# Patient Record
Sex: MALE | Race: WHITE | Employment: OTHER | URBAN - METROPOLITAN AREA
[De-identification: names, ages, dates, MRNs, and addresses within clinical notes are randomized per-mention and may not be internally consistent; named-entity substitution may affect disease eponyms.]

---

## 2017-03-09 RX ORDER — TADALAFIL 5 MG
TABLET ORAL
Refills: 6 | COMMUNITY
Start: 2017-02-21 | End: 2017-09-18

## 2017-03-09 RX ORDER — PANTOPRAZOLE SODIUM 40 MG/1
40 TABLET, DELAYED RELEASE ORAL
Qty: 90 TABLET | Refills: 3 | Status: SHIPPED | OUTPATIENT
Start: 2017-03-09 | End: 2017-03-27

## 2017-03-27 ENCOUNTER — TELEPHONE (OUTPATIENT)
Dept: INTERNAL MEDICINE CLINIC | Facility: CLINIC | Age: 82
End: 2017-03-27

## 2017-03-27 RX ORDER — PANTOPRAZOLE SODIUM 40 MG/1
40 TABLET, DELAYED RELEASE ORAL
Qty: 90 TABLET | Refills: 3 | Status: SHIPPED | OUTPATIENT
Start: 2017-03-27 | End: 2018-05-10

## 2017-03-27 NOTE — TELEPHONE ENCOUNTER
[facility] requesting NEW RX for:  Pantoprazole Sod DR Tabs 40MG, Qty 90 days w/refills  Tasked to [employer]

## 2017-03-28 ENCOUNTER — HOSPITAL ENCOUNTER (OUTPATIENT)
Dept: GENERAL RADIOLOGY | Facility: HOSPITAL | Age: 82
Discharge: HOME | End: 2017-03-28
Attending: ORTHOPAEDIC SURGERY
Payer: MEDICARE

## 2017-03-28 DIAGNOSIS — M16.11 PRIMARY OSTEOARTHRITIS OF RIGHT HIP: ICD-10-CM

## 2017-03-28 PROCEDURE — 77002 NEEDLE LOCALIZATION BY XRAY: CPT

## 2017-03-28 PROCEDURE — 20610 DRAIN/INJ JOINT/BURSA W/O US: CPT

## 2017-03-28 RX ORDER — TRIAMCINOLONE ACETONIDE 40 MG/ML
INJECTION, SUSPENSION INTRA-ARTICULAR; INTRAMUSCULAR
Status: COMPLETED
Start: 2017-03-28 | End: 2017-03-28

## 2017-03-28 RX ORDER — LIDOCAINE HYDROCHLORIDE 20 MG/ML
5 INJECTION, SOLUTION EPIDURAL; INFILTRATION; INTRACAUDAL; PERINEURAL ONCE
Status: COMPLETED | OUTPATIENT
Start: 2017-03-28 | End: 2017-03-28

## 2017-03-28 RX ORDER — LIDOCAINE HYDROCHLORIDE 20 MG/ML
INJECTION, SOLUTION EPIDURAL; INFILTRATION; INTRACAUDAL; PERINEURAL
Status: COMPLETED
Start: 2017-03-28 | End: 2017-03-28

## 2017-03-28 RX ORDER — TRIAMCINOLONE ACETONIDE 40 MG/ML
40 INJECTION, SUSPENSION INTRA-ARTICULAR; INTRAMUSCULAR ONCE
Status: COMPLETED | OUTPATIENT
Start: 2017-03-28 | End: 2017-03-28

## 2017-03-28 RX ADMIN — TRIAMCINOLONE ACETONIDE 40 MG: 40 INJECTION, SUSPENSION INTRA-ARTICULAR; INTRAMUSCULAR at 09:45:00

## 2017-03-28 RX ADMIN — LIDOCAINE HYDROCHLORIDE 5 ML: 20 INJECTION, SOLUTION EPIDURAL; INFILTRATION; INTRACAUDAL; PERINEURAL at 09:45:00

## 2017-03-28 NOTE — OPERATIVE REPORT
[facility]    PATIENT'S NAME: Marley Smart   ATTENDING PHYSICIAN: George Lee MD   OPERATING PHYSICIAN: George Lee MD   PATIENT ACCOUNT#:   [de-identified]    LOCATION:  [facility]  MEDICAL RECORD #:   [id number]       DATE OF BIRTH:  [birth year] patient supine on the fluoroscopy table and with a foam wedge placed to flex his right hip and knee, posterior sterile draping to the right hip was applied.   The anterior and lateral right hip were then prepared with a triple application of Povidone-iodine

## 2017-03-31 ENCOUNTER — APPOINTMENT (OUTPATIENT)
Dept: LAB | Age: 82
End: 2017-03-31
Attending: INTERNAL MEDICINE
Payer: MEDICARE

## 2017-03-31 PROCEDURE — 84443 ASSAY THYROID STIM HORMONE: CPT | Performed by: INTERNAL MEDICINE

## 2017-03-31 PROCEDURE — 80061 LIPID PANEL: CPT | Performed by: INTERNAL MEDICINE

## 2017-03-31 PROCEDURE — 80053 COMPREHEN METABOLIC PANEL: CPT | Performed by: INTERNAL MEDICINE

## 2017-03-31 PROCEDURE — 85060 BLOOD SMEAR INTERPRETATION: CPT | Performed by: INTERNAL MEDICINE

## 2017-03-31 PROCEDURE — 81001 URINALYSIS AUTO W/SCOPE: CPT | Performed by: INTERNAL MEDICINE

## 2017-03-31 PROCEDURE — 85025 COMPLETE CBC W/AUTO DIFF WBC: CPT | Performed by: INTERNAL MEDICINE

## 2017-04-05 ENCOUNTER — OFFICE VISIT (OUTPATIENT)
Dept: INTERNAL MEDICINE CLINIC | Facility: CLINIC | Age: 82
End: 2017-04-05

## 2017-04-05 VITALS
OXYGEN SATURATION: 97 % | SYSTOLIC BLOOD PRESSURE: 120 MMHG | HEART RATE: 81 BPM | BODY MASS INDEX: 24.5 KG/M2 | WEIGHT: 140 LBS | DIASTOLIC BLOOD PRESSURE: 70 MMHG | HEIGHT: 63.25 IN | TEMPERATURE: 98 F

## 2017-04-05 DIAGNOSIS — E78.00 HYPERCHOLESTEREMIA: ICD-10-CM

## 2017-04-05 DIAGNOSIS — I25.9 IHD (ISCHEMIC HEART DISEASE): ICD-10-CM

## 2017-04-05 DIAGNOSIS — M79.606 PAIN IN LEG, UNSPECIFIED: Primary | ICD-10-CM

## 2017-04-05 DIAGNOSIS — D72.829 LEUKOCYTOSIS, UNSPECIFIED TYPE: ICD-10-CM

## 2017-04-05 PROCEDURE — G0463 HOSPITAL OUTPT CLINIC VISIT: HCPCS | Performed by: INTERNAL MEDICINE

## 2017-04-05 PROCEDURE — 99214 OFFICE O/P EST MOD 30 MIN: CPT | Performed by: INTERNAL MEDICINE

## 2017-04-05 RX ORDER — ATORVASTATIN CALCIUM 10 MG/1
10 TABLET, FILM COATED ORAL DAILY
Qty: 90 TABLET | Refills: 3 | Status: SHIPPED | OUTPATIENT
Start: 2017-04-05 | End: 2018-03-13

## 2017-04-05 RX ORDER — NITROGLYCERIN 0.3 MG/1
0.3 TABLET SUBLINGUAL EVERY 5 MIN PRN
Qty: 25 TABLET | Refills: 0 | Status: SHIPPED | OUTPATIENT
Start: 2017-04-05 | End: 2018-09-07

## 2017-04-05 NOTE — PROGRESS NOTES
Clinton Monroe is a 80year old male. HPI:   He is doing well and has no major complaints. He did have steroid injection in right hip Dr Roula Griffin with good relief. He has not CV sx in spite of significant disease.        SR: no chest pain or sob, no gu or g distress  SKIN: no rashes,no suspicious lesions  HEENT: atraumatic, normocephalic,ears and throat are clear  NECK: supple,no adenopathy,no bruits  LUNGS: clear to auscultation  CARDIO: RRR without murmur  GI: good BS's,no masses, HSM or tenderness  EXTREMI

## 2017-05-04 ENCOUNTER — LAB ENCOUNTER (OUTPATIENT)
Dept: LAB | Age: 82
End: 2017-05-04
Attending: INTERNAL MEDICINE
Payer: MEDICARE

## 2017-05-04 DIAGNOSIS — I25.9 IHD (ISCHEMIC HEART DISEASE): ICD-10-CM

## 2017-05-04 DIAGNOSIS — M79.606 PAIN IN LEG, UNSPECIFIED: ICD-10-CM

## 2017-05-04 DIAGNOSIS — E78.00 HYPERCHOLESTEREMIA: ICD-10-CM

## 2017-05-04 DIAGNOSIS — D72.829 LEUKOCYTOSIS, UNSPECIFIED TYPE: ICD-10-CM

## 2017-05-04 PROCEDURE — 36415 COLL VENOUS BLD VENIPUNCTURE: CPT

## 2017-05-04 PROCEDURE — 85025 COMPLETE CBC W/AUTO DIFF WBC: CPT

## 2017-05-07 ENCOUNTER — TELEPHONE (OUTPATIENT)
Dept: INTERNAL MEDICINE CLINIC | Facility: CLINIC | Age: 82
End: 2017-05-07

## 2017-05-07 DIAGNOSIS — D56.3 THALASSEMIA TRAIT: Primary | ICD-10-CM

## 2017-05-07 NOTE — TELEPHONE ENCOUNTER
Blood count slightly lower 12.2 was 12.9 - most likely due to thalassemia-  Just repeat CBC in 2 mo with ferritin    All other labs good from 3/31

## 2017-05-08 NOTE — TELEPHONE ENCOUNTER
Patient contacted and relayed 's message regarding lab results. CBC and Ferritin to be done in 2 months ordered.

## 2017-07-07 ENCOUNTER — LAB ENCOUNTER (OUTPATIENT)
Dept: LAB | Age: 82
End: 2017-07-07
Attending: INTERNAL MEDICINE
Payer: MEDICARE

## 2017-07-07 DIAGNOSIS — D56.3 THALASSEMIA TRAIT: ICD-10-CM

## 2017-07-07 LAB
BASOPHILS # BLD: 0.1 K/UL (ref 0–0.2)
BASOPHILS NFR BLD: 1 %
EOSINOPHIL # BLD: 0.2 K/UL (ref 0–0.7)
EOSINOPHIL NFR BLD: 2 %
ERYTHROCYTE [DISTWIDTH] IN BLOOD BY AUTOMATED COUNT: 17.7 % (ref 11–15)
FERRITIN SERPL IA-MCNC: 101 NG/ML (ref 24–336)
HCT VFR BLD AUTO: 41.5 % (ref 41–52)
HGB BLD-MCNC: 13 G/DL (ref 13.5–17.5)
LYMPHOCYTES # BLD: 1.5 K/UL (ref 1–4)
LYMPHOCYTES NFR BLD: 16 %
MCH RBC QN AUTO: 20.7 PG (ref 27–32)
MCHC RBC AUTO-ENTMCNC: 31.4 G/DL (ref 32–37)
MCV RBC AUTO: 65.9 FL (ref 80–100)
MONOCYTES # BLD: 0.9 K/UL (ref 0–1)
MONOCYTES NFR BLD: 10 %
NEUTROPHILS # BLD AUTO: 6.5 K/UL (ref 1.8–7.7)
NEUTROPHILS NFR BLD: 71 %
PLATELET # BLD AUTO: 190 K/UL (ref 140–400)
PMV BLD AUTO: 9.2 FL (ref 7.4–10.3)
RBC # BLD AUTO: 6.3 M/UL (ref 4.5–5.9)
WBC # BLD AUTO: 9.1 K/UL (ref 4–11)

## 2017-07-07 PROCEDURE — 82728 ASSAY OF FERRITIN: CPT

## 2017-07-07 PROCEDURE — 85025 COMPLETE CBC W/AUTO DIFF WBC: CPT

## 2017-07-07 PROCEDURE — 36415 COLL VENOUS BLD VENIPUNCTURE: CPT

## 2017-07-16 ENCOUNTER — TELEPHONE (OUTPATIENT)
Dept: INTERNAL MEDICINE CLINIC | Facility: CLINIC | Age: 82
End: 2017-07-16

## 2017-08-30 RX ORDER — METOPROLOL SUCCINATE 25 MG/1
TABLET, EXTENDED RELEASE ORAL
Qty: 90 TABLET | Refills: 3 | Status: SHIPPED | OUTPATIENT
Start: 2017-08-30 | End: 2018-08-25

## 2017-09-05 ENCOUNTER — HOSPITAL ENCOUNTER (OUTPATIENT)
Dept: GENERAL RADIOLOGY | Facility: HOSPITAL | Age: 82
Discharge: HOME | End: 2017-09-05
Attending: ORTHOPAEDIC SURGERY
Payer: MEDICARE

## 2017-09-05 DIAGNOSIS — M16.11 PRIMARY OSTEOARTHRITIS OF RIGHT HIP: ICD-10-CM

## 2017-09-05 PROCEDURE — 77002 NEEDLE LOCALIZATION BY XRAY: CPT | Performed by: ORTHOPAEDIC SURGERY

## 2017-09-05 PROCEDURE — 20610 DRAIN/INJ JOINT/BURSA W/O US: CPT | Performed by: ORTHOPAEDIC SURGERY

## 2017-09-05 RX ORDER — LIDOCAINE HYDROCHLORIDE 20 MG/ML
INJECTION, SOLUTION EPIDURAL; INFILTRATION; INTRACAUDAL; PERINEURAL
Status: COMPLETED
Start: 2017-09-05 | End: 2017-09-05

## 2017-09-05 RX ORDER — TRIAMCINOLONE ACETONIDE 40 MG/ML
40 INJECTION, SUSPENSION INTRA-ARTICULAR; INTRAMUSCULAR ONCE
Status: COMPLETED | OUTPATIENT
Start: 2017-09-05 | End: 2017-09-05

## 2017-09-05 RX ORDER — TRIAMCINOLONE ACETONIDE 40 MG/ML
INJECTION, SUSPENSION INTRA-ARTICULAR; INTRAMUSCULAR
Status: COMPLETED
Start: 2017-09-05 | End: 2017-09-05

## 2017-09-05 RX ORDER — LIDOCAINE HYDROCHLORIDE 20 MG/ML
5 INJECTION, SOLUTION EPIDURAL; INFILTRATION; INTRACAUDAL; PERINEURAL ONCE
Status: COMPLETED | OUTPATIENT
Start: 2017-09-05 | End: 2017-09-05

## 2017-09-05 RX ADMIN — TRIAMCINOLONE ACETONIDE 40 MG: 40 INJECTION, SUSPENSION INTRA-ARTICULAR; INTRAMUSCULAR at 10:00:00

## 2017-09-05 RX ADMIN — LIDOCAINE HYDROCHLORIDE 5 ML: 20 INJECTION, SOLUTION EPIDURAL; INFILTRATION; INTRACAUDAL; PERINEURAL at 10:15:00

## 2017-09-05 NOTE — OPERATIVE REPORT
[facility]    PATIENT'S NAME: John Saha   ATTENDING PHYSICIAN: George Lee MD   OPERATING PHYSICIAN: George Lee MD   PATIENT ACCOUNT#:   [de-identified]    LOCATION:  [facility]  MEDICAL RECORD #:   [id number]       DATE OF BIRTH:  [birth year] application of povidone-iodine solution. Local anterolateral draping was placed. The right hip was visualized with AP fluoroscopy.   A 22-gauge spinal needle was aligned to the femoral neck and head followed by skin puncture and infiltration of a small Dictated By Tanya Lee MD  d: 09/05/2017 11:46:22  t: 09/05/2017 11:58:29  Job [id number]  EOK/

## 2017-09-18 ENCOUNTER — TELEPHONE (OUTPATIENT)
Dept: INTERNAL MEDICINE CLINIC | Facility: CLINIC | Age: 82
End: 2017-09-18

## 2017-09-18 RX ORDER — TADALAFIL 10 MG/1
10 TABLET ORAL
Qty: 30 TABLET | Refills: 6 | COMMUNITY
Start: 2017-09-18 | End: 2018-09-21

## 2017-09-18 NOTE — TELEPHONE ENCOUNTER
Pt. Needs a Rx for Cialis 10 mg he will use a [facility] to have it filled there.    Ph. # [phone]    Routed to Rx

## 2017-09-18 NOTE — TELEPHONE ENCOUNTER
Called patient and relayed DR. SAUER message - RX written , copy to scanning  Patient will  RX [address]

## 2018-01-09 ENCOUNTER — HOSPITAL ENCOUNTER (OUTPATIENT)
Dept: GENERAL RADIOLOGY | Facility: HOSPITAL | Age: 83
Discharge: HOME | End: 2018-01-09
Attending: ORTHOPAEDIC SURGERY
Payer: MEDICARE

## 2018-01-09 DIAGNOSIS — M16.11 OSTEOARTHRITIS OF RIGHT HIP: ICD-10-CM

## 2018-01-09 PROCEDURE — 20610 DRAIN/INJ JOINT/BURSA W/O US: CPT | Performed by: ORTHOPAEDIC SURGERY

## 2018-01-09 PROCEDURE — 77002 NEEDLE LOCALIZATION BY XRAY: CPT | Performed by: ORTHOPAEDIC SURGERY

## 2018-01-09 RX ORDER — TRIAMCINOLONE ACETONIDE 40 MG/ML
INJECTION, SUSPENSION INTRA-ARTICULAR; INTRAMUSCULAR
Status: COMPLETED
Start: 2018-01-09 | End: 2018-01-09

## 2018-01-09 RX ORDER — TRIAMCINOLONE ACETONIDE 40 MG/ML
40 INJECTION, SUSPENSION INTRA-ARTICULAR; INTRAMUSCULAR ONCE
Status: COMPLETED | OUTPATIENT
Start: 2018-01-09 | End: 2018-01-09

## 2018-01-09 RX ORDER — LIDOCAINE HYDROCHLORIDE 20 MG/ML
5 INJECTION, SOLUTION EPIDURAL; INFILTRATION; INTRACAUDAL; PERINEURAL ONCE
Status: COMPLETED | OUTPATIENT
Start: 2018-01-09 | End: 2018-01-09

## 2018-01-09 RX ORDER — LIDOCAINE HYDROCHLORIDE 20 MG/ML
INJECTION, SOLUTION EPIDURAL; INFILTRATION; INTRACAUDAL; PERINEURAL
Status: COMPLETED
Start: 2018-01-09 | End: 2018-01-09

## 2018-01-09 RX ADMIN — LIDOCAINE HYDROCHLORIDE 5 ML: 20 INJECTION, SOLUTION EPIDURAL; INFILTRATION; INTRACAUDAL; PERINEURAL at 09:30:00

## 2018-01-09 RX ADMIN — TRIAMCINOLONE ACETONIDE 40 MG: 40 INJECTION, SUSPENSION INTRA-ARTICULAR; INTRAMUSCULAR at 09:30:00

## 2018-01-09 NOTE — OPERATIVE REPORT
[facility]    PATIENT'S NAME: Francie Uriarte   ATTENDING PHYSICIAN: George Lee MD   OPERATING PHYSICIAN: George Lee MD   PATIENT ACCOUNT#:   [id number]    LOCATION:  [facility]  MEDICAL RECORD #:   [id number]       DATE OF BIRTH:  [birth year] bone resistance over the midportion of the femoral neck at the junction of the middle and inferior one-third. A total of 3.5 mL of the 1% lidocaine was injected. Attempt at aspirating the right hip provided no recovery of joint fluid.     The right hip wa

## 2018-01-16 ENCOUNTER — TELEPHONE (OUTPATIENT)
Dept: INTERNAL MEDICINE CLINIC | Facility: CLINIC | Age: 83
End: 2018-01-16

## 2018-01-16 DIAGNOSIS — D64.9 ANEMIA, UNSPECIFIED TYPE: ICD-10-CM

## 2018-01-16 DIAGNOSIS — E78.00 HYPERCHOLESTEREMIA: Primary | ICD-10-CM

## 2018-01-16 NOTE — TELEPHONE ENCOUNTER
Pt is scheduled for 6 Month on 2/5 pt would like an order for labs put in system  Please call pt when complete [phone]   Tasked to nursing

## 2018-02-01 ENCOUNTER — LAB ENCOUNTER (OUTPATIENT)
Dept: LAB | Age: 83
End: 2018-02-01
Attending: INTERNAL MEDICINE
Payer: MEDICARE

## 2018-02-01 DIAGNOSIS — D64.9 ANEMIA, UNSPECIFIED TYPE: ICD-10-CM

## 2018-02-01 DIAGNOSIS — E78.00 HYPERCHOLESTEREMIA: ICD-10-CM

## 2018-02-01 LAB
ALBUMIN SERPL BCP-MCNC: 3.9 G/DL (ref 3.5–4.8)
ALBUMIN/GLOB SERPL: 1.5 {RATIO} (ref 1–2)
ALP SERPL-CCNC: 62 U/L (ref 32–100)
ALT SERPL-CCNC: 17 U/L (ref 17–63)
ANION GAP SERPL CALC-SCNC: 8 MMOL/L (ref 0–18)
AST SERPL-CCNC: 22 U/L (ref 15–41)
BASOPHILS # BLD: 0.1 K/UL (ref 0–0.2)
BASOPHILS NFR BLD: 1 %
BILIRUB SERPL-MCNC: 1.9 MG/DL (ref 0.3–1.2)
BUN SERPL-MCNC: 18 MG/DL (ref 8–20)
BUN/CREAT SERPL: 17.8 (ref 10–20)
CALCIUM SERPL-MCNC: 9.3 MG/DL (ref 8.5–10.5)
CHLORIDE SERPL-SCNC: 108 MMOL/L (ref 95–110)
CHOLEST SERPL-MCNC: 128 MG/DL (ref 110–200)
CO2 SERPL-SCNC: 27 MMOL/L (ref 22–32)
CREAT SERPL-MCNC: 1.01 MG/DL (ref 0.5–1.5)
EOSINOPHIL # BLD: 0.2 K/UL (ref 0–0.7)
EOSINOPHIL NFR BLD: 2 %
ERYTHROCYTE [DISTWIDTH] IN BLOOD BY AUTOMATED COUNT: 17.6 % (ref 11–15)
FERRITIN SERPL IA-MCNC: 111 NG/ML (ref 24–336)
GLOBULIN PLAS-MCNC: 2.6 G/DL (ref 2.5–3.7)
GLUCOSE SERPL-MCNC: 94 MG/DL (ref 70–99)
HCT VFR BLD AUTO: 42.5 % (ref 41–52)
HDLC SERPL-MCNC: 46 MG/DL
HGB BLD-MCNC: 13.2 G/DL (ref 13.5–17.5)
LDLC SERPL CALC-MCNC: 70 MG/DL (ref 0–99)
LYMPHOCYTES # BLD: 1.3 K/UL (ref 1–4)
LYMPHOCYTES NFR BLD: 12 %
MCH RBC QN AUTO: 20.7 PG (ref 27–32)
MCHC RBC AUTO-ENTMCNC: 31.1 G/DL (ref 32–37)
MCV RBC AUTO: 66.4 FL (ref 80–100)
MONOCYTES # BLD: 0.8 K/UL (ref 0–1)
MONOCYTES NFR BLD: 8 %
NEUTROPHILS # BLD AUTO: 8 K/UL (ref 1.8–7.7)
NEUTROPHILS NFR BLD: 77 %
NONHDLC SERPL-MCNC: 82 MG/DL
OSMOLALITY UR CALC.SUM OF ELEC: 298 MOSM/KG (ref 275–295)
PLATELET # BLD AUTO: 184 K/UL (ref 140–400)
PMV BLD AUTO: 8.4 FL (ref 7.4–10.3)
POTASSIUM SERPL-SCNC: 4.4 MMOL/L (ref 3.3–5.1)
PROT SERPL-MCNC: 6.5 G/DL (ref 5.9–8.4)
RBC # BLD AUTO: 6.4 M/UL (ref 4.5–5.9)
SODIUM SERPL-SCNC: 143 MMOL/L (ref 136–144)
TRIGL SERPL-MCNC: 59 MG/DL (ref 1–149)
TSH SERPL-ACNC: 5.72 UIU/ML (ref 0.45–5.33)
VIT B12 SERPL-MCNC: 639 PG/ML (ref 181–914)
WBC # BLD AUTO: 10.3 K/UL (ref 4–11)

## 2018-02-01 PROCEDURE — 82728 ASSAY OF FERRITIN: CPT

## 2018-02-01 PROCEDURE — 80053 COMPREHEN METABOLIC PANEL: CPT

## 2018-02-01 PROCEDURE — 80061 LIPID PANEL: CPT

## 2018-02-01 PROCEDURE — 84443 ASSAY THYROID STIM HORMONE: CPT

## 2018-02-01 PROCEDURE — 82607 VITAMIN B-12: CPT

## 2018-02-01 PROCEDURE — 36415 COLL VENOUS BLD VENIPUNCTURE: CPT

## 2018-02-01 PROCEDURE — 85025 COMPLETE CBC W/AUTO DIFF WBC: CPT

## 2018-02-05 ENCOUNTER — OFFICE VISIT (OUTPATIENT)
Dept: INTERNAL MEDICINE CLINIC | Facility: CLINIC | Age: 83
End: 2018-02-05

## 2018-02-05 VITALS
SYSTOLIC BLOOD PRESSURE: 144 MMHG | OXYGEN SATURATION: 98 % | TEMPERATURE: 98 F | DIASTOLIC BLOOD PRESSURE: 82 MMHG | WEIGHT: 136 LBS | BODY MASS INDEX: 23.8 KG/M2 | HEIGHT: 63.25 IN | HEART RATE: 87 BPM

## 2018-02-05 DIAGNOSIS — R09.89 CAROTID BRUIT, UNSPECIFIED LATERALITY: ICD-10-CM

## 2018-02-05 DIAGNOSIS — I25.9 IHD (ISCHEMIC HEART DISEASE): Primary | ICD-10-CM

## 2018-02-05 DIAGNOSIS — E78.00 HYPERCHOLESTEREMIA: ICD-10-CM

## 2018-02-05 DIAGNOSIS — M16.11 PRIMARY OSTEOARTHRITIS OF RIGHT HIP: ICD-10-CM

## 2018-02-05 DIAGNOSIS — D56.3 THALASSEMIA MINOR: ICD-10-CM

## 2018-02-05 PROCEDURE — G0463 HOSPITAL OUTPT CLINIC VISIT: HCPCS | Performed by: INTERNAL MEDICINE

## 2018-02-05 PROCEDURE — 99214 OFFICE O/P EST MOD 30 MIN: CPT | Performed by: INTERNAL MEDICINE

## 2018-02-05 NOTE — PROGRESS NOTES
Kristy Junior is a 80year old male. HPI:   He is feeling fine and very active and no cardiac sx.        SR: no chest pain or sob, no gu or gi sx.        1. IHD (ischemic heart disease)  He has no sx of chest pain and is doing well on current meds     2 resection of prostate)    • Thalassemia trait       Social History:  Smoking status: Never Smoker                                                              Alcohol use: Yes           3.5 oz/week     Glasses of wine: 7 per week       REVIEW OF SYSTEMS:

## 2018-02-09 ENCOUNTER — HOSPITAL ENCOUNTER (OUTPATIENT)
Dept: ULTRASOUND IMAGING | Facility: HOSPITAL | Age: 83
Discharge: HOME | End: 2018-02-09
Attending: INTERNAL MEDICINE
Payer: MEDICARE

## 2018-02-09 DIAGNOSIS — R09.89 CAROTID BRUIT, UNSPECIFIED LATERALITY: ICD-10-CM

## 2018-02-09 DIAGNOSIS — I25.9 IHD (ISCHEMIC HEART DISEASE): ICD-10-CM

## 2018-02-09 PROCEDURE — 76770 US EXAM ABDO BACK WALL COMP: CPT | Performed by: INTERNAL MEDICINE

## 2018-02-09 PROCEDURE — 93880 EXTRACRANIAL BILAT STUDY: CPT | Performed by: INTERNAL MEDICINE

## 2018-02-13 ENCOUNTER — TELEPHONE (OUTPATIENT)
Dept: INTERNAL MEDICINE CLINIC | Facility: CLINIC | Age: 83
End: 2018-02-13

## 2018-03-13 RX ORDER — ATORVASTATIN CALCIUM 10 MG/1
TABLET, FILM COATED ORAL
Qty: 90 TABLET | Refills: 3 | Status: SHIPPED | OUTPATIENT
Start: 2018-03-13 | End: 2019-03-10

## 2018-05-08 ENCOUNTER — HOSPITAL ENCOUNTER (OUTPATIENT)
Dept: GENERAL RADIOLOGY | Facility: HOSPITAL | Age: 83
Discharge: HOME | End: 2018-05-08
Attending: ORTHOPAEDIC SURGERY
Payer: MEDICARE

## 2018-05-08 DIAGNOSIS — M16.11 OSTEOARTHRITIS OF RIGHT HIP: ICD-10-CM

## 2018-05-08 PROCEDURE — 20610 DRAIN/INJ JOINT/BURSA W/O US: CPT | Performed by: ORTHOPAEDIC SURGERY

## 2018-05-08 PROCEDURE — 77002 NEEDLE LOCALIZATION BY XRAY: CPT | Performed by: ORTHOPAEDIC SURGERY

## 2018-05-08 RX ORDER — LIDOCAINE HYDROCHLORIDE 20 MG/ML
5 INJECTION, SOLUTION EPIDURAL; INFILTRATION; INTRACAUDAL; PERINEURAL ONCE
Status: COMPLETED | OUTPATIENT
Start: 2018-05-08 | End: 2018-05-08

## 2018-05-08 RX ORDER — TRIAMCINOLONE ACETONIDE 40 MG/ML
40 INJECTION, SUSPENSION INTRA-ARTICULAR; INTRAMUSCULAR ONCE
Status: DISCONTINUED | OUTPATIENT
Start: 2018-05-08 | End: 2018-05-10

## 2018-05-08 RX ORDER — LIDOCAINE HYDROCHLORIDE 10 MG/ML
5 INJECTION, SOLUTION EPIDURAL; INFILTRATION; INTRACAUDAL; PERINEURAL ONCE
Status: DISCONTINUED | OUTPATIENT
Start: 2018-05-08 | End: 2018-05-10

## 2018-05-08 RX ADMIN — LIDOCAINE HYDROCHLORIDE 5 ML: 20 INJECTION, SOLUTION EPIDURAL; INFILTRATION; INTRACAUDAL; PERINEURAL at 09:30:00

## 2018-05-08 NOTE — OPERATIVE REPORT
[facility]    PATIENT'S NAME: Katie Mejia   ATTENDING PHYSICIAN: George Lee MD   OPERATING PHYSICIAN: George Lee MD   PATIENT ACCOUNT#:   [id number]    LOCATION:  [facility]  MEDICAL RECORD #:   [id number]       DATE OF BIRTH:  [birth year] desires to proceed with right hip steroid injection, which was agreed to. Repeat exam of the right hip shows markedly restricted passive range of motion with severe end range pain with minimal degrees of internal and external rotation as well as flexion. company of technical staff. Prior to doing so, he was directed to reschedule a repeat injection for 4 months which would be in the second week of September 2018. Dictated By Tanya Lee MD  d: 05/08/2018 10:19:59  t: 05/08/2018 10:28:47  Job [id number]

## 2018-05-10 RX ORDER — PANTOPRAZOLE SODIUM 40 MG/1
40 TABLET, DELAYED RELEASE ORAL DAILY
Qty: 90 TABLET | Refills: 3 | Status: SHIPPED | OUTPATIENT
Start: 2018-05-10 | End: 2019-05-05

## 2018-05-26 ENCOUNTER — HOSPITAL ENCOUNTER (EMERGENCY)
Facility: HOSPITAL | Age: 83
Discharge: HOME | End: 2018-05-27
Attending: EMERGENCY MEDICINE
Payer: MEDICARE

## 2018-05-26 VITALS
HEIGHT: 64 IN | TEMPERATURE: 98 F | HEART RATE: 78 BPM | OXYGEN SATURATION: 96 % | DIASTOLIC BLOOD PRESSURE: 77 MMHG | RESPIRATION RATE: 18 BRPM | BODY MASS INDEX: 23.9 KG/M2 | SYSTOLIC BLOOD PRESSURE: 142 MMHG | WEIGHT: 140 LBS

## 2018-05-26 DIAGNOSIS — S61.411A LACERATION OF RIGHT HAND WITHOUT FOREIGN BODY, INITIAL ENCOUNTER: Primary | ICD-10-CM

## 2018-05-26 PROCEDURE — 12001 RPR S/N/AX/GEN/TRNK 2.5CM/<: CPT

## 2018-05-26 PROCEDURE — 99283 EMERGENCY DEPT VISIT LOW MDM: CPT

## 2018-05-27 NOTE — ED INITIAL ASSESSMENT (HPI)
Caught top of right hand on car door edge- skin tear to top of right hand, came to ED for uncontrolled bleeding that has now stopped.

## 2018-05-27 NOTE — ED PROVIDER NOTES
Patient Seen in: [facility] AND [facility] Emergency Department    History   Patient presents with:  Laceration Abrasion (integumentary)      HPI    Patient presents to the ED after sustaining a skin tear to the dorsal right hand after catching on his car door e 2314]  BP: 142/77  Pulse: 78  Resp: 18  Temp: 98.1 °F (36.7 °C)  Temp src: Temporal  SpO2: 96 %  O2 Device: None (Room air)    Physical Exam   Constitutional: He appears well-developed and well-nourished. No distress.    HENT:   Head: Normocephalic and atra Verbal consent was obtained from the patient. Sterile technique. The 2 cm laceration located to the dorsal right hand was  scrubbed, draped and explored. There were no deep structures involved. No tendon injury was identified.   The wound was repaired wi

## 2018-07-13 RX ORDER — TADALAFIL 5 MG
TABLET ORAL
Qty: 30 TABLET | Refills: 0 | OUTPATIENT
Start: 2018-07-13

## 2018-07-13 NOTE — TELEPHONE ENCOUNTER
Cialis refill request denied. Per 9/18/17 encounter, patient on 10mg and gets from [facility]

## 2018-08-21 ENCOUNTER — HOSPITAL ENCOUNTER (OUTPATIENT)
Dept: GENERAL RADIOLOGY | Facility: HOSPITAL | Age: 83
Discharge: HOME | End: 2018-08-21
Attending: ORTHOPAEDIC SURGERY
Payer: MEDICARE

## 2018-08-21 DIAGNOSIS — M16.11 OSTEOARTHRITIS OF RIGHT HIP: ICD-10-CM

## 2018-08-21 PROCEDURE — 77002 NEEDLE LOCALIZATION BY XRAY: CPT | Performed by: ORTHOPAEDIC SURGERY

## 2018-08-21 PROCEDURE — 20610 DRAIN/INJ JOINT/BURSA W/O US: CPT | Performed by: ORTHOPAEDIC SURGERY

## 2018-08-21 RX ORDER — LIDOCAINE HYDROCHLORIDE 10 MG/ML
5 INJECTION, SOLUTION EPIDURAL; INFILTRATION; INTRACAUDAL; PERINEURAL ONCE
Status: COMPLETED | OUTPATIENT
Start: 2018-08-21 | End: 2018-08-21

## 2018-08-21 RX ORDER — METHYLPREDNISOLONE ACETATE 80 MG/ML
80 INJECTION, SUSPENSION INTRA-ARTICULAR; INTRALESIONAL; INTRAMUSCULAR; SOFT TISSUE ONCE
Status: COMPLETED | OUTPATIENT
Start: 2018-08-21 | End: 2018-08-21

## 2018-08-21 RX ORDER — METHYLPREDNISOLONE ACETATE 80 MG/ML
INJECTION, SUSPENSION INTRA-ARTICULAR; INTRALESIONAL; INTRAMUSCULAR; SOFT TISSUE
Status: COMPLETED
Start: 2018-08-21 | End: 2018-08-21

## 2018-08-21 RX ORDER — LIDOCAINE HYDROCHLORIDE 20 MG/ML
INJECTION, SOLUTION EPIDURAL; INFILTRATION; INTRACAUDAL; PERINEURAL
Status: COMPLETED
Start: 2018-08-21 | End: 2018-08-21

## 2018-08-21 RX ORDER — TRIAMCINOLONE ACETONIDE 40 MG/ML
INJECTION, SUSPENSION INTRA-ARTICULAR; INTRAMUSCULAR
Status: DISCONTINUED
Start: 2018-08-21 | End: 2018-08-21 | Stop reason: WASHOUT

## 2018-08-21 RX ORDER — LIDOCAINE HYDROCHLORIDE 10 MG/ML
INJECTION, SOLUTION EPIDURAL; INFILTRATION; INTRACAUDAL; PERINEURAL
Status: COMPLETED
Start: 2018-08-21 | End: 2018-08-21

## 2018-08-21 RX ORDER — LIDOCAINE HYDROCHLORIDE 20 MG/ML
5 INJECTION, SOLUTION EPIDURAL; INFILTRATION; INTRACAUDAL; PERINEURAL ONCE
Status: COMPLETED | OUTPATIENT
Start: 2018-08-21 | End: 2018-08-21

## 2018-08-21 RX ADMIN — LIDOCAINE HYDROCHLORIDE 5 ML: 20 INJECTION, SOLUTION EPIDURAL; INFILTRATION; INTRACAUDAL; PERINEURAL at 10:00:00

## 2018-08-21 RX ADMIN — LIDOCAINE HYDROCHLORIDE 5 ML: 10 INJECTION, SOLUTION EPIDURAL; INFILTRATION; INTRACAUDAL; PERINEURAL at 10:00:00

## 2018-08-21 RX ADMIN — METHYLPREDNISOLONE ACETATE 80 MG: 80 INJECTION, SUSPENSION INTRA-ARTICULAR; INTRALESIONAL; INTRAMUSCULAR; SOFT TISSUE at 10:00:00

## 2018-08-21 NOTE — OPERATIVE REPORT
[facility]    PATIENT'S NAME: Kitty Stephie   ATTENDING PHYSICIAN: George Lee MD   OPERATING PHYSICIAN: George Lee MD   PATIENT ACCOUNT#:   [de-identified]    LOCATION:  [facility]  MEDICAL RECORD #:   [id number]       DATE OF BIRTH:  [birth year] to the right hip. The anterior and lateral right hip were prepared with a triple application of povidone-iodine solution. Local isolation draping to the anterolateral right hip was placed.     With fluoroscopy guidance, a 22-gauge spinal needle was aligne time then continuation of the steroid injection might be recommended. He continues to be a candidate for total hip replacement arthroplasty in the event that his cardiac status would be further addressed and allow for major elective surgery.   The current

## 2018-08-25 RX ORDER — METOPROLOL SUCCINATE 25 MG/1
TABLET, EXTENDED RELEASE ORAL
Qty: 90 TABLET | Refills: 3 | Status: SHIPPED | OUTPATIENT
Start: 2018-08-25 | End: 2019-08-22

## 2018-08-27 ENCOUNTER — TELEPHONE (OUTPATIENT)
Dept: INTERNAL MEDICINE CLINIC | Facility: CLINIC | Age: 83
End: 2018-08-27

## 2018-08-27 DIAGNOSIS — D56.3 THALASSEMIA TRAIT: Primary | ICD-10-CM

## 2018-08-27 DIAGNOSIS — E78.00 HYPERCHOLESTEREMIA: ICD-10-CM

## 2018-08-27 NOTE — TELEPHONE ENCOUNTER
Pt made appointment with Dr Krystle Alcantara for September 7th. Pt states he does blood work before every 6 month appointment. Can we please put the orders, if any, in epic.

## 2018-09-06 ENCOUNTER — LAB ENCOUNTER (OUTPATIENT)
Dept: LAB | Age: 83
End: 2018-09-06
Attending: INTERNAL MEDICINE
Payer: MEDICARE

## 2018-09-06 DIAGNOSIS — D56.3 THALASSEMIA TRAIT: ICD-10-CM

## 2018-09-06 DIAGNOSIS — E78.00 HYPERCHOLESTEREMIA: ICD-10-CM

## 2018-09-06 LAB
ALBUMIN SERPL BCP-MCNC: 4 G/DL (ref 3.5–4.8)
ALBUMIN/GLOB SERPL: 1.5 {RATIO} (ref 1–2)
ALP SERPL-CCNC: 75 U/L (ref 32–100)
ALT SERPL-CCNC: 17 U/L (ref 17–63)
ANION GAP SERPL CALC-SCNC: 8 MMOL/L (ref 0–18)
AST SERPL-CCNC: 22 U/L (ref 15–41)
BASOPHILS # BLD: 0.1 K/UL (ref 0–0.2)
BASOPHILS NFR BLD: 1 %
BILIRUB SERPL-MCNC: 1.6 MG/DL (ref 0.3–1.2)
BUN SERPL-MCNC: 16 MG/DL (ref 8–20)
BUN/CREAT SERPL: 15.7 (ref 10–20)
CALCIUM SERPL-MCNC: 9.1 MG/DL (ref 8.5–10.5)
CHLORIDE SERPL-SCNC: 108 MMOL/L (ref 95–110)
CHOLEST SERPL-MCNC: 132 MG/DL (ref 110–200)
CO2 SERPL-SCNC: 27 MMOL/L (ref 22–32)
CREAT SERPL-MCNC: 1.02 MG/DL (ref 0.5–1.5)
EOSINOPHIL # BLD: 0.2 K/UL (ref 0–0.7)
EOSINOPHIL NFR BLD: 2 %
ERYTHROCYTE [DISTWIDTH] IN BLOOD BY AUTOMATED COUNT: 17.8 % (ref 11–15)
GLOBULIN PLAS-MCNC: 2.6 G/DL (ref 2.5–3.7)
GLUCOSE SERPL-MCNC: 96 MG/DL (ref 70–99)
HCT VFR BLD AUTO: 42.1 % (ref 41–52)
HDLC SERPL-MCNC: 46 MG/DL
HGB BLD-MCNC: 13.1 G/DL (ref 13.5–17.5)
LDLC SERPL CALC-MCNC: 72 MG/DL (ref 0–99)
LYMPHOCYTES # BLD: 1.3 K/UL (ref 1–4)
LYMPHOCYTES NFR BLD: 10 %
MCH RBC QN AUTO: 21 PG (ref 27–32)
MCHC RBC AUTO-ENTMCNC: 31.2 G/DL (ref 32–37)
MCV RBC AUTO: 67.2 FL (ref 80–100)
MONOCYTES # BLD: 1.2 K/UL (ref 0–1)
MONOCYTES NFR BLD: 9 %
NEUTROPHILS # BLD AUTO: 10.1 K/UL (ref 1.8–7.7)
NEUTROPHILS NFR BLD: 78 %
NONHDLC SERPL-MCNC: 86 MG/DL
OSMOLALITY UR CALC.SUM OF ELEC: 297 MOSM/KG (ref 275–295)
PATIENT FASTING: YES
PLATELET # BLD AUTO: 216 K/UL (ref 140–400)
PMV BLD AUTO: 8.9 FL (ref 7.4–10.3)
POTASSIUM SERPL-SCNC: 4.4 MMOL/L (ref 3.3–5.1)
PROT SERPL-MCNC: 6.6 G/DL (ref 5.9–8.4)
RBC # BLD AUTO: 6.26 M/UL (ref 4.5–5.9)
SODIUM SERPL-SCNC: 143 MMOL/L (ref 136–144)
TRIGL SERPL-MCNC: 71 MG/DL (ref 1–149)
VIT B12 SERPL-MCNC: 746 PG/ML (ref 181–914)
WBC # BLD AUTO: 12.8 K/UL (ref 4–11)

## 2018-09-06 PROCEDURE — 82607 VITAMIN B-12: CPT

## 2018-09-06 PROCEDURE — 85025 COMPLETE CBC W/AUTO DIFF WBC: CPT

## 2018-09-06 PROCEDURE — 36415 COLL VENOUS BLD VENIPUNCTURE: CPT

## 2018-09-06 PROCEDURE — 80061 LIPID PANEL: CPT

## 2018-09-06 PROCEDURE — 80053 COMPREHEN METABOLIC PANEL: CPT

## 2018-09-07 ENCOUNTER — OFFICE VISIT (OUTPATIENT)
Dept: INTERNAL MEDICINE CLINIC | Facility: CLINIC | Age: 83
End: 2018-09-07
Payer: MEDICARE

## 2018-09-07 VITALS
BODY MASS INDEX: 23.97 KG/M2 | OXYGEN SATURATION: 97 % | DIASTOLIC BLOOD PRESSURE: 74 MMHG | WEIGHT: 137 LBS | HEIGHT: 63.25 IN | SYSTOLIC BLOOD PRESSURE: 118 MMHG | HEART RATE: 106 BPM | TEMPERATURE: 98 F

## 2018-09-07 DIAGNOSIS — I25.9 IHD (ISCHEMIC HEART DISEASE): ICD-10-CM

## 2018-09-07 DIAGNOSIS — E78.00 HYPERCHOLESTEREMIA: ICD-10-CM

## 2018-09-07 DIAGNOSIS — D56.3 THALASSEMIA MINOR: ICD-10-CM

## 2018-09-07 DIAGNOSIS — M16.11 PRIMARY OSTEOARTHRITIS OF RIGHT HIP: Primary | ICD-10-CM

## 2018-09-07 PROCEDURE — 99214 OFFICE O/P EST MOD 30 MIN: CPT | Performed by: INTERNAL MEDICINE

## 2018-09-07 PROCEDURE — G0463 HOSPITAL OUTPT CLINIC VISIT: HCPCS | Performed by: INTERNAL MEDICINE

## 2018-09-07 RX ORDER — NITROGLYCERIN 0.3 MG/1
0.3 TABLET SUBLINGUAL EVERY 5 MIN PRN
Qty: 25 TABLET | Refills: 0 | Status: SHIPPED | OUTPATIENT
Start: 2018-09-07 | End: 2018-09-21

## 2018-09-07 NOTE — PROGRESS NOTES
Jaziel Horn is a 80year old male. HPI:   He is generally doing well and has no CV sx and no chest pain -  He has triple vessel disease and has elected against CABG.      He has continued pain in the right hip and has had 5 steroid injections in the h week       REVIEW OF SYSTEMS:   GENERAL HEALTH: feels well otherwise  SKIN: denies any unusual skin lesions or rashes  RESPIRATORY: denies shortness of breath with exertion  CARDIOVASCULAR: denies chest pain on exertion  GI: denies abdominal pain and denie

## 2018-09-12 ENCOUNTER — TELEPHONE (OUTPATIENT)
Dept: INTERNAL MEDICINE CLINIC | Facility: CLINIC | Age: 83
End: 2018-09-12

## 2018-09-12 NOTE — TELEPHONE ENCOUNTER
[facility] faxed a clarification for: Nitroglycerin fax.  # [phone]    Routed to Rx  Placed in [address] [place] [address] folder

## 2018-09-20 NOTE — TELEPHONE ENCOUNTER
Pt requesting refill for Cialis 10MG, Qty 60  Previous prescription   Pt uses [address] ([facility])  MJW#[phone]  Tasked to [employer]

## 2018-09-21 RX ORDER — TADALAFIL 10 MG/1
10 TABLET ORAL
Qty: 30 TABLET | Refills: 6 | Status: ON HOLD
Start: 2018-09-21 | End: 2019-08-28

## 2019-03-10 RX ORDER — ATORVASTATIN CALCIUM 10 MG/1
TABLET, FILM COATED ORAL
Qty: 90 TABLET | Refills: 3 | Status: SHIPPED | OUTPATIENT
Start: 2019-03-10 | End: 2020-03-05

## 2019-05-06 RX ORDER — PANTOPRAZOLE SODIUM 40 MG/1
TABLET, DELAYED RELEASE ORAL
Qty: 90 TABLET | Refills: 3 | Status: SHIPPED | OUTPATIENT
Start: 2019-05-06 | End: 2020-04-30

## 2019-08-22 RX ORDER — METOPROLOL SUCCINATE 25 MG/1
25 TABLET, EXTENDED RELEASE ORAL DAILY
Qty: 90 TABLET | Refills: 0 | Status: SHIPPED | OUTPATIENT
Start: 2019-08-22 | End: 2019-11-25

## 2019-08-27 ENCOUNTER — APPOINTMENT (OUTPATIENT)
Dept: GENERAL RADIOLOGY | Facility: HOSPITAL | Age: 84
DRG: 661 | End: 2019-08-27
Attending: EMERGENCY MEDICINE
Payer: MEDICARE

## 2019-08-27 ENCOUNTER — APPOINTMENT (OUTPATIENT)
Dept: CT IMAGING | Facility: HOSPITAL | Age: 84
DRG: 661 | End: 2019-08-27
Attending: EMERGENCY MEDICINE
Payer: MEDICARE

## 2019-08-27 ENCOUNTER — TELEPHONE (OUTPATIENT)
Dept: INTERNAL MEDICINE CLINIC | Facility: CLINIC | Age: 84
End: 2019-08-27

## 2019-08-27 ENCOUNTER — HOSPITAL ENCOUNTER (INPATIENT)
Facility: HOSPITAL | Age: 84
LOS: 2 days | Discharge: HOME | DRG: 661 | End: 2019-08-29
Attending: EMERGENCY MEDICINE | Admitting: HOSPITALIST
Payer: MEDICARE

## 2019-08-27 DIAGNOSIS — N30.01 ACUTE CYSTITIS WITH HEMATURIA: ICD-10-CM

## 2019-08-27 DIAGNOSIS — N20.1 URETEROLITHIASIS: Primary | ICD-10-CM

## 2019-08-27 LAB
ANION GAP SERPL CALC-SCNC: 10 MMOL/L (ref 0–18)
BILIRUB UR QL: NEGATIVE
BUN BLD-MCNC: 16 MG/DL (ref 7–18)
BUN/CREAT SERPL: 14.5 (ref 10–20)
CALCIUM BLD-MCNC: 9.3 MG/DL (ref 8.5–10.1)
CHLORIDE SERPL-SCNC: 105 MMOL/L (ref 98–112)
CO2 SERPL-SCNC: 25 MMOL/L (ref 21–32)
COLOR UR: YELLOW
CREAT BLD-MCNC: 1.1 MG/DL (ref 0.7–1.3)
GLUCOSE BLD-MCNC: 110 MG/DL (ref 70–99)
GLUCOSE UR-MCNC: NEGATIVE MG/DL
HAV IGM SER QL: 2.1 MG/DL (ref 1.6–2.6)
HYALINE CASTS #/AREA URNS AUTO: 1 /LPF
KETONES UR-MCNC: NEGATIVE MG/DL
LACTATE SERPL-SCNC: 1.3 MMOL/L (ref 0.4–2)
NITRITE UR QL STRIP.AUTO: NEGATIVE
OSMOLALITY SERPL CALC.SUM OF ELEC: 292 MOSM/KG (ref 275–295)
PH UR: 5 [PH] (ref 5–8)
POTASSIUM SERPL-SCNC: 3.5 MMOL/L (ref 3.5–5.1)
PROT UR-MCNC: NEGATIVE MG/DL
RBC #/AREA URNS AUTO: 9 /HPF
SODIUM SERPL-SCNC: 140 MMOL/L (ref 136–145)
SP GR UR STRIP: 1.01 (ref 1–1.03)
UROBILINOGEN UR STRIP-ACNC: <2
VIT C UR-MCNC: NEGATIVE MG/DL
WBC #/AREA URNS AUTO: 72 /HPF

## 2019-08-27 PROCEDURE — 74019 RADEX ABDOMEN 2 VIEWS: CPT | Performed by: EMERGENCY MEDICINE

## 2019-08-27 PROCEDURE — 74176 CT ABD & PELVIS W/O CONTRAST: CPT | Performed by: EMERGENCY MEDICINE

## 2019-08-27 PROCEDURE — 99223 1ST HOSP IP/OBS HIGH 75: CPT | Performed by: HOSPITALIST

## 2019-08-27 RX ORDER — ATORVASTATIN CALCIUM 10 MG/1
10 TABLET, FILM COATED ORAL NIGHTLY
Status: DISCONTINUED | OUTPATIENT
Start: 2019-08-27 | End: 2019-08-29

## 2019-08-27 RX ORDER — SODIUM CHLORIDE 9 MG/ML
INJECTION, SOLUTION INTRAVENOUS CONTINUOUS
Status: ACTIVE | OUTPATIENT
Start: 2019-08-27 | End: 2019-08-27

## 2019-08-27 RX ORDER — POTASSIUM CHLORIDE 20 MEQ/1
40 TABLET, EXTENDED RELEASE ORAL EVERY 4 HOURS
Status: DISCONTINUED | OUTPATIENT
Start: 2019-08-27 | End: 2019-08-27

## 2019-08-27 RX ORDER — DOXEPIN HYDROCHLORIDE 50 MG/1
1 CAPSULE ORAL DAILY
Status: DISCONTINUED | OUTPATIENT
Start: 2019-08-28 | End: 2019-08-29

## 2019-08-27 RX ORDER — METOPROLOL SUCCINATE 25 MG/1
25 TABLET, EXTENDED RELEASE ORAL
Status: DISCONTINUED | OUTPATIENT
Start: 2019-08-28 | End: 2019-08-29

## 2019-08-27 RX ORDER — SODIUM CHLORIDE 9 MG/ML
INJECTION, SOLUTION INTRAVENOUS CONTINUOUS
Status: DISCONTINUED | OUTPATIENT
Start: 2019-08-27 | End: 2019-08-29

## 2019-08-27 RX ORDER — ONDANSETRON 2 MG/ML
4 INJECTION INTRAMUSCULAR; INTRAVENOUS EVERY 4 HOURS PRN
Status: DISCONTINUED | OUTPATIENT
Start: 2019-08-27 | End: 2019-08-29

## 2019-08-27 RX ORDER — PANTOPRAZOLE SODIUM 40 MG/1
40 TABLET, DELAYED RELEASE ORAL
Status: DISCONTINUED | OUTPATIENT
Start: 2019-08-28 | End: 2019-08-29

## 2019-08-27 RX ORDER — SODIUM CHLORIDE 0.9 % (FLUSH) 0.9 %
3 SYRINGE (ML) INJECTION AS NEEDED
Status: DISCONTINUED | OUTPATIENT
Start: 2019-08-27 | End: 2019-08-29

## 2019-08-27 RX ORDER — ONDANSETRON 2 MG/ML
4 INJECTION INTRAMUSCULAR; INTRAVENOUS EVERY 6 HOURS PRN
Status: DISCONTINUED | OUTPATIENT
Start: 2019-08-27 | End: 2019-08-29

## 2019-08-27 NOTE — ED INITIAL ASSESSMENT (HPI)
Pain radiates from left flank to lower left abdomen started one week ago. Nausea and emesis per pt. NO blood in urine. No painful urination.

## 2019-08-27 NOTE — TELEPHONE ENCOUNTER
Yes, if he is not making urine and he is in this much pain, he may be dehydrated or obstructed, regardless he should be in the emergency room getting evaluated and CAT scan, nursing try to call him and stressed that I recommend he goes in

## 2019-08-27 NOTE — TELEPHONE ENCOUNTER
To Dr. Rony Gonzalez, on call - fyi - see below, Onset was Sunday, Monday not too bad, last night was very rough with nausea. Pain located left mid-back - pain level 9-10/10. Pt drinking water and \"only pass about a 1 teaspoonful\" at each void.   Advised ER,

## 2019-08-27 NOTE — TELEPHONE ENCOUNTER
Patient has been in pain for the last 2 nights with what he thinks is a kidney stone. He has pain in his lower back & it goes down his left side into his leg. Very nauseated.     Wants a medication called in to a pharmacy - too difficult to come to office

## 2019-08-27 NOTE — TELEPHONE ENCOUNTER
Spoke with patient and relayed Dr. Rondon Fairly message. He verbalized understanding, but states the soonest he would go to ER is tomorrow morning. States he does not feel like going out, but has just been going from bed to the bathroom.  Encouraged patient not to w

## 2019-08-27 NOTE — TELEPHONE ENCOUNTER
Patient called back. He is going to ER. He has a ride. He wondered if he needed to ask for anyone specifically. I advised him to go through the ER (no specific doctor he needs to ask for) and the staff will be able to view office documentation.  He verbaliz

## 2019-08-28 ENCOUNTER — APPOINTMENT (OUTPATIENT)
Dept: GENERAL RADIOLOGY | Facility: HOSPITAL | Age: 84
DRG: 661 | End: 2019-08-28
Attending: UROLOGY
Payer: MEDICARE

## 2019-08-28 ENCOUNTER — ANESTHESIA (OUTPATIENT)
Dept: SURGERY | Facility: HOSPITAL | Age: 84
DRG: 661 | End: 2019-08-28
Payer: MEDICARE

## 2019-08-28 ENCOUNTER — ANESTHESIA EVENT (OUTPATIENT)
Dept: SURGERY | Facility: HOSPITAL | Age: 84
DRG: 661 | End: 2019-08-28
Payer: MEDICARE

## 2019-08-28 ENCOUNTER — APPOINTMENT (OUTPATIENT)
Dept: CV DIAGNOSTICS | Facility: HOSPITAL | Age: 84
DRG: 661 | End: 2019-08-28
Attending: HOSPITALIST
Payer: MEDICARE

## 2019-08-28 LAB
ALBUMIN SERPL-MCNC: 3 G/DL (ref 3.4–5)
ALBUMIN/GLOB SERPL: 0.9 {RATIO} (ref 1–2)
ALP LIVER SERPL-CCNC: 82 U/L (ref 45–117)
ALT SERPL-CCNC: 13 U/L (ref 16–61)
ANION GAP SERPL CALC-SCNC: 7 MMOL/L (ref 0–18)
AST SERPL-CCNC: 18 U/L (ref 15–37)
BASOPHILS # BLD AUTO: 0.06 X10(3) UL (ref 0–0.2)
BASOPHILS # BLD AUTO: 0.08 X10(3) UL (ref 0–0.2)
BASOPHILS NFR BLD AUTO: 0.4 %
BASOPHILS NFR BLD AUTO: 0.6 %
BILIRUB SERPL-MCNC: 1.7 MG/DL (ref 0.1–2)
BUN BLD-MCNC: 13 MG/DL (ref 7–18)
BUN/CREAT SERPL: 11 (ref 10–20)
CALCIUM BLD-MCNC: 8.4 MG/DL (ref 8.5–10.1)
CHLORIDE SERPL-SCNC: 112 MMOL/L (ref 98–112)
CHOLEST SMN-MCNC: 91 MG/DL (ref ?–200)
CO2 SERPL-SCNC: 25 MMOL/L (ref 21–32)
CREAT BLD-MCNC: 1.18 MG/DL (ref 0.7–1.3)
DEPRECATED RDW RBC AUTO: 40.2 FL (ref 35.1–46.3)
DEPRECATED RDW RBC AUTO: 40.3 FL (ref 35.1–46.3)
EOSINOPHIL # BLD AUTO: 0.08 X10(3) UL (ref 0–0.7)
EOSINOPHIL # BLD AUTO: 0.09 X10(3) UL (ref 0–0.7)
EOSINOPHIL NFR BLD AUTO: 0.5 %
EOSINOPHIL NFR BLD AUTO: 0.7 %
ERYTHROCYTE [DISTWIDTH] IN BLOOD BY AUTOMATED COUNT: 17.6 % (ref 11–15)
ERYTHROCYTE [DISTWIDTH] IN BLOOD BY AUTOMATED COUNT: 18.6 % (ref 11–15)
GLOBULIN PLAS-MCNC: 3.4 G/DL (ref 2.8–4.4)
GLUCOSE BLD-MCNC: 98 MG/DL (ref 70–99)
HCT VFR BLD AUTO: 34.7 % (ref 39–53)
HCT VFR BLD AUTO: 39.1 % (ref 39–53)
HDLC SERPL-MCNC: 40 MG/DL (ref 40–59)
HGB BLD-MCNC: 10.8 G/DL (ref 13–17.5)
HGB BLD-MCNC: 12.2 G/DL (ref 13–17.5)
IMM GRANULOCYTES # BLD AUTO: 0.08 X10(3) UL (ref 0–1)
IMM GRANULOCYTES # BLD AUTO: 0.09 X10(3) UL (ref 0–1)
IMM GRANULOCYTES NFR BLD: 0.6 %
IMM GRANULOCYTES NFR BLD: 0.6 %
LDLC SERPL CALC-MCNC: 38 MG/DL (ref ?–100)
LYMPHOCYTES # BLD AUTO: 0.8 X10(3) UL (ref 1–4)
LYMPHOCYTES # BLD AUTO: 1.29 X10(3) UL (ref 1–4)
LYMPHOCYTES NFR BLD AUTO: 6.4 %
LYMPHOCYTES NFR BLD AUTO: 7.9 %
M PROTEIN MFR SERPL ELPH: 6.4 G/DL (ref 6.4–8.2)
MCH RBC QN AUTO: 21 PG (ref 26–34)
MCH RBC QN AUTO: 21.1 PG (ref 26–34)
MCHC RBC AUTO-ENTMCNC: 31.1 G/DL (ref 31–37)
MCHC RBC AUTO-ENTMCNC: 31.2 G/DL (ref 31–37)
MCV RBC AUTO: 67.4 FL (ref 80–100)
MCV RBC AUTO: 67.6 FL (ref 80–100)
MONOCYTES # BLD AUTO: 0.92 X10(3) UL (ref 0.1–1)
MONOCYTES # BLD AUTO: 1.3 X10(3) UL (ref 0.1–1)
MONOCYTES NFR BLD AUTO: 7.4 %
MONOCYTES NFR BLD AUTO: 8 %
NEUTROPHILS # BLD AUTO: 10.49 X10 (3) UL (ref 1.5–7.7)
NEUTROPHILS # BLD AUTO: 10.49 X10(3) UL (ref 1.5–7.7)
NEUTROPHILS # BLD AUTO: 13.48 X10 (3) UL (ref 1.5–7.7)
NEUTROPHILS # BLD AUTO: 13.48 X10(3) UL (ref 1.5–7.7)
NEUTROPHILS NFR BLD AUTO: 82.6 %
NEUTROPHILS NFR BLD AUTO: 84.3 %
NONHDLC SERPL-MCNC: 51 MG/DL (ref ?–130)
OSMOLALITY SERPL CALC.SUM OF ELEC: 298 MOSM/KG (ref 275–295)
PLATELET # BLD AUTO: 271 10(3)UL (ref 150–450)
PLATELET # BLD AUTO: 318 10(3)UL (ref 150–450)
PLATELET MORPHOLOGY: NORMAL
PLATELET MORPHOLOGY: NORMAL
POTASSIUM SERPL-SCNC: 3.9 MMOL/L (ref 3.5–5.1)
PSA SERPL-MCNC: 8.71 NG/ML (ref ?–4)
RBC # BLD AUTO: 5.13 X10(6)UL (ref 3.8–5.8)
RBC # BLD AUTO: 5.8 X10(6)UL (ref 3.8–5.8)
SODIUM SERPL-SCNC: 144 MMOL/L (ref 136–145)
T4 FREE SERPL-MCNC: 1.1 NG/DL (ref 0.8–1.7)
TRIGL SERPL-MCNC: 66 MG/DL (ref 30–149)
TSI SER-ACNC: 4.05 MIU/ML (ref 0.36–3.74)
VLDLC SERPL CALC-MCNC: 13 MG/DL (ref 0–30)
WBC # BLD AUTO: 12.5 X10(3) UL (ref 4–11)
WBC # BLD AUTO: 16.3 X10(3) UL (ref 4–11)

## 2019-08-28 PROCEDURE — 52332 CYSTOSCOPY AND TREATMENT: CPT | Performed by: UROLOGY

## 2019-08-28 PROCEDURE — 99223 1ST HOSP IP/OBS HIGH 75: CPT | Performed by: UROLOGY

## 2019-08-28 PROCEDURE — 93306 TTE W/DOPPLER COMPLETE: CPT | Performed by: HOSPITALIST

## 2019-08-28 PROCEDURE — 99233 SBSQ HOSP IP/OBS HIGH 50: CPT | Performed by: HOSPITALIST

## 2019-08-28 PROCEDURE — 0T778DZ DILATION OF LEFT URETER WITH INTRALUMINAL DEVICE, VIA NATURAL OR ARTIFICIAL OPENING ENDOSCOPIC: ICD-10-PCS | Performed by: UROLOGY

## 2019-08-28 DEVICE — STENT URET 6F 26CM WO GW INL: Type: IMPLANTABLE DEVICE | Status: FUNCTIONAL

## 2019-08-28 RX ORDER — HALOPERIDOL 5 MG/ML
0.25 INJECTION INTRAMUSCULAR ONCE AS NEEDED
Status: DISCONTINUED | OUTPATIENT
Start: 2019-08-28 | End: 2019-08-28 | Stop reason: HOSPADM

## 2019-08-28 RX ORDER — PROCHLORPERAZINE EDISYLATE 5 MG/ML
5 INJECTION INTRAMUSCULAR; INTRAVENOUS ONCE AS NEEDED
Status: DISCONTINUED | OUTPATIENT
Start: 2019-08-28 | End: 2019-08-28 | Stop reason: HOSPADM

## 2019-08-28 RX ORDER — NALOXONE HYDROCHLORIDE 0.4 MG/ML
80 INJECTION, SOLUTION INTRAMUSCULAR; INTRAVENOUS; SUBCUTANEOUS AS NEEDED
Status: DISCONTINUED | OUTPATIENT
Start: 2019-08-28 | End: 2019-08-28 | Stop reason: HOSPADM

## 2019-08-28 RX ORDER — HYDROMORPHONE HYDROCHLORIDE 1 MG/ML
0.6 INJECTION, SOLUTION INTRAMUSCULAR; INTRAVENOUS; SUBCUTANEOUS EVERY 5 MIN PRN
Status: DISCONTINUED | OUTPATIENT
Start: 2019-08-28 | End: 2019-08-28 | Stop reason: HOSPADM

## 2019-08-28 RX ORDER — SODIUM CHLORIDE, SODIUM LACTATE, POTASSIUM CHLORIDE, CALCIUM CHLORIDE 600; 310; 30; 20 MG/100ML; MG/100ML; MG/100ML; MG/100ML
INJECTION, SOLUTION INTRAVENOUS CONTINUOUS
Status: DISCONTINUED | OUTPATIENT
Start: 2019-08-28 | End: 2019-08-28 | Stop reason: HOSPADM

## 2019-08-28 RX ORDER — DEXAMETHASONE SODIUM PHOSPHATE 4 MG/ML
VIAL (ML) INJECTION AS NEEDED
Status: DISCONTINUED | OUTPATIENT
Start: 2019-08-28 | End: 2019-08-28 | Stop reason: SURG

## 2019-08-28 RX ORDER — SODIUM CHLORIDE, SODIUM LACTATE, POTASSIUM CHLORIDE, CALCIUM CHLORIDE 600; 310; 30; 20 MG/100ML; MG/100ML; MG/100ML; MG/100ML
INJECTION, SOLUTION INTRAVENOUS CONTINUOUS PRN
Status: DISCONTINUED | OUTPATIENT
Start: 2019-08-28 | End: 2019-08-28 | Stop reason: SURG

## 2019-08-28 RX ORDER — ACETAMINOPHEN 325 MG/1
650 TABLET ORAL DAILY
Status: DISCONTINUED | OUTPATIENT
Start: 2019-08-28 | End: 2019-08-28

## 2019-08-28 RX ORDER — ALFUZOSIN HYDROCHLORIDE 10 MG/1
10 TABLET, EXTENDED RELEASE ORAL DAILY
Status: DISCONTINUED | OUTPATIENT
Start: 2019-08-28 | End: 2019-08-29

## 2019-08-28 RX ORDER — MORPHINE SULFATE 10 MG/ML
6 INJECTION, SOLUTION INTRAMUSCULAR; INTRAVENOUS EVERY 10 MIN PRN
Status: DISCONTINUED | OUTPATIENT
Start: 2019-08-28 | End: 2019-08-28 | Stop reason: HOSPADM

## 2019-08-28 RX ORDER — MIDAZOLAM HYDROCHLORIDE 1 MG/ML
INJECTION INTRAMUSCULAR; INTRAVENOUS AS NEEDED
Status: DISCONTINUED | OUTPATIENT
Start: 2019-08-28 | End: 2019-08-28 | Stop reason: SURG

## 2019-08-28 RX ORDER — MORPHINE SULFATE 4 MG/ML
4 INJECTION, SOLUTION INTRAMUSCULAR; INTRAVENOUS EVERY 10 MIN PRN
Status: DISCONTINUED | OUTPATIENT
Start: 2019-08-28 | End: 2019-08-28 | Stop reason: HOSPADM

## 2019-08-28 RX ORDER — HYDROMORPHONE HYDROCHLORIDE 1 MG/ML
0.2 INJECTION, SOLUTION INTRAMUSCULAR; INTRAVENOUS; SUBCUTANEOUS EVERY 5 MIN PRN
Status: DISCONTINUED | OUTPATIENT
Start: 2019-08-28 | End: 2019-08-28 | Stop reason: HOSPADM

## 2019-08-28 RX ORDER — MORPHINE SULFATE 2 MG/ML
2 INJECTION, SOLUTION INTRAMUSCULAR; INTRAVENOUS EVERY 10 MIN PRN
Status: DISCONTINUED | OUTPATIENT
Start: 2019-08-28 | End: 2019-08-28 | Stop reason: HOSPADM

## 2019-08-28 RX ORDER — HYDROCODONE BITARTRATE AND ACETAMINOPHEN 5; 325 MG/1; MG/1
2 TABLET ORAL AS NEEDED
Status: DISCONTINUED | OUTPATIENT
Start: 2019-08-28 | End: 2019-08-28 | Stop reason: HOSPADM

## 2019-08-28 RX ORDER — MULTIVIT-MIN/FA/LYCOPEN/LUTEIN .4-300-25
TABLET ORAL DAILY
Status: DISCONTINUED | OUTPATIENT
Start: 2019-08-28 | End: 2019-08-28

## 2019-08-28 RX ORDER — ACETAMINOPHEN 325 MG/1
650 TABLET ORAL EVERY 6 HOURS PRN
Status: DISCONTINUED | OUTPATIENT
Start: 2019-08-28 | End: 2019-08-29

## 2019-08-28 RX ORDER — HYDROMORPHONE HYDROCHLORIDE 1 MG/ML
0.4 INJECTION, SOLUTION INTRAMUSCULAR; INTRAVENOUS; SUBCUTANEOUS EVERY 5 MIN PRN
Status: DISCONTINUED | OUTPATIENT
Start: 2019-08-28 | End: 2019-08-28 | Stop reason: HOSPADM

## 2019-08-28 RX ORDER — ACETAMINOPHEN 650 MG/1
650 SUPPOSITORY RECTAL EVERY 6 HOURS PRN
Status: DISCONTINUED | OUTPATIENT
Start: 2019-08-28 | End: 2019-08-28

## 2019-08-28 RX ORDER — HYDROCODONE BITARTRATE AND ACETAMINOPHEN 5; 325 MG/1; MG/1
1 TABLET ORAL AS NEEDED
Status: DISCONTINUED | OUTPATIENT
Start: 2019-08-28 | End: 2019-08-28 | Stop reason: HOSPADM

## 2019-08-28 RX ORDER — ONDANSETRON 2 MG/ML
4 INJECTION INTRAMUSCULAR; INTRAVENOUS ONCE AS NEEDED
Status: DISCONTINUED | OUTPATIENT
Start: 2019-08-28 | End: 2019-08-28 | Stop reason: HOSPADM

## 2019-08-28 RX ADMIN — SODIUM CHLORIDE, SODIUM LACTATE, POTASSIUM CHLORIDE, CALCIUM CHLORIDE: 600; 310; 30; 20 INJECTION, SOLUTION INTRAVENOUS at 19:16:00

## 2019-08-28 RX ADMIN — ONDANSETRON 4 MG: 2 INJECTION INTRAMUSCULAR; INTRAVENOUS at 19:51:00

## 2019-08-28 RX ADMIN — DEXAMETHASONE SODIUM PHOSPHATE 4 MG: 4 MG/ML VIAL (ML) INJECTION at 19:49:00

## 2019-08-28 RX ADMIN — MIDAZOLAM HYDROCHLORIDE 2 MG: 1 INJECTION INTRAMUSCULAR; INTRAVENOUS at 19:41:00

## 2019-08-28 NOTE — PROGRESS NOTES
[facility] Pharmacy Note:  Renal Adjustment for meropenem (MERREM)    Radha Iyer is a 80year old male who has been prescribed meropenem (MERREM) 500 mg every 8 hrs. CrCl is estimated creatinine clearance is 38.1 mL/min (based on SCr of 1.1 mg/dL).  so the

## 2019-08-28 NOTE — CONSULTS
[facility]    Report of Consultation    Maximino Miranda Patient Status:  Inpatient    1930 MRN [id number]   Location [facility] 5SW/SE Attending Evangelina Martinez MD   Hosp Day # 1 PCP Fritz Curling.  MD Silvino     Date of Admission:   but states he can walk up to 1/2 mile. Patient never was able to have hip surgery because there was concerns about the cardiac risk of surgery. His echo shows his EF is stable at 40 to 45% with inferior wall akinesis similar to that described in 2015.   E (ADULT) tab 1 tablet 1 tablet Oral Daily   Pantoprazole Sodium (PROTONIX) EC tab 40 mg 40 mg Oral Before breakfast       Medications Prior to Admission:  Multiple Vitamins-Minerals (CENTRUM SILVER ADULT 50+ OR) Take 1 tablet by mouth daily.    Acetaminophen rales, rhonchi or dullness. Normal excursions and effort. Abdomen:  Soft, non-tender. No organosplenomegally, mass or rebound, BS-present. Extremities:  Without clubbing, cyanosis or edema. Peripheral pulses are 2+.   Neurologic:  Alert and oriented, no 10. Low-grade distal bronchial impaction in the lingula. 11. Punctate 5 mm metallic foreign body along the subcutaneous margin of the anterior right 6th rib. 12. Severe asymmetric right hip osteoarthritis.    Dictated by (CST): Nickolas Norris MD on 8/27/20

## 2019-08-28 NOTE — PROGRESS NOTES
ADMISSION NOTE    80year old male with h/o 3 vessel CAD for which he refused surgery 5 years ago, previous urolithiasis presents with 5 to 6 days of intermittent left flank pain radiating to L groin with frequent urination.   Found to have obstructing jacob

## 2019-08-28 NOTE — CM/SW NOTE
SW received MDO for Advanced Directives. SW met w/ pt to discuss eventual discharge needs. Pt lives at home alone in [place]. Pt reports to be independent w/ all ADL's and drives. Pt owns a cane, but never had to use it.  Pt has a daughter in [place]

## 2019-08-28 NOTE — ED PROVIDER NOTES
Patient Seen in: [facility] AND [facility] Emergency Department    History   Patient presents with:  Abdomen/Flank Pain (GI/)      HPI    Patient presents to the ED complaining of pain to his left flank area that started a week ago but then resolved after nora file      Years of education: Not on file      Highest education level: Not on file    Tobacco Use      Smoking status: Never Smoker      Smokeless tobacco: Never Used    Substance and Sexual Activity      Alcohol use:  Yes        Alcohol/week: 5.8 standard - Abnormal; Notable for the following components:    Clarity Urine Hazy (*)     Blood Urine Moderate (*)     Leukocyte Esterase Urine Large (*)     WBC Urine 72 (*)     RBC URINE 9 (*)     Bacteria Urine Few (*)     All other components within normal limit pelvis. 4. Probable bilateral renal cysts. 5. Colonic diverticulosis. Duodenal diverticulum. 6. Nonspecific submucosal fat deposition involving the sigmoid colon.   This finding can be seen in the setting of chronic inflammation, obesity, or steroid use am (ROCEPHIN) 1 g in sodium chloride 0.9% 100 mL MBP/ADD-vantage (0 g Intravenous Stopped 8/27/19 2012)         MDM      08/27/19  2246 08/27/19  2330 08/27/19  2336 08/27/19  2340   BP:  112/60 118/73 128/67   BP Location:  Left arm Left arm Left arm   Pulse

## 2019-08-28 NOTE — CONSULTS
[facility]    Report of Consultation    Linda Beckwith Patient Status:  Inpatient    1930 MRN [id number]   Location [facility] 5SW/SE Attending Lori Cohen MD   Hosp Day # 1 PCP Dmitri Carballo.  MD Silvino     Date of A TURP 2000.     HISTORY:  Past Medical History:   Diagnosis Date   • BPH (benign prostatic hyperplasia)    • Calculus of kidney 2005   • Essential hypertension    • History of asbestos exposure    • Hyperlipidemia    • Lipid screening 04-   • Lower GI patterns, increased activity, polydipsia and polyphagia  Allergic/Immuno:  Negative for environmental allergies and food allergies  Cardiovascular:  Negative for cool extremity and irregular heartbeat/palpitations  Constitutional:  Negative for decreased a SCROTUM--normal.  URETHRAL MEATUS--unremarkable. [place] PENIS--unremarkable. Perineum unremarkable. Normal anus, normal rectal tone, no rectal masses. Seminal vesicles are nonpalpable. STOOL IMPACTION--none.   PROSTATE--prostate is 2+ enlarged, 35 g pr base.  Thoracolumbar levoscoliosis with multilevel spondylosis. Demineralization. Small fat-containing inguinal hernia. 8/27/2019 [facility] X-RAY  Vascular calcifications; no obvious urinary tract stones seen.       Bladder Scan Volume (mL): 61 m and he understands and wants to proceed. I schedule case with the operating room. I write preop orders. We agreed to observe the stone in the right kidney for now since it is asymptomatic.   We discussed possibility of patient having a prostate neoplasm

## 2019-08-28 NOTE — H&P
[facility]    PATIENT'S NAME: Edel Wilkins   ATTENDING PHYSICIAN: Zachary Tapia MD   PATIENT ACCOUNT#:   [de-identified]    LOCATION:  [address] [place] [address] Est #:   [id number]       YOB: 1930  ADMISSION DATE: hydroureteronephrosis related to an obstructing calculi at the distal left ureter, which measured about 6 mm in size, asymmetric left perinephric periureteric stranding most likely secondary to the obstructive uropathy and urinary tract infection.   There w drinks about 1 glass of wine a couple times a week currently. Used to drink more heavily when he was younger. He is a  for the last 15 years and lives alone.   Says he has no trouble with activities of daily living, house cleaning, cooking, etc.  H calcium 9.3, anion gap of 10, magnesium of 2.1, lactic acid level 1.3. White count 16.3, with a hemoglobin of 12.2, and a platelet count of 951,064. Differential was not done. CT scan of the abdomen and pelvis was previously mentioned.       EKG was Cardiology. In the meantime, we will continue beta-blocker, place on remote telemetry, and monitor. 3.   Hypercholesterolemia. Continue statin. 4.   Gastroesophageal reflux disease. Continue Protonix. 5.   Thalassemia minor.   Hemoglobin is stable

## 2019-08-28 NOTE — ED NOTES
Orders for admission, patient is aware of plan and ready to go upstairs.  Any questions, please call ED RN Magnus Plata at extension [phone]

## 2019-08-28 NOTE — PROGRESS NOTES
[facility]  Progress Note     Abelardo Aranda  : 1930    Status: Inpatient  Day #: 1    Attending: Chantal Billings MD  PCP: Joyce Diez.  MD Silvino      Assessment and Plan     Obstructive uropathy with possible UTI  -urology consulted  -plan TP  --  6.4   TSH 4.050*  --    T4F 1.1  --        Ct Abdomen+pelvis Kidneystone 2d Rndr(no Iv,no Oral)(cpt=74176)    Result Date: 8/27/2019  CONCLUSION:  1.  Moderate asymmetric left hydroureteronephrosis related to sequential obstructing calculi in the Daily Beta Blocker   • multivitamin  1 tablet Oral Daily   • Pantoprazole Sodium  40 mg Oral Before breakfast      PRN Meds: ondansetron HCl, Normal Saline Flush, ondansetron HCl    Spent >35 minutes, >50% of the time counseling coordinating care.   Discuss

## 2019-08-28 NOTE — PLAN OF CARE
Problem: Patient Centered Care  Goal: Patient preferences are identified and integrated in the patient's plan of care  Description  Interventions:  - What would you like us to know as we care for you?  \" I have two daughters, I live alone\"  - Provide ti respiratory effort  - Oxygen supplementation based on oxygen saturation or ABGs  - Provide Smoking Cessation handout, if applicable  - Encourage broncho-pulmonary hygiene including cough, deep breathe, Incentive Spirometry  - Assess the need for suctioning patient/family  Outcome: Progressing     Problem: PAIN - ADULT  Goal: Verbalizes/displays adequate comfort level or patient's stated pain goal  Description  INTERVENTIONS:  - Encourage pt to monitor pain and request assistance  - Assess pain using appropri

## 2019-08-29 ENCOUNTER — TELEPHONE (OUTPATIENT)
Dept: SURGERY | Facility: CLINIC | Age: 84
End: 2019-08-29

## 2019-08-29 VITALS
BODY MASS INDEX: 23.46 KG/M2 | HEART RATE: 83 BPM | OXYGEN SATURATION: 97 % | DIASTOLIC BLOOD PRESSURE: 47 MMHG | TEMPERATURE: 97 F | RESPIRATION RATE: 18 BRPM | SYSTOLIC BLOOD PRESSURE: 100 MMHG | HEIGHT: 63 IN | WEIGHT: 132.38 LBS

## 2019-08-29 DIAGNOSIS — N20.1 LEFT URETERAL STONE: ICD-10-CM

## 2019-08-29 DIAGNOSIS — N20.0 RIGHT KIDNEY STONE: Primary | ICD-10-CM

## 2019-08-29 PROCEDURE — 99233 SBSQ HOSP IP/OBS HIGH 50: CPT | Performed by: UROLOGY

## 2019-08-29 PROCEDURE — 99239 HOSP IP/OBS DSCHRG MGMT >30: CPT | Performed by: HOSPITALIST

## 2019-08-29 RX ORDER — ALFUZOSIN HYDROCHLORIDE 10 MG/1
10 TABLET, EXTENDED RELEASE ORAL DAILY
Qty: 30 TABLET | Refills: 0 | Status: SHIPPED | OUTPATIENT
Start: 2019-08-30 | End: 2020-11-30 | Stop reason: ALTCHOICE

## 2019-08-29 RX ORDER — POLYETHYLENE GLYCOL 3350 17 G/17G
17 POWDER, FOR SOLUTION ORAL ONCE
Status: COMPLETED | OUTPATIENT
Start: 2019-08-29 | End: 2019-08-29

## 2019-08-29 RX ORDER — CEFADROXIL 500 MG/1
500 CAPSULE ORAL 2 TIMES DAILY
Qty: 12 CAPSULE | Refills: 0 | Status: SHIPPED | OUTPATIENT
Start: 2019-08-29 | End: 2019-09-04

## 2019-08-29 RX ORDER — ONDANSETRON 2 MG/ML
4 INJECTION INTRAMUSCULAR; INTRAVENOUS EVERY 4 HOURS PRN
Status: DISCONTINUED | OUTPATIENT
Start: 2019-08-29 | End: 2019-08-29

## 2019-08-29 RX ORDER — GABAPENTIN 100 MG/1
100 CAPSULE ORAL NIGHTLY
Qty: 30 CAPSULE | Refills: 0 | Status: SHIPPED | OUTPATIENT
Start: 2019-08-29 | End: 2019-09-20

## 2019-08-29 RX ORDER — ATROPA BELLADONNA AND OPIUM 16.2; 6 MG/1; MG/1
1 SUPPOSITORY RECTAL EVERY 6 HOURS PRN
Status: DISCONTINUED | OUTPATIENT
Start: 2019-08-29 | End: 2019-08-29

## 2019-08-29 RX ORDER — PHENAZOPYRIDINE HYDROCHLORIDE 200 MG/1
200 TABLET, FILM COATED ORAL 3 TIMES DAILY PRN
Qty: 20 TABLET | Refills: 0 | Status: ON HOLD | OUTPATIENT
Start: 2019-08-29 | End: 2019-09-24

## 2019-08-29 NOTE — PROGRESS NOTES
[facility]  Progress Note     Casper Haynes  : 1930    Status: Inpatient  Day #: 2    Attending: Mihaela Schneider MD  PCP: Gunnar Dubois MD      Assessment and Plan     Obstructive uropathy with possible UTI - urine culture with mixed 140 144   K 3.5 3.9    112   CO2 25.0 25.0   * 98   MG 2.1  --    BILT  --  1.7   AST  --  18   ALT  --  13*   ALKPHO  --  82   TP  --  6.4   TSH 4.050*  --    T4F 1.1  --        Ct Abdomen+pelvis Kidneystone 2d Rndr(no Iv,no Oral)(cpt=74176) ventricular beat -Nonspecific ST depression + Nonspecific T-abnormality -Nondiagnostic.  ABNORMAL When compared with ECG of 04/20/2009 12:30:07 No significant changes have occurred Electronically signed on 08/28/2019 at 15:45 by KYLE Cardenas     Medications

## 2019-08-29 NOTE — PROGRESS NOTES
Casper Haynes is a 80year old male. HPI:   Patient presents with:  Abdomen/Flank Pain (GI/)      History provided by patient and review of records.     77-year-old with coronary artery disease, history of asbestos exposure, thalassemia trait.     P hypertension    • History of asbestos exposure    • Hyperlipidemia    • Lipid screening 04-   • Lower GI bleed rectal ulcer   • Osteoarthritis, shoulder    • S/P TURP (transurethral resection of prostate)    • Thalassemia trait       Past Surgical H UNKNOWN      ROS:   General review of systems -- please see HPI section above. Denies chest pain, shortness of breath, bone pain; chronic constipation. PHYSICAL EXAM:   Temperature 97.4. Pulse 83. Respiratory rate 18 nonlabored.   Blood pressure 100/ ureterovesical junction, causing moderate left hydroureteronephrosis  Contralateral RIGHT kidney has 8 mm nonobstructing calculus in the right renal pelvis  Probable bilateral renal cysts. No lymphadenopathy. Enlarged prostate which indents bladder base. Patient denies any symptoms suggestive of prostate neoplasm.     6. Family history of prostate cancer––brother  from     7.   BPH--35 g prostate, prostate 2+ enlarged; history of TURP ; patient denies voiding complaints.     I discussed a that patient has a prostate nodule and a family history of prostate cancer (brother  from an) and there is a possibility the patient may have prostate cancer; we agreed to address this matter in a week or 2 after his current kidney stone problems are a to address the nodule you have in your prostate on rectal exam and also your   family history of prostate cancer     Dr. Nathan Martinez M.D.                   Priya Suggs MD

## 2019-08-29 NOTE — TELEPHONE ENCOUNTER
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 8/29/2019 I saw patient; he is being discharged    Please review the following instructions with patient--a set of these was given to patient at the time of discharge 8/29/2019 =    Patient to go home on alfuzosin, cefadroxil, phenazopyridine  1. You may restart aspirin daily since her urine is not pink or red  2. With the left ureteral stent in place, if you develop moderate to severe visible blood in the urine, please do not take your daily aspirin that morning, but rather call my office nurses and notify us of your condition  3. If your urine is pink or red, drink a lot of oral fluids to dilute out the blood  4. For stent discomfort, please take gabapentin 100 mg tablet 1 hour before bedtime--I will electronically send order to your pharmacy    5. Please call very soon by  surgery scheduler [facility] [phone] to schedule, 1- 3 weeks from now  Cystoscopy,  Left ureteroscopy with laser lithotripsy of stone, possible basket extraction of stone fragments, possible replacement of left ureteral stent, possible dilation of left ureter, possible left  retrograde pyelogram x-ray of the ureter and kidney--General anesthesia, same-day surgery, [facility]  6. Please stop your daily aspirin 4 days before the procedure  7. Nothing to eat or drink after midnight, night before the procedure    8.   KUB plain x-ray about 5 days before the procedure; I am electronically entering that order into the system. Please take milk of magnesia 2 ounces 6 PM evening before the x-ray to induce bowel movement and to improve clarity and visibility of any kidney stones. Please call one day after the KUB plain x-ray for the results and we will get back to you  9.   In the future, we will want to address the nodule you have in your prostate on rectal exam and also your   family history of prostate cancer    Many thanks, Dr. Rona Ponce

## 2019-08-29 NOTE — DISCHARGE PLANNING
Discharge instructions and prescriptions given to patient. Patient was instructed to follow up with doctor for appointment. Patient verbalized understanding of discharge instructions. Saline locks removed.  Patient transported by wheelchair and discharge ho

## 2019-08-29 NOTE — ANESTHESIA POSTPROCEDURE EVALUATION
Patient: Jaziel Horn    Procedure Summary     Date:  08/28/19 Room / Location:  [address] [place] [address] MAIN OR 14 / [address] [place] [address] OR    Anesthesia Start:  1939 Anesthesia Stop:      Procedure:  CYSTOSCOPY STENT INSERTION (Left ) Diagnosis:       Ureterolithiasis      (Ureter

## 2019-08-29 NOTE — DISCHARGE SUMMARY
[facility]  Discharge Summary     Dorota Ramires  : 1930    Status: Inpatient  Day #: 2    Attending: Shannan Beltrán MD  PCP: Mario Raymond MD     Date of Admission: 2019  Date of Discharge: 2019     Hospital Discharge Diag which measured about 6 mm in size, asymmetric left perinephric periureteric stranding most likely secondary to the obstructive uropathy and urinary tract infection. There was an 8 mm nonobstructing staghorn-appearing calculus in the right renal pelvis.   H unlabored  Gastrointestinal:  Soft, +BS  Musculoskeletal:  No joint swelling  Extremities:  No edema, no cyanosis, no clubbing  Neurologic:  nonfocal  Psychiatric:  Normal affect, calm and appropriate         Discharge Medications      CONTINUE taking thes

## 2019-08-29 NOTE — PLAN OF CARE
Problem: Patient Centered Care  Goal: Patient preferences are identified and integrated in the patient's plan of care  Description  Interventions:  - What would you like us to know as we care for you?  \" I have two daughters, I live alone\"  - Provide ti supplementation based on oxygen saturation or ABGs  - Provide Smoking Cessation handout, if applicable  - Encourage broncho-pulmonary hygiene including cough, deep breathe, Incentive Spirometry  - Assess the need for suctioning and perform as needed  - Ass - ADULT  Goal: Return mobility to safest level of function  Description  INTERVENTIONS:  - Assess patient stability and activity tolerance for standing, transferring and ambulating w/ or w/o assistive devices  - Assist with transfers and ambulation using s assistance with activity based on assessment  - Modify environment to reduce risk of injury  - Provide assistive devices as appropriate  - Consider OT/PT consult to assist with strengthening/mobility  - Encourage toileting schedule  Outcome: Progressing  N

## 2019-08-29 NOTE — ANESTHESIA PREPROCEDURE EVALUATION
Anesthesia PreOp Note    HPI:     Kristy Junior is a 80year old male who presents for preoperative consultation requested by: Hudson Rueda MD    Date of Surgery: 8/27/2019 - 8/28/2019    Procedure(s):  CYSTOSCOPY STENT INSERTION  Indication: Basilio Garcia Acetaminophen (TYLENOL ARTHRITIS EXT RELIEF OR) Take 650 mg by mouth daily. Disp:  Rfl:  8/27/2019 at Unknown time   Metoprolol Succinate ER 25 MG Oral Tablet 24 Hr Take 1 tablet (25 mg total) by mouth daily.  Disp: 90 tablet Rfl: 0    PANTOPRAZOLE SODIUM 4 Penicillins             UNKNOWN    Family History   Problem Relation Age of Onset   • Cancer Father 79        gastric   • Stroke Mother 62     Social History    Socioeconomic History      Marital status:        Spouse name: Not on file      Number of Exercise: Not Asked        Bike Helmet: Not Asked        Seat Belt: Not Asked        Self-Exams: Not Asked    Social History Narrative      Not on file      Available pre-op labs reviewed.   Lab Results   Component Value Date    WBC 12.5 (H) 08/28/201 Informed Consent Plan and Risks Discussed With:  Patient  Discussed plan with:  Attending and surgeon  Provider Attestation (if preop done by other):  GA/ LMA,OPONV,dental damagges etc      I have informed Ed Jainism and/or legal guardian or family m

## 2019-08-29 NOTE — ANESTHESIA PROCEDURE NOTES
Airway  Date/Time: 8/28/2019 7:46 PM  Urgency: elective      General Information and Staff    Patient location during procedure: OR  Anesthesiologist: Lizz Garcia MD  Performed: anesthesiologist     Indications and Patient Condition  Indications for airwa

## 2019-08-29 NOTE — PLAN OF CARE
Problem: Patient Centered Care  Goal: Patient preferences are identified and integrated in the patient's plan of care  Description  Interventions:  - What would you like us to know as we care for you?  \" I have two daughters, I live alone\"  - Provide ti ABGs  - Provide Smoking Cessation handout, if applicable  - Encourage broncho-pulmonary hygiene including cough, deep breathe, Incentive Spirometry  - Assess the need for suctioning and perform as needed  - Assess and instruct to report SOB or any respirat Verbalizes/displays adequate comfort level or patient's stated pain goal  Description  INTERVENTIONS:  - Encourage pt to monitor pain and request assistance  - Assess pain using appropriate pain scale  - Administer analgesics based on type and severity of using pain scale from 1 to 10. Vital signs and labs will remain within normal range. Plan of care is discussed with patient. Patient educated on all medications administered per doctor orders.  Patient has discharge orders for home on today if okay and olga

## 2019-08-29 NOTE — PROGRESS NOTES
[facility]    Progress Note    Linda Beckwith Patient Status:  Inpatient    1930 MRN [id number]   Location [facility] 5SW/SE Attending Wallis Lombard, MD   [facility] Day # 2 PCP Dmitri Carballo.  MD Silvino        Subjective:     Respiratory 8/27/2019  CONCLUSION:  1. Moderate asymmetric left hydroureteronephrosis related to sequential obstructing calculi in the distal left ureter, which as a conglomerate measure 6 mm.   These calculi are located approximately 1 cm cephalad to the ureterovesicu APRN  8/29/2019

## 2019-08-29 NOTE — BRIEF OP NOTE
[facility] POST ANESTHESIA CARE UNIT  Brief Op Note       Patients Name: Jimbo Joaquin  Attending Physician: Tessa Chong MD  Operating Physician: Helder Shell MD  CSN: [id number]     Location:  OR  MRN: [id number]    YOB: 1930  A

## 2019-08-29 NOTE — OPERATIVE REPORT
[facility]    PATIENT'S NAME: Edel Wilkins   ATTENDING PHYSICIAN: Marielena Peck MD   OPERATING PHYSICIAN: Humble Louise MD   PATIENT ACCOUNT#:   [id number]    LOCATION:  [address] [place] [address] #:   [id number]       DATE OF BIRTH:  1 large veins and vessels present in the bladder. The ureteral orifices are unremarkable except that I attempted left retrograde pyelogram and the left ureteral orifice is small and there appeared to be some J hooking.   There also appeared to be a stricture because it appeared fluoroscopically it was getting stuck on the stone, but I then advanced the open-ended ureteral catheter over it and ultimately was able to get the guidewire up into the left kidney and then I positioned a 26 cm 6-German [address] maria elena

## 2019-08-30 ENCOUNTER — PATIENT OUTREACH (OUTPATIENT)
Dept: CASE MANAGEMENT | Age: 84
End: 2019-08-30

## 2019-08-30 DIAGNOSIS — Z02.9 ENCOUNTERS FOR ADMINISTRATIVE PURPOSE: ICD-10-CM

## 2019-09-04 ENCOUNTER — TELEPHONE (OUTPATIENT)
Dept: SURGERY | Facility: CLINIC | Age: 84
End: 2019-09-04

## 2019-09-04 DIAGNOSIS — N20.1 URETERAL CALCULI: Primary | ICD-10-CM

## 2019-09-04 NOTE — TELEPHONE ENCOUNTER
Pt saw Dr. Mary Curiel in the ER and had sx on  08/28/19. Pt needs to set up an appointment to get his stent removed.

## 2019-09-05 ENCOUNTER — TELEPHONE (OUTPATIENT)
Dept: SURGERY | Facility: CLINIC | Age: 84
End: 2019-09-05

## 2019-09-05 DIAGNOSIS — N20.1 URETERAL CALCULI: Primary | ICD-10-CM

## 2019-09-05 NOTE — TELEPHONE ENCOUNTER
Called patient scheduled Cystoscopy, left ureteroscopy, laser lithotripsy of stone, possible basket extraction of stone fragments, possible replacement of left ureteral stent, possible dilation of left ureter, possible left retrograde pyelogram, Tuesday 09

## 2019-09-05 NOTE — TELEPHONE ENCOUNTER
Dear Parveen Skelton, per Cristina James' request this is to inform that patient is scheduled forCystoscopy, left ureteroscopy, laser lithotripsy of stone, possible basket extraction of stone fragments, possible replacement of left ureteral stent, possible dilation

## 2019-09-05 NOTE — TELEPHONE ENCOUNTER
Denzel Torresp you for the message. Because of penicillin allergy and because of 10% possibility of patient having allergic reaction to ceftriaxone, please change IV antibiotic on-call to Levaquin 500 mg IV on-call to the OR.   Many thanks, Dr. Brennan Beauchamp

## 2019-09-06 ENCOUNTER — OFFICE VISIT (OUTPATIENT)
Dept: INTERNAL MEDICINE CLINIC | Facility: CLINIC | Age: 84
End: 2019-09-06
Payer: MEDICARE

## 2019-09-06 VITALS
RESPIRATION RATE: 12 BRPM | DIASTOLIC BLOOD PRESSURE: 62 MMHG | WEIGHT: 137 LBS | BODY MASS INDEX: 24 KG/M2 | SYSTOLIC BLOOD PRESSURE: 110 MMHG | HEART RATE: 110 BPM | TEMPERATURE: 98 F | OXYGEN SATURATION: 94 %

## 2019-09-06 DIAGNOSIS — I25.9 IHD (ISCHEMIC HEART DISEASE): ICD-10-CM

## 2019-09-06 DIAGNOSIS — N20.0 KIDNEY STONE: Primary | ICD-10-CM

## 2019-09-06 DIAGNOSIS — N23 RENAL COLIC ON LEFT SIDE: ICD-10-CM

## 2019-09-06 PROCEDURE — G0463 HOSPITAL OUTPT CLINIC VISIT: HCPCS | Performed by: INTERNAL MEDICINE

## 2019-09-06 PROCEDURE — 99214 OFFICE O/P EST MOD 30 MIN: CPT | Performed by: INTERNAL MEDICINE

## 2019-09-06 RX ORDER — NYSTATIN 100000 U/G
1 CREAM TOPICAL 2 TIMES DAILY
Qty: 30 G | Refills: 1 | Status: SHIPPED | OUTPATIENT
Start: 2019-09-06 | End: 2019-09-20

## 2019-09-06 NOTE — PROGRESS NOTES
Elmer Trinidad is a 80year old male. HPI:   He went to ED on 8/27 with left sided renal colic and hydro - multiple stones distal left ureter. He has stent placed and stone extraction planned for about 2 weeks.   He has some balanitis which developed abo History of asbestos exposure    • Hyperlipidemia    • Lipid screening 04-   • Lower GI bleed rectal ulcer   • Osteoarthritis, shoulder    • S/P TURP (transurethral resection of prostate)    • Thalassemia trait       Social History:  Social History

## 2019-09-11 NOTE — PROGRESS NOTES
Several attempts made to reach the patient with no return call. Patient completed HFU on 9/6/2019. Closing encounter.

## 2019-09-17 ENCOUNTER — HOSPITAL ENCOUNTER (OUTPATIENT)
Dept: GENERAL RADIOLOGY | Facility: HOSPITAL | Age: 84
Discharge: HOME | End: 2019-09-17
Attending: UROLOGY
Payer: MEDICARE

## 2019-09-17 DIAGNOSIS — N20.0 RIGHT KIDNEY STONE: ICD-10-CM

## 2019-09-17 DIAGNOSIS — N20.1 LEFT URETERAL STONE: ICD-10-CM

## 2019-09-17 PROCEDURE — 74021 RADEX ABDOMEN 3+ VIEWS: CPT | Performed by: UROLOGY

## 2019-09-24 ENCOUNTER — ANESTHESIA (OUTPATIENT)
Dept: SURGERY | Facility: HOSPITAL | Age: 84
End: 2019-09-24
Payer: MEDICARE

## 2019-09-24 ENCOUNTER — ANESTHESIA EVENT (OUTPATIENT)
Dept: SURGERY | Facility: HOSPITAL | Age: 84
End: 2019-09-24
Payer: MEDICARE

## 2019-09-24 ENCOUNTER — APPOINTMENT (OUTPATIENT)
Dept: GENERAL RADIOLOGY | Facility: HOSPITAL | Age: 84
End: 2019-09-24
Attending: UROLOGY
Payer: MEDICARE

## 2019-09-24 ENCOUNTER — HOSPITAL ENCOUNTER (OUTPATIENT)
Facility: HOSPITAL | Age: 84
Setting detail: HOSPITAL OUTPATIENT SURGERY
Discharge: HOME | End: 2019-09-24
Attending: UROLOGY | Admitting: UROLOGY
Payer: MEDICARE

## 2019-09-24 ENCOUNTER — TELEPHONE (OUTPATIENT)
Dept: SURGERY | Facility: CLINIC | Age: 84
End: 2019-09-24

## 2019-09-24 VITALS
TEMPERATURE: 97 F | RESPIRATION RATE: 15 BRPM | HEART RATE: 66 BPM | OXYGEN SATURATION: 93 % | DIASTOLIC BLOOD PRESSURE: 73 MMHG | SYSTOLIC BLOOD PRESSURE: 135 MMHG | WEIGHT: 134.81 LBS | HEIGHT: 64 IN | BODY MASS INDEX: 23.02 KG/M2

## 2019-09-24 DIAGNOSIS — N20.1 URETERAL CALCULI: ICD-10-CM

## 2019-09-24 PROCEDURE — 0TC78ZZ EXTIRPATION OF MATTER FROM LEFT URETER, VIA NATURAL OR ARTIFICIAL OPENING ENDOSCOPIC: ICD-10-PCS | Performed by: UROLOGY

## 2019-09-24 PROCEDURE — 0TP98DZ REMOVAL OF INTRALUMINAL DEVICE FROM URETER, VIA NATURAL OR ARTIFICIAL OPENING ENDOSCOPIC: ICD-10-PCS | Performed by: UROLOGY

## 2019-09-24 PROCEDURE — 0T7D8ZZ DILATION OF URETHRA, VIA NATURAL OR ARTIFICIAL OPENING ENDOSCOPIC: ICD-10-PCS | Performed by: UROLOGY

## 2019-09-24 PROCEDURE — 52353 CYSTOURETERO W/LITHOTRIPSY: CPT | Performed by: UROLOGY

## 2019-09-24 RX ORDER — ACETAMINOPHEN 500 MG
1000 TABLET ORAL ONCE
Status: COMPLETED | OUTPATIENT
Start: 2019-09-24 | End: 2019-09-24

## 2019-09-24 RX ORDER — HYDROMORPHONE HYDROCHLORIDE 1 MG/ML
0.6 INJECTION, SOLUTION INTRAMUSCULAR; INTRAVENOUS; SUBCUTANEOUS EVERY 5 MIN PRN
Status: DISCONTINUED | OUTPATIENT
Start: 2019-09-24 | End: 2019-09-24

## 2019-09-24 RX ORDER — ONDANSETRON 2 MG/ML
INJECTION INTRAMUSCULAR; INTRAVENOUS AS NEEDED
Status: DISCONTINUED | OUTPATIENT
Start: 2019-09-24 | End: 2019-09-24 | Stop reason: SURG

## 2019-09-24 RX ORDER — ONDANSETRON 2 MG/ML
4 INJECTION INTRAMUSCULAR; INTRAVENOUS ONCE AS NEEDED
Status: DISCONTINUED | OUTPATIENT
Start: 2019-09-24 | End: 2019-09-24

## 2019-09-24 RX ORDER — HYDROCODONE BITARTRATE AND ACETAMINOPHEN 5; 325 MG/1; MG/1
1 TABLET ORAL AS NEEDED
Status: DISCONTINUED | OUTPATIENT
Start: 2019-09-24 | End: 2019-09-24

## 2019-09-24 RX ORDER — HYDROCODONE BITARTRATE AND ACETAMINOPHEN 5; 325 MG/1; MG/1
2 TABLET ORAL AS NEEDED
Status: DISCONTINUED | OUTPATIENT
Start: 2019-09-24 | End: 2019-09-24

## 2019-09-24 RX ORDER — SODIUM CHLORIDE, SODIUM LACTATE, POTASSIUM CHLORIDE, CALCIUM CHLORIDE 600; 310; 30; 20 MG/100ML; MG/100ML; MG/100ML; MG/100ML
INJECTION, SOLUTION INTRAVENOUS CONTINUOUS
Status: DISCONTINUED | OUTPATIENT
Start: 2019-09-24 | End: 2019-09-24

## 2019-09-24 RX ORDER — LEVOFLOXACIN 5 MG/ML
500 INJECTION, SOLUTION INTRAVENOUS ONCE
Status: COMPLETED | OUTPATIENT
Start: 2019-09-24 | End: 2019-09-24

## 2019-09-24 RX ORDER — LIDOCAINE HYDROCHLORIDE 10 MG/ML
INJECTION, SOLUTION EPIDURAL; INFILTRATION; INTRACAUDAL; PERINEURAL AS NEEDED
Status: DISCONTINUED | OUTPATIENT
Start: 2019-09-24 | End: 2019-09-24 | Stop reason: SURG

## 2019-09-24 RX ORDER — HYDROMORPHONE HYDROCHLORIDE 1 MG/ML
0.4 INJECTION, SOLUTION INTRAMUSCULAR; INTRAVENOUS; SUBCUTANEOUS EVERY 5 MIN PRN
Status: DISCONTINUED | OUTPATIENT
Start: 2019-09-24 | End: 2019-09-24

## 2019-09-24 RX ORDER — MORPHINE SULFATE 4 MG/ML
4 INJECTION, SOLUTION INTRAMUSCULAR; INTRAVENOUS EVERY 10 MIN PRN
Status: DISCONTINUED | OUTPATIENT
Start: 2019-09-24 | End: 2019-09-24

## 2019-09-24 RX ORDER — FAMOTIDINE 20 MG/1
20 TABLET ORAL ONCE
Status: DISCONTINUED | OUTPATIENT
Start: 2019-09-24 | End: 2019-09-24 | Stop reason: HOSPADM

## 2019-09-24 RX ORDER — MORPHINE SULFATE 2 MG/ML
2 INJECTION, SOLUTION INTRAMUSCULAR; INTRAVENOUS EVERY 10 MIN PRN
Status: DISCONTINUED | OUTPATIENT
Start: 2019-09-24 | End: 2019-09-24

## 2019-09-24 RX ORDER — LIDOCAINE HYDROCHLORIDE 20 MG/ML
JELLY TOPICAL AS NEEDED
Status: DISCONTINUED | OUTPATIENT
Start: 2019-09-24 | End: 2019-09-24 | Stop reason: HOSPADM

## 2019-09-24 RX ORDER — PHENAZOPYRIDINE HYDROCHLORIDE 200 MG/1
200 TABLET, FILM COATED ORAL 3 TIMES DAILY PRN
Qty: 15 TABLET | Refills: 1 | Status: SHIPPED | OUTPATIENT
Start: 2019-09-24 | End: 2020-11-30 | Stop reason: ALTCHOICE

## 2019-09-24 RX ORDER — HALOPERIDOL 5 MG/ML
0.25 INJECTION INTRAMUSCULAR ONCE AS NEEDED
Status: DISCONTINUED | OUTPATIENT
Start: 2019-09-24 | End: 2019-09-24

## 2019-09-24 RX ORDER — DEXAMETHASONE SODIUM PHOSPHATE 4 MG/ML
VIAL (ML) INJECTION AS NEEDED
Status: DISCONTINUED | OUTPATIENT
Start: 2019-09-24 | End: 2019-09-24 | Stop reason: SURG

## 2019-09-24 RX ORDER — METOCLOPRAMIDE 10 MG/1
10 TABLET ORAL ONCE
Status: DISCONTINUED | OUTPATIENT
Start: 2019-09-24 | End: 2019-09-24 | Stop reason: HOSPADM

## 2019-09-24 RX ORDER — MORPHINE SULFATE 10 MG/ML
6 INJECTION, SOLUTION INTRAMUSCULAR; INTRAVENOUS EVERY 10 MIN PRN
Status: DISCONTINUED | OUTPATIENT
Start: 2019-09-24 | End: 2019-09-24

## 2019-09-24 RX ORDER — HYDROMORPHONE HYDROCHLORIDE 1 MG/ML
0.2 INJECTION, SOLUTION INTRAMUSCULAR; INTRAVENOUS; SUBCUTANEOUS EVERY 5 MIN PRN
Status: DISCONTINUED | OUTPATIENT
Start: 2019-09-24 | End: 2019-09-24

## 2019-09-24 RX ORDER — PROCHLORPERAZINE EDISYLATE 5 MG/ML
5 INJECTION INTRAMUSCULAR; INTRAVENOUS ONCE AS NEEDED
Status: DISCONTINUED | OUTPATIENT
Start: 2019-09-24 | End: 2019-09-24

## 2019-09-24 RX ORDER — NALOXONE HYDROCHLORIDE 0.4 MG/ML
80 INJECTION, SOLUTION INTRAMUSCULAR; INTRAVENOUS; SUBCUTANEOUS AS NEEDED
Status: DISCONTINUED | OUTPATIENT
Start: 2019-09-24 | End: 2019-09-24

## 2019-09-24 RX ORDER — METOPROLOL TARTRATE 5 MG/5ML
2.5 INJECTION INTRAVENOUS ONCE
Status: DISCONTINUED | OUTPATIENT
Start: 2019-09-24 | End: 2019-09-24

## 2019-09-24 RX ORDER — PHENAZOPYRIDINE HYDROCHLORIDE 200 MG/1
200 TABLET, FILM COATED ORAL ONCE AS NEEDED
Status: DISCONTINUED | OUTPATIENT
Start: 2019-09-24 | End: 2019-09-24

## 2019-09-24 RX ADMIN — ONDANSETRON 4 MG: 2 INJECTION INTRAMUSCULAR; INTRAVENOUS at 07:53:00

## 2019-09-24 RX ADMIN — LEVOFLOXACIN 500 MG: 5 INJECTION, SOLUTION INTRAVENOUS at 07:40:00

## 2019-09-24 RX ADMIN — LIDOCAINE HYDROCHLORIDE 20 MG: 10 INJECTION, SOLUTION EPIDURAL; INFILTRATION; INTRACAUDAL; PERINEURAL at 07:42:00

## 2019-09-24 RX ADMIN — SODIUM CHLORIDE, SODIUM LACTATE, POTASSIUM CHLORIDE, CALCIUM CHLORIDE: 600; 310; 30; 20 INJECTION, SOLUTION INTRAVENOUS at 09:07:00

## 2019-09-24 RX ADMIN — DEXAMETHASONE SODIUM PHOSPHATE 4 MG: 4 MG/ML VIAL (ML) INJECTION at 07:53:00

## 2019-09-24 NOTE — ANESTHESIA PROCEDURE NOTES
Airway  Date/Time: 9/24/2019 7:44 AM  Urgency: elective    Airway not difficult    General Information and Staff    Patient location during procedure: OR  Anesthesiologist: Sy Lopez MD  Resident/CRNA: Monet Strickland CRNA  Performed: ANU     In

## 2019-09-24 NOTE — INTERVAL H&P NOTE
Pre-op Diagnosis: Ureteral calculi [N20.1]    The above referenced H&P was reviewed by Kaye Bo MD on 9/24/2019, the patient was examined and no significant changes have occurred in the patient's condition since the H&P was performed.   I discussed

## 2019-09-24 NOTE — H&P
PREOP HISTORY AND PHYSICAL     Aylin Hayde is a 80year old male.     HPI:   Patient presents with:  Abdomen/Flank Pain (GI/)        History provided by patient and review of records.     80year-old with coronary artery disease, history of asbestos     • Calculus of kidney 2005   • Essential hypertension     • History of asbestos exposure     • Hyperlipidemia     • Lipid screening 04-   • Lower GI bleed rectal ulcer   • Osteoarthritis, shoulder     • S/P TURP (transurethral resection of prosta breakfast     Allergies:     Codeine                 UNKNOWN  Penicillins             UNKNOWN        ROS:   General review of systems -- please see HPI section above.    Denies chest pain, shortness of breath, bone pain; chronic constipation.     PHYSICAL E    IIMAGING     9/17/2019 KUB plain x-ray---left ureteral stent in place.   \"Ovoid conglomerate stone measuring 7.5 x 4.1 mm in size along the course of the distal stent in the region of the left ureterovesical junction or perhaps just above this at the to above;           Now resolved with left ureteral stent in place     4.  Contralateral right kidney has a nonobstructing 8 mm calculus--asymptomatic at this time     5.  Prostate nodule on the left lobe suspicious for prostate neoplasm                Hyun detailed message as to my staff to schedule the case.        I also reviewed with patient and he understands that there is a stone present in the contralateral right kidney and right now that stone is not causing any problems; I also explained that patient cancer      Dr. Ramiro Castellanos M.D.

## 2019-09-24 NOTE — ANESTHESIA POSTPROCEDURE EVALUATION
Patient: Radha Davila    Procedure Summary     Date:  09/24/19 Room / Location:  [address] [place] [address] MAIN OR 14 / [address] [place] [address] MAIN OR    Anesthesia Start:  1679 Anesthesia Stop:  8572    Procedure:  CYSTOSCOPY URETEROSCOPY (Left ) Diagnosis:       Ureteral calculi      (Ureter

## 2019-09-24 NOTE — TELEPHONE ENCOUNTER
Urology nurses,    Please help patient get appointment to see me during the next 3--6 weeks to discuss possible treatment of  1. Nonobstructing stone in the right kidney  2. Prostate nodule with elevated PSA 8.71.     Note that 9/24/2019 cystoscopy with l

## 2019-09-24 NOTE — BRIEF OP NOTE
[facility] POST ANESTHESIA CARE UNIT  Brief Op Note       Patients Name: Elmer Trinidad  Attending Physician: Melany Johnson MD  Operating Physician: Nicol Jones MD  CSN: [id number]     Location:  OR  MRN: [id number]    Date of Birth: [birth year]

## 2019-09-24 NOTE — ANESTHESIA PREPROCEDURE EVALUATION
Anesthesia PreOp Note    HPI:     Radha Davila is a 80year old male who presents for preoperative consultation requested by: Jennifer Valles MD    Date of Surgery: 9/24/2019    Procedure(s):  CYSTOSCOPY URETEROSCOPY  LASER HOLMIUM LITHOTRIPSY  CYSTO Surgical History:   Procedure Laterality Date   • BACK SURGERY      C-SPINE SURGERY    • COLONOSCOPY     • CYSTOSCOPY STENT INSERTION Left 8/28/2019    Performed by Yoseph Aguirre MD at [address] [place] [address] MAIN OR   • HERNIA SURGERY  2006   • KNEE REPLACEMENT SURGERY current Epic-ordered outpatient medications on file.       Penicillins             HIVES  Codeine                 NAUSEA AND VOMITING    Family History   Problem Relation Age of Onset   • Cancer Father 79        gastric   • Stroke Mother 62     Social Histo Asked        Stress Concern: Not Asked        Weight Concern: Not Asked        Special Diet: Not Asked        Back Care: Not Asked        Exercise: Not Asked        Bike Helmet: Not Asked        Seat Belt: Not Asked        Self-Exams: Not Asked    Social H or family member of the nature of the anesthetic plan, benefits, risks including possible dental damage if relevant, major complications, and any alternative forms of anesthetic management.    All of the patient's questions were answered to the best of my a

## 2019-09-24 NOTE — TELEPHONE ENCOUNTER
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 Patient did not need replacement of left ureteral stent--it was removed. the following are discharge urology instructions given to the patient today ---    I am asking patient to make appointment to see me to discuss if and how to treat the stone in the other kidney, namely the right kidney and whether to investigate a palpable nodule of the prostate found on rectal exam and also   elevated PSA 8.71.      Many thanks,  [facility]    Urology

## 2019-09-24 NOTE — OPERATIVE REPORT
[facility]    PATIENT'S NAME: Debbie Nair   ATTENDING PHYSICIAN: Humble Grewal MD   OPERATING PHYSICIAN: Humble Grewal MD   PATIENT ACCOUNT#:   [id number]    LOCATION:  [address]  MEDICAL RECORD #:   [id number]       D size and is alongside the stent in the distal ureter, and ureteroscopy confirms a single large yellow stone that is hard.   It takes a lot of energy to break it up into stone fragments, and it does not pulverize into sand, but rather into larger chunks, and I passed a 0.035 Glidewire through the lumen of the stricture into the bladder and then endoscopically dilated with a 19-Ukrainian sheath with 30-degree angle lens, then with a 22-Ukrainian sheath, then with a 24-Ukrainian [employer] sound.   I then reintroduced the Darreld MD Norman Willis MD

## 2019-09-24 NOTE — TELEPHONE ENCOUNTER
Patient contacted. Offered patient appointment on Thursday 11/7 @ 10:00 am (6 weeks). Patient agreed, appt made. Patient verbalized understanding to all discharge instructions listed below. All questions answered.

## 2019-09-26 LAB — CALCULI MASS: 93 MG

## 2019-10-03 ENCOUNTER — LAB ENCOUNTER (OUTPATIENT)
Dept: LAB | Age: 84
End: 2019-10-03
Attending: INTERNAL MEDICINE
Payer: MEDICARE

## 2019-10-03 DIAGNOSIS — I25.9 IHD (ISCHEMIC HEART DISEASE): ICD-10-CM

## 2019-10-03 DIAGNOSIS — N23 RENAL COLIC ON LEFT SIDE: ICD-10-CM

## 2019-10-03 PROCEDURE — 85025 COMPLETE CBC W/AUTO DIFF WBC: CPT

## 2019-10-03 PROCEDURE — 36415 COLL VENOUS BLD VENIPUNCTURE: CPT

## 2019-10-03 PROCEDURE — 80053 COMPREHEN METABOLIC PANEL: CPT

## 2019-10-11 ENCOUNTER — TELEPHONE (OUTPATIENT)
Dept: INTERNAL MEDICINE CLINIC | Facility: CLINIC | Age: 84
End: 2019-10-11

## 2019-10-15 ENCOUNTER — OFFICE VISIT (OUTPATIENT)
Dept: OTOLARYNGOLOGY | Facility: CLINIC | Age: 84
End: 2019-10-15
Payer: MEDICARE

## 2019-10-15 VITALS
HEIGHT: 64 IN | WEIGHT: 134.69 LBS | TEMPERATURE: 98 F | BODY MASS INDEX: 22.99 KG/M2 | DIASTOLIC BLOOD PRESSURE: 82 MMHG | SYSTOLIC BLOOD PRESSURE: 133 MMHG

## 2019-10-15 DIAGNOSIS — R13.10 DYSPHAGIA, UNSPECIFIED TYPE: Primary | ICD-10-CM

## 2019-10-15 PROCEDURE — 99203 OFFICE O/P NEW LOW 30 MIN: CPT | Performed by: OTOLARYNGOLOGY

## 2019-10-15 PROCEDURE — 31575 DIAGNOSTIC LARYNGOSCOPY: CPT | Performed by: OTOLARYNGOLOGY

## 2019-10-15 PROCEDURE — G0463 HOSPITAL OUTPT CLINIC VISIT: HCPCS | Performed by: OTOLARYNGOLOGY

## 2019-10-15 NOTE — PROGRESS NOTES
Andrea Zuluaga is a 80year old male. Patient presents with:  Throat Problem: per pt he feels something stuck at his throat for a while    HPI:   He is been feeling something sticking in his throat for a long time.   This is been going on at least several Alcohol/week: 5.8 standard drinks      Types: 7 Glasses of wine per week      Frequency: 2-3 times a week    Drug use: No       REVIEW OF SYSTEMS:   GENERAL HEALTH: feels well otherwise  GENERAL : denies fever, chills, sweats, weight loss, weight gain  SKI performed. The flexible fiberoptic laryngoscope was placed into the nose or mouth and advanced  into the interior of the larynx. A thorough examination of the interior of the larynx was performed. Findings were as follows.        Hypopharynx/Larynx:  Epig

## 2019-10-31 ENCOUNTER — HOSPITAL ENCOUNTER (OUTPATIENT)
Dept: GENERAL RADIOLOGY | Facility: HOSPITAL | Age: 84
Discharge: HOME | End: 2019-10-31
Attending: OTOLARYNGOLOGY
Payer: MEDICARE

## 2019-10-31 DIAGNOSIS — R13.10 DYSPHAGIA, UNSPECIFIED TYPE: ICD-10-CM

## 2019-10-31 PROCEDURE — 92611 MOTION FLUOROSCOPY/SWALLOW: CPT

## 2019-10-31 PROCEDURE — 74230 X-RAY XM SWLNG FUNCJ C+: CPT | Performed by: OTOLARYNGOLOGY

## 2019-10-31 NOTE — PROGRESS NOTES
ADULT VIDEOFLUOROSCOPIC SWALLOWING STUDY:   Referring Physician: Pablo Russell      Radiologist: Dr. Corina Bello   Diagnosis: Dysphagia  Date of Service: 10/31/2019     PATIENT SUMMARY   Chief Complaint: Pt reports difficulty swallowing stating, \"I feel like little pharyngeal response to penetrated material. SLP cued pt to throat clear+swallow in attempt to clear residue from upper airway;however, deemed ineffective.  Nectar thickened liquids via tsp and nectar thickened liquids via cup with chin tuck posture reduced compensatory strategies:   Sit upright  Small sips  Small bites  Eat slowly  Swallow twice with each bite  Multiple dry swallows  No straw  Use chin tuck for liquids and solids    Medication Administration:  Take whole in pureed    Further Follow-up:  Yes

## 2019-10-31 NOTE — PATIENT INSTRUCTIONS
SWALLOWING INSTRUCTIONS    DIET: CHOPPED SOLIDS   LIQUIDS: NECTAR THICKENED LIQUIDS       ____ SIT UPRIGHT    ____ SMALL BITES AND SIPS    ____ EAT SLOWLY    ____ ALTERNATED LIQUIDS AND SOLIDS    ____ Green Krishna WITH EACH BITE    ____ NO STRAW    ____

## 2019-11-01 NOTE — PROGRESS NOTES
Please let the patient know that there is no obvious structural abnormality in the study for his swallowing. The therapist does note that there is some difficulty with the way the muscles in his throat are working.   According to her note she did give him

## 2019-11-04 ENCOUNTER — TELEPHONE (OUTPATIENT)
Dept: OTOLARYNGOLOGY | Facility: CLINIC | Age: 84
End: 2019-11-04

## 2019-11-04 NOTE — TELEPHONE ENCOUNTER
Informed pt of note below and verbalized understanding,offered one of our gastroenterologist but stated he will give it time with therapist advised  and go from there.

## 2019-11-04 NOTE — TELEPHONE ENCOUNTER
Author: Stephen Card MD Service: ENT Author Type: Physician   Filed: 11/1/2019  6:57 AM Date of Service: 10/31/2019 10:00 AM Status: Signed   : Stephen Card MD (Physician)        Show:Clear all  [x]Manual[]Template[]Copied    Added by:  [x]Charo

## 2019-11-07 ENCOUNTER — OFFICE VISIT (OUTPATIENT)
Dept: SURGERY | Facility: CLINIC | Age: 84
End: 2019-11-07
Payer: MEDICARE

## 2019-11-07 VITALS
HEART RATE: 69 BPM | TEMPERATURE: 98 F | WEIGHT: 138 LBS | BODY MASS INDEX: 24 KG/M2 | DIASTOLIC BLOOD PRESSURE: 74 MMHG | SYSTOLIC BLOOD PRESSURE: 132 MMHG

## 2019-11-07 DIAGNOSIS — N40.2 PROSTATE NODULE: ICD-10-CM

## 2019-11-07 DIAGNOSIS — R39.14 BENIGN PROSTATIC HYPERPLASIA WITH INCOMPLETE BLADDER EMPTYING: ICD-10-CM

## 2019-11-07 DIAGNOSIS — Z80.42 FAMILY HISTORY OF PROSTATE CANCER: ICD-10-CM

## 2019-11-07 DIAGNOSIS — R35.1 NOCTURIA: ICD-10-CM

## 2019-11-07 DIAGNOSIS — N20.0 RIGHT KIDNEY STONE: Primary | ICD-10-CM

## 2019-11-07 DIAGNOSIS — N13.5 URETERAL STRICTURE, LEFT: ICD-10-CM

## 2019-11-07 DIAGNOSIS — N40.1 BENIGN PROSTATIC HYPERPLASIA WITH INCOMPLETE BLADDER EMPTYING: ICD-10-CM

## 2019-11-07 DIAGNOSIS — R97.20 ELEVATED PSA: ICD-10-CM

## 2019-11-07 PROCEDURE — 99214 OFFICE O/P EST MOD 30 MIN: CPT | Performed by: UROLOGY

## 2019-11-07 PROCEDURE — G0463 HOSPITAL OUTPT CLINIC VISIT: HCPCS | Performed by: UROLOGY

## 2019-11-07 NOTE — PATIENT INSTRUCTIONS
Lenin Chavez M.D.      1.  If you ever develop progressive, continuing weakening of your stream, please call my office nurses on an urgent basis--that could mean recurrence and return of stricture/scar tissue in your urethr diet, up to 1,200 mg a day; rule of thumb--you may have 2 servings or possibly up to 3 servings of dairy daily, however, try to avoid cheese which is naturally high in salt.   Be careful not to take high doses of calcium; you may be better off taking vitami

## 2019-11-07 NOTE — PROGRESS NOTES
HPI:    Patient ID: Royer Carranza is a 80year old male. HPI   Kidney Stone  Recurrent.  Status post 9/24/2019 cystoscopy left ureteroscopy with laser lithotripsy of large left ureteral calculus, basket extraction of stone fragments, removal of left ur not drink caffeinated beverages in the evening. He drinks 2 oz of alcohol in the evening. The patient snores. History of Ureteral Stricture  Problem started following TURP in 2000.  Status post 8/29/19 and 9/24/19 cystoscopy with endoscopic dilation of s calculi size 6 mm on CT, cannot be seen on plain x-ray; left hyronephrosis secondary to above; left renal colic--flank pain and left lower quandrant pain secondary to above; nodule on the left lobe suspicious for prostate neoplasm, denies symptoms; family for color change. Neurological: Negative for speech difficulty. Psychiatric/Behavioral: The patient is not nervous/anxious.           Current Outpatient Medications   Medication Sig Dispense Refill   • Multiple Vitamins-Minerals (CENTRUM SILVER ADULT 50 • SHOULDER ARTHROSCOPY  2006   • SPINE SURGERY PROCEDURE UNLISTED     • TOTAL KNEE REPLACEMENT Bilateral       Family History   Problem Relation Age of Onset   • Cancer Father 79        gastric   • Stroke Mother 62      Social History: Social History Genitourinary Comments: I did not examine the prostate today. On 8/28/19 MARIA DE JESUS, prostate 2+ enlarged, 35 grams, there is a suspicious left prostate nodule 1 cm in size--suspicious for prostate neoplasm. Musculoskeletal: Normal range of motion.      Neurolog (H) 4.0 - 11.0 x10(3) uL Final            HGB   Date Value Ref Range Status   08/28/2019 10.8 (L) 13.0 - 17.5 g/dL Final            HCT   Date Value Ref Range Status   08/28/2019 34.7 (L) 39.0 - 53.0 % Final            PLT   Date Value Ref Range Status   0 right ureteroscopy, laser lithotripsy vs observation and I answered patient's questions on treatment; patient understands all of this and decides to observe this problem.  He does not want surgery as he is asymptomatic, especially in light of his heart prob prostatic hyperplasia with incomplete bladder emptying  I did not examine the prostate today. On 8/28/19 MARIA DE JESUS, prostate 2+ enlarged, 35 grams, there is a suspicious left prostate nodule 1 cm in size--suspicious for prostate neoplasm. History of TURP 2000.  Arminda Harirs stones   -- Urinalysis urine test.    4.  To lower your chances of developing kidney stone or having  existing stones increase in size, please avoid salty foods and also sodium and salt in your diet --avoid fast foods, frozen foods, pizza which are very hi documentation has been prepared under the direction and in the presence of Annie Rowell MD.   Electronically Signed: Ely Oconnor, 11/7/2019, 10:34 AM.    I, Annie Rowell MD,  personally performed the services described in this documentatio

## 2019-11-25 ENCOUNTER — TELEPHONE (OUTPATIENT)
Dept: INTERNAL MEDICINE CLINIC | Facility: CLINIC | Age: 84
End: 2019-11-25

## 2019-11-25 DIAGNOSIS — N23 RENAL COLIC ON LEFT SIDE: Primary | ICD-10-CM

## 2019-11-25 DIAGNOSIS — I25.9 IHD (ISCHEMIC HEART DISEASE): ICD-10-CM

## 2019-11-26 RX ORDER — METOPROLOL SUCCINATE 25 MG/1
TABLET, EXTENDED RELEASE ORAL
Qty: 90 TABLET | Refills: 3 | Status: SHIPPED | OUTPATIENT
Start: 2019-11-26 | End: 2020-01-06

## 2019-11-26 NOTE — TELEPHONE ENCOUNTER
Spoke to patient  Scheduled appt w/Dr Rebecca Farias on Dec 6  Please advise if patient should have labs done prior     [phone]

## 2019-12-02 NOTE — TELEPHONE ENCOUNTER
Notified pt that labs have been ordered and can complete them before visit on 12/6; pt verbalized understanding

## 2019-12-04 ENCOUNTER — LAB ENCOUNTER (OUTPATIENT)
Dept: LAB | Age: 84
End: 2019-12-04
Attending: INTERNAL MEDICINE
Payer: MEDICARE

## 2019-12-04 DIAGNOSIS — I25.9 IHD (ISCHEMIC HEART DISEASE): ICD-10-CM

## 2019-12-04 DIAGNOSIS — N23 RENAL COLIC ON LEFT SIDE: ICD-10-CM

## 2019-12-04 PROCEDURE — 85025 COMPLETE CBC W/AUTO DIFF WBC: CPT

## 2019-12-04 PROCEDURE — 36415 COLL VENOUS BLD VENIPUNCTURE: CPT

## 2019-12-04 PROCEDURE — 80061 LIPID PANEL: CPT

## 2019-12-04 PROCEDURE — 80053 COMPREHEN METABOLIC PANEL: CPT

## 2019-12-06 ENCOUNTER — OFFICE VISIT (OUTPATIENT)
Dept: INTERNAL MEDICINE CLINIC | Facility: CLINIC | Age: 84
End: 2019-12-06
Payer: MEDICARE

## 2019-12-06 VITALS
BODY MASS INDEX: 23.56 KG/M2 | OXYGEN SATURATION: 97 % | WEIGHT: 138 LBS | DIASTOLIC BLOOD PRESSURE: 80 MMHG | SYSTOLIC BLOOD PRESSURE: 142 MMHG | TEMPERATURE: 98 F | HEIGHT: 64 IN | HEART RATE: 82 BPM

## 2019-12-06 DIAGNOSIS — N13.30 HYDRONEPHROSIS, LEFT: ICD-10-CM

## 2019-12-06 DIAGNOSIS — D56.3 THALASSEMIA MINOR: Primary | ICD-10-CM

## 2019-12-06 DIAGNOSIS — E78.00 HYPERCHOLESTEREMIA: ICD-10-CM

## 2019-12-06 DIAGNOSIS — M16.11 PRIMARY OSTEOARTHRITIS OF RIGHT HIP: ICD-10-CM

## 2019-12-06 DIAGNOSIS — I25.9 IHD (ISCHEMIC HEART DISEASE): ICD-10-CM

## 2019-12-06 PROCEDURE — 99214 OFFICE O/P EST MOD 30 MIN: CPT | Performed by: INTERNAL MEDICINE

## 2019-12-06 PROCEDURE — G0463 HOSPITAL OUTPT CLINIC VISIT: HCPCS | Performed by: INTERNAL MEDICINE

## 2019-12-06 NOTE — PROGRESS NOTES
Guanakito Teran is a 80year old male. HPI:   He has been doing well. No ED or UC visits. He had extraction of ureteral stone last Sept and no recurrence. OA right hip - doing well and fully ambulatory and no assists.     IHD - no chest pain or sob vomiting)    • S/P TURP (transurethral resection of prostate)    • Thalassemia trait    • Visual impairment       Social History:  Social History    Tobacco Use      Smoking status: Former Smoker      Smokeless tobacco: Never Used    Alcohol use:  Yes

## 2019-12-23 NOTE — TELEPHONE ENCOUNTER
Current refill request refused due to refill is either a duplicate request or has active refills at the pharmacy. Check previous templates.     Requested Prescriptions     Signed Prescriptions Disp Refills   • METOPROLOL SUCCINATE ER 25 MG Oral Tablet 24 H

## 2019-12-23 NOTE — TELEPHONE ENCOUNTER
[facility] faxed a new Rx request for Metoprolol  90 day supply fax.  # [phone]   Routed to Rx

## 2019-12-27 ENCOUNTER — TELEPHONE (OUTPATIENT)
Dept: INTERNAL MEDICINE CLINIC | Facility: CLINIC | Age: 84
End: 2019-12-27

## 2020-01-06 RX ORDER — METOPROLOL SUCCINATE 25 MG/1
25 TABLET, EXTENDED RELEASE ORAL
Qty: 90 TABLET | Refills: 3 | Status: SHIPPED | OUTPATIENT
Start: 2020-01-06 | End: 2020-01-16

## 2020-01-06 RX ORDER — METOPROLOL SUCCINATE 25 MG/1
25 TABLET, EXTENDED RELEASE ORAL
Qty: 30 TABLET | Refills: 3 | Status: SHIPPED | OUTPATIENT
Start: 2020-01-06 | End: 2020-01-06

## 2020-01-06 NOTE — TELEPHONE ENCOUNTER
Pt. Is out of meds he would like a short fill sent to [facility] in [place] pt was told by [facility] that he has no refills please advise ph.  # [phone]  Routed low to Rx

## 2020-01-06 NOTE — TELEPHONE ENCOUNTER
Both Rxs sent  Reviewed and Rx done  Requested Prescriptions     Signed Prescriptions Disp Refills   • Metoprolol Succinate ER 25 MG Oral Tablet 24 Hr 90 tablet 3     Sig: Take 1 tablet (25 mg total) by mouth once daily.      Authorizing Provider: Veronica Angel

## 2020-01-15 NOTE — TELEPHONE ENCOUNTER
Called patient and relayed message that RX was sent 1/6 to [facility] and he should call to ask them if they sent it- if any  Problem he should call us back - verbalized understanding

## 2020-01-15 NOTE — TELEPHONE ENCOUNTER
Pt called for status on refill for Metoprolol that was to be sent to [facility]  Pt got 30 day supply to hold him from [place]  Please call pt with status  Tasked to [employer]

## 2020-01-16 RX ORDER — METOPROLOL SUCCINATE 25 MG/1
25 TABLET, EXTENDED RELEASE ORAL
Qty: 90 TABLET | Refills: 3 | Status: SHIPPED | OUTPATIENT
Start: 2020-01-16 | End: 2020-01-30

## 2020-01-24 NOTE — TELEPHONE ENCOUNTER
Note faxed back to [facility]  Confirmation was received

## 2020-01-24 NOTE — TELEPHONE ENCOUNTER
Pt called, he rec'd an email from [address], they cannot ship his Metoprolol as they have not rec'd RX.  Our records indicate we have sent the RX 2 times already  [employer] called [facility],they say they are waiting on authorization from physi

## 2020-01-24 NOTE — TELEPHONE ENCOUNTER
Note with prescription order to be faxed to [facility] to clarify pt identifier;  [facility] has received Rxs for him in the past without issue

## 2020-01-29 NOTE — TELEPHONE ENCOUNTER
[facility] sent form which requires addt'l information  Form placed in purple folder  Tasked to RX

## 2020-01-30 RX ORDER — METOPROLOL SUCCINATE 25 MG/1
25 TABLET, EXTENDED RELEASE ORAL
Qty: 90 TABLET | Refills: 3 | Status: SHIPPED
Start: 2020-01-30 | End: 2020-11-18

## 2020-01-30 NOTE — TELEPHONE ENCOUNTER
Printed Rx and faxed with a note to contact pt with any further inquires as this Rx has been sent 3 times

## 2020-01-31 NOTE — TELEPHONE ENCOUNTER
Spoke to pt -- [facility] faxed us again about metoprolol; Discussed as pt has demographic issues with name and  that he has had for some time;    He will call [facility] and work on it;   I offered a written Rx to be mailed to him but he will let us know if he need

## 2020-02-03 ENCOUNTER — TELEPHONE (OUTPATIENT)
Dept: INTERNAL MEDICINE CLINIC | Facility: CLINIC | Age: 85
End: 2020-02-03

## 2020-02-03 NOTE — TELEPHONE ENCOUNTER
Spoke to Ritesh Darling who advised that she spoke to patient on Friday and patient is aware of the situation. Per note below, pt was advised to call the pharmacy to clear up demographic issues.

## 2020-02-03 NOTE — TELEPHONE ENCOUNTER
Please call [facility]/pharmacist/[facility] regarding refill for:  Metoprolol  Pt is out of medication  Please call:  [phone]  reference#: [id number]  Tasked to [employer]

## 2020-02-03 NOTE — TELEPHONE ENCOUNTER
Voice mail message received from [employer] for renewal of   Metoprolol  [employer]#[id number]    Pt is low or almost out    Please send prescription for refill

## 2020-03-05 RX ORDER — ATORVASTATIN CALCIUM 10 MG/1
TABLET, FILM COATED ORAL
Qty: 90 TABLET | Refills: 3 | Status: SHIPPED | OUTPATIENT
Start: 2020-03-05 | End: 2021-02-28

## 2020-04-30 RX ORDER — PANTOPRAZOLE SODIUM 40 MG/1
TABLET, DELAYED RELEASE ORAL
Qty: 90 TABLET | Refills: 3 | Status: SHIPPED | OUTPATIENT
Start: 2020-04-30 | End: 2021-04-25

## 2020-06-08 ENCOUNTER — OFFICE VISIT (OUTPATIENT)
Dept: INTERNAL MEDICINE CLINIC | Facility: CLINIC | Age: 85
End: 2020-06-08
Payer: MEDICARE

## 2020-06-08 ENCOUNTER — LAB ENCOUNTER (OUTPATIENT)
Dept: LAB | Age: 85
End: 2020-06-08
Attending: INTERNAL MEDICINE
Payer: MEDICARE

## 2020-06-08 VITALS
HEIGHT: 64 IN | HEART RATE: 90 BPM | DIASTOLIC BLOOD PRESSURE: 70 MMHG | BODY MASS INDEX: 23.05 KG/M2 | OXYGEN SATURATION: 98 % | WEIGHT: 135 LBS | TEMPERATURE: 98 F | RESPIRATION RATE: 16 BRPM | SYSTOLIC BLOOD PRESSURE: 136 MMHG

## 2020-06-08 DIAGNOSIS — I25.9 IHD (ISCHEMIC HEART DISEASE): ICD-10-CM

## 2020-06-08 DIAGNOSIS — E78.00 HYPERCHOLESTEREMIA: ICD-10-CM

## 2020-06-08 DIAGNOSIS — K21.9 GASTROESOPHAGEAL REFLUX DISEASE WITHOUT ESOPHAGITIS: ICD-10-CM

## 2020-06-08 DIAGNOSIS — R42 DIZZINESS: Primary | ICD-10-CM

## 2020-06-08 PROCEDURE — 80061 LIPID PANEL: CPT

## 2020-06-08 PROCEDURE — 80053 COMPREHEN METABOLIC PANEL: CPT

## 2020-06-08 PROCEDURE — 84443 ASSAY THYROID STIM HORMONE: CPT

## 2020-06-08 PROCEDURE — 36415 COLL VENOUS BLD VENIPUNCTURE: CPT

## 2020-06-08 PROCEDURE — 85060 BLOOD SMEAR INTERPRETATION: CPT

## 2020-06-08 PROCEDURE — 85025 COMPLETE CBC W/AUTO DIFF WBC: CPT

## 2020-06-08 PROCEDURE — G0463 HOSPITAL OUTPT CLINIC VISIT: HCPCS | Performed by: INTERNAL MEDICINE

## 2020-06-08 PROCEDURE — 99214 OFFICE O/P EST MOD 30 MIN: CPT | Performed by: INTERNAL MEDICINE

## 2020-06-08 RX ORDER — ASPIRIN 81 MG/1
81 TABLET ORAL DAILY
COMMUNITY

## 2020-06-08 NOTE — PROGRESS NOTES
Patsey Landau is a 80year old male. HPI:   He has felt dizzi off and on over the last 2 weeks, not pre-syncopal and no vertigo. No NV, appetite good, energy adequate but \"no ambition\". He walked up stairs to OV today and no sx.  He feels \"as if he di • Hyperlipidemia    • Lipid screening 04-   • Lower GI bleed rectal ulcer   • Osteoarthritis    • Osteoarthritis, shoulder    • PONV (postoperative nausea and vomiting)    • S/P TURP (transurethral resection of prostate)    • Thalassemia trait

## 2020-07-20 ENCOUNTER — OFFICE VISIT (OUTPATIENT)
Dept: INTERNAL MEDICINE CLINIC | Facility: CLINIC | Age: 85
End: 2020-07-20
Payer: MEDICARE

## 2020-07-20 VITALS
DIASTOLIC BLOOD PRESSURE: 76 MMHG | SYSTOLIC BLOOD PRESSURE: 122 MMHG | WEIGHT: 138 LBS | TEMPERATURE: 97 F | HEIGHT: 64 IN | HEART RATE: 90 BPM | OXYGEN SATURATION: 99 % | BODY MASS INDEX: 23.56 KG/M2

## 2020-07-20 DIAGNOSIS — E78.00 HYPERCHOLESTEREMIA: ICD-10-CM

## 2020-07-20 DIAGNOSIS — I25.9 IHD (ISCHEMIC HEART DISEASE): ICD-10-CM

## 2020-07-20 DIAGNOSIS — K21.9 GASTROESOPHAGEAL REFLUX DISEASE WITHOUT ESOPHAGITIS: ICD-10-CM

## 2020-07-20 DIAGNOSIS — R42 DIZZINESS: Primary | ICD-10-CM

## 2020-07-20 PROCEDURE — G0463 HOSPITAL OUTPT CLINIC VISIT: HCPCS | Performed by: INTERNAL MEDICINE

## 2020-07-20 PROCEDURE — 99214 OFFICE O/P EST MOD 30 MIN: CPT | Performed by: INTERNAL MEDICINE

## 2020-07-20 NOTE — PROGRESS NOTES
Xochitl Lee is a 80year old male. HPI:   6 weeks ago he was here and noted dizziness and I decreased his metoprolol from 25 to 12.5 but he BP andrea and after 1 week he resumed metoprolol 25 mg daily.      He feels the dizziness is about the same - desc • History of asbestos exposure    • Hyperlipidemia    • Lipid screening 04-   • Lower GI bleed rectal ulcer   • Osteoarthritis    • Osteoarthritis, shoulder    • PONV (postoperative nausea and vomiting)    • S/P TURP (transurethral resection of pr

## 2020-09-03 ENCOUNTER — TELEPHONE (OUTPATIENT)
Dept: INTERNAL MEDICINE CLINIC | Facility: CLINIC | Age: 85
End: 2020-09-03

## 2020-09-03 NOTE — TELEPHONE ENCOUNTER
Dr. Santiago Jerome message relayed to pt who verbalized understanding. Pt states this is recurring issue s/p left shoulder surgery - denies sx of CVA. Pt states he can wait till Tuesday to see Dr. Waqar Bardales - appt made for 10:00 AM on Tuesday.     To Dr. Waqar clemente

## 2020-09-03 NOTE — TELEPHONE ENCOUNTER
To Dr. Maty Lopez - see below - OK to schedule with you today? .   Onset: 3 days ago - last night could not move arm. Pt has had cortisone shots Left shoulder for about 10 years. Pt thinks you did the last one. Respiratory infection triage:    Fever:  [x]  No fever  []  Fever>100.4    Cough:  [] Tight cough  [] Cough with exertion  [] Dry cough  [] Sputum production, Color:     Breathing:  [] Mild shortness of breath interfering with activity  [] Wheezing  [] Pain with deep breathing  [] Using inhaler    Other symptoms:  [] Sore throat  [] Difficulty swallowing  [] Nasal drainage/congestion  [] Sinus congestion/pressure  [] Ear pain  [] Body aches  [] Poor appetite  [] Loss of sense of smell   [] Loss of sense of taste  []Conjunctivitis? [] Any recent travel? [] Any sick contacts? [] Are you a healthcare worker? ADDITIONAL NOTES:            Notified patient that we will route this message to the doctor and see what their recommendations would be. In the meantime, if anything worsens, they were advised to call back or seek emergent evaluation.

## 2020-09-08 ENCOUNTER — OFFICE VISIT (OUTPATIENT)
Dept: INTERNAL MEDICINE CLINIC | Facility: CLINIC | Age: 85
End: 2020-09-08
Payer: MEDICARE

## 2020-09-08 VITALS
WEIGHT: 139.19 LBS | BODY MASS INDEX: 23.76 KG/M2 | OXYGEN SATURATION: 98 % | SYSTOLIC BLOOD PRESSURE: 140 MMHG | DIASTOLIC BLOOD PRESSURE: 84 MMHG | RESPIRATION RATE: 16 BRPM | HEIGHT: 64 IN | HEART RATE: 90 BPM | TEMPERATURE: 98 F

## 2020-09-08 DIAGNOSIS — I25.9 IHD (ISCHEMIC HEART DISEASE): ICD-10-CM

## 2020-09-08 DIAGNOSIS — M25.512 ACUTE PAIN OF LEFT SHOULDER: Primary | ICD-10-CM

## 2020-09-08 PROCEDURE — G0463 HOSPITAL OUTPT CLINIC VISIT: HCPCS | Performed by: INTERNAL MEDICINE

## 2020-09-08 PROCEDURE — 99214 OFFICE O/P EST MOD 30 MIN: CPT | Performed by: INTERNAL MEDICINE

## 2020-09-08 NOTE — PROGRESS NOTES
Isra Roche is a 80year old male. HPI:   He had throbbing pain in the left shoulder for 4 days and then he awakened on Sat and had just his usual degree of pain. He had surgery left rotator cuff about 10 yrs ago.      No further vertigo - he has asymm Osteoarthritis, shoulder    • PONV (postoperative nausea and vomiting)    • S/P TURP (transurethral resection of prostate)    • Thalassemia trait    • Visual impairment       Social History:  Social History    Tobacco Use      Smoking status: Former Smoker

## 2020-11-18 RX ORDER — METOPROLOL SUCCINATE 25 MG/1
TABLET, EXTENDED RELEASE ORAL
Qty: 90 TABLET | Refills: 3 | Status: SHIPPED | OUTPATIENT
Start: 2020-11-18 | End: 2021-11-16

## 2020-11-30 ENCOUNTER — OFFICE VISIT (OUTPATIENT)
Dept: INTERNAL MEDICINE CLINIC | Facility: CLINIC | Age: 85
End: 2020-11-30
Payer: MEDICARE

## 2020-11-30 ENCOUNTER — TELEPHONE (OUTPATIENT)
Dept: INTERNAL MEDICINE CLINIC | Facility: CLINIC | Age: 85
End: 2020-11-30

## 2020-11-30 VITALS
DIASTOLIC BLOOD PRESSURE: 84 MMHG | OXYGEN SATURATION: 98 % | HEIGHT: 64 IN | SYSTOLIC BLOOD PRESSURE: 132 MMHG | BODY MASS INDEX: 22.88 KG/M2 | HEART RATE: 110 BPM | WEIGHT: 134 LBS

## 2020-11-30 DIAGNOSIS — R42 DIZZINESS: ICD-10-CM

## 2020-11-30 DIAGNOSIS — M54.12 CERVICAL RADICULOPATHY: Primary | ICD-10-CM

## 2020-11-30 PROCEDURE — G0463 HOSPITAL OUTPT CLINIC VISIT: HCPCS | Performed by: INTERNAL MEDICINE

## 2020-11-30 PROCEDURE — 99213 OFFICE O/P EST LOW 20 MIN: CPT | Performed by: INTERNAL MEDICINE

## 2020-11-30 RX ORDER — METHYLPREDNISOLONE 4 MG/1
TABLET ORAL
Qty: 1 KIT | Refills: 0 | Status: SHIPPED | OUTPATIENT
Start: 2020-11-30

## 2020-11-30 RX ORDER — TRAMADOL HYDROCHLORIDE 50 MG/1
50 TABLET ORAL EVERY 6 HOURS PRN
Qty: 20 TABLET | Refills: 1 | Status: SHIPPED | OUTPATIENT
Start: 2020-11-30

## 2020-11-30 RX ORDER — GUAIFENESIN DEXTROMETHORPHAN HYDROBROMIDE ORAL SOLUTION 10; 100 MG/5ML; MG/5ML
5 SOLUTION ORAL EVERY 12 HOURS
Qty: 120 ML | Refills: 0 | Status: SHIPPED | OUTPATIENT
Start: 2020-11-30 | End: 2020-11-30

## 2020-11-30 NOTE — TELEPHONE ENCOUNTER
Per Dr. Akua Gee-- Robitussin DM rx to be canceled. Called and spoke with pharmacy who reports it looks like it has already been canceled. Updated med module.

## 2020-11-30 NOTE — TELEPHONE ENCOUNTER
Patient reports neck pain that flared up on Friday. He reported feeling some relief over the weekend, but today he looked up and pain got worse again, 10/10. Took 2 tabs of Advil 200 mg 30 minutes ago. Feels as though he may have a pinched nerve. He has a h/o neck pain. Has had neck surgery in the past for a pinched nerve. He doesn't want surgery this time around, and solely looking for pain management. Denies fall/injury/heavy lifting. Advised ER as he reported a high level of pain. Pt declined and requested to see MD instead. Daughter also on the line and questioned if he could get a steroid shot. appt made for 4pm today.     To Dr. Eduardo Leo

## 2020-11-30 NOTE — PROGRESS NOTES
Li Ingram is a 80year old male. HPI:   Last Thursday he was hyperextending his neck watching his son-in-law install a light fixture on the ceiling. The patient was not doing any physical work nor did he injure himself nor fall.   However, following by mouth daily.         Past Medical History:   Diagnosis Date   • Back problem    • BPH (benign prostatic hyperplasia)    • Calculus of kidney 2005   • Cataract    • Coronary atherosclerosis    • Essential hypertension    • High blood pressure    • High ch cervical collar, heating pad if not improving by the end of the week then I want him to call me. 2. Dizziness  Mild and not requiring any treatment    The patient indicates understanding of these issues and agrees to the plan.   The patient is asked to r

## 2020-12-19 ENCOUNTER — HOSPITAL ENCOUNTER (EMERGENCY)
Facility: HOSPITAL | Age: 85
Discharge: HOME | End: 2020-12-19
Attending: EMERGENCY MEDICINE
Payer: MEDICARE

## 2020-12-19 ENCOUNTER — APPOINTMENT (OUTPATIENT)
Dept: CT IMAGING | Facility: HOSPITAL | Age: 85
End: 2020-12-19
Attending: EMERGENCY MEDICINE
Payer: MEDICARE

## 2020-12-19 VITALS
SYSTOLIC BLOOD PRESSURE: 150 MMHG | WEIGHT: 134 LBS | DIASTOLIC BLOOD PRESSURE: 73 MMHG | HEART RATE: 89 BPM | RESPIRATION RATE: 16 BRPM | BODY MASS INDEX: 22.88 KG/M2 | HEIGHT: 64 IN | OXYGEN SATURATION: 93 % | TEMPERATURE: 98 F

## 2020-12-19 DIAGNOSIS — M54.12 CERVICAL RADICULOPATHY: Primary | ICD-10-CM

## 2020-12-19 PROCEDURE — 80048 BASIC METABOLIC PNL TOTAL CA: CPT | Performed by: EMERGENCY MEDICINE

## 2020-12-19 PROCEDURE — 93010 ELECTROCARDIOGRAM REPORT: CPT | Performed by: EMERGENCY MEDICINE

## 2020-12-19 PROCEDURE — 85060 BLOOD SMEAR INTERPRETATION: CPT | Performed by: EMERGENCY MEDICINE

## 2020-12-19 PROCEDURE — 85025 COMPLETE CBC W/AUTO DIFF WBC: CPT | Performed by: EMERGENCY MEDICINE

## 2020-12-19 PROCEDURE — 93005 ELECTROCARDIOGRAM TRACING: CPT

## 2020-12-19 PROCEDURE — 99284 EMERGENCY DEPT VISIT MOD MDM: CPT

## 2020-12-19 PROCEDURE — 36415 COLL VENOUS BLD VENIPUNCTURE: CPT

## 2020-12-19 PROCEDURE — 70491 CT SOFT TISSUE NECK W/DYE: CPT | Performed by: EMERGENCY MEDICINE

## 2020-12-19 RX ORDER — ACETAMINOPHEN 500 MG
1000 TABLET ORAL ONCE
Status: COMPLETED | OUTPATIENT
Start: 2020-12-19 | End: 2020-12-19

## 2020-12-19 RX ORDER — PREDNISONE 20 MG/1
40 TABLET ORAL DAILY
Qty: 10 TABLET | Refills: 0 | Status: SHIPPED | OUTPATIENT
Start: 2020-12-19 | End: 2020-12-24

## 2020-12-19 NOTE — ED NOTES
Patient having difficulty swallowing tylenol. Patient denies difficulty swallowing medication this morning.

## 2020-12-19 NOTE — ED INITIAL ASSESSMENT (HPI)
Patient presents to ER with c/o sharp, neck pain that had been ongoing for several weeks. He saw Dr. Tiana Orta and was given steroids with improvement of symptoms.   The symptoms started back up again this past Wednesday on left side of neck and patient reports

## 2020-12-19 NOTE — ED NOTES
Patient presents ax4, ambulatory in room with family. Patient holding left cheek in pain on arrival to room. Patient has been having neck pain, sharp, for weeks.

## 2020-12-20 NOTE — ED PROVIDER NOTES
Patient Seen in: [facility] AND [facility] Emergency Department    History   Patient presents with:  Neck Pain    Stated Complaint: Pinched nerve in back of neck. HPI    Chief complaint: Neck Pain    History of present illness:   The patient complains of neck p Tab,  Take 1 tablet (50 mg total) by mouth every 6 (six) hours as needed for Pain. METOPROLOL SUCCINATE ER 25 MG Oral Tablet 24 Hr,  TAKE 1 TABLET ONCE DAILY   aspirin 81 MG Oral Tab EC,  Take 81 mg by mouth daily.    PANTOPRAZOLE SODIUM 40 MG Oral Tab EC with steady gait, 5 out of 5 strength bilateral upper extremities. Sensation intact .   2+ DTRs of the bicep bilateral.  Extremities:Nontender full range of motion s, no edema,          ED Course     Labs Reviewed   BASIC METABOLIC PANEL (8) - Abnormal; No for 5 days. , Normal, Disp-10 tablet, R-0          Present on Admission:  **None**

## 2020-12-21 ENCOUNTER — TELEPHONE (OUTPATIENT)
Dept: INTERNAL MEDICINE CLINIC | Facility: CLINIC | Age: 85
End: 2020-12-21

## 2020-12-21 RX ORDER — PREDNISONE 10 MG/1
10 TABLET ORAL 2 TIMES DAILY PRN
Qty: 30 TABLET | Refills: 0 | Status: SHIPPED | OUTPATIENT
Start: 2020-12-21 | End: 2021-04-12

## 2020-12-21 NOTE — TELEPHONE ENCOUNTER
I spoke with patient. He was prescribed prednisone for his neck in the emergency room on December 19. He has had this before but he thinks it was a different dose. He wonders if this is the same medication that he was given back on November 30 when he saw Dr. Linda Gan. Explained that he was given a medrol dose pack at that time. He is on third day of medication. It is helping and he feels back to normal. He asks for Dr. Linda Gan to review his ER visit. He says he had imaging done. Explained that I would relay his message to the doctor and he verbalized understanding. To Dr. Linda Gan.

## 2020-12-21 NOTE — TELEPHONE ENCOUNTER
Patient was in the ER this past weekend  Has questions about the medication the Dr Maru Watkins prescribed    Would like to talk to Dr Taylor Later about it    Call back [phone]

## 2020-12-21 NOTE — TELEPHONE ENCOUNTER
Dr. Graham Oh, I spoke with patient and relayed your message. He verbalized understanding and he will call back to schedule a follow up appointment. Please clarify prednisone 10 mg tablets sig. Two tablets daily prn or 1 tablet BID prn? Thank you.

## 2020-12-21 NOTE — TELEPHONE ENCOUNTER
CT shows much arthritis in the neck. Would continue prednisone for now and I should check him in about 2 weeks.   If he is running out of prednisone give 10 mg #31 or 2 daily as needed for pain

## 2020-12-24 ENCOUNTER — TELEPHONE (OUTPATIENT)
Dept: NEUROLOGY | Facility: CLINIC | Age: 85
End: 2020-12-24

## 2021-02-28 RX ORDER — ATORVASTATIN CALCIUM 10 MG/1
TABLET, FILM COATED ORAL
Qty: 90 TABLET | Refills: 1 | Status: SHIPPED | OUTPATIENT
Start: 2021-02-28 | End: 2021-08-27

## 2021-03-12 DIAGNOSIS — Z23 NEED FOR VACCINATION: ICD-10-CM

## 2021-03-31 ENCOUNTER — TELEPHONE (OUTPATIENT)
Dept: INTERNAL MEDICINE CLINIC | Facility: CLINIC | Age: 86
End: 2021-03-31

## 2021-03-31 NOTE — TELEPHONE ENCOUNTER
Patient was called to schedule a medicare annual with Dr Madeleine Maya. No answer. A message was left for patient to call back and schedule. Awaiting patient's call back at this time.

## 2021-04-08 ENCOUNTER — TELEPHONE (OUTPATIENT)
Dept: INTERNAL MEDICINE CLINIC | Facility: CLINIC | Age: 86
End: 2021-04-08

## 2021-04-08 NOTE — TELEPHONE ENCOUNTER
To Pham/Silvino - see below. Informed pt Dr. Rafa Rome out till Monday. Pt states 20 yr left shoulder pain - has had cortisone injections several times in the past but not for the past couple years.   Pt does have prednisone 10mg tablets - has 10 tablets l

## 2021-04-08 NOTE — TELEPHONE ENCOUNTER
Please call pt  He was hoping to get a cortisone shot with Dr Telma Gibson today  Dr Telma Gibson out of the office until Monday  Would pt be able to schedule with one of the other physicians for this?   Please call pt to advise  Tasked to nursing

## 2021-04-08 NOTE — TELEPHONE ENCOUNTER
Telephone call to patient and situation discussed. I have recommended to the patient that he see Dr. Gladys Salguero on Monday for evaluation. In the interim I would recommend that he use Tylenol for the pain.   Patient does have prednisone available but he was giv

## 2021-04-09 ENCOUNTER — LAB ENCOUNTER (OUTPATIENT)
Dept: LAB | Age: 86
End: 2021-04-09
Attending: INTERNAL MEDICINE
Payer: MEDICARE

## 2021-04-09 DIAGNOSIS — E78.00 HYPERCHOLESTEREMIA: ICD-10-CM

## 2021-04-09 DIAGNOSIS — I25.9 IHD (ISCHEMIC HEART DISEASE): ICD-10-CM

## 2021-04-09 PROCEDURE — 85025 COMPLETE CBC W/AUTO DIFF WBC: CPT

## 2021-04-09 PROCEDURE — 80061 LIPID PANEL: CPT

## 2021-04-09 PROCEDURE — 36415 COLL VENOUS BLD VENIPUNCTURE: CPT

## 2021-04-09 PROCEDURE — 80053 COMPREHEN METABOLIC PANEL: CPT

## 2021-04-09 PROCEDURE — 84443 ASSAY THYROID STIM HORMONE: CPT

## 2021-04-12 ENCOUNTER — OFFICE VISIT (OUTPATIENT)
Dept: INTERNAL MEDICINE CLINIC | Facility: CLINIC | Age: 86
End: 2021-04-12
Payer: MEDICARE

## 2021-04-12 VITALS
HEIGHT: 64 IN | OXYGEN SATURATION: 97 % | BODY MASS INDEX: 23.15 KG/M2 | DIASTOLIC BLOOD PRESSURE: 70 MMHG | HEART RATE: 86 BPM | WEIGHT: 135.63 LBS | TEMPERATURE: 98 F | SYSTOLIC BLOOD PRESSURE: 132 MMHG

## 2021-04-12 DIAGNOSIS — M25.511 CHRONIC RIGHT SHOULDER PAIN: ICD-10-CM

## 2021-04-12 DIAGNOSIS — E78.00 HYPERCHOLESTEREMIA: ICD-10-CM

## 2021-04-12 DIAGNOSIS — I25.9 IHD (ISCHEMIC HEART DISEASE): ICD-10-CM

## 2021-04-12 DIAGNOSIS — D56.3 THALASSEMIA MINOR: ICD-10-CM

## 2021-04-12 DIAGNOSIS — R52 PAIN: Primary | ICD-10-CM

## 2021-04-12 DIAGNOSIS — M19.011 OSTEOARTHRITIS OF RIGHT SHOULDER, UNSPECIFIED OSTEOARTHRITIS TYPE: ICD-10-CM

## 2021-04-12 DIAGNOSIS — M54.12 CERVICAL RADICULOPATHY: ICD-10-CM

## 2021-04-12 DIAGNOSIS — G89.29 CHRONIC RIGHT SHOULDER PAIN: ICD-10-CM

## 2021-04-12 PROCEDURE — 20550 NJX 1 TENDON SHEATH/LIGAMENT: CPT | Performed by: INTERNAL MEDICINE

## 2021-04-12 PROCEDURE — 99214 OFFICE O/P EST MOD 30 MIN: CPT | Performed by: INTERNAL MEDICINE

## 2021-04-12 RX ORDER — TRIAMCINOLONE ACETONIDE 40 MG/ML
40 INJECTION, SUSPENSION INTRA-ARTICULAR; INTRAMUSCULAR ONCE
Status: COMPLETED | OUTPATIENT
Start: 2021-04-12 | End: 2021-04-12

## 2021-04-12 RX ORDER — PREDNISONE 10 MG/1
10 TABLET ORAL 2 TIMES DAILY PRN
Qty: 30 TABLET | Refills: 0 | Status: SHIPPED | OUTPATIENT
Start: 2021-04-12

## 2021-04-12 RX ADMIN — TRIAMCINOLONE ACETONIDE 40 MG: 40 INJECTION, SUSPENSION INTRA-ARTICULAR; INTRAMUSCULAR at 12:30:00

## 2021-04-12 NOTE — PROGRESS NOTES
Traci Hill is a 80year old male. HPI:   He has had severe pain in the right shoulder since Easter Sun, got very intense last Thurs, much better today. No fall or injury. He is otherwise doing well.      occas pain in the neck for which he takes o cholesterol    • History of asbestos exposure    • Hyperlipidemia    • Lipid screening 04-   • Lower GI bleed rectal ulcer   • Osteoarthritis    • Osteoarthritis, shoulder    • PONV (postoperative nausea and vomiting)    • S/P TURP (transurethral re injection 40 mg    3. Chronic right shoulder pain  See above     4. Cervical radiculopathy  Doing well     5. IHD (ischemic heart disease)  No chest pain or sob     6. Hypercholesteremia  Last labs at goal  - LDL 59    7.  Thalassemia minor  Hb steady at 11

## 2021-04-13 ENCOUNTER — OFFICE VISIT (OUTPATIENT)
Dept: OTOLARYNGOLOGY | Facility: CLINIC | Age: 86
End: 2021-04-13
Payer: MEDICARE

## 2021-04-13 VITALS
WEIGHT: 135.63 LBS | BODY MASS INDEX: 23.15 KG/M2 | TEMPERATURE: 99 F | DIASTOLIC BLOOD PRESSURE: 68 MMHG | SYSTOLIC BLOOD PRESSURE: 118 MMHG | HEIGHT: 64 IN

## 2021-04-13 DIAGNOSIS — H61.22 IMPACTED CERUMEN OF LEFT EAR: Primary | ICD-10-CM

## 2021-04-13 PROCEDURE — 69210 REMOVE IMPACTED EAR WAX UNI: CPT | Performed by: OTOLARYNGOLOGY

## 2021-04-13 NOTE — PROGRESS NOTES
Harpal Castellano is a 80year old male. Patient presents with:  Ear Wax: both ears    HPI:   Is been experiencing a blockage in his ears left greater than right.   He wears hearing aids and has not been able to wear the hearing aid because of the wax and hermelinda Comment: rarely    Drug use: No       REVIEW OF SYSTEMS:   GENERAL HEALTH: feels well otherwise  GENERAL : denies fever, chills, sweats, weight loss, weight gain  SKIN: denies any unusual skin lesions or rashes  RESPIRATORY: denies shortness of breath wit issues and agrees to the plan. Umberto Mcneal MD  4/13/2021  11:09 AM

## 2021-04-25 RX ORDER — PANTOPRAZOLE SODIUM 40 MG/1
TABLET, DELAYED RELEASE ORAL
Qty: 90 TABLET | Refills: 3 | Status: SHIPPED | OUTPATIENT
Start: 2021-04-25

## 2021-08-27 RX ORDER — ATORVASTATIN CALCIUM 10 MG/1
TABLET, FILM COATED ORAL
Qty: 90 TABLET | Refills: 1 | Status: SHIPPED | OUTPATIENT
Start: 2021-08-27

## 2021-11-16 RX ORDER — METOPROLOL SUCCINATE 25 MG/1
TABLET, EXTENDED RELEASE ORAL
Qty: 90 TABLET | Refills: 3 | Status: SHIPPED | OUTPATIENT
Start: 2021-11-16

## 2021-12-01 ENCOUNTER — TELEPHONE (OUTPATIENT)
Dept: INTERNAL MEDICINE CLINIC | Facility: CLINIC | Age: 86
End: 2021-12-01

## 2021-12-01 NOTE — TELEPHONE ENCOUNTER
Pt called to request written Rx for Cialis,   10 mg - 30 tablets    Please call pt as he would like to pick it up     [phone]

## 2021-12-06 RX ORDER — TADALAFIL 10 MG/1
10 TABLET ORAL
Qty: 30 TABLET | Refills: 11 | Status: CANCELLED | OUTPATIENT
Start: 2021-12-06

## 2021-12-06 NOTE — TELEPHONE ENCOUNTER
To Dr. Gladys Salguero---    Medication not refilled since 2018. Pended. Please send to nursing after if approved as pt wants paper RX.

## 2021-12-22 ENCOUNTER — HOSPITAL ENCOUNTER (EMERGENCY)
Facility: HOSPITAL | Age: 86
Discharge: HOME | End: 2021-12-22
Attending: EMERGENCY MEDICINE
Payer: MEDICARE

## 2021-12-22 ENCOUNTER — TELEPHONE (OUTPATIENT)
Dept: INTERNAL MEDICINE CLINIC | Facility: CLINIC | Age: 86
End: 2021-12-22

## 2021-12-22 VITALS
RESPIRATION RATE: 17 BRPM | OXYGEN SATURATION: 99 % | DIASTOLIC BLOOD PRESSURE: 81 MMHG | TEMPERATURE: 97 F | BODY MASS INDEX: 22 KG/M2 | WEIGHT: 128 LBS | HEART RATE: 91 BPM | SYSTOLIC BLOOD PRESSURE: 138 MMHG

## 2021-12-22 DIAGNOSIS — D72.829 LEUKOCYTOSIS, UNSPECIFIED TYPE: ICD-10-CM

## 2021-12-22 DIAGNOSIS — N30.01 ACUTE CYSTITIS WITH HEMATURIA: Primary | ICD-10-CM

## 2021-12-22 DIAGNOSIS — N30.00 ACUTE CYSTITIS WITHOUT HEMATURIA: Primary | ICD-10-CM

## 2021-12-22 PROCEDURE — 87186 SC STD MICRODIL/AGAR DIL: CPT | Performed by: EMERGENCY MEDICINE

## 2021-12-22 PROCEDURE — 87086 URINE CULTURE/COLONY COUNT: CPT | Performed by: EMERGENCY MEDICINE

## 2021-12-22 PROCEDURE — 99284 EMERGENCY DEPT VISIT MOD MDM: CPT

## 2021-12-22 PROCEDURE — 87077 CULTURE AEROBIC IDENTIFY: CPT | Performed by: EMERGENCY MEDICINE

## 2021-12-22 PROCEDURE — 85025 COMPLETE CBC W/AUTO DIFF WBC: CPT | Performed by: EMERGENCY MEDICINE

## 2021-12-22 PROCEDURE — 81001 URINALYSIS AUTO W/SCOPE: CPT

## 2021-12-22 PROCEDURE — 87086 URINE CULTURE/COLONY COUNT: CPT

## 2021-12-22 PROCEDURE — 85060 BLOOD SMEAR INTERPRETATION: CPT | Performed by: EMERGENCY MEDICINE

## 2021-12-22 PROCEDURE — 80048 BASIC METABOLIC PNL TOTAL CA: CPT | Performed by: EMERGENCY MEDICINE

## 2021-12-22 PROCEDURE — 96365 THER/PROPH/DIAG IV INF INIT: CPT

## 2021-12-22 PROCEDURE — 81001 URINALYSIS AUTO W/SCOPE: CPT | Performed by: EMERGENCY MEDICINE

## 2021-12-22 RX ORDER — CEPHALEXIN 500 MG/1
500 CAPSULE ORAL 2 TIMES DAILY
Qty: 14 CAPSULE | Refills: 0 | Status: SHIPPED | OUTPATIENT
Start: 2021-12-22 | End: 2021-12-29

## 2021-12-22 NOTE — TELEPHONE ENCOUNTER
UTI symptoms:    [x]Frequency  [x]Urgency  [x]Pain/burning  []Blood in urine  []Low back pain  []Flank pain  []Fevers/chills  []Odor  []Confusion    NOTES:    Please advise - called patient with above SX - to DR.C SCHWARTZ and culture pending

## 2021-12-22 NOTE — TELEPHONE ENCOUNTER
Pt. Called stating on Monday he started urinating like every 1/2 hour. It. Has been getting worse. Now he feels the urge to go frequently and just dribbles out. Sometimes he can't hold it to get to the bathroom, it just comes out.   Looking for direction

## 2021-12-22 NOTE — TELEPHONE ENCOUNTER
Patients daughter Mendez Oconnor called and I relayed 's message regarding the Cipro to her. Mendez Johansenkelli is concerned that patient has not been able to urinate since yesterday and he is feeling lots of pressure but is unable to go.  I discussed with  who a

## 2021-12-22 NOTE — ED INITIAL ASSESSMENT (HPI)
Patient complains of dysuria and retention, reporting he is having a difficult time emptying bladder.

## 2021-12-23 NOTE — TELEPHONE ENCOUNTER
Pt daughter and patient called together  Pt was seen in the ER yesterday for UTI  PT rec'd RX for Cephalexin 500MG, pt took one last night and one this morning   Pt is allergic to penicillin, can he continue with this or take what Dr Dina Deutsch was going to sen

## 2021-12-23 NOTE — ED PROVIDER NOTES
Patient Seen in: [facility] AND [facility] Emergency Department    History   Patient presents with:  Urinary Symptoms      HPI    70-year-old male presents the ER with complaint of burning with urination. Patient denies any suprapubic pain.   Patient without any f pertinent positives for the review of systems are mentioned in the HPI  All other organ systems are reviewed and are negative. Constitutional and vital signs reviewed.       Social History and Family History elements reviewed from today, pertinent positi Psychiatric:         Behavior: Behavior normal.         Thought Content:  Thought content normal.         Judgment: Judgment normal.         ED Course        Labs Reviewed   URINALYSIS WITH CULTURE REFLEX - Abnormal; Notable for the following components: (36.2 °C)   TempSrc: Temporal   SpO2: 100%   Weight: 58.1 kg     *I personally reviewed and interpreted all ED vitals.   I also personally reviewed all labs and imaging if ordered    Pulse Ox: 100%, Room air, Normal     Monitor Interpretation:   normal sinu medications    cephalexin 500 MG Oral Cap  Take 1 capsule (500 mg total) by mouth 2 (two) times daily for 7 days.   Qty: 14 capsule Refills: 0

## 2021-12-23 NOTE — TELEPHONE ENCOUNTER
To Dr. Shweta Zamarripa----    Are you able to advise in MD absence?      FYI per PCP below, he was going to prescribe:  \"Give Cipro 500 mg bid for 7 days \"

## 2022-01-04 ENCOUNTER — LAB ENCOUNTER (OUTPATIENT)
Dept: LAB | Age: 87
End: 2022-01-04
Attending: INTERNAL MEDICINE
Payer: MEDICARE

## 2022-01-04 ENCOUNTER — HOSPITAL ENCOUNTER (OUTPATIENT)
Dept: ULTRASOUND IMAGING | Facility: HOSPITAL | Age: 87
Discharge: HOME | End: 2022-01-04
Attending: INTERNAL MEDICINE
Payer: MEDICARE

## 2022-01-04 ENCOUNTER — OFFICE VISIT (OUTPATIENT)
Dept: INTERNAL MEDICINE CLINIC | Facility: CLINIC | Age: 87
End: 2022-01-04
Payer: MEDICARE

## 2022-01-04 VITALS
HEART RATE: 77 BPM | TEMPERATURE: 98 F | WEIGHT: 127 LBS | BODY MASS INDEX: 22 KG/M2 | DIASTOLIC BLOOD PRESSURE: 84 MMHG | OXYGEN SATURATION: 99 % | SYSTOLIC BLOOD PRESSURE: 150 MMHG

## 2022-01-04 DIAGNOSIS — N30.90 CYSTITIS: Primary | ICD-10-CM

## 2022-01-04 DIAGNOSIS — N30.90 CYSTITIS: ICD-10-CM

## 2022-01-04 DIAGNOSIS — I25.9 IHD (ISCHEMIC HEART DISEASE): ICD-10-CM

## 2022-01-04 LAB
ALBUMIN SERPL-MCNC: 3.8 G/DL (ref 3.4–5)
ALBUMIN/GLOB SERPL: 1.3 {RATIO} (ref 1–2)
ALP LIVER SERPL-CCNC: 93 U/L
ALT SERPL-CCNC: 21 U/L
ANION GAP SERPL CALC-SCNC: 9 MMOL/L (ref 0–18)
AST SERPL-CCNC: 22 U/L (ref 15–37)
BASOPHILS # BLD AUTO: 0.08 X10(3) UL (ref 0–0.2)
BASOPHILS NFR BLD AUTO: 0.6 %
BILIRUB SERPL-MCNC: 1.9 MG/DL (ref 0.1–2)
BILIRUB UR QL: NEGATIVE
BUN BLD-MCNC: 21 MG/DL (ref 7–18)
BUN/CREAT SERPL: 26.3 (ref 10–20)
CALCIUM BLD-MCNC: 9.2 MG/DL (ref 8.5–10.1)
CHLORIDE SERPL-SCNC: 106 MMOL/L (ref 98–112)
CO2 SERPL-SCNC: 27 MMOL/L (ref 21–32)
COLOR UR: YELLOW
CREAT BLD-MCNC: 0.8 MG/DL
DEPRECATED RDW RBC AUTO: 42.5 FL (ref 35.1–46.3)
EOSINOPHIL # BLD AUTO: 0.16 X10(3) UL (ref 0–0.7)
EOSINOPHIL NFR BLD AUTO: 1.3 %
ERYTHROCYTE [DISTWIDTH] IN BLOOD BY AUTOMATED COUNT: 19 % (ref 11–15)
FASTING STATUS PATIENT QL REPORTED: YES
GLOBULIN PLAS-MCNC: 2.9 G/DL (ref 2.8–4.4)
GLUCOSE BLD-MCNC: 93 MG/DL (ref 70–99)
GLUCOSE UR-MCNC: NEGATIVE MG/DL
HCT VFR BLD AUTO: 40.8 %
HGB BLD-MCNC: 12.2 G/DL
IMM GRANULOCYTES # BLD AUTO: 0.08 X10(3) UL (ref 0–1)
IMM GRANULOCYTES NFR BLD: 0.6 %
KETONES UR-MCNC: NEGATIVE MG/DL
LYMPHOCYTES # BLD AUTO: 1.35 X10(3) UL (ref 1–4)
LYMPHOCYTES NFR BLD AUTO: 10.9 %
MCH RBC QN AUTO: 20.5 PG (ref 26–34)
MCHC RBC AUTO-ENTMCNC: 29.9 G/DL (ref 31–37)
MCV RBC AUTO: 68.7 FL
MONOCYTES # BLD AUTO: 0.76 X10(3) UL (ref 0.1–1)
MONOCYTES NFR BLD AUTO: 6.2 %
NEUTROPHILS # BLD AUTO: 9.92 X10 (3) UL (ref 1.5–7.7)
NEUTROPHILS # BLD AUTO: 9.92 X10(3) UL (ref 1.5–7.7)
NEUTROPHILS NFR BLD AUTO: 80.4 %
NITRITE UR QL STRIP.AUTO: NEGATIVE
OSMOLALITY SERPL CALC.SUM OF ELEC: 297 MOSM/KG (ref 275–295)
PH UR: 5 [PH] (ref 5–8)
PLATELET # BLD AUTO: 327 10(3)UL (ref 150–450)
POTASSIUM SERPL-SCNC: 4 MMOL/L (ref 3.5–5.1)
PROT SERPL-MCNC: 6.7 G/DL (ref 6.4–8.2)
PROT UR-MCNC: 100 MG/DL
RBC # BLD AUTO: 5.94 X10(6)UL
RBC #/AREA URNS AUTO: >10 /HPF
SODIUM SERPL-SCNC: 142 MMOL/L (ref 136–145)
SP GR UR STRIP: 1.02 (ref 1–1.03)
UROBILINOGEN UR STRIP-ACNC: <2
WBC # BLD AUTO: 12.4 X10(3) UL (ref 4–11)
WBC #/AREA URNS AUTO: >50 /HPF
WBC CLUMPS UR QL AUTO: PRESENT /HPF

## 2022-01-04 PROCEDURE — 80053 COMPREHEN METABOLIC PANEL: CPT

## 2022-01-04 PROCEDURE — 85025 COMPLETE CBC W/AUTO DIFF WBC: CPT

## 2022-01-04 PROCEDURE — 36415 COLL VENOUS BLD VENIPUNCTURE: CPT

## 2022-01-04 PROCEDURE — 87086 URINE CULTURE/COLONY COUNT: CPT

## 2022-01-04 PROCEDURE — 81001 URINALYSIS AUTO W/SCOPE: CPT

## 2022-01-04 PROCEDURE — 87088 URINE BACTERIA CULTURE: CPT

## 2022-01-04 PROCEDURE — 87186 SC STD MICRODIL/AGAR DIL: CPT

## 2022-01-04 PROCEDURE — 99214 OFFICE O/P EST MOD 30 MIN: CPT | Performed by: INTERNAL MEDICINE

## 2022-01-04 PROCEDURE — 76770 US EXAM ABDO BACK WALL COMP: CPT | Performed by: INTERNAL MEDICINE

## 2022-01-04 RX ORDER — NITROFURANTOIN 25; 75 MG/1; MG/1
100 CAPSULE ORAL 2 TIMES DAILY
Qty: 28 CAPSULE | Refills: 0 | Status: SHIPPED | OUTPATIENT
Start: 2022-01-04

## 2022-01-04 RX ORDER — NITROFURANTOIN 25; 75 MG/1; MG/1
CAPSULE ORAL
COMMUNITY
Start: 2021-12-26 | End: 2022-01-04

## 2022-01-04 NOTE — PROGRESS NOTES
Jassi Lucio is a 80year old male.   HPI:   He went to ED on 12/22 with acute cystitis, urinary frequency and dysuria, given cephalexin then switched to nitrofurantoin for E coli ESBL -  He has retrogressed since starting nitrofurantoin - he is voiding hyperplasia)    • Calculus of kidney 2005   • Cataract    • Coronary atherosclerosis    • Essential hypertension    • High blood pressure    • High cholesterol    • History of asbestos exposure    • Hyperlipidemia    • Lipid screening 04-   • Lower and this is been reported back to me as not showing any distention of the bladder nor any hydronephrosis. The prostate is markedly enlarged. I am referring him to his urologist,     2.  IHD (ischemic heart disease)  Clinically stable    The

## 2022-01-05 ENCOUNTER — OFFICE VISIT (OUTPATIENT)
Dept: SURGERY | Facility: CLINIC | Age: 87
End: 2022-01-05
Payer: MEDICARE

## 2022-01-05 VITALS
WEIGHT: 127 LBS | DIASTOLIC BLOOD PRESSURE: 75 MMHG | HEART RATE: 82 BPM | SYSTOLIC BLOOD PRESSURE: 135 MMHG | HEIGHT: 64 IN | RESPIRATION RATE: 16 BRPM | BODY MASS INDEX: 21.68 KG/M2

## 2022-01-05 DIAGNOSIS — R39.14 BENIGN PROSTATIC HYPERPLASIA WITH INCOMPLETE BLADDER EMPTYING: ICD-10-CM

## 2022-01-05 DIAGNOSIS — N30.00 ACUTE CYSTITIS WITHOUT HEMATURIA: Primary | ICD-10-CM

## 2022-01-05 DIAGNOSIS — N40.1 BENIGN PROSTATIC HYPERPLASIA WITH INCOMPLETE BLADDER EMPTYING: ICD-10-CM

## 2022-01-05 DIAGNOSIS — N40.2 PROSTATE NODULE: ICD-10-CM

## 2022-01-05 DIAGNOSIS — R97.20 ELEVATED PSA: ICD-10-CM

## 2022-01-05 DIAGNOSIS — N20.0 RIGHT KIDNEY STONE: ICD-10-CM

## 2022-01-05 DIAGNOSIS — R35.1 NOCTURIA: ICD-10-CM

## 2022-01-05 DIAGNOSIS — Z80.42 FAMILY HISTORY OF PROSTATE CANCER: ICD-10-CM

## 2022-01-05 PROCEDURE — 99215 OFFICE O/P EST HI 40 MIN: CPT | Performed by: UROLOGY

## 2022-01-05 NOTE — PATIENT INSTRUCTIONS
Dinora Smart M.D.    1.  You have  a recurrent E. coli urinary tract infection and were restarted on nitrofurantoin macrocrystals (generic Macrobid) yesterday.   We do not have final results of urine culture and

## 2022-01-05 NOTE — PROGRESS NOTES
HPI:    Patient ID: Rita Suarez is a 80year old male. HPI    Acute Cystitis   On 12/22 went to ER with acute cystitis, urinary frequency and dysuria.  On 12/22/2021 Urine culture = >100,000 CFU/ML Escherichia coli  ESBL Pos (Resistant: Cefazolin, Ce or herbal prostate medications. He denies any associated pelvic pain or discomfort. He feels the condition is stable.      Voiding Dysfunction  History of TURP in 2000. Patient currently has a UTI.  Patient has current AUA score of 27, severe voiding dysfun nothing made it better or worse.  Pain continued.  Came to [address] [place] [address] ER 8/20/2019 and evaluated at 1 AM by Dr. Gigi Rosenthal; afebrile; urinalysis showed WBC 72 and RBC 9; CT showed 6 mm stone distal left ureter and an additional nonobstructing 8 mm stone in the contr stent; postoperative diagnosis--Large distal left ureteral calculus, recurrent stricture of membranobulbar urethra; recurrent stricture in the membranobulbar urethra, I cannot pass a 19[place] sheath without a guidewire; prostatic urethra is open, status p Take 1 tablet by mouth daily. • predniSONE 10 MG Oral Tab Take 1 tablet (10 mg total) by mouth 2 (two) times daily as needed. (Patient not taking: No sig reported) 30 tablet 0   • methylPREDNISolone (MEDROL) 4 MG Oral Tablet Therapy Pack As directed.  ( emptying your bladder completely after you finish urinating?: Almost Always  Over the past month, how often have you had to urinate again less than 2 hours after you finished urinating?: Almost Always  Over the past month, how often have you found that you ESBL Pos (Resistant: Cefazolin, Cefepime, Ceftazidime, Ceftriaxone, Gentamicin, Levofloxacin; Intermediate: Tobramycin;  Sensitive to nitrofurantoin and Bactrim); UA Blood = large; Protein = >=500; Urobilinogen = 4; WBC = >50; RBC = >10; Creatinine = 0.98; current medications, allergies, laboratories and imaging studies with patient, discussing treatment options, answering questions about treatment, and coordinating care, then documenting the encounter.         ASSESSMENT/PLAN:   Acute cystitis without hematu basket extraction of stone fragments, removal of left ureteral stent. 9/17/2019 XR abdomen showed a 7.0 x 5.6 mm in size stone in profile with the midpole of the right kidney.  Most recent 01/04/2022 US Kidney = 11 mm nonobstructing calculus in the right re stricture  Status post 8/29/19 and 9/24/19 cystoscopy with endoscopic dilation of stricture of membranobulbar urethra.  The patient denies current weak stream. He will contact my office should he develop a weak stream.      I explained to patient the risks, [id number]  By signing my name below, Bev Zackaryneno,  attest that this documentation has been prepared under the direction and in the presence of Mandy Coley MD.   Electronically Signed: Asif Waters, 1/5/2022, 4:24 PM.    I, Clarence Valiente

## 2022-01-06 NOTE — TELEPHONE ENCOUNTER
Refill request is for a maintenance medication and has met the criteria specified in the Ambulatory Medication Refill Standing Order for eligibility, visits, laboratory, alerts and was sent to the requested pharmacy.     Requested Prescriptions     Signed P Medium Risk

## 2022-01-10 ENCOUNTER — TELEPHONE (OUTPATIENT)
Dept: SURGERY | Facility: CLINIC | Age: 87
End: 2022-01-10

## 2022-01-10 DIAGNOSIS — Z22.39 ESBL E. COLI CARRIER: ICD-10-CM

## 2022-01-10 DIAGNOSIS — N41.1 CHRONIC PROSTATITIS: Primary | ICD-10-CM

## 2022-01-10 RX ORDER — SULFAMETHOXAZOLE AND TRIMETHOPRIM 800; 160 MG/1; MG/1
TABLET ORAL
Qty: 42 TABLET | Refills: 0 | Status: SHIPPED | OUTPATIENT
Start: 2022-01-10

## 2022-01-10 NOTE — TELEPHONE ENCOUNTER
Routed to [facility];    Please review most recent urine culture results and advise. Thank you.

## 2022-01-11 NOTE — TELEPHONE ENCOUNTER
Spoke with pt and notified him of result note. Pt verbalized understanding. States he is going to [place] late January for 2 weeks.  Let him know to complete the 3 weeks of antibiotics while traveling and to submit urine specimen once he gets back from [facility]

## 2022-01-11 NOTE — TELEPHONE ENCOUNTER
Message to urology nurses    And    important urology update   ---    Urology nurses,    Please call patient.   1/4/22 urine culture E. coli ESBL which is a \"hardened\" E. coli bacteria that has become immune to a lot of antibiotics; he also has the same b

## 2022-01-12 NOTE — TELEPHONE ENCOUNTER
Patient calling back. States he would like to see if he can get urine test done over at [facility].  Please advise

## 2022-01-13 NOTE — TELEPHONE ENCOUNTER
Called pt, states he talked to someone about this already. Pt wanted to clarify when is should repeat urine test. Let him know [facility] recommends he repeats urine culture 3 weeks after finishing antibiotic.

## 2022-02-01 ENCOUNTER — TELEPHONE (OUTPATIENT)
Dept: SURGERY | Facility: CLINIC | Age: 87
End: 2022-02-01

## 2022-02-01 DIAGNOSIS — R31.0 GROSS HEMATURIA: ICD-10-CM

## 2022-02-01 DIAGNOSIS — N20.0 KIDNEY STONES: Primary | ICD-10-CM

## 2022-02-01 NOTE — TELEPHONE ENCOUNTER
Pt calling to see if MD received results of urine culture done was faxed over from [facility] asking for another script please advise

## 2022-02-02 NOTE — TELEPHONE ENCOUNTER
Let pt know there is no results in our basket. Asked pt if he can resend results to [phone]. Pt states he is on is way to the ER in [place] because of gross hematuria. Outcome pending.

## 2022-02-03 ENCOUNTER — TELEPHONE (OUTPATIENT)
Dept: SURGERY | Facility: CLINIC | Age: 87
End: 2022-02-03

## 2022-02-03 NOTE — TELEPHONE ENCOUNTER
Pt called in, states he is currently in [place]. Went to ER yesterday for gross hematuria. Pt states he was prescribed cephalexin 500 mg BID x 7 days. States bleeding began yesterday and is still dark red blood today. Denies visible blood clots. Denies burning, denies fevers or flu-like sx. States ER also did CT scan abdomen, states he will fax over results. Pt wondering how much longer bleeding is to be expected, and if he should come back to [place], says he can come back next week if necessary. Routed to [facility];  Please advise.

## 2022-02-03 NOTE — TELEPHONE ENCOUNTER
Spoke with pt and notified him of [facility] response. Pt does not think he will be able to find urologist in [place]. Will hold aspirin 81 mg and states he will call back tomorrow with update and with his decision.

## 2022-02-03 NOTE — TELEPHONE ENCOUNTER
Please call patient back--    I cannot answer question as to when his gross hematuria (a visible blood in his urine). ;  Answer the question, I need results of urine culture that should have been obtained in ER in [place] and I also need the CT scan report. I need both of those reports. Once I have them, I might be able to answer his question. If his bleeding gets worse, he should return to that  emergency room. If patient decides to come back to [place], he should let us know when he will be back in [place]. If he stays in [place], then please ask him to come back for his 3/3/2022 appointment with us. Also patient should get a plain KUB x-ray 1--7 days before visit; needs to take milk of magnesia or generic equivalent 2 ounces =60 mL =4 tablespoons at 6 PM, evening before the x-ray; the KUB x-ray does not require any reservation but it is a \"walk-in\"; I will enter the order right now. Thank you,    Dr. Shireen Estrada    P.S. review of records show that --  He likely has stones in his kidneys; history of TURP in 2000; history of membrano-bulbar urethral stricture. He had a E. coli ESBL UTI January 2022. On 1//22 kidney ultrasound showed an 11 mm stone in the right renal pelvis but it was not causing any hydronephrosis. For his January 2022 1/5/2022 E. coli ESBL, patient was treated with Bactrim DS generic for at least 2 weeks if not 3 weeks. Patient/CT was August 27, 2019. He has a follow-up appointment March 3, 2022.

## 2022-02-03 NOTE — TELEPHONE ENCOUNTER
Please call patient back but before doing so, please see my previous message from 10 minutes ago---    Notify patient that his [place] urinalysis indicates that he probably does not have a bacterial infection. The 2/2/2022 [place] CT scan shows right kidney to have a very large 1.3 cm (slightly larger than 1/2 inch ) which is too large to pass. He also has a 2.7 x 1.8 cm object (slightly larger than 1 inch) in his bladder and that is either a bladder tumor or a clot; more likely to be a clot; it is too large to pass at this time. Needs to see a urologist very soon, either in [place] or else he needs to come back here soon as possible. If he stops urinating, he should go to the emergency room; if intensity of blood in the urine worsens, he should go to the closest emergency room. He should try to avoid taking NSAIDs such as Advil, Motrin, Aleve, ibuprofen because the increase bleeding tendencies and sodas aspirin. Okay to take Tylenol. Let me know if patient makes a decision to return here because then we have to start looking for a office spot for him next week. If I have no openings, talk to SIDNEY MEIER and look at my surgery schedule and see if if there is any open spot Tuesday or Thursday of this coming week.     Thank you,    Dr. Carlito Norris

## 2022-02-04 NOTE — TELEPHONE ENCOUNTER
Received CT scan results from [address]. Results placed in back folder. PT has upcoming appt.      Future Appointments   Date Time Provider Crispin Fara   2/8/2022 11:40 AM Evelyn Christian MD [facility] [address]   3/3/2022 10:40 AM Evelyn Christian MD [facility]

## 2022-02-04 NOTE — TELEPHONE ENCOUNTER
S/W Claudine Rivera about booking pt on Tues 2/8 at 11:40 am and she thought this would be OK. I called pt back and offered him that appt and he accepted and I reminded him to make sure he brought all of the records from [place] including the CT and the disc if possible. Pt verbalized understanding and accepted the appt.

## 2022-02-04 NOTE — TELEPHONE ENCOUNTER
S/W pt and he states that he will come back in town tomorrow and will take an appt next week. I explained that I do not have one to offer but we will have to c/b to see if we can see him on tues or thurs next week.

## 2022-02-08 ENCOUNTER — OFFICE VISIT (OUTPATIENT)
Dept: SURGERY | Facility: CLINIC | Age: 87
End: 2022-02-08
Payer: MEDICARE

## 2022-02-08 DIAGNOSIS — R97.20 ELEVATED PSA: ICD-10-CM

## 2022-02-08 DIAGNOSIS — N20.0 RIGHT KIDNEY STONE: ICD-10-CM

## 2022-02-08 DIAGNOSIS — N41.1 CHRONIC PROSTATITIS: ICD-10-CM

## 2022-02-08 DIAGNOSIS — N40.1 BENIGN PROSTATIC HYPERPLASIA WITH INCOMPLETE BLADDER EMPTYING: ICD-10-CM

## 2022-02-08 DIAGNOSIS — R35.1 NOCTURIA: ICD-10-CM

## 2022-02-08 DIAGNOSIS — Z22.39 ESBL E. COLI CARRIER: ICD-10-CM

## 2022-02-08 DIAGNOSIS — N40.2 PROSTATE NODULE: ICD-10-CM

## 2022-02-08 DIAGNOSIS — Z80.42 FAMILY HISTORY OF PROSTATE CANCER: ICD-10-CM

## 2022-02-08 DIAGNOSIS — R39.14 BENIGN PROSTATIC HYPERPLASIA WITH INCOMPLETE BLADDER EMPTYING: ICD-10-CM

## 2022-02-08 DIAGNOSIS — D64.9 ANEMIA, UNSPECIFIED TYPE: ICD-10-CM

## 2022-02-08 DIAGNOSIS — N39.0 RECURRENT UTI: ICD-10-CM

## 2022-02-08 DIAGNOSIS — R31.0 GROSS HEMATURIA: Primary | ICD-10-CM

## 2022-02-08 LAB
BILIRUB UR QL: NEGATIVE
COLOR UR: YELLOW
KETONES UR-MCNC: NEGATIVE MG/DL
NITRITE UR QL STRIP.AUTO: NEGATIVE
PH UR: 5 [PH] (ref 5–8)
PROT UR-MCNC: 100 MG/DL
RBC #/AREA URNS AUTO: >10 /HPF
SP GR UR STRIP: 1.02 (ref 1–1.03)
UROBILINOGEN UR STRIP-ACNC: <2

## 2022-02-08 PROCEDURE — 99215 OFFICE O/P EST HI 40 MIN: CPT | Performed by: UROLOGY

## 2022-02-08 NOTE — PATIENT INSTRUCTIONS
Cherelle Penn M.D.    1.  Urine specimen today for urine culture and complete urinalysis--we will notify you of the results    2. Please submit another urine culture and complete urinalysis on 2/16/2022 Camila Estevez is to make sure that any infections do not return once you are off of antibiotics for 1 week. Please call our office on Friday, 2/18/2022 for the results. 3.   If you ever experience burning pain with urination, please standing order for urine culture and complete urinalysis and submit specimen--call us 2 days later for the results; if it proves providence of an infection, we would prescribe appropriate antibiotic. 4.  Please get KUB plain x-ray--does not require a reservation; it is important to take milk of magnesia or generic equivalent 2 ounces =60 mL =4 tablespoons at 6 PM, evening before x-ray to induce bowel movement and to improve clarity and visibility of any kidney stones. 5.   Restart your aspirin since you have not had any visible blood in your urine for 1 week. 6.  Please get blood draw for CBC--to make sure that you have not become anemic after the visible blood in your urine in [place]. 7.    Cystoscopy, possible dilation of stricture of urethra, possible bladder biopsy, dilation of right ureter, right nephro-ureteroscopy with laser lithotripsy of stone, possible basket extraction of stone fragments, insertion of right ureteral stent---       General anesthesia, [facility], same-day surgery. 8.  If this operation only takes place a few weeks from now, use your standing order for urine culture and complete urinalysis and submit a urine specimen 7 days before the procedure--call office 2 days later for the results to see if any antibiotic has to be started a few days before surgery or not    9. Please stop aspirin 7 days before the procedure    10.   Nothing to eat or drink after midnight, night before the procedure    11.   Important to take your metoprolol blood pressure pill with a sip of water at home on the morning of the procedure

## 2022-02-08 NOTE — PROGRESS NOTES
Patient seen in office, scheduled Cystoscopy, possible dilation of stricture of urethra, possible bladder biopsy, dilation of right ureter, right nephro-ureteroscopy with laser lithotripsy of stone, possible basket extraction of stone fragments, insertion of right ureteral stent, Thursday 03/10/2022, [facility]/outpatient, went over pre-op instructions. Reiterated ' recommendations: 1.patient will submit another urine culture and complete urinalysis on 2/16/2022 Hetal Wooten is to make sure that any infections do not return once you are off of antibiotics for 1 week. Please call our office on Friday, 2/18/2022 for the results. Patient will get KUB plain x-ray--does not require a reservation; it is important to take milk of magnesia or generic equivalent 2 ounces =60 mL =4 tablespoons at 6 pm.    Patient will get blood draw for CBC, verbalized understanding.

## 2022-02-14 ENCOUNTER — TELEPHONE (OUTPATIENT)
Dept: SURGERY | Facility: CLINIC | Age: 87
End: 2022-02-14

## 2022-02-15 ENCOUNTER — TELEPHONE (OUTPATIENT)
Dept: SURGERY | Facility: CLINIC | Age: 87
End: 2022-02-15

## 2022-02-15 ENCOUNTER — LAB ENCOUNTER (OUTPATIENT)
Dept: LAB | Facility: HOSPITAL | Age: 87
End: 2022-02-15
Attending: UROLOGY
Payer: MEDICARE

## 2022-02-15 DIAGNOSIS — N39.0 RECURRENT UTI: ICD-10-CM

## 2022-02-15 DIAGNOSIS — D64.9 ANEMIA, UNSPECIFIED TYPE: ICD-10-CM

## 2022-02-15 LAB
BASOPHILS # BLD AUTO: 0.07 X10(3) UL (ref 0–0.2)
BASOPHILS NFR BLD AUTO: 0.6 %
BILIRUB UR QL: NEGATIVE
COLOR UR: YELLOW
DEPRECATED RDW RBC AUTO: 44.6 FL (ref 35.1–46.3)
EOSINOPHIL # BLD AUTO: 0.16 X10(3) UL (ref 0–0.7)
EOSINOPHIL NFR BLD AUTO: 1.3 %
ERYTHROCYTE [DISTWIDTH] IN BLOOD BY AUTOMATED COUNT: 19.6 % (ref 11–15)
GLUCOSE UR-MCNC: NEGATIVE MG/DL
HCT VFR BLD AUTO: 35.4 %
HGB BLD-MCNC: 10.7 G/DL
IMM GRANULOCYTES # BLD AUTO: 0.08 X10(3) UL (ref 0–1)
IMM GRANULOCYTES NFR BLD: 0.7 %
KETONES UR-MCNC: NEGATIVE MG/DL
LYMPHOCYTES # BLD AUTO: 1.47 X10(3) UL (ref 1–4)
LYMPHOCYTES NFR BLD AUTO: 12.3 %
MCH RBC QN AUTO: 20.7 PG (ref 26–34)
MCHC RBC AUTO-ENTMCNC: 30.2 G/DL (ref 31–37)
MCV RBC AUTO: 68.6 FL
MONOCYTES # BLD AUTO: 1.07 X10(3) UL (ref 0.1–1)
MONOCYTES NFR BLD AUTO: 8.9 %
NEUTROPHILS # BLD AUTO: 9.13 X10 (3) UL (ref 1.5–7.7)
NEUTROPHILS # BLD AUTO: 9.13 X10(3) UL (ref 1.5–7.7)
NITRITE UR QL STRIP.AUTO: NEGATIVE
PH UR: 6 [PH] (ref 5–8)
PLATELET # BLD AUTO: 251 10(3)UL (ref 150–450)
PROT UR-MCNC: 100 MG/DL
RBC # BLD AUTO: 5.16 X10(6)UL
RBC #/AREA URNS AUTO: >10 /HPF
SP GR UR STRIP: 1.01 (ref 1–1.03)
UROBILINOGEN UR STRIP-ACNC: <2
WBC # BLD AUTO: 12 X10(3) UL (ref 4–11)
WBC CLUMPS UR QL AUTO: PRESENT /HPF

## 2022-02-15 PROCEDURE — 87186 SC STD MICRODIL/AGAR DIL: CPT

## 2022-02-15 PROCEDURE — 87086 URINE CULTURE/COLONY COUNT: CPT

## 2022-02-15 PROCEDURE — 36415 COLL VENOUS BLD VENIPUNCTURE: CPT

## 2022-02-15 PROCEDURE — 87088 URINE BACTERIA CULTURE: CPT

## 2022-02-15 PROCEDURE — 85025 COMPLETE CBC W/AUTO DIFF WBC: CPT

## 2022-02-15 PROCEDURE — 81001 URINALYSIS AUTO W/SCOPE: CPT

## 2022-02-15 RX ORDER — SULFAMETHOXAZOLE AND TRIMETHOPRIM 800; 160 MG/1; MG/1
1 TABLET ORAL EVERY 12 HOURS
Qty: 20 TABLET | Refills: 0 | Status: SHIPPED | OUTPATIENT
Start: 2022-02-15 | End: 2022-02-25

## 2022-02-15 NOTE — TELEPHONE ENCOUNTER
Pt  Stated that  He took a  UA test today  And is having  Urinary issues again. Going every 2 hours, Burning.  Please call

## 2022-02-16 NOTE — TELEPHONE ENCOUNTER
I saw that this encounter was un-bolded and that no one had addressed pt's issues. I printed the the UA results and PVK's LOV and I went to PVK to show him and inform him of pt's issues and I told him that the urine c&s is still pending. PVK then reviewed the result and his LOV note and I informed him of pt's allergies of PCN and he gave verbal orders to start pt on Bactrim DS 1 tab by mouth every 12 hrs for 10 days, # 20, no refills. I sent the med to pt's Michelle and I called pt to inform him of PVK's orders and instructions and I told pt to start the med ASAP and to drink plenty of fluids while taking it and to call the office if his symptoms do not improve after a couple days on the med. Pt verbalized understanding and compliance.

## 2022-02-17 RX ORDER — SULFAMETHOXAZOLE AND TRIMETHOPRIM 800; 160 MG/1; MG/1
1 TABLET ORAL 2 TIMES DAILY
Qty: 20 TABLET | Refills: 0 | Status: SHIPPED | OUTPATIENT
Start: 2022-02-17 | End: 2022-02-27

## 2022-02-17 NOTE — PROGRESS NOTES
I like to note that my nurse Gila Araiza just spoke to patient by phone and I am also sending him the following message--  Tamiko Kang,    2/15/2022 CBC blood test shows hemoglobin 10.7; you do not need a blood transfusion. White blood cell count is mildly elevated consistent with urinary tract infection. Urine culture shows a E. coli ESBL bacteria. The Bactrim DS generic that you are taking is the correct antibiotic. You have an order for 10 days; because this is a \"strong\" bacteria, I want you to take the antibiotic for total of 20 days so I sent another extra 10 days of the Bactrim DS generic antibiotic. IF your symptoms are not improving, go to the emergency room for IV antibiotics and to be put on an outpatient IV antibiotic program.  Please call my office nurses on a daily basis and let us know how you are doing.   Dr. Frances Garcia

## 2022-02-17 NOTE — TELEPHONE ENCOUNTER
Patient requesting call back with urine test results, states he still has the same symptoms and medication is not working.  Please advise

## 2022-02-17 NOTE — TELEPHONE ENCOUNTER
Called pt and notified him that I spoke with PVK and PVK states bactrim is the correct antibiotic for the infection. However if pt symptoms are not better and if he feels antibiotic is not working, then pt will need to go to ER for IV antibiotics. Pt states he feel burning is getting a bit better. Advised pt to continue to monitor symptoms, monitor temperature and if worsening to go to ER. Otherwise pt should call back tomorrow for condition update. Verbalized understanding.

## 2022-02-17 NOTE — TELEPHONE ENCOUNTER
Please call patient back either today or tomorrow;     I agree that he needs to continue Bactrim DS generic (presently has a 10-day order) for an E. coli ESBL UTI; I agree that if he does not feel better, he needs to go to the emergency room for IV antibiotics and for infectious disease consult for outpatient IV antibiotics. Let patient know that if he slowly improves, he needs to be on the antibiotic for at least 20 days and I will electronically send order for an extra 10 days of Bactrim DS generic.     Thank you,    Dr. Shelia Nichols

## 2022-02-18 NOTE — TELEPHONE ENCOUNTER
S/W pt and informed him of the info and instructions stated in PVK\"s msg as stated below and he verbalized understanding and compliance.

## 2022-02-22 ENCOUNTER — HOSPITAL ENCOUNTER (OUTPATIENT)
Dept: GENERAL RADIOLOGY | Facility: HOSPITAL | Age: 87
Discharge: HOME | End: 2022-02-22
Attending: UROLOGY
Payer: MEDICARE

## 2022-02-22 ENCOUNTER — TELEPHONE (OUTPATIENT)
Dept: INTERNAL MEDICINE CLINIC | Facility: CLINIC | Age: 87
End: 2022-02-22

## 2022-02-22 DIAGNOSIS — R31.0 GROSS HEMATURIA: ICD-10-CM

## 2022-02-22 DIAGNOSIS — N20.0 KIDNEY STONES: ICD-10-CM

## 2022-02-22 PROCEDURE — 74021 RADEX ABDOMEN 3+ VIEWS: CPT | Performed by: UROLOGY

## 2022-02-23 RX ORDER — ATORVASTATIN CALCIUM 10 MG/1
TABLET, FILM COATED ORAL
Qty: 90 TABLET | Refills: 0 | Status: SHIPPED | OUTPATIENT
Start: 2022-02-23 | End: 2022-05-24

## 2022-02-25 ENCOUNTER — TELEPHONE (OUTPATIENT)
Dept: SURGERY | Facility: CLINIC | Age: 87
End: 2022-02-25

## 2022-03-03 ENCOUNTER — TELEPHONE (OUTPATIENT)
Dept: SURGERY | Facility: CLINIC | Age: 87
End: 2022-03-03

## 2022-03-03 ENCOUNTER — OFFICE VISIT (OUTPATIENT)
Dept: SURGERY | Facility: CLINIC | Age: 87
End: 2022-03-03
Payer: MEDICARE

## 2022-03-03 DIAGNOSIS — D64.9 ANEMIA, UNSPECIFIED TYPE: ICD-10-CM

## 2022-03-03 DIAGNOSIS — N39.0 UTI DUE TO EXTENDED-SPECTRUM BETA LACTAMASE (ESBL) PRODUCING ESCHERICHIA COLI: ICD-10-CM

## 2022-03-03 DIAGNOSIS — R31.0 GROSS HEMATURIA: ICD-10-CM

## 2022-03-03 DIAGNOSIS — R39.14 BENIGN PROSTATIC HYPERPLASIA WITH INCOMPLETE BLADDER EMPTYING: ICD-10-CM

## 2022-03-03 DIAGNOSIS — N40.2 PROSTATE NODULE: ICD-10-CM

## 2022-03-03 DIAGNOSIS — R97.20 ELEVATED PSA: ICD-10-CM

## 2022-03-03 DIAGNOSIS — R35.1 NOCTURIA: ICD-10-CM

## 2022-03-03 DIAGNOSIS — N41.1 CHRONIC PROSTATITIS: ICD-10-CM

## 2022-03-03 DIAGNOSIS — N20.0 RIGHT KIDNEY STONE: Primary | ICD-10-CM

## 2022-03-03 DIAGNOSIS — N40.1 BENIGN PROSTATIC HYPERPLASIA WITH INCOMPLETE BLADDER EMPTYING: ICD-10-CM

## 2022-03-03 DIAGNOSIS — Z22.39 ESBL E. COLI CARRIER: ICD-10-CM

## 2022-03-03 DIAGNOSIS — Z79.82 LONG TERM (CURRENT) USE OF ASPIRIN: ICD-10-CM

## 2022-03-03 DIAGNOSIS — B96.29 UTI DUE TO EXTENDED-SPECTRUM BETA LACTAMASE (ESBL) PRODUCING ESCHERICHIA COLI: ICD-10-CM

## 2022-03-03 DIAGNOSIS — Z16.12 UTI DUE TO EXTENDED-SPECTRUM BETA LACTAMASE (ESBL) PRODUCING ESCHERICHIA COLI: ICD-10-CM

## 2022-03-03 PROCEDURE — 99215 OFFICE O/P EST HI 40 MIN: CPT | Performed by: UROLOGY

## 2022-03-03 RX ORDER — SULFAMETHOXAZOLE AND TRIMETHOPRIM 800; 160 MG/1; MG/1
1 TABLET ORAL 2 TIMES DAILY
Qty: 20 TABLET | Refills: 0 | Status: SHIPPED | OUTPATIENT
Start: 2022-03-03 | End: 2022-03-13

## 2022-03-03 NOTE — TELEPHONE ENCOUNTER
Dr. Sadie Flores,    Patient has been on Bactrim DS twice daily for symptomatic E. coli ESBL UTI and while on Bactrim DS now for about 14 days, he now has no symptoms; he will continue the Bactrim DS for another 7 days until ureteroscopy with laser lithotripsy of a large 1.6 cm calculus that he has; that will be performed [facility] same-day surgery. I will keep you posted.     Many thanks,    Oswald Monroy,  urology

## 2022-03-08 ENCOUNTER — LAB ENCOUNTER (OUTPATIENT)
Dept: LAB | Facility: HOSPITAL | Age: 87
End: 2022-03-08
Attending: UROLOGY
Payer: MEDICARE

## 2022-03-08 DIAGNOSIS — Z01.818 PRE-OP TESTING: ICD-10-CM

## 2022-03-08 LAB — SARS-COV-2 RNA RESP QL NAA+PROBE: NOT DETECTED

## 2022-03-10 ENCOUNTER — TELEPHONE (OUTPATIENT)
Dept: SURGERY | Facility: CLINIC | Age: 87
End: 2022-03-10

## 2022-03-10 ENCOUNTER — ANESTHESIA (OUTPATIENT)
Dept: SURGERY | Facility: HOSPITAL | Age: 87
End: 2022-03-10
Payer: MEDICARE

## 2022-03-10 ENCOUNTER — HOSPITAL ENCOUNTER (OUTPATIENT)
Facility: HOSPITAL | Age: 87
Setting detail: HOSPITAL OUTPATIENT SURGERY
Discharge: HOME | End: 2022-03-10
Attending: UROLOGY | Admitting: UROLOGY
Payer: MEDICARE

## 2022-03-10 ENCOUNTER — ANESTHESIA EVENT (OUTPATIENT)
Dept: SURGERY | Facility: HOSPITAL | Age: 87
End: 2022-03-10
Payer: MEDICARE

## 2022-03-10 ENCOUNTER — APPOINTMENT (OUTPATIENT)
Dept: GENERAL RADIOLOGY | Facility: HOSPITAL | Age: 87
End: 2022-03-10
Attending: UROLOGY
Payer: MEDICARE

## 2022-03-10 VITALS
OXYGEN SATURATION: 95 % | SYSTOLIC BLOOD PRESSURE: 118 MMHG | HEIGHT: 64 IN | HEART RATE: 70 BPM | WEIGHT: 121 LBS | BODY MASS INDEX: 20.66 KG/M2 | RESPIRATION RATE: 16 BRPM | DIASTOLIC BLOOD PRESSURE: 67 MMHG | TEMPERATURE: 98 F

## 2022-03-10 DIAGNOSIS — N20.0 RIGHT KIDNEY STONE: ICD-10-CM

## 2022-03-10 DIAGNOSIS — R31.0 GROSS HEMATURIA: ICD-10-CM

## 2022-03-10 DIAGNOSIS — Z01.818 PRE-OP TESTING: Primary | ICD-10-CM

## 2022-03-10 PROCEDURE — 0T768DZ DILATION OF RIGHT URETER WITH INTRALUMINAL DEVICE, VIA NATURAL OR ARTIFICIAL OPENING ENDOSCOPIC: ICD-10-PCS | Performed by: UROLOGY

## 2022-03-10 PROCEDURE — 52356 CYSTO/URETERO W/LITHOTRIPSY: CPT | Performed by: UROLOGY

## 2022-03-10 PROCEDURE — 0TF38ZZ FRAGMENTATION IN RIGHT KIDNEY PELVIS, VIA NATURAL OR ARTIFICIAL OPENING ENDOSCOPIC: ICD-10-PCS | Performed by: UROLOGY

## 2022-03-10 DEVICE — URETERAL STENT
Type: IMPLANTABLE DEVICE | Site: URETER | Status: FUNCTIONAL
Brand: ASCERTA™

## 2022-03-10 RX ORDER — SODIUM CHLORIDE 9 MG/ML
INJECTION, SOLUTION INTRAVENOUS CONTINUOUS PRN
Status: DISCONTINUED | OUTPATIENT
Start: 2022-03-10 | End: 2022-03-10 | Stop reason: SURG

## 2022-03-10 RX ORDER — ONDANSETRON 2 MG/ML
4 INJECTION INTRAMUSCULAR; INTRAVENOUS ONCE AS NEEDED
Status: DISCONTINUED | OUTPATIENT
Start: 2022-03-10 | End: 2022-03-10

## 2022-03-10 RX ORDER — GENTAMICIN SULFATE 40 MG/ML
INJECTION, SOLUTION INTRAMUSCULAR; INTRAVENOUS AS NEEDED
Status: DISCONTINUED | OUTPATIENT
Start: 2022-03-10 | End: 2022-03-10 | Stop reason: HOSPADM

## 2022-03-10 RX ORDER — PHENAZOPYRIDINE HYDROCHLORIDE 200 MG/1
200 TABLET, FILM COATED ORAL ONCE AS NEEDED
Status: DISCONTINUED | OUTPATIENT
Start: 2022-03-10 | End: 2022-03-10

## 2022-03-10 RX ORDER — HYDROMORPHONE HYDROCHLORIDE 1 MG/ML
0.4 INJECTION, SOLUTION INTRAMUSCULAR; INTRAVENOUS; SUBCUTANEOUS EVERY 5 MIN PRN
Status: DISCONTINUED | OUTPATIENT
Start: 2022-03-10 | End: 2022-03-10

## 2022-03-10 RX ORDER — PROCHLORPERAZINE EDISYLATE 5 MG/ML
5 INJECTION INTRAMUSCULAR; INTRAVENOUS ONCE AS NEEDED
Status: DISCONTINUED | OUTPATIENT
Start: 2022-03-10 | End: 2022-03-10

## 2022-03-10 RX ORDER — GLYCOPYRROLATE 0.2 MG/ML
INJECTION, SOLUTION INTRAMUSCULAR; INTRAVENOUS AS NEEDED
Status: DISCONTINUED | OUTPATIENT
Start: 2022-03-10 | End: 2022-03-10 | Stop reason: SURG

## 2022-03-10 RX ORDER — NALOXONE HYDROCHLORIDE 0.4 MG/ML
80 INJECTION, SOLUTION INTRAMUSCULAR; INTRAVENOUS; SUBCUTANEOUS AS NEEDED
Status: DISCONTINUED | OUTPATIENT
Start: 2022-03-10 | End: 2022-03-10

## 2022-03-10 RX ORDER — HYDROMORPHONE HYDROCHLORIDE 1 MG/ML
0.2 INJECTION, SOLUTION INTRAMUSCULAR; INTRAVENOUS; SUBCUTANEOUS EVERY 5 MIN PRN
Status: DISCONTINUED | OUTPATIENT
Start: 2022-03-10 | End: 2022-03-10

## 2022-03-10 RX ORDER — METOPROLOL TARTRATE 5 MG/5ML
2.5 INJECTION INTRAVENOUS ONCE
Status: DISCONTINUED | OUTPATIENT
Start: 2022-03-10 | End: 2022-03-10

## 2022-03-10 RX ORDER — DEXAMETHASONE SODIUM PHOSPHATE 4 MG/ML
VIAL (ML) INJECTION AS NEEDED
Status: DISCONTINUED | OUTPATIENT
Start: 2022-03-10 | End: 2022-03-10 | Stop reason: SURG

## 2022-03-10 RX ORDER — HYDROCODONE BITARTRATE AND ACETAMINOPHEN 5; 325 MG/1; MG/1
1 TABLET ORAL AS NEEDED
Status: DISCONTINUED | OUTPATIENT
Start: 2022-03-10 | End: 2022-03-10

## 2022-03-10 RX ORDER — HYDROCODONE BITARTRATE AND ACETAMINOPHEN 5; 325 MG/1; MG/1
2 TABLET ORAL AS NEEDED
Status: DISCONTINUED | OUTPATIENT
Start: 2022-03-10 | End: 2022-03-10

## 2022-03-10 RX ORDER — MORPHINE SULFATE 4 MG/ML
4 INJECTION, SOLUTION INTRAMUSCULAR; INTRAVENOUS EVERY 10 MIN PRN
Status: DISCONTINUED | OUTPATIENT
Start: 2022-03-10 | End: 2022-03-10

## 2022-03-10 RX ORDER — NEOSTIGMINE METHYLSULFATE 1 MG/ML
INJECTION INTRAVENOUS AS NEEDED
Status: DISCONTINUED | OUTPATIENT
Start: 2022-03-10 | End: 2022-03-10 | Stop reason: SURG

## 2022-03-10 RX ORDER — MORPHINE SULFATE 4 MG/ML
2 INJECTION, SOLUTION INTRAMUSCULAR; INTRAVENOUS EVERY 10 MIN PRN
Status: DISCONTINUED | OUTPATIENT
Start: 2022-03-10 | End: 2022-03-10

## 2022-03-10 RX ORDER — LIDOCAINE HYDROCHLORIDE 20 MG/ML
JELLY TOPICAL AS NEEDED
Status: DISCONTINUED | OUTPATIENT
Start: 2022-03-10 | End: 2022-03-10 | Stop reason: HOSPADM

## 2022-03-10 RX ORDER — SODIUM CHLORIDE, SODIUM LACTATE, POTASSIUM CHLORIDE, CALCIUM CHLORIDE 600; 310; 30; 20 MG/100ML; MG/100ML; MG/100ML; MG/100ML
INJECTION, SOLUTION INTRAVENOUS CONTINUOUS
Status: DISCONTINUED | OUTPATIENT
Start: 2022-03-10 | End: 2022-03-10

## 2022-03-10 RX ORDER — ONDANSETRON 2 MG/ML
INJECTION INTRAMUSCULAR; INTRAVENOUS AS NEEDED
Status: DISCONTINUED | OUTPATIENT
Start: 2022-03-10 | End: 2022-03-10 | Stop reason: SURG

## 2022-03-10 RX ORDER — LIDOCAINE HYDROCHLORIDE 10 MG/ML
INJECTION, SOLUTION EPIDURAL; INFILTRATION; INTRACAUDAL; PERINEURAL AS NEEDED
Status: DISCONTINUED | OUTPATIENT
Start: 2022-03-10 | End: 2022-03-10 | Stop reason: SURG

## 2022-03-10 RX ORDER — HYDROMORPHONE HYDROCHLORIDE 1 MG/ML
0.6 INJECTION, SOLUTION INTRAMUSCULAR; INTRAVENOUS; SUBCUTANEOUS EVERY 5 MIN PRN
Status: DISCONTINUED | OUTPATIENT
Start: 2022-03-10 | End: 2022-03-10

## 2022-03-10 RX ORDER — PHENYLEPHRINE HCL 10 MG/ML
VIAL (ML) INJECTION AS NEEDED
Status: DISCONTINUED | OUTPATIENT
Start: 2022-03-10 | End: 2022-03-10 | Stop reason: SURG

## 2022-03-10 RX ORDER — MORPHINE SULFATE 10 MG/ML
6 INJECTION, SOLUTION INTRAMUSCULAR; INTRAVENOUS EVERY 10 MIN PRN
Status: DISCONTINUED | OUTPATIENT
Start: 2022-03-10 | End: 2022-03-10

## 2022-03-10 RX ORDER — ROCURONIUM BROMIDE 10 MG/ML
INJECTION, SOLUTION INTRAVENOUS AS NEEDED
Status: DISCONTINUED | OUTPATIENT
Start: 2022-03-10 | End: 2022-03-10 | Stop reason: SURG

## 2022-03-10 RX ORDER — ACETAMINOPHEN 500 MG
1000 TABLET ORAL ONCE
Status: COMPLETED | OUTPATIENT
Start: 2022-03-10 | End: 2022-03-10

## 2022-03-10 RX ADMIN — DEXAMETHASONE SODIUM PHOSPHATE 4 MG: 4 MG/ML VIAL (ML) INJECTION at 09:54:00

## 2022-03-10 RX ADMIN — SODIUM CHLORIDE: 9 INJECTION, SOLUTION INTRAVENOUS at 09:50:00

## 2022-03-10 RX ADMIN — NEOSTIGMINE METHYLSULFATE 3 MG: 1 INJECTION INTRAVENOUS at 11:53:00

## 2022-03-10 RX ADMIN — ROCURONIUM BROMIDE 30 MG: 10 INJECTION, SOLUTION INTRAVENOUS at 09:54:00

## 2022-03-10 RX ADMIN — SODIUM CHLORIDE: 9 INJECTION, SOLUTION INTRAVENOUS at 12:08:00

## 2022-03-10 RX ADMIN — LIDOCAINE HYDROCHLORIDE 25 MG: 10 INJECTION, SOLUTION EPIDURAL; INFILTRATION; INTRACAUDAL; PERINEURAL at 09:54:00

## 2022-03-10 RX ADMIN — ONDANSETRON 4 MG: 2 INJECTION INTRAMUSCULAR; INTRAVENOUS at 09:54:00

## 2022-03-10 RX ADMIN — GLYCOPYRROLATE 0.6 MG: 0.2 INJECTION, SOLUTION INTRAMUSCULAR; INTRAVENOUS at 11:53:00

## 2022-03-10 RX ADMIN — GLYCOPYRROLATE 0.2 MG: 0.2 INJECTION, SOLUTION INTRAMUSCULAR; INTRAVENOUS at 09:54:00

## 2022-03-10 RX ADMIN — PHENYLEPHRINE HCL 100 MCG: 10 MG/ML VIAL (ML) INJECTION at 10:00:00

## 2022-03-10 NOTE — BRIEF OP NOTE
[facility] POST ANESTHESIA CARE UNIT  Brief Op Note       Patients Name: Joann Keller  Attending Physician: Johan Almonte MD  Operating Physician: Di Alves MD  CSN: [id number]     Location:  OR  MRN: [id number]    YOB: 1930  Admission Date: 3/10/2022  Operation Date: 3/10/2022    Brief Operative Report    Pre-Operative Diagnosis: Gross hematuria [R31.0]                                           Right kidney stone [N20.0];  history of recurrent urethral stricture    Post-Operative Diagnosis: Large right kidney stone 1.5 cm; gross hematuria; BPH; recurrent urethral stricture    Procedure Performed: Cystoscopy, endoscopic dilation of stricture of urethra, dilation of right ureter, right nephro-ureteroscopy with holmium laser lithotripsy of large stone, insertion of right ureteral stent     Primary Surgeon:  Di Alves MD    Assistants: None    Anesthesia: General  CRNA: Parish Miranda CRNA  Anesthesiologist [facility]: Kim Cifuentes MD    Findings: As above    EBL: Less than 5 mL    Complications: None    Specimens:  * No specimens in log *    Condition: Stable      Di Alves MD  3/10/2022  12:29 PM

## 2022-03-10 NOTE — ANESTHESIA PROCEDURE NOTES
Airway  Date/Time: 3/10/2022 9:55 AM  Urgency: Elective    Airway not difficult    General Information and Staff    Patient location during procedure: OR  Anesthesiologist: Arlen Holder MD  Resident/CRNA: Estefany Andrade CRNA  Performed: CRNA     Indications and Patient Condition  Indications for airway management: anesthesia  Sedation level: deep  Preoxygenated: yes  Patient position: sniffing  Mask difficulty assessment: 1 - vent by mask    Final Airway Details  Final airway type: endotracheal airway      Successful airway: ETT  Cuffed: yes   Successful intubation technique: direct laryngoscopy  Endotracheal tube insertion site: oral  Blade: Michel  Blade size: #3  ETT size (mm): 7.5    Placement verified by: chest auscultation and capnometry   Cuff volume (mL): 6  Measured from: lips  ETT to lips (cm): 22  Number of attempts at approach: 1  Number of other approaches attempted: 0

## 2022-03-10 NOTE — INTERVAL H&P NOTE
Pre-op Diagnosis: Gross hematuria [R31.0]  Right kidney stone [N20.0]    The above referenced H&P was reviewed by Frances Warren MD on 3/10/2022, the patient was examined and no significant changes have occurred in the patient's condition since the H&P was performed. I discussed with the patient and upon obtaining patient's permission also discussed with his significant friend Thierry Roche, who is present in the room, the potential benefits, risks and side effects of this procedure; the likelihood of the patient achieving goals; and potential problems that might occur during recuperation. I discussed reasonable alternatives to the procedure, including risks, benefits and side effects related to the alternatives and risks related to not receiving this procedure. I answered patient's questions and friend Paulette's questions on surgery and on treatment and patient understands and wants to proceed and Thierry Roche agrees. I asked patient he specifically asks me to call his friend Thierry Roche after the procedure and tell her about any significant urological developments that occurred during the operation and I agreed to do so. Patient wants to proceed with surgery as scheduled. We will proceed with procedure as planned.

## 2022-03-10 NOTE — TELEPHONE ENCOUNTER
Urology instructions given to the patient today after successful right nephro-ureteroscopy, laser lithotripsy =      Urology discharge instructions of the patient  1. This evening resume the Bactrim DS generic antibiotic (sulfamethoxazole-trimethoprim) and continue it until you finish it  2. Resume daily aspirin and 1 to 2 days as long as color of urine is no worse than light pink; if it were worse than light pink, restart the aspirin but take it every other day instead  3. If urine is pink or red, drink lots of various different liquids to increase urine output, thereby diluting out in color blood in your urine  4. For pain, please take Tylenol  5. For burning pain with urination, if Tylenol is enough, buy over-the-counter \"Azo\" 95 mg capsule, 1 to 2 capsules every 8 hours as needed; this urine makes the urine of bright fluorescent orange-red; it is optional and I will be happy to take it if you are uncomfortable. 6.  Please perform the inversion exercises detailed on the handout that I am giving you. 50% of the time do the exercise lying on your stomach and the other 50% of the time performed lying on your left side with the right side up towards the ceiling; ideally, when you do these exercises, keep the entire body shifted downward by 10 or 15 degrees. 7.)  In 6 to 7 days, please get plain KUB x-ray; I will enter the order. Please take milk of magnesia   two ounces =60 mL =4 tablespoons 6 PM, evening before x-ray to induce bowel movement and to improve clarity and visibility of any remaining stone fragments. Purpose of this x-rays to make sure that you are mobilizing the stone particles out of your kidney  8. Keep in touch with [facility] my surgery scheduler [phone] to set up cystoscopy with right stent removal, but only after we confirm on the follow-up x-ray described above that it is appropriate to remove your stent.

## 2022-03-10 NOTE — OPERATIVE REPORT
[facility]    PATIENT'S NAME: Megha Chavez   ATTENDING PHYSICIAN: Deandre Sanders MD   OPERATING PHYSICIAN: Deandre Sanders MD   PATIENT ACCOUNT#:   [id number]    LOCATION:  [facility] [address] PACU [address] [place] [address]  MEDICAL RECORD #:   [id number]       YOB: 1930  ADMISSION DATE:       03/10/2022      OPERATION DATE:  03/10/2022    OPERATIVE REPORT    PREOPERATIVE DIAGNOSIS:    1.   Large 1.5 cm calculus, right kidney. 2.   Gross hematuria. 3.   Recent Escherichia coli extended spectrum beta-lactamase urinary tract infection - still on Bactrim DS for it. 4.   History of urethral stricture. 5.   Benign prostatic hypertrophy. POSTOPERATIVE DIAGNOSIS:    1.   Large 1.5 cm calculus, right kidney. 2.   Gross hematuria. 3.   Recent Escherichia coli extended spectrum beta-lactamase urinary tract infection - still on Bactrim DS for it. 4.   History of urethral stricture. 5.   Benign prostatic hypertrophy. 6.   Recurrent stricture of urethra. PROCEDURE:  Cystoscopy with endoscopic dilation of stricture of urethra, dilation of right ureter, right nephroureteroscopy, holmium laser lithotripsy of very large right kidney stone 1.5 cm, right stent insertion. ASSISTANT:  None. COMPLICATIONS:  None. ESTIMATED BLOOD LOSS:  1 mL. ANESTHESIA:  General anesthesia. FINDINGS:  This is a 16-Bengali stricture in the distal most urethra and then another 1 in the mid to proximal bulbar urethra, and both of these are dilated cystoscopically and then up to a 22-Bengali with a 30-degree angle lens and then with a [employer] sound up to a size 24-Bengali. There is some small amount of obstructing BPH tissue at the apex, but the prostatic fossa is open and this patient has had a TURP in the past.  The bladder is 2+ trabeculated with no evidence of any cancers or stones in the bladder and no CIS of the bladder.   Hence, previous gross hematuria was felt to be due to either the patient's known very large stone in the right kidney and/or his Escherichia coli ESBL infection which he has had in the last several weeks and he has been on Bactrim DS since then. Right nephroureteroscopy shows no stones in the ureter, but there is a very large 1.5 cm gold and light brown-colored stone and it is relatively hard, but it is totally broken up into 1 mm fragments. A 24 cm 6-Maltese stent was placed. RECOMMENDATION AND TREATMENT PLAN:  I am going to instruct the patient how to do inversion exercises - we will give him our teaching handout on that; because the stone was so large at 1.5 cm preop, I will likely him get a KUB plain x-ray in about a week to make sure that he is passing the stone particle dust and debris out of the kidney, and if that looks good, we will plan to perform cystoscopy with stent removal.     OPERATIVE TECHNIQUE:  The patient was taken to the operating room, placed in a supine position, induced under general anesthesia, placed in the dorsal lithotomy position, prepped and draped in usual sterile fashion. I endoscopically dilated strictures in the urethra that I encountered with a combination of using a 17-Maltese, then a 19-Maltese, then a 22-Maltese sheath with 30-degree angle lens and dilating under direct visualization followed by using Cresenciano Jose sounds and the above-mentioned urethral strictures were dilated. I entered the bladder, inspected it with 30-degree and 70-degree angle lenses and confirmed that there were no bladder tumors or CIS of the bladder, and other findings as above. Next, I dilated the right ureter using 2 separate 0.035 [employer] \"zip\" Amauri Champion and than also used a 10-Maltese tapered double-lumen ureteral catheter as well as a 6/12-Maltese Bally ureteral dilator and then an 11/13-Maltese 24 cm App[employer] ureteral access sheath, and then I used a disposable [employer] ureteroscope and performed nephroureteroscopy and encountered the stone.   I used a 200 micron holmium laser fiber. Laser settings to totally pulverize the stone were as follows:  0.3 joules, 70 hertz, 21 lee; 0.8 joules, 8 hertz, 6.4 lee; then 0.5 joules, 80 hertz, 4 lee. I delivered a total of 28.78 kilojoules to totally fragment the stone up into 1 mm particles. At the end of the case, inspected all the calices and the kidney. There were no other significant stones or stone fragments. I then positioned a 24 cm 6-Malaysian stent so a good coil formed in the right kidney fluoroscopically and endoscopically witnessed how a good coil formed in the bladder. I irrigated the bladder. Because of BPH oozing, I inserted a 20-Malaysian 3-way Beaver catheter into the bladder, inflated the 30 mL balloon to 30 mL and placed it to gravity drainage with continuous bladder irrigation with the irrigant returning just light pink/salmon in color. He was returned to the recovery room in stable condition having tolerated the procedure well. Patient asked me preoperatively, before he received any anesthesia, to call his \"significant other\" friend Maryana Carltonneno, and my intentions are to do so right now. Also, we will plan to remove his Beaver catheter in approximately 1 hour, as soon as the color of the urine is good. (Also job [id number])    Dictated By Maura Dc MD  d: 03/10/2022 12:01:54  t: 03/10/2022 12:27:20  Job [id number]  PVK/    cc: Saad Stewart.  MD Maura Sharpe MD

## 2022-03-11 NOTE — TELEPHONE ENCOUNTER
Jose Dumont,    Today 3/10/2022 cystoscopy with right nephro-ureteroscopy and laser lithotripsy of a very large 1.5 cm stone             General anesthesia, EM, same-day surgery. The very large stone pulverized into 1 mm fragments. Because of the large volume of stone material, I have asked him to get a KUB in 6 to 7 days to make sure that he is moving the stone \"dust\" out of the kidney. If follow-up KUB is favorable, we will remove his stent under local anesthesia. With respect to his E. coli ESBL UTI, he has been on Bactrim DS continuously for 2 to 3 weeks; he will take it for another several days and stop it. I will asked patient to submit a urine culture several days before the cystoscopy stent removal.    I will keep you posted.     Many thanks,    Rigoberto Oconnor, urology

## 2022-03-11 NOTE — TELEPHONE ENCOUNTER
Lin Hernandez,    Please make sure the patient submits urine culture 7--10 days before cystoscopy with stent removal; please note that he needs to get a KUB plain x-ray first.  Thank you, Dr. Art Birmingham      I sent patient the following message by means of \"my chart\" =    Blanquita Veronica,    Your operation went well today. Please do the inversion exercises as described on the handout that was given to you. Please try to do 20 minutes sets, 3 sets per day; 50% of time lying on your stomach, the other 50% of time lying on your left side with right kidney/right side up towards the ceiling and try to do these maneuvers, if possible, with the entire body slanting 10 or 15 degrees downward. Purpose of these is to get the stone \"dust\" out from the bottom of the kidney, to the center of the kidney also known as the renal pelvis, thereby going down the ureter, into your bladder and out of your body. Remember to get the KUB plain x-ray in about 6 to 7 days. If the x-ray looks good, we will plan to perform cystoscopy with stent removal; my surgery scheduler Lin Hernandez will be calling you about that. Please submit a urine culture specimen to the laboratory 7-10 days before cystoscopy with stent removal to make sure that you do not have a relapse or return of the E. coli ESBL bacteria. Once you submit the urine culture, please call our office 2 to 3 days later for the results--state that it is urgent. If you did have a bacteria, we would want you to start appropriate antibiotic a few days before cystoscopy with stent removal.  I am entering the order for the urine culture.     I wish you the best of health,    Dr. Art Sera

## 2022-03-16 ENCOUNTER — LAB ENCOUNTER (OUTPATIENT)
Dept: LAB | Facility: HOSPITAL | Age: 87
End: 2022-03-16
Attending: UROLOGY
Payer: MEDICARE

## 2022-03-16 ENCOUNTER — TELEPHONE (OUTPATIENT)
Dept: SURGERY | Facility: CLINIC | Age: 87
End: 2022-03-16

## 2022-03-16 ENCOUNTER — HOSPITAL ENCOUNTER (OUTPATIENT)
Dept: GENERAL RADIOLOGY | Facility: HOSPITAL | Age: 87
Discharge: HOME | End: 2022-03-16
Attending: UROLOGY
Payer: MEDICARE

## 2022-03-16 DIAGNOSIS — N20.0 RIGHT KIDNEY STONE: ICD-10-CM

## 2022-03-16 DIAGNOSIS — N39.0 RECURRENT UTI: Primary | ICD-10-CM

## 2022-03-16 DIAGNOSIS — N39.0 RECURRENT UTI: ICD-10-CM

## 2022-03-16 LAB
BILIRUB UR QL: NEGATIVE
COLOR UR: YELLOW
GLUCOSE UR-MCNC: NEGATIVE MG/DL
KETONES UR-MCNC: NEGATIVE MG/DL
NITRITE UR QL STRIP.AUTO: POSITIVE
PH UR: 6 [PH] (ref 5–8)
PROT UR-MCNC: >=500 MG/DL
RBC #/AREA URNS AUTO: >10 /HPF
UROBILINOGEN UR STRIP-ACNC: 4
VIT C UR-MCNC: NEGATIVE MG/DL
WBC #/AREA URNS AUTO: >50 /HPF

## 2022-03-16 PROCEDURE — 74021 RADEX ABDOMEN 3+ VIEWS: CPT | Performed by: UROLOGY

## 2022-03-16 PROCEDURE — 87086 URINE CULTURE/COLONY COUNT: CPT

## 2022-03-16 PROCEDURE — 81001 URINALYSIS AUTO W/SCOPE: CPT

## 2022-03-16 NOTE — PROGRESS NOTES
I sent patient the following message by means of \"my chart\" =  Peterson Stewart,  3/16/2022 KUB plain x-ray shows no remaining stones or stone fragments in the right kidney. We can proceed with plans to perform cystoscopy with removal of right ureteral stent under local anesthesia either in the office or at the [facility] on the first floor of the same building that my office is in  The procedure takes about 5 to 7 minutes; if you wish, I could prescribe a Valium-diazepam 5 mg tablet that you could take 30 minutes before the procedure to relax you but then you need a . Since we would not be putting you to sleep, you may eat or drink before this procedure. Because you had a recent urinary tract infection with a very resistant E. coli ESBL bacteria, please submit a urine culture 7 to 10 days before the procedure, then call our office 3 days after submitting the urine culture for the results; this would give us time to treat you with an antibiotic before the procedure. I will ask my surgery scheduler Tomás Wheatley to call you to set up the procedure.   I wish you the best of health, Dr. Carlito Norris

## 2022-03-16 NOTE — TELEPHONE ENCOUNTER
Lin Hernandez,    Please call patient and set him up for cystoscopy with removal of right ureteral stent under local anesthesia--office or surgery center next available, anytime. If something were to happen where it would take 3 weeks or longer to do this, please let me know and I would then make sure that patient would be taking potassium citrate to lessen the chances of his stent calcifying before it is removed. Please find out from patient if he would like to take Valium-diazepam 5 mg tablet sedative to relax him but if he does that, he needs a ; since we are not putting in the sleep, you may eat breakfast or lunch that day. Patient must submit urine culture 7-10 days before the procedure and to call her office 3 days later for the results so that we have time to start him on an oral antibiotic if he has a UTI before the procedure. I am entering the order. I will leave the filled out surgery scheduling sheet on your desk. Please see the following message that I sent patient this evening by means of \"my chart result note\" =    Blanquita Martin,  3/16/2022 KUB plain x-ray shows no remaining stones or stone fragments in the right kidney. We can proceed with plans to perform cystoscopy with removal of right ureteral stent under local anesthesia either in the office or at the [facility] center on the first floor of the same building that my office is in  The procedure takes about 5 to 7 minutes; if you wish, I could prescribe a Valium-diazepam 5 mg tablet that you could take 30 minutes before the procedure to relax you but then you need a . Since we would not be putting you to sleep, you may eat or drink before this procedure.   Because you had a recent urinary tract infection with a very resistant E. coli ESBL bacteria, please submit a urine culture 7 to 10 days before the procedure, then call our office 3 days after submitting the urine culture for the results; this would give us time to treat you with an antibiotic before the procedure. I will ask my surgery scheduler Danii Sung to call you to set up the procedure.   I wish you the best of [facility],  [address]

## 2022-03-18 NOTE — TELEPHONE ENCOUNTER
Spoke with patient scheduled cystoscopy, right ureteral stent removal, Thursday 03/24/2022, [place] [facility], informed patient, pre-op will call with exact arrival time.      per your request, patient does not want diazepam/ valium, prefers straight local.  Thanks Jorge Luis

## 2022-03-24 ENCOUNTER — TELEPHONE (OUTPATIENT)
Dept: SURGERY | Facility: CLINIC | Age: 87
End: 2022-03-24

## 2022-03-24 NOTE — TELEPHONE ENCOUNTER
3/24/2022 cystoscopy with removal of right ureteral stent--                                                                            local anesthesia at the surgery center    On 3/10/2022 patient underwent right nephro-ureteroscopy with holmium laser lithotripsy of a large stone, right stent insertion. Today at the surgery center I discussed with patient further treatment options. He states he will use food management measures to help prevent kidney stones. He will return to see us as needed as needed. Please see my last office visit note of 3/3/2022.      [address]

## 2022-04-20 RX ORDER — PANTOPRAZOLE SODIUM 40 MG/1
TABLET, DELAYED RELEASE ORAL
Qty: 90 TABLET | Refills: 3 | Status: SHIPPED | OUTPATIENT
Start: 2022-04-20

## 2022-05-24 RX ORDER — ATORVASTATIN CALCIUM 10 MG/1
TABLET, FILM COATED ORAL
Qty: 90 TABLET | Refills: 0 | Status: SHIPPED | OUTPATIENT
Start: 2022-05-24

## 2022-08-22 ENCOUNTER — TELEPHONE (OUTPATIENT)
Dept: INTERNAL MEDICINE CLINIC | Facility: CLINIC | Age: 87
End: 2022-08-22

## 2022-08-22 DIAGNOSIS — Z00.00 ANNUAL PHYSICAL EXAM: Primary | ICD-10-CM

## 2022-08-22 DIAGNOSIS — E78.00 HYPERCHOLESTEREMIA: ICD-10-CM

## 2022-08-22 RX ORDER — ATORVASTATIN CALCIUM 10 MG/1
TABLET, FILM COATED ORAL
Qty: 90 TABLET | Refills: 3 | Status: SHIPPED | OUTPATIENT
Start: 2022-08-22 | End: 2023-08-17

## 2022-08-22 NOTE — TELEPHONE ENCOUNTER
Called patient   Scheduled annual ov w/Dr Dubois on Oct 5   Pt will go for labs the Monday prior to his appt

## 2022-08-22 NOTE — TELEPHONE ENCOUNTER
There aren't any active lab orders.  Annual labs pended per protocol    To  for lab    To Rites[facility] Darling for EE

## 2022-10-03 ENCOUNTER — LAB ENCOUNTER (OUTPATIENT)
Dept: LAB | Age: 87
End: 2022-10-03
Attending: INTERNAL MEDICINE
Payer: MEDICARE

## 2022-10-03 DIAGNOSIS — E78.00 HYPERCHOLESTEREMIA: ICD-10-CM

## 2022-10-03 DIAGNOSIS — Z00.00 ANNUAL PHYSICAL EXAM: ICD-10-CM

## 2022-10-03 LAB
ALBUMIN SERPL-MCNC: 3.7 G/DL (ref 3.4–5)
ALBUMIN/GLOB SERPL: 1.2 {RATIO} (ref 1–2)
ALP LIVER SERPL-CCNC: 75 U/L
ALT SERPL-CCNC: 19 U/L
ANION GAP SERPL CALC-SCNC: 8 MMOL/L (ref 0–18)
AST SERPL-CCNC: 21 U/L (ref 15–37)
BASOPHILS # BLD AUTO: 0.1 X10(3) UL (ref 0–0.2)
BASOPHILS NFR BLD AUTO: 1.2 %
BILIRUB SERPL-MCNC: 1.3 MG/DL (ref 0.1–2)
BILIRUB UR QL: NEGATIVE
BUN BLD-MCNC: 21 MG/DL (ref 7–18)
BUN/CREAT SERPL: 20.6 (ref 10–20)
CALCIUM BLD-MCNC: 9.2 MG/DL (ref 8.5–10.1)
CHLORIDE SERPL-SCNC: 109 MMOL/L (ref 98–112)
CHOLEST SERPL-MCNC: 127 MG/DL (ref ?–200)
CLARITY UR: CLEAR
CO2 SERPL-SCNC: 26 MMOL/L (ref 21–32)
COLOR UR: YELLOW
CREAT BLD-MCNC: 1.02 MG/DL
DEPRECATED RDW RBC AUTO: 43 FL (ref 35.1–46.3)
EOSINOPHIL # BLD AUTO: 0.36 X10(3) UL (ref 0–0.7)
EOSINOPHIL NFR BLD AUTO: 4.4 %
ERYTHROCYTE [DISTWIDTH] IN BLOOD BY AUTOMATED COUNT: 18.6 % (ref 11–15)
FASTING PATIENT LIPID ANSWER: YES
FASTING STATUS PATIENT QL REPORTED: YES
GFR SERPLBLD BASED ON 1.73 SQ M-ARVRAT: 69 ML/MIN/1.73M2 (ref 60–?)
GLOBULIN PLAS-MCNC: 3 G/DL (ref 2.8–4.4)
GLUCOSE BLD-MCNC: 93 MG/DL (ref 70–99)
GLUCOSE UR-MCNC: NEGATIVE MG/DL
HCT VFR BLD AUTO: 39 %
HDLC SERPL-MCNC: 46 MG/DL (ref 40–59)
HGB BLD-MCNC: 12.1 G/DL
HGB UR QL STRIP.AUTO: NEGATIVE
IMM GRANULOCYTES # BLD AUTO: 0.05 X10(3) UL (ref 0–1)
IMM GRANULOCYTES NFR BLD: 0.6 %
KETONES UR-MCNC: NEGATIVE MG/DL
LDLC SERPL CALC-MCNC: 69 MG/DL (ref ?–100)
LYMPHOCYTES # BLD AUTO: 1.14 X10(3) UL (ref 1–4)
LYMPHOCYTES NFR BLD AUTO: 13.8 %
MCH RBC QN AUTO: 21.5 PG (ref 26–34)
MCHC RBC AUTO-ENTMCNC: 31 G/DL (ref 31–37)
MCV RBC AUTO: 69.3 FL
MONOCYTES # BLD AUTO: 0.82 X10(3) UL (ref 0.1–1)
MONOCYTES NFR BLD AUTO: 9.9 %
NEUTROPHILS # BLD AUTO: 5.78 X10 (3) UL (ref 1.5–7.7)
NEUTROPHILS # BLD AUTO: 5.78 X10(3) UL (ref 1.5–7.7)
NEUTROPHILS NFR BLD AUTO: 70.1 %
NITRITE UR QL STRIP.AUTO: NEGATIVE
NONHDLC SERPL-MCNC: 81 MG/DL (ref ?–130)
OSMOLALITY SERPL CALC.SUM OF ELEC: 299 MOSM/KG (ref 275–295)
PH UR: 5 [PH] (ref 5–8)
PLATELET # BLD AUTO: 247 10(3)UL (ref 150–450)
POTASSIUM SERPL-SCNC: 4 MMOL/L (ref 3.5–5.1)
PROT SERPL-MCNC: 6.7 G/DL (ref 6.4–8.2)
PROT UR-MCNC: NEGATIVE MG/DL
RBC # BLD AUTO: 5.63 X10(6)UL
SODIUM SERPL-SCNC: 143 MMOL/L (ref 136–145)
SP GR UR STRIP: 1.02 (ref 1–1.03)
T4 FREE SERPL-MCNC: 0.9 NG/DL (ref 0.8–1.7)
TRIGL SERPL-MCNC: 52 MG/DL (ref 30–149)
TSI SER-ACNC: 6.87 MIU/ML (ref 0.36–3.74)
UROBILINOGEN UR STRIP-ACNC: 2
VIT C UR-MCNC: NEGATIVE MG/DL
VLDLC SERPL CALC-MCNC: 8 MG/DL (ref 0–30)
WBC # BLD AUTO: 8.3 X10(3) UL (ref 4–11)

## 2022-10-03 PROCEDURE — 84439 ASSAY OF FREE THYROXINE: CPT

## 2022-10-03 PROCEDURE — 81001 URINALYSIS AUTO W/SCOPE: CPT

## 2022-10-03 PROCEDURE — 80053 COMPREHEN METABOLIC PANEL: CPT

## 2022-10-03 PROCEDURE — 85025 COMPLETE CBC W/AUTO DIFF WBC: CPT

## 2022-10-03 PROCEDURE — 84443 ASSAY THYROID STIM HORMONE: CPT

## 2022-10-03 PROCEDURE — 80061 LIPID PANEL: CPT

## 2022-10-03 PROCEDURE — 87086 URINE CULTURE/COLONY COUNT: CPT

## 2022-10-03 PROCEDURE — 85060 BLOOD SMEAR INTERPRETATION: CPT

## 2022-10-03 PROCEDURE — 36415 COLL VENOUS BLD VENIPUNCTURE: CPT

## 2022-10-05 ENCOUNTER — OFFICE VISIT (OUTPATIENT)
Dept: INTERNAL MEDICINE CLINIC | Facility: CLINIC | Age: 87
End: 2022-10-05
Payer: MEDICARE

## 2022-10-05 VITALS
HEIGHT: 64 IN | WEIGHT: 124 LBS | TEMPERATURE: 98 F | DIASTOLIC BLOOD PRESSURE: 60 MMHG | HEART RATE: 77 BPM | SYSTOLIC BLOOD PRESSURE: 140 MMHG | OXYGEN SATURATION: 99 % | BODY MASS INDEX: 21.17 KG/M2

## 2022-10-05 DIAGNOSIS — I25.9 IHD (ISCHEMIC HEART DISEASE): ICD-10-CM

## 2022-10-05 DIAGNOSIS — Z00.00 ENCOUNTER FOR MEDICARE ANNUAL WELLNESS EXAM: Primary | ICD-10-CM

## 2022-10-05 DIAGNOSIS — K21.9 GASTROESOPHAGEAL REFLUX DISEASE WITHOUT ESOPHAGITIS: ICD-10-CM

## 2022-10-05 DIAGNOSIS — M16.11 PRIMARY OSTEOARTHRITIS OF RIGHT HIP: ICD-10-CM

## 2022-10-05 DIAGNOSIS — E78.00 HYPERCHOLESTEREMIA: ICD-10-CM

## 2022-10-05 DIAGNOSIS — E46 PROTEIN-CALORIE MALNUTRITION, UNSPECIFIED SEVERITY (HCC): ICD-10-CM

## 2022-10-05 PROCEDURE — 1125F AMNT PAIN NOTED PAIN PRSNT: CPT | Performed by: INTERNAL MEDICINE

## 2022-10-05 PROCEDURE — G0439 PPPS, SUBSEQ VISIT: HCPCS | Performed by: INTERNAL MEDICINE

## 2022-11-10 RX ORDER — METOPROLOL SUCCINATE 25 MG/1
TABLET, EXTENDED RELEASE ORAL
Qty: 90 TABLET | Refills: 3 | Status: SHIPPED | OUTPATIENT
Start: 2022-11-10

## 2022-11-18 ENCOUNTER — TELEPHONE (OUTPATIENT)
Dept: INTERNAL MEDICINE CLINIC | Facility: CLINIC | Age: 87
End: 2022-11-18

## 2022-11-18 RX ORDER — TADALAFIL 20 MG/1
20 TABLET ORAL
Refills: 0 | Status: CANCELLED | OUTPATIENT
Start: 2022-11-18

## 2022-11-18 NOTE — TELEPHONE ENCOUNTER
Pt requesting NEW RX for:  Cialis 20MG  Pt discussed this with Dr Sera Carney at his last appt  [facility]  Tasked to [employer]

## 2022-11-28 ENCOUNTER — TELEPHONE (OUTPATIENT)
Dept: INTERNAL MEDICINE CLINIC | Facility: CLINIC | Age: 87
End: 2022-11-28

## 2022-11-28 LAB — AMB EXT COVID-19 RESULT: DETECTED

## 2022-11-28 RX ORDER — NIRMATRELVIR AND RITONAVIR 300-100 MG
KIT ORAL
Qty: 30 TABLET | Refills: 0 | Status: SHIPPED | OUTPATIENT
Start: 2022-11-28 | End: 2022-12-03

## 2022-11-28 NOTE — TELEPHONE ENCOUNTER
Daughter Susan Ritter called  Pt sick with flu like symptoms for two days and he tested positive for Covid today    Hoping to get anti viral medication for patient   Pharmacy is [facility] in [place]    Call back to Susan Ritter to advise [phone]

## 2022-11-28 NOTE — TELEPHONE ENCOUNTER
To nursing, please tell patient or family-I sent Rx to [facility] for Paxlovid. Hold atorvastatin for 10 days from first dose of Paxlovid. Thanks. GFR 69.

## 2022-11-28 NOTE — TELEPHONE ENCOUNTER
Spoke to pt's daughter Felicita Juan (ok per hipaa) and relayed MD message and instructions. She verbalized understanding and agrees with plan.

## 2022-11-28 NOTE — TELEPHONE ENCOUNTER
Respiratory infection triage:    Fever:  [x]  No fever  []  Fever>100.4    Cough:  [] Tight cough  [] Cough with exertion  [x] Dry cough  [] Sputum production, Color:     Breathing:  [] Mild shortness of breath interfering with activity  [] Wheezing  [] Pain with deep breathing  [] Using inhaler    Other symptoms:  [x] Sore throat:scratchy[] Difficulty swallowing  [x] Nasal drainage/congestion  [] Sinus congestion/pressure  [] Ear pain  [] Body aches  [] Poor appetite  [] Loss of sense of smell   [] Loss of sense of taste  []Conjunctivitis? [] Any recent travel? [] Any sick contacts? [] Are you a healthcare worker? ADDITIONAL NOTES:  Please advise - called daughter per hipaa who states SX started 11/26/ and tested positive today. He uses [facility] in [place]. RN went over quarantine guidelines per CDC and SX to monitor like high fever, SOB chest pain or oxygen below 9 0% which would warrant ER visit - to DR. HUGHES        Notified patient that we will route this message to the doctor and see what their recommendations would be. In the meantime, if anything worsens, they were advised to call back or seek emergent evaluation.

## 2022-12-23 RX ORDER — TADALAFIL 10 MG/1
10 TABLET ORAL
Qty: 8 TABLET | Refills: 3 | OUTPATIENT
Start: 2022-12-23

## 2023-03-06 ENCOUNTER — LAB ENCOUNTER (OUTPATIENT)
Dept: LAB | Age: 88
End: 2023-03-06
Attending: INTERNAL MEDICINE
Payer: MEDICARE

## 2023-03-06 ENCOUNTER — OFFICE VISIT (OUTPATIENT)
Dept: INTERNAL MEDICINE CLINIC | Facility: CLINIC | Age: 88
End: 2023-03-06

## 2023-03-06 ENCOUNTER — HOSPITAL ENCOUNTER (OUTPATIENT)
Dept: GENERAL RADIOLOGY | Facility: HOSPITAL | Age: 88
Discharge: HOME | End: 2023-03-06
Attending: INTERNAL MEDICINE
Payer: MEDICARE

## 2023-03-06 VITALS
BODY MASS INDEX: 21.51 KG/M2 | OXYGEN SATURATION: 95 % | HEIGHT: 64 IN | SYSTOLIC BLOOD PRESSURE: 140 MMHG | DIASTOLIC BLOOD PRESSURE: 86 MMHG | HEART RATE: 73 BPM | WEIGHT: 126 LBS

## 2023-03-06 DIAGNOSIS — I25.9 IHD (ISCHEMIC HEART DISEASE): Primary | ICD-10-CM

## 2023-03-06 DIAGNOSIS — E78.00 HYPERCHOLESTEREMIA: ICD-10-CM

## 2023-03-06 DIAGNOSIS — R52 PAIN: ICD-10-CM

## 2023-03-06 DIAGNOSIS — E46 PROTEIN-CALORIE MALNUTRITION, UNSPECIFIED SEVERITY (HCC): ICD-10-CM

## 2023-03-06 DIAGNOSIS — I10 ESSENTIAL HYPERTENSION: ICD-10-CM

## 2023-03-06 LAB
ALBUMIN SERPL-MCNC: 3.8 G/DL (ref 3.4–5)
ALBUMIN/GLOB SERPL: 1.2 {RATIO} (ref 1–2)
ALP LIVER SERPL-CCNC: 96 U/L
ALT SERPL-CCNC: 19 U/L
ANION GAP SERPL CALC-SCNC: 4 MMOL/L (ref 0–18)
AST SERPL-CCNC: 23 U/L (ref 15–37)
BASOPHILS # BLD AUTO: 0.09 X10(3) UL (ref 0–0.2)
BASOPHILS NFR BLD AUTO: 0.8 %
BILIRUB SERPL-MCNC: 1.5 MG/DL (ref 0.1–2)
BUN BLD-MCNC: 19 MG/DL (ref 7–18)
BUN/CREAT SERPL: 19.6 (ref 10–20)
CALCIUM BLD-MCNC: 9.5 MG/DL (ref 8.5–10.1)
CHLORIDE SERPL-SCNC: 111 MMOL/L (ref 98–112)
CHOLEST SERPL-MCNC: 120 MG/DL (ref ?–200)
CO2 SERPL-SCNC: 28 MMOL/L (ref 21–32)
CREAT BLD-MCNC: 0.97 MG/DL
DEPRECATED RDW RBC AUTO: 43.3 FL (ref 35.1–46.3)
EOSINOPHIL # BLD AUTO: 0.31 X10(3) UL (ref 0–0.7)
EOSINOPHIL NFR BLD AUTO: 2.9 %
ERYTHROCYTE [DISTWIDTH] IN BLOOD BY AUTOMATED COUNT: 18.7 % (ref 11–15)
FASTING PATIENT LIPID ANSWER: YES
FASTING STATUS PATIENT QL REPORTED: YES
GFR SERPLBLD BASED ON 1.73 SQ M-ARVRAT: 73 ML/MIN/1.73M2 (ref 60–?)
GLOBULIN PLAS-MCNC: 3.3 G/DL (ref 2.8–4.4)
GLUCOSE BLD-MCNC: 96 MG/DL (ref 70–99)
HCT VFR BLD AUTO: 39.9 %
HDLC SERPL-MCNC: 51 MG/DL (ref 40–59)
HGB BLD-MCNC: 12.2 G/DL
IMM GRANULOCYTES # BLD AUTO: 0.05 X10(3) UL (ref 0–1)
IMM GRANULOCYTES NFR BLD: 0.5 %
LDLC SERPL CALC-MCNC: 54 MG/DL (ref ?–100)
LYMPHOCYTES # BLD AUTO: 1.21 X10(3) UL (ref 1–4)
LYMPHOCYTES NFR BLD AUTO: 11.2 %
MCH RBC QN AUTO: 21.4 PG (ref 26–34)
MCHC RBC AUTO-ENTMCNC: 30.6 G/DL (ref 31–37)
MCV RBC AUTO: 70.1 FL
MONOCYTES # BLD AUTO: 1.02 X10(3) UL (ref 0.1–1)
MONOCYTES NFR BLD AUTO: 9.4 %
NEUTROPHILS # BLD AUTO: 8.16 X10 (3) UL (ref 1.5–7.7)
NEUTROPHILS # BLD AUTO: 8.16 X10(3) UL (ref 1.5–7.7)
NEUTROPHILS NFR BLD AUTO: 75.2 %
NONHDLC SERPL-MCNC: 69 MG/DL (ref ?–130)
OSMOLALITY SERPL CALC.SUM OF ELEC: 298 MOSM/KG (ref 275–295)
PLATELET # BLD AUTO: 239 10(3)UL (ref 150–450)
POTASSIUM SERPL-SCNC: 4.7 MMOL/L (ref 3.5–5.1)
PROT SERPL-MCNC: 7.1 G/DL (ref 6.4–8.2)
RBC # BLD AUTO: 5.69 X10(6)UL
SODIUM SERPL-SCNC: 143 MMOL/L (ref 136–145)
TRIGL SERPL-MCNC: 73 MG/DL (ref 30–149)
TSI SER-ACNC: 7.02 MIU/ML (ref 0.36–3.74)
VLDLC SERPL CALC-MCNC: 11 MG/DL (ref 0–30)
WBC # BLD AUTO: 10.8 X10(3) UL (ref 4–11)

## 2023-03-06 PROCEDURE — 80053 COMPREHEN METABOLIC PANEL: CPT

## 2023-03-06 PROCEDURE — 99214 OFFICE O/P EST MOD 30 MIN: CPT | Performed by: INTERNAL MEDICINE

## 2023-03-06 PROCEDURE — 73502 X-RAY EXAM HIP UNI 2-3 VIEWS: CPT | Performed by: INTERNAL MEDICINE

## 2023-03-06 PROCEDURE — 84443 ASSAY THYROID STIM HORMONE: CPT

## 2023-03-06 PROCEDURE — 80061 LIPID PANEL: CPT

## 2023-03-06 PROCEDURE — 85025 COMPLETE CBC W/AUTO DIFF WBC: CPT

## 2023-03-06 PROCEDURE — 36415 COLL VENOUS BLD VENIPUNCTURE: CPT

## 2023-03-11 ENCOUNTER — TELEPHONE (OUTPATIENT)
Dept: INTERNAL MEDICINE CLINIC | Facility: CLINIC | Age: 88
End: 2023-03-11

## 2023-03-11 NOTE — TELEPHONE ENCOUNTER
Labs including CBC, CMP, lipids, thyroid all normal - continue same meds     Xray hip - the joint is obliterated and worse than last xray.  See ortho as we had discussed and then we'll need to decide based on their recommendation

## 2023-03-13 NOTE — TELEPHONE ENCOUNTER
Patient called and relayed Dr Mary Wang message. Patient verbalized understanding with no further questions noted.

## 2023-04-18 RX ORDER — PANTOPRAZOLE SODIUM 40 MG/1
TABLET, DELAYED RELEASE ORAL
Qty: 90 TABLET | Refills: 3 | Status: SHIPPED | OUTPATIENT
Start: 2023-04-18

## 2023-05-08 ENCOUNTER — OFFICE VISIT (OUTPATIENT)
Dept: INTERNAL MEDICINE CLINIC | Facility: CLINIC | Age: 88
End: 2023-05-08

## 2023-05-08 VITALS
HEIGHT: 64 IN | SYSTOLIC BLOOD PRESSURE: 130 MMHG | WEIGHT: 125 LBS | HEART RATE: 77 BPM | DIASTOLIC BLOOD PRESSURE: 70 MMHG | TEMPERATURE: 97 F | BODY MASS INDEX: 21.34 KG/M2 | OXYGEN SATURATION: 99 %

## 2023-05-08 DIAGNOSIS — I10 ESSENTIAL HYPERTENSION: ICD-10-CM

## 2023-05-08 DIAGNOSIS — I25.9 IHD (ISCHEMIC HEART DISEASE): ICD-10-CM

## 2023-05-08 DIAGNOSIS — D56.3 THALASSEMIA MINOR: Primary | ICD-10-CM

## 2023-05-08 DIAGNOSIS — N20.0 RENAL CALCULUS, RIGHT: ICD-10-CM

## 2023-05-08 DIAGNOSIS — E78.00 HYPERCHOLESTEREMIA: ICD-10-CM

## 2023-05-08 PROCEDURE — 1125F AMNT PAIN NOTED PAIN PRSNT: CPT | Performed by: INTERNAL MEDICINE

## 2023-05-08 PROCEDURE — 99214 OFFICE O/P EST MOD 30 MIN: CPT | Performed by: INTERNAL MEDICINE

## 2023-05-08 RX ORDER — ASPIRIN 81 MG/1
1 TABLET, CHEWABLE ORAL DAILY
COMMUNITY

## 2023-08-16 DIAGNOSIS — Z00.00 ANNUAL PHYSICAL EXAM: ICD-10-CM

## 2023-08-17 RX ORDER — ATORVASTATIN CALCIUM 10 MG/1
TABLET, FILM COATED ORAL
Qty: 90 TABLET | Refills: 3 | Status: SHIPPED | OUTPATIENT
Start: 2023-08-17

## 2023-08-17 NOTE — TELEPHONE ENCOUNTER
Refill request is for a maintenance medication and has met the criteria specified in the Ambulatory Medication Refill Standing Order for eligibility, visits, laboratory, alerts and was sent to the requested pharmacy.     Requested Prescriptions     Signed Prescriptions Disp Refills    ATORVASTATIN 10 MG Oral Tab 90 tablet 3     Sig: TAKE 1 TABLET DAILY FOR CHOLESTEROL     Authorizing Provider: Rebeca Rose     Ordering User: Steve Hardin

## 2023-11-07 ENCOUNTER — LAB ENCOUNTER (OUTPATIENT)
Dept: LAB | Age: 88
End: 2023-11-07
Attending: INTERNAL MEDICINE
Payer: MEDICARE

## 2023-11-07 DIAGNOSIS — E78.00 HYPERCHOLESTEREMIA: ICD-10-CM

## 2023-11-07 LAB
ALBUMIN SERPL-MCNC: 4.4 G/DL (ref 3.2–4.8)
ALBUMIN/GLOB SERPL: 1.9 {RATIO} (ref 1–2)
ALP LIVER SERPL-CCNC: 86 U/L
ALT SERPL-CCNC: 14 U/L
ANION GAP SERPL CALC-SCNC: 9 MMOL/L (ref 0–18)
AST SERPL-CCNC: 31 U/L (ref ?–34)
BASOPHILS # BLD AUTO: 0.11 X10(3) UL (ref 0–0.2)
BASOPHILS NFR BLD AUTO: 1.2 %
BILIRUB SERPL-MCNC: 1.9 MG/DL (ref 0.2–0.9)
BUN BLD-MCNC: 20 MG/DL (ref 9–23)
BUN/CREAT SERPL: 20 (ref 10–20)
CALCIUM BLD-MCNC: 9.6 MG/DL (ref 8.7–10.4)
CHLORIDE SERPL-SCNC: 106 MMOL/L (ref 98–112)
CHOLEST SERPL-MCNC: 123 MG/DL (ref ?–200)
CO2 SERPL-SCNC: 25 MMOL/L (ref 21–32)
CREAT BLD-MCNC: 1 MG/DL
DEPRECATED RDW RBC AUTO: 42.4 FL (ref 35.1–46.3)
EGFRCR SERPLBLD CKD-EPI 2021: 70 ML/MIN/1.73M2 (ref 60–?)
EOSINOPHIL # BLD AUTO: 0.22 X10(3) UL (ref 0–0.7)
EOSINOPHIL NFR BLD AUTO: 2.4 %
ERYTHROCYTE [DISTWIDTH] IN BLOOD BY AUTOMATED COUNT: 18.3 % (ref 11–15)
FASTING PATIENT LIPID ANSWER: YES
FASTING STATUS PATIENT QL REPORTED: YES
GLOBULIN PLAS-MCNC: 2.3 G/DL (ref 2.8–4.4)
GLUCOSE BLD-MCNC: 96 MG/DL (ref 70–99)
HCT VFR BLD AUTO: 38.5 %
HDLC SERPL-MCNC: 43 MG/DL (ref 40–59)
HGB BLD-MCNC: 11.9 G/DL
IMM GRANULOCYTES # BLD AUTO: 0.04 X10(3) UL (ref 0–1)
IMM GRANULOCYTES NFR BLD: 0.4 %
LDLC SERPL CALC-MCNC: 68 MG/DL (ref ?–100)
LYMPHOCYTES # BLD AUTO: 1.42 X10(3) UL (ref 1–4)
LYMPHOCYTES NFR BLD AUTO: 15.5 %
MCH RBC QN AUTO: 21.4 PG (ref 26–34)
MCHC RBC AUTO-ENTMCNC: 30.9 G/DL (ref 31–37)
MCV RBC AUTO: 69.1 FL
MONOCYTES # BLD AUTO: 0.88 X10(3) UL (ref 0.1–1)
MONOCYTES NFR BLD AUTO: 9.6 %
NEUTROPHILS # BLD AUTO: 6.47 X10 (3) UL (ref 1.5–7.7)
NEUTROPHILS # BLD AUTO: 6.47 X10(3) UL (ref 1.5–7.7)
NEUTROPHILS NFR BLD AUTO: 70.9 %
NONHDLC SERPL-MCNC: 80 MG/DL (ref ?–130)
OSMOLALITY SERPL CALC.SUM OF ELEC: 292 MOSM/KG (ref 275–295)
PLATELET # BLD AUTO: 268 10(3)UL (ref 150–450)
POTASSIUM SERPL-SCNC: 4.2 MMOL/L (ref 3.5–5.1)
PROT SERPL-MCNC: 6.7 G/DL (ref 5.7–8.2)
RBC # BLD AUTO: 5.57 X10(6)UL
SODIUM SERPL-SCNC: 140 MMOL/L (ref 136–145)
TRIGL SERPL-MCNC: 53 MG/DL (ref 30–149)
TSI SER-ACNC: 4.46 MIU/ML (ref 0.55–4.78)
VLDLC SERPL CALC-MCNC: 8 MG/DL (ref 0–30)
WBC # BLD AUTO: 9.1 X10(3) UL (ref 4–11)

## 2023-11-07 PROCEDURE — 80061 LIPID PANEL: CPT

## 2023-11-07 PROCEDURE — 36415 COLL VENOUS BLD VENIPUNCTURE: CPT

## 2023-11-07 PROCEDURE — 84443 ASSAY THYROID STIM HORMONE: CPT

## 2023-11-07 PROCEDURE — 85025 COMPLETE CBC W/AUTO DIFF WBC: CPT

## 2023-11-07 PROCEDURE — 80053 COMPREHEN METABOLIC PANEL: CPT

## 2023-11-07 PROCEDURE — 85060 BLOOD SMEAR INTERPRETATION: CPT

## 2023-11-08 ENCOUNTER — OFFICE VISIT (OUTPATIENT)
Dept: INTERNAL MEDICINE CLINIC | Facility: CLINIC | Age: 88
End: 2023-11-08

## 2023-11-08 VITALS
SYSTOLIC BLOOD PRESSURE: 148 MMHG | HEIGHT: 64 IN | TEMPERATURE: 98 F | DIASTOLIC BLOOD PRESSURE: 74 MMHG | BODY MASS INDEX: 21.34 KG/M2 | WEIGHT: 125 LBS | HEART RATE: 92 BPM | OXYGEN SATURATION: 94 %

## 2023-11-08 DIAGNOSIS — D56.3 THALASSEMIA MINOR: ICD-10-CM

## 2023-11-08 DIAGNOSIS — E78.00 HYPERCHOLESTEREMIA: ICD-10-CM

## 2023-11-08 DIAGNOSIS — I25.9 IHD (ISCHEMIC HEART DISEASE): ICD-10-CM

## 2023-11-08 DIAGNOSIS — K21.9 GASTROESOPHAGEAL REFLUX DISEASE WITHOUT ESOPHAGITIS: ICD-10-CM

## 2023-11-08 DIAGNOSIS — I10 ESSENTIAL HYPERTENSION: ICD-10-CM

## 2023-11-08 DIAGNOSIS — M16.11 PRIMARY OSTEOARTHRITIS OF RIGHT HIP: ICD-10-CM

## 2023-11-08 DIAGNOSIS — Z00.00 ENCOUNTER FOR MEDICARE ANNUAL WELLNESS EXAM: Primary | ICD-10-CM

## 2023-11-08 PROCEDURE — 1125F AMNT PAIN NOTED PAIN PRSNT: CPT | Performed by: INTERNAL MEDICINE

## 2023-11-08 PROCEDURE — G0439 PPPS, SUBSEQ VISIT: HCPCS | Performed by: INTERNAL MEDICINE

## 2023-11-08 RX ORDER — METOPROLOL SUCCINATE 25 MG/1
TABLET, EXTENDED RELEASE ORAL
Qty: 90 TABLET | Refills: 3 | Status: SHIPPED | OUTPATIENT
Start: 2023-11-08

## 2023-11-08 NOTE — TELEPHONE ENCOUNTER
Refill request is for a maintenance medication and has met the criteria specified in the Ambulatory Medication Refill Standing Order for eligibility, visits, laboratory, alerts and was sent to the requested pharmacy.     Requested Prescriptions     Signed Prescriptions Disp Refills    METOPROLOL SUCCINATE ER 25 MG Oral Tablet 24 Hr 90 tablet 3     Sig: TAKE 1 TABLET ONCE DAILY     Authorizing Provider: Jay Valero     Ordering User: Aldo Lassiter

## 2023-12-04 ENCOUNTER — TELEPHONE (OUTPATIENT)
Dept: INTERNAL MEDICINE CLINIC | Facility: CLINIC | Age: 88
End: 2023-12-04

## 2023-12-04 LAB — AMB EXT COVID-19 RESULT: DETECTED

## 2023-12-04 NOTE — TELEPHONE ENCOUNTER
Pt has had a cough for one week and tested positive for Covid today  Requests call back to advise [phone]

## 2023-12-04 NOTE — TELEPHONE ENCOUNTER
COVID triage:    Start of symptoms: Sunday, tested + today     Fever:  [x]  No fever  []  Temperature: 97.4  []  Chills  []  Night sweats    Cough:  [x] Productive cough  [] Cough with exertion  [x] Dry cough    Breathing:  [] Wheezing  [] Pain with deep breathing  [] SOB with exertion  [] SOB at rest  [] Heavy breathing  [] Chest discomfort with deep breathing or coughing    GI Symptoms:  [] Diarrhea  [] Nausea  [] Vomiting  [] Abdominal pain  [] Lack of appetite    Other symptoms:  [x] Sore throat \"scratchy\"   [] Difficulty swallowing  [] Nasal drainage  [] Nasal congestion  [x] PND  [] Sinus pressure  [] Chest congestion  [x] Head congestion  [] Facial pain   [] Ear pain  [] Body aches  [] Loss of sense of smell   [] Loss of sense of taste  []Conjunctivitis  [] Headache  [] Fatigue  [] Weakness    [x]OTC Medications:  DayQuil       To On call (Dr. DE LA CRUZ):  Please advise in absence of Dr. Dubois.  Inquiring about Paxlovid vs OTC recommendations.  Please advise.       [employer] [id number] [place], [place]           Discussed the following quarantine guidelines and instructions:  According to CDC guidelines,  If symptomatic: you should stay home and quarantine for 5 full days. Day 1 is the first full day after your symptoms developed (24 hrs later).   Wear a well-fitted mask if you must be around others in your home.  End isolation after completing 5 full days, fever-free for 24 hours (without the use of fever-reducing medication), and your symptoms are improving.   If you were severely ill with COVID-19 you should isolate for at least 10 days. Consult your doctor before ending isolation.  Wear a well-fitted mask for 10 full days any time you are around others inside your home or in public. Do not go to places where you are unable to wear a mask. Avoid being around people who are at high risk. Do not travel until a full 10 days after your symptoms started.  Monitor your symptoms at home and call the office with any  new or worsening symptoms.    ER is recommended should you develop shortness of breath, chest pain, high fever that's non-responsive to fever reducing medicine, or spo2 (oxygen level) <90% (if pulse ox is accesible).

## 2023-12-04 NOTE — TELEPHONE ENCOUNTER
Discussed with César.  He indicates that he took a COVID test because his daughter was coming over today along with his grandchild and the COVID test came out positive.    I questioned him about symptoms.  He indicates that he has had a cough with some chest congestion for 1 to 2 weeks.  It comes and goes and has been going on for over 6 weeks.  No temperature.  He feels well.  He has no shortness of breath.    I did ask him about sore throat and he said he does not have a sore throat.  I also asked him about head congestion and he indicates that he does not have any head congestion.    With that said we talked about Paxlovid but I did discuss with him that it is hard to tell when his COVID symptoms started i.e. wanting to stay within the guidelines of giving Paxlovid within 5 days of treatment.    In the end we both felt that I should not give him Paxlovid.  He indicates he is accepting of that decision as he cannot say when any \"COVID\" symptoms started.    I offered to talk to his daughter about this but he indicates that his daughter would agree with any decision he makes.    I told him I will pass this on to Dr. Dubois as an FYI.  He verbalized understanding.    ( SANDY Hdez )

## 2024-04-16 RX ORDER — PANTOPRAZOLE SODIUM 40 MG/1
TABLET, DELAYED RELEASE ORAL
Qty: 90 TABLET | Refills: 3 | Status: SHIPPED | OUTPATIENT
Start: 2024-04-16

## 2024-04-16 NOTE — TELEPHONE ENCOUNTER
Refill request is for a maintenance medication and has met the criteria specified in the Ambulatory Medication Refill Standing Order for eligibility, visits, laboratory, alerts and was sent to the requested pharmacy.    Requested Prescriptions     Signed Prescriptions Disp Refills    PANTOPRAZOLE 40 MG Oral Tab EC 90 tablet 3     Sig: TAKE 1 TABLET DAILY     Authorizing Provider: VINAY JAMES     Ordering User: JULIETA BHATT

## 2024-05-17 ENCOUNTER — TELEPHONE (OUTPATIENT)
Dept: INTERNAL MEDICINE CLINIC | Facility: CLINIC | Age: 89
End: 2024-05-17

## 2024-05-17 DIAGNOSIS — R30.0 DYSURIA: Primary | ICD-10-CM

## 2024-05-17 RX ORDER — CEPHALEXIN 500 MG/1
500 CAPSULE ORAL 3 TIMES DAILY
Qty: 15 CAPSULE | Refills: 0 | Status: SHIPPED | OUTPATIENT
Start: 2024-05-17 | End: 2024-05-22

## 2024-05-17 NOTE — TELEPHONE ENCOUNTER
Called patient to reschedule 5/29/24 appointment with Dr. Dubois as provider will be out of office. Rescheduled to 7/10/24.    Patient requesting a message be sent to provider that he is having a difficult time holding urine and is looking for a prescription to help.     Please advise.

## 2024-05-17 NOTE — TELEPHONE ENCOUNTER
Please notify patient that he should drop off a urine specimen to rule out urinary tract infection.  If symptoms are prolonged, I will also send for cephalexin 500 mg 3 times a day for 5 days.  Should await urinary testing.    Of note, he has tolerated this in the past with his urologist.

## 2024-05-17 NOTE — TELEPHONE ENCOUNTER
UTI symptoms:    [x]Frequency  [x]Urgency  []Pain/burning  []Blood in urine  []Low back pain  []Flank pain  []Fevers/chills  []Odor  []Confusion    NOTES:  Please advise -called patient who states for 1 month he has urgency / frequency , no other symptoms - to

## 2024-05-21 ENCOUNTER — LAB ENCOUNTER (OUTPATIENT)
Dept: LAB | Age: 89
End: 2024-05-21
Attending: INTERNAL MEDICINE

## 2024-05-21 DIAGNOSIS — R30.0 DYSURIA: ICD-10-CM

## 2024-05-21 DIAGNOSIS — E78.00 HYPERCHOLESTEREMIA: ICD-10-CM

## 2024-05-21 LAB
ALBUMIN SERPL-MCNC: 4.3 G/DL (ref 3.2–4.8)
ALBUMIN/GLOB SERPL: 1.8 {RATIO} (ref 1–2)
ALP LIVER SERPL-CCNC: 81 U/L
ALT SERPL-CCNC: 14 U/L
ANION GAP SERPL CALC-SCNC: 7 MMOL/L (ref 0–18)
AST SERPL-CCNC: 23 U/L (ref ?–34)
BASOPHILS # BLD AUTO: 0.06 X10(3) UL (ref 0–0.2)
BASOPHILS NFR BLD AUTO: 0.4 %
BILIRUB SERPL-MCNC: 1.8 MG/DL (ref 0.2–0.9)
BILIRUB UR QL: NEGATIVE
BUN BLD-MCNC: 29 MG/DL (ref 9–23)
BUN/CREAT SERPL: 29.9 (ref 10–20)
CALCIUM BLD-MCNC: 9.2 MG/DL (ref 8.7–10.4)
CHLORIDE SERPL-SCNC: 108 MMOL/L (ref 98–112)
CHOLEST SERPL-MCNC: 138 MG/DL (ref ?–200)
CLARITY UR: CLEAR
CO2 SERPL-SCNC: 28 MMOL/L (ref 21–32)
COLOR UR: YELLOW
CREAT BLD-MCNC: 0.97 MG/DL
DEPRECATED RDW RBC AUTO: 42 FL (ref 35.1–46.3)
EGFRCR SERPLBLD CKD-EPI 2021: 72 ML/MIN/1.73M2 (ref 60–?)
EOSINOPHIL # BLD AUTO: 0.08 X10(3) UL (ref 0–0.7)
EOSINOPHIL NFR BLD AUTO: 0.5 %
ERYTHROCYTE [DISTWIDTH] IN BLOOD BY AUTOMATED COUNT: 19.3 % (ref 11–15)
FASTING PATIENT LIPID ANSWER: YES
FASTING STATUS PATIENT QL REPORTED: YES
GLOBULIN PLAS-MCNC: 2.4 G/DL (ref 2–3.5)
GLUCOSE BLD-MCNC: 108 MG/DL (ref 70–99)
GLUCOSE UR-MCNC: NORMAL MG/DL
HCT VFR BLD AUTO: 39.6 %
HDLC SERPL-MCNC: 50 MG/DL (ref 40–59)
HGB BLD-MCNC: 12.7 G/DL
HGB UR QL STRIP.AUTO: NEGATIVE
IMM GRANULOCYTES # BLD AUTO: 0.28 X10(3) UL (ref 0–1)
IMM GRANULOCYTES NFR BLD: 1.7 %
KETONES UR-MCNC: NEGATIVE MG/DL
LDLC SERPL CALC-MCNC: 68 MG/DL (ref ?–100)
LEUKOCYTE ESTERASE UR QL STRIP.AUTO: 25
LYMPHOCYTES # BLD AUTO: 1.94 X10(3) UL (ref 1–4)
LYMPHOCYTES NFR BLD AUTO: 11.8 %
MCH RBC QN AUTO: 22.1 PG (ref 26–34)
MCHC RBC AUTO-ENTMCNC: 32.1 G/DL (ref 31–37)
MCV RBC AUTO: 68.9 FL
MONOCYTES # BLD AUTO: 1.05 X10(3) UL (ref 0.1–1)
MONOCYTES NFR BLD AUTO: 6.4 %
NEUTROPHILS # BLD AUTO: 13.05 X10 (3) UL (ref 1.5–7.7)
NEUTROPHILS # BLD AUTO: 13.05 X10(3) UL (ref 1.5–7.7)
NEUTROPHILS NFR BLD AUTO: 79.2 %
NITRITE UR QL STRIP.AUTO: NEGATIVE
NONHDLC SERPL-MCNC: 88 MG/DL (ref ?–130)
OSMOLALITY SERPL CALC.SUM OF ELEC: 302 MOSM/KG (ref 275–295)
PH UR: 6 [PH] (ref 5–8)
PLATELET # BLD AUTO: 274 10(3)UL (ref 150–450)
POTASSIUM SERPL-SCNC: 3.9 MMOL/L (ref 3.5–5.1)
PROT SERPL-MCNC: 6.7 G/DL (ref 5.7–8.2)
PROT UR-MCNC: NEGATIVE MG/DL
RBC # BLD AUTO: 5.75 X10(6)UL
SODIUM SERPL-SCNC: 143 MMOL/L (ref 136–145)
SP GR UR STRIP: 1.02 (ref 1–1.03)
TRIGL SERPL-MCNC: 108 MG/DL (ref 30–149)
UROBILINOGEN UR STRIP-ACNC: NORMAL
VLDLC SERPL CALC-MCNC: 16 MG/DL (ref 0–30)
WBC # BLD AUTO: 16.5 X10(3) UL (ref 4–11)

## 2024-05-21 PROCEDURE — 85060 BLOOD SMEAR INTERPRETATION: CPT

## 2024-05-21 PROCEDURE — 80053 COMPREHEN METABOLIC PANEL: CPT

## 2024-05-21 PROCEDURE — 85025 COMPLETE CBC W/AUTO DIFF WBC: CPT

## 2024-05-21 PROCEDURE — 81001 URINALYSIS AUTO W/SCOPE: CPT

## 2024-05-21 PROCEDURE — 36415 COLL VENOUS BLD VENIPUNCTURE: CPT

## 2024-05-21 PROCEDURE — 87086 URINE CULTURE/COLONY COUNT: CPT

## 2024-05-21 PROCEDURE — 80061 LIPID PANEL: CPT

## 2024-05-22 ENCOUNTER — TELEPHONE (OUTPATIENT)
Dept: INTERNAL MEDICINE CLINIC | Facility: CLINIC | Age: 89
End: 2024-05-22

## 2024-05-22 NOTE — TELEPHONE ENCOUNTER
(on call) could you please review patients 5/21/24 CBC results in 's absence?      (See 5/17/24 telephone encounter regarding urine testing)

## 2024-05-22 NOTE — TELEPHONE ENCOUNTER
Please notify the patient labs from yesteday showed high white count, mild dehydration, and urine culture was negative.    Would recommend completing the course of antibiootics and increased water intake (he was fasting for these labs). Notify us if still having ongoing symptoms as may need to see Dr. Dubois sooner

## 2024-05-29 ENCOUNTER — TELEPHONE (OUTPATIENT)
Dept: INTERNAL MEDICINE CLINIC | Facility: CLINIC | Age: 89
End: 2024-05-29

## 2024-05-29 DIAGNOSIS — D72.829 LEUKOCYTOSIS, UNSPECIFIED TYPE: Primary | ICD-10-CM

## 2024-05-29 NOTE — TELEPHONE ENCOUNTER
Called patient and relayed DR SAUER message - verbalized understanding. Complete Blood Count (CBC) ordered per written order    Patient states he has right flank pain - history of kidney stones , denies blood in urine - RN advised to be evaluated at  - he will go to [facility] F/U 5/30

## 2024-05-29 NOTE — TELEPHONE ENCOUNTER
Labs including CBC, CMP, lipids, thyroid all normal - continue same meds     WBC a little elevated. Could he repeat CBC day or two prior to his next visit??

## 2024-05-31 NOTE — TELEPHONE ENCOUNTER
Called patient who  did not go to UC for flank pain - he applied heat and pain is gone.RN instructed if pain comes back to call the office and he verbalized understanding

## 2024-07-08 ENCOUNTER — APPOINTMENT (OUTPATIENT)
Dept: CT IMAGING | Facility: HOSPITAL | Age: 89
End: 2024-07-08
Attending: EMERGENCY MEDICINE
Payer: MEDICARE

## 2024-07-08 ENCOUNTER — HOSPITAL ENCOUNTER (EMERGENCY)
Facility: HOSPITAL | Age: 89
Discharge: HOME | End: 2024-07-08
Attending: EMERGENCY MEDICINE
Payer: MEDICARE

## 2024-07-08 VITALS
DIASTOLIC BLOOD PRESSURE: 76 MMHG | WEIGHT: 130 LBS | RESPIRATION RATE: 16 BRPM | HEIGHT: 64 IN | SYSTOLIC BLOOD PRESSURE: 120 MMHG | OXYGEN SATURATION: 97 % | TEMPERATURE: 98 F | BODY MASS INDEX: 22.2 KG/M2 | HEART RATE: 84 BPM

## 2024-07-08 DIAGNOSIS — N30.00 ACUTE CYSTITIS WITHOUT HEMATURIA: Primary | ICD-10-CM

## 2024-07-08 LAB
ANION GAP SERPL CALC-SCNC: 6 MMOL/L (ref 0–18)
BASOPHILS # BLD AUTO: 0.09 X10(3) UL (ref 0–0.2)
BASOPHILS NFR BLD AUTO: 0.9 %
BILIRUB UR QL: NEGATIVE
BUN BLD-MCNC: 14 MG/DL (ref 9–23)
BUN/CREAT SERPL: 14.9 (ref 10–20)
CALCIUM BLD-MCNC: 9.4 MG/DL (ref 8.7–10.4)
CHLORIDE SERPL-SCNC: 110 MMOL/L (ref 98–112)
CO2 SERPL-SCNC: 27 MMOL/L (ref 21–32)
COLOR UR: YELLOW
CREAT BLD-MCNC: 0.94 MG/DL
DEPRECATED RDW RBC AUTO: 43.6 FL (ref 35.1–46.3)
EGFRCR SERPLBLD CKD-EPI 2021: 75 ML/MIN/1.73M2 (ref 60–?)
EOSINOPHIL # BLD AUTO: 0.25 X10(3) UL (ref 0–0.7)
EOSINOPHIL NFR BLD AUTO: 2.4 %
ERYTHROCYTE [DISTWIDTH] IN BLOOD BY AUTOMATED COUNT: 19.3 % (ref 11–15)
GLUCOSE BLD-MCNC: 109 MG/DL (ref 70–99)
GLUCOSE UR-MCNC: NORMAL MG/DL
HCT VFR BLD AUTO: 37.4 %
HGB BLD-MCNC: 11.7 G/DL
IMM GRANULOCYTES # BLD AUTO: 0.15 X10(3) UL (ref 0–1)
IMM GRANULOCYTES NFR BLD: 1.5 %
KETONES UR-MCNC: NEGATIVE MG/DL
LEUKOCYTE ESTERASE UR QL STRIP.AUTO: 500
LYMPHOCYTES # BLD AUTO: 0.92 X10(3) UL (ref 1–4)
LYMPHOCYTES NFR BLD AUTO: 8.9 %
MCH RBC QN AUTO: 21.7 PG (ref 26–34)
MCHC RBC AUTO-ENTMCNC: 31.3 G/DL (ref 31–37)
MCV RBC AUTO: 69.3 FL
MONOCYTES # BLD AUTO: 0.91 X10(3) UL (ref 0.1–1)
MONOCYTES NFR BLD AUTO: 8.8 %
NEUTROPHILS # BLD AUTO: 8 X10 (3) UL (ref 1.5–7.7)
NEUTROPHILS # BLD AUTO: 8 X10(3) UL (ref 1.5–7.7)
NEUTROPHILS NFR BLD AUTO: 77.5 %
NITRITE UR QL STRIP.AUTO: NEGATIVE
OSMOLALITY SERPL CALC.SUM OF ELEC: 297 MOSM/KG (ref 275–295)
PH UR: 7 [PH] (ref 5–8)
PLATELET # BLD AUTO: 303 10(3)UL (ref 150–450)
POTASSIUM SERPL-SCNC: 4 MMOL/L (ref 3.5–5.1)
PROT UR-MCNC: 70 MG/DL
RBC # BLD AUTO: 5.4 X10(6)UL
RBC #/AREA URNS AUTO: >10 /HPF
SODIUM SERPL-SCNC: 143 MMOL/L (ref 136–145)
SP GR UR STRIP: 1.02 (ref 1–1.03)
UROBILINOGEN UR STRIP-ACNC: 2
WBC # BLD AUTO: 10.3 X10(3) UL (ref 4–11)
WBC #/AREA URNS AUTO: >50 /HPF

## 2024-07-08 PROCEDURE — 51798 US URINE CAPACITY MEASURE: CPT

## 2024-07-08 PROCEDURE — 99284 EMERGENCY DEPT VISIT MOD MDM: CPT

## 2024-07-08 PROCEDURE — 87086 URINE CULTURE/COLONY COUNT: CPT | Performed by: EMERGENCY MEDICINE

## 2024-07-08 PROCEDURE — 85025 COMPLETE CBC W/AUTO DIFF WBC: CPT | Performed by: EMERGENCY MEDICINE

## 2024-07-08 PROCEDURE — 96361 HYDRATE IV INFUSION ADD-ON: CPT

## 2024-07-08 PROCEDURE — 87077 CULTURE AEROBIC IDENTIFY: CPT | Performed by: EMERGENCY MEDICINE

## 2024-07-08 PROCEDURE — 74176 CT ABD & PELVIS W/O CONTRAST: CPT | Performed by: EMERGENCY MEDICINE

## 2024-07-08 PROCEDURE — 87186 SC STD MICRODIL/AGAR DIL: CPT | Performed by: EMERGENCY MEDICINE

## 2024-07-08 PROCEDURE — 80048 BASIC METABOLIC PNL TOTAL CA: CPT | Performed by: EMERGENCY MEDICINE

## 2024-07-08 PROCEDURE — 96365 THER/PROPH/DIAG IV INF INIT: CPT

## 2024-07-08 PROCEDURE — 81001 URINALYSIS AUTO W/SCOPE: CPT | Performed by: EMERGENCY MEDICINE

## 2024-07-08 RX ORDER — SULFAMETHOXAZOLE AND TRIMETHOPRIM 800; 160 MG/1; MG/1
1 TABLET ORAL 2 TIMES DAILY
Qty: 14 TABLET | Refills: 0 | Status: SHIPPED | OUTPATIENT
Start: 2024-07-08 | End: 2024-07-15

## 2024-07-08 NOTE — ED PROVIDER NOTES
Patient Seen in: [facility] Emergency Department      History     Chief Complaint   Patient presents with    Urinary Symptoms     Stated Complaint: Urinary problem    Subjective:   HPI    Patient presents the emergency department complaining of urinary urgency and frequency.  He states that he feels full and like he cannot empty.  There is no vomiting or diarrhea.  There is no other aggravating or alleviating factors.  There is no fever.  He states that similar symptoms occurred a year ago when he had a kidney stone.    Objective:   Past Medical History:    Arthritis    G    Back problem    Blood disorder    BPH (benign prostatic hyperplasia)    Calculus of kidney    Cataract    Coronary atherosclerosis    Essential hypertension    Hearing impairment    hearing aids    History of asbestos exposure    Hyperlipidemia    Lipid screening    Lower GI bleed    Osteoarthritis    Osteoarthritis, shoulder    PONV (postoperative nausea and vomiting)    S/P TURP (transurethral resection of prostate)    Thalassemia trait    Visual impairment              Past Surgical History:   Procedure Laterality Date    Back surgery      C-SPINE SURGERY     Cataract      Colonoscopy      Hernia surgery  2006    Knee replacement surgery      Laminectomy,lumbar  1960    Shoulder arthroscopy  2006    Spine surgery procedure unlisted      Total knee replacement Bilateral                 Social History     Socioeconomic History    Marital status:    Tobacco Use    Smoking status: Former     Current packs/day: 0.00     Types: Cigarettes     Quit date: 1971     Years since quittin.5    Smokeless tobacco: Never   Vaping Use    Vaping status: Never Used   Substance and Sexual Activity    Alcohol use: Yes     Alcohol/week: 2.0 standard drinks of alcohol     Types: 2 Glasses of wine per week     Comment: rarely    Drug use: Never   Other Topics Concern    Caffeine Concern Yes     Comment: Coffee 3 cups daily               Review of Systems    Positive for stated Chief Complaint: Urinary Symptoms    Other systems are as noted in HPI.  Constitutional and vital signs reviewed.      All other systems reviewed and negative except as noted above.    Physical Exam     ED Triage Vitals   BP 07/08/24 0506 156/85   Pulse 07/08/24 0506 90   Resp 07/08/24 0506 18   Temp 07/08/24 0506 97.9 °F (36.6 °C)   Temp src 07/08/24 0506 Oral   SpO2 07/08/24 0506 98 %   O2 Device 07/08/24 0615 None (Room air)       Current Vitals:   Vital Signs  BP: 120/76  Pulse: 84  Resp: 16  Temp: 97.9 °F (36.6 °C)  Temp src: Oral  MAP (mmHg): 90    Oxygen Therapy  SpO2: 97 %  O2 Device: None (Room air)            Physical Exam  Vitals and nursing note reviewed.   Constitutional:       General: He is not in acute distress.     Appearance: He is well-developed.   HENT:      Head: Normocephalic.      Nose: Nose normal.      Mouth/Throat:      Mouth: Mucous membranes are moist.   Eyes:      Conjunctiva/sclera: Conjunctivae normal.   Cardiovascular:      Rate and Rhythm: Normal rate and regular rhythm.      Heart sounds: No murmur heard.  Pulmonary:      Effort: Pulmonary effort is normal. No respiratory distress.      Breath sounds: Normal breath sounds.   Abdominal:      General: There is no distension.      Palpations: Abdomen is soft.      Tenderness: There is no abdominal tenderness.   Musculoskeletal:         General: No tenderness. Normal range of motion.      Cervical back: Normal range of motion and neck supple.   Skin:     General: Skin is warm and dry.      Capillary Refill: Capillary refill takes less than 2 seconds.      Findings: No rash.   Neurological:      General: No focal deficit present.      Mental Status: He is alert and oriented to person, place, and time.      Cranial Nerves: No cranial nerve deficit.      Sensory: No sensory deficit.      Motor: No weakness.      Coordination: Coordination normal.               ED Course     Labs Reviewed    BASIC METABOLIC PANEL (8) - Abnormal; Notable for the following components:       Result Value    Glucose 109 (*)     Calculated Osmolality 297 (*)     All other components within normal limits   URINALYSIS, ROUTINE - Abnormal; Notable for the following components:    Clarity Urine Ex.Turbid (*)     Blood Urine 2+ (*)     Protein Urine 70 (*)     Urobilinogen Urine 2 (*)     Leukocyte Esterase Urine 500 (*)     WBC Urine >50 (*)     RBC Urine >10 (*)     Bacteria Urine Rare (*)     Squamous Epi. Cells Few (*)     All other components within normal limits   CBC W/ DIFFERENTIAL - Abnormal; Notable for the following components:    HGB 11.7 (*)     HCT 37.4 (*)     MCV 69.3 (*)     MCH 21.7 (*)     RDW 19.3 (*)     Neutrophil Absolute Prelim 8.00 (*)     Neutrophil Absolute 8.00 (*)     Lymphocyte Absolute 0.92 (*)     All other components within normal limits   CBC WITH DIFFERENTIAL WITH PLATELET    Narrative:     The following orders were created for panel order CBC With Differential With Platelet.  Procedure                               Abnormality         Status                     ---------                               -----------         ------                     CBC W/ DIFFERENTIAL[[id number]]          Abnormal            Final result                 Please view results for these tests on the individual orders.   URINE CULTURE, ROUTINE                      MDM                             Medical Decision Making  Differential diagnosis considered for prostatitis, UTI, kidney stone, pyelonephritis, urethritis.    Problems Addressed:  Acute cystitis without hematuria: acute illness or injury    Amount and/or Complexity of Data Reviewed  Labs: ordered. Decision-making details documented in ED Course.     Details: CBC and chemistry panel unremarkable.  Infected urine  Radiology: ordered and independent interpretation performed. Decision-making details documented in ED Course.     Details: Small stone in the kidney  but no obstructing uropathy.  Bladder wall thickening consistent with cystitis.  Discussion of management or test interpretation with external provider(s): Patient given a single dose of meropenem in the emergency department due to distant history of ESBL in the urine.  Will discharge on Bactrim.    Risk  Prescription drug management.        Disposition and Plan     Clinical Impression:  1. Acute cystitis without hematuria         Disposition:  Discharge  7/8/2024  8:57 am    Follow-up:  Shai Dubois MD  [address] [place] [id number]  [phone]    Schedule an appointment as soon as possible for a visit      We recommend that you schedule follow up care with a primary care provider within the next three months to obtain basic health screening including reassessment of your blood pressure.      Medications Prescribed:  Discharge Medication List as of 7/8/2024  8:58 AM        START taking these medications    Details   sulfamethoxazole-trimethoprim -160 MG Oral Tab per tablet Take 1 tablet by mouth 2 (two) times daily for 7 days., Normal, Disp-14 tablet, R-0

## 2024-07-08 NOTE — ED INITIAL ASSESSMENT (HPI)
Pt presents with family for inability to urinate for the past 6 hours.  Pt had this a month ago as well but states that it went away.  Pt reports burning with the urination.

## 2024-07-10 ENCOUNTER — OFFICE VISIT (OUTPATIENT)
Dept: INTERNAL MEDICINE CLINIC | Facility: CLINIC | Age: 89
End: 2024-07-10

## 2024-07-10 VITALS
TEMPERATURE: 98 F | OXYGEN SATURATION: 98 % | BODY MASS INDEX: 21.24 KG/M2 | HEART RATE: 61 BPM | SYSTOLIC BLOOD PRESSURE: 122 MMHG | WEIGHT: 124.38 LBS | HEIGHT: 64 IN | DIASTOLIC BLOOD PRESSURE: 66 MMHG

## 2024-07-10 DIAGNOSIS — I25.9 IHD (ISCHEMIC HEART DISEASE): ICD-10-CM

## 2024-07-10 DIAGNOSIS — N30.00 ACUTE CYSTITIS WITHOUT HEMATURIA: Primary | ICD-10-CM

## 2024-07-10 DIAGNOSIS — I10 ESSENTIAL HYPERTENSION: ICD-10-CM

## 2024-07-10 PROCEDURE — 3008F BODY MASS INDEX DOCD: CPT | Performed by: INTERNAL MEDICINE

## 2024-07-10 PROCEDURE — 99214 OFFICE O/P EST MOD 30 MIN: CPT | Performed by: INTERNAL MEDICINE

## 2024-07-10 PROCEDURE — 1126F AMNT PAIN NOTED NONE PRSNT: CPT | Performed by: INTERNAL MEDICINE

## 2024-07-10 PROCEDURE — 1159F MED LIST DOCD IN RCRD: CPT | Performed by: INTERNAL MEDICINE

## 2024-07-10 PROCEDURE — 3074F SYST BP LT 130 MM HG: CPT | Performed by: INTERNAL MEDICINE

## 2024-07-10 PROCEDURE — 3078F DIAST BP <80 MM HG: CPT | Performed by: INTERNAL MEDICINE

## 2024-07-10 NOTE — PROGRESS NOTES
César Copeland is a 89 year old male.  HPI:   He had urinary sx in mid May and scanty urine output with dysuria - these sx lasted about 3 days but recurred about 1 week ago, went to ED two days ago 1 am,  given IV antibiotics and oral antibiotics.  Had Enterobacter UTI - given Bactrim bid for 1 week.     IHD - no chest pain or sob    Severe OA right hip - getting steroid injections right hip.       SR: no chest pain or sob, no gu or gi sx.    BP Readings from Last 6 Encounters:   07/10/24 122/66   07/08/24 120/76   11/08/23 148/74   05/08/23 130/70   03/06/23 140/86   10/05/22 140/60     Wt Readings from Last 6 Encounters:   07/10/24 124 lb 6.4 oz (56.4 kg)   07/08/24 130 lb (59 kg)   11/08/23 125 lb (56.7 kg)   05/08/23 125 lb (56.7 kg)   03/06/23 126 lb (57.2 kg)   10/05/22 124 lb (56.2 kg)     Current Outpatient Medications   Medication Sig Dispense Refill    sulfamethoxazole-trimethoprim -160 MG Oral Tab per tablet Take 1 tablet by mouth 2 (two) times daily for 7 days. 14 tablet 0    PANTOPRAZOLE 40 MG Oral Tab EC TAKE 1 TABLET DAILY 90 tablet 3    METOPROLOL SUCCINATE ER 25 MG Oral Tablet 24 Hr TAKE 1 TABLET ONCE DAILY 90 tablet 3    ATORVASTATIN 10 MG Oral Tab TAKE 1 TABLET DAILY FOR CHOLESTEROL 90 tablet 3    aspirin 81 MG Oral Chew Tab Chew 1 tablet (81 mg total) by mouth daily.      Tadalafil 10 MG Oral Tab Take 1 tablet (10 mg total) by mouth daily as needed for Erectile Dysfunction. 30 tablet 6    Multiple Vitamins-Minerals (CENTRUM SILVER ADULT 50+ OR) Take 1 tablet by mouth daily.        Past Medical History:    Arthritis    G    Back problem    Blood disorder    BPH (benign prostatic hyperplasia)    Calculus of kidney    Cataract    Coronary atherosclerosis    Essential hypertension    Hearing impairment    hearing aids    History of asbestos exposure    Hyperlipidemia    Lipid screening    Lower GI bleed    Osteoarthritis    Osteoarthritis, shoulder    PONV (postoperative nausea and vomiting)     S/P TURP (transurethral resection of prostate)    Thalassemia trait    Visual impairment      Social History:  Social History     Socioeconomic History    Marital status:    Tobacco Use    Smoking status: Former     Current packs/day: 0.00     Types: Cigarettes     Quit date: 1971     Years since quittin.5    Smokeless tobacco: Never   Vaping Use    Vaping status: Never Used   Substance and Sexual Activity    Alcohol use: Yes     Alcohol/week: 2.0 standard drinks of alcohol     Types: 2 Glasses of wine per week     Comment: rarely    Drug use: Never   Other Topics Concern    Caffeine Concern Yes     Comment: Coffee 3 cups daily        REVIEW OF SYSTEMS:   GENERAL HEALTH: feels well otherwise  SKIN: denies any unusual skin lesions or rashes  RESPIRATORY: denies shortness of breath with exertion  CARDIOVASCULAR: denies chest pain on exertion  GI: denies abdominal pain and denies heartburn  NEURO: denies headaches    EXAM:   /66   Pulse 61   Temp 97.7 °F (36.5 °C)   Ht 5' 4\" (1.626 m)   Wt 124 lb 6.4 oz (56.4 kg)   SpO2 98%   BMI 21.35 kg/m²   GENERAL: well developed, well nourished,in no apparent distress  SKIN: no rashes,no suspicious lesions  HEENT: atraumatic, normocephalic,ears and throat are clear  NECK: supple,no adenopathy,no bruits  LUNGS: clear to auscultation  CARDIO: RRR without murmur  GI: good BS's,no masses, HSM or tenderness  EXTREMITIES: no cyanosis, clubbing or edema    ASSESSMENT AND PLAN:   1. Acute cystitis without hematuria  Complete Bactrim and then re-culture     2. Essential hypertension  At goal, same meds     3. IHD (ischemic heart disease)  Clinically stable on medical management     He has decided against any surgical intervention and does not want any repeat testing.     Same meds     The patient indicates understanding of these issues and agrees to the plan.  The patient is asked to return in 6 mo - Dr CORLEY

## 2024-07-10 NOTE — PROGRESS NOTES
ED Culture Callback Results Review    Pharmacist reviewed culture results from ED visit .    Final urine culture positive for Enterobacter cloacae complex. Patient was prescribed Sulfamethoxazole/Trimethoprim (Bactrim) on discharge. Current therapy is appropriate based on reported susceptibilities. No further intervention required at this time.      Karlos Steele PharmD  Emergency Medicine Pharmacist Specialist  07/10/24; 1:04 PM

## 2024-07-17 ENCOUNTER — LAB ENCOUNTER (OUTPATIENT)
Dept: LAB | Age: 89
End: 2024-07-17
Attending: INTERNAL MEDICINE
Payer: MEDICARE

## 2024-07-17 DIAGNOSIS — N30.00 ACUTE CYSTITIS WITHOUT HEMATURIA: ICD-10-CM

## 2024-07-17 PROCEDURE — 87086 URINE CULTURE/COLONY COUNT: CPT

## 2024-07-24 ENCOUNTER — TELEPHONE (OUTPATIENT)
Dept: INTERNAL MEDICINE CLINIC | Facility: CLINIC | Age: 89
End: 2024-07-24

## 2024-07-24 DIAGNOSIS — R93.41 ABNORMAL CT SCAN, BLADDER: ICD-10-CM

## 2024-07-24 DIAGNOSIS — N30.00 ACUTE CYSTITIS WITHOUT HEMATURIA: Primary | ICD-10-CM

## 2024-07-24 NOTE — TELEPHONE ENCOUNTER
Patient called and stated he was treated for UTI. He finished medication last week. Now all the same symptoms are back. Burning and Urinating every hour. Please call and advise

## 2024-07-24 NOTE — TELEPHONE ENCOUNTER
Patient went to ER on 7/10/24, diagnosed with acute cystitis and prescribed Bactrim for 1 week.   Patent saw  7/10/24 and repeat culture was ordered to be completed once antibiotics finished.   Repeat culture done 7/17/24 which showed no growth.   Patient states the symptoms have now returned.     To  to please advise

## 2024-07-25 ENCOUNTER — LAB ENCOUNTER (OUTPATIENT)
Dept: LAB | Age: 89
End: 2024-07-25
Attending: INTERNAL MEDICINE
Payer: MEDICARE

## 2024-07-25 DIAGNOSIS — N30.00 ACUTE CYSTITIS WITHOUT HEMATURIA: ICD-10-CM

## 2024-07-25 LAB
BILIRUB UR QL: NEGATIVE
COLOR UR: YELLOW
GLUCOSE UR-MCNC: NORMAL MG/DL
KETONES UR-MCNC: NEGATIVE MG/DL
LEUKOCYTE ESTERASE UR QL STRIP.AUTO: 500
NITRITE UR QL STRIP.AUTO: NEGATIVE
PH UR: 6 [PH] (ref 5–8)
PROT UR-MCNC: NEGATIVE MG/DL
RBC #/AREA URNS AUTO: >10 /HPF
SP GR UR STRIP: 1.01 (ref 1–1.03)
UROBILINOGEN UR STRIP-ACNC: 2
WBC #/AREA URNS AUTO: >50 /HPF

## 2024-07-25 PROCEDURE — 81001 URINALYSIS AUTO W/SCOPE: CPT

## 2024-07-25 PROCEDURE — 87086 URINE CULTURE/COLONY COUNT: CPT

## 2024-07-25 NOTE — TELEPHONE ENCOUNTER
Please let patient know that I received his message in the absence of Dr. Dubois.    I am thinking it would be wise to repeat his urine and urine culture.  I do see that his last urine culture July 18 was clear.  I do see that July 10 he had a UTI.    Orders in place.  Once we get results we can figure out if he needs another course of antibiotics.

## 2024-07-25 NOTE — TELEPHONE ENCOUNTER
Patient is calling back to check on the status of his message from yesterday    Phone [phone]

## 2024-07-25 NOTE — TELEPHONE ENCOUNTER
Patient contacted and relayed 's message below. Patient verbalized understanding and will go to the lab to provide the urine specimen today.

## 2024-07-26 RX ORDER — SULFAMETHOXAZOLE AND TRIMETHOPRIM 800; 160 MG/1; MG/1
1 TABLET ORAL 2 TIMES DAILY
Qty: 14 TABLET | Refills: 0 | Status: SHIPPED | OUTPATIENT
Start: 2024-07-26 | End: 2024-08-02

## 2024-07-26 NOTE — TELEPHONE ENCOUNTER
Please call patient and let him know that his urinalysis does not look abnormal with white cells and red cells that may indicate that his infection is back.    We still have to wait for the actual bacteria that is causing this but I think it would be appropriate before getting those results which may take till Monday to restart antibiotic Bactrim.    He has had Bactrim before recently.    I pended order for Bactrim twice a day for 1 week pending results of culture and sensitivity which will have back Monday if he is agreeable.  If agreeable okay to send to pharmacy.

## 2024-07-26 NOTE — TELEPHONE ENCOUNTER
Called patient and relayed DR. DE LA CRUZ message - verbalized understanding   Information for DR. Madera sent via [employer] - patient knows how to use it

## 2024-07-26 NOTE — TELEPHONE ENCOUNTER
Please let patient know the following is his urine culture did come back.    Please let him know that his urine culture actually came out clear without any bacteria.    2.    Usually if the urine culture is clear we would not give antibiotics.    3.    However his prior antibiotics may have clear the bacteria in the urine but not gotten rid of an infection such as a \"prostate\" infection.    4.    Therefore even though the urine culture is clear without signs of infection the abnormal urinalysis would suggest he may have a condition called prostatitis.  Therefore I would like him to continue the Bactrim we sent today    5.    I would like to hear from him Monday as to how he is doing.    6.   His prior urologist Dr. Hood is no longer at the hospital.  Please refer him to Dr. Madera as the urologist I would like him to see is he had a recent CT scan showing that he had some abnormalities on his bladder that could be contributing to his recurrent symptoms.  Referral in place    I copy and pasted recent CT scan below for reference.    Impression   CONCLUSION:     Punctate nonobstructing right nephrolithiasis.     Mild circumferential urinary bladder wall thickening which could in part relate to lack of distension.  A cystitis or chronic bladder outlet obstruction given the enlarged prostate are also in the differential.  Consider correlation with urinalysis.     Lesser incidental findings as above.           Dictated by (CST): Gustavo Roche MD on 7/08/2024 at 8:04 AM      Finalized by (CST): Gustavo Roche MD on 7/08/2024 at 8:14 AM

## 2024-07-29 ENCOUNTER — OFFICE VISIT (OUTPATIENT)
Dept: SURGERY | Facility: CLINIC | Age: 89
End: 2024-07-29
Payer: MEDICARE

## 2024-07-29 VITALS
DIASTOLIC BLOOD PRESSURE: 68 MMHG | SYSTOLIC BLOOD PRESSURE: 118 MMHG | HEART RATE: 75 BPM | BODY MASS INDEX: 20.49 KG/M2 | HEIGHT: 64 IN | WEIGHT: 120 LBS

## 2024-07-29 DIAGNOSIS — N13.8 BPH WITH OBSTRUCTION/LOWER URINARY TRACT SYMPTOMS: Primary | ICD-10-CM

## 2024-07-29 DIAGNOSIS — N40.1 BPH WITH OBSTRUCTION/LOWER URINARY TRACT SYMPTOMS: Primary | ICD-10-CM

## 2024-07-29 LAB
BILIRUB UR QL: NEGATIVE
COLOR UR: YELLOW
GLUCOSE UR-MCNC: NORMAL MG/DL
HGB UR QL STRIP.AUTO: NEGATIVE
KETONES UR-MCNC: NEGATIVE MG/DL
LEUKOCYTE ESTERASE UR QL STRIP.AUTO: 75
NITRITE UR QL STRIP.AUTO: NEGATIVE
PH UR: 6 [PH] (ref 5–8)
PROT UR-MCNC: NEGATIVE MG/DL
SP GR UR STRIP: 1.02 (ref 1–1.03)
UROBILINOGEN UR STRIP-ACNC: 3

## 2024-07-29 PROCEDURE — 99214 OFFICE O/P EST MOD 30 MIN: CPT | Performed by: UROLOGY

## 2024-07-29 PROCEDURE — 3074F SYST BP LT 130 MM HG: CPT | Performed by: UROLOGY

## 2024-07-29 PROCEDURE — 1160F RVW MEDS BY RX/DR IN RCRD: CPT | Performed by: UROLOGY

## 2024-07-29 PROCEDURE — 1159F MED LIST DOCD IN RCRD: CPT | Performed by: UROLOGY

## 2024-07-29 PROCEDURE — 3078F DIAST BP <80 MM HG: CPT | Performed by: UROLOGY

## 2024-07-29 PROCEDURE — 3008F BODY MASS INDEX DOCD: CPT | Performed by: UROLOGY

## 2024-07-29 RX ORDER — TAMSULOSIN HYDROCHLORIDE 0.4 MG/1
0.4 CAPSULE ORAL DAILY
Qty: 90 CAPSULE | Refills: 3 | Status: SHIPPED | OUTPATIENT
Start: 2024-07-29 | End: 2025-07-24

## 2024-07-29 RX ORDER — DUTASTERIDE 0.5 MG/1
0.5 CAPSULE, LIQUID FILLED ORAL DAILY
Qty: 90 CAPSULE | Refills: 3 | Status: SHIPPED | OUTPATIENT
Start: 2024-07-29

## 2024-07-29 NOTE — TELEPHONE ENCOUNTER
Agree w/RN. Stop bactrim. F/u with urology per Dr. DE LA CRUZ recommendations for further evaluation.    Thank you!

## 2024-07-29 NOTE — TELEPHONE ENCOUNTER
Please call patient  He seems to be having a reaction to medication  Patient would also like to review the information regarding specialist  Tasked to nursing

## 2024-07-29 NOTE — TELEPHONE ENCOUNTER
S/w patient and relayed MD message.  Verbalizes understanding and agreement with tx. plan     Pt stated he is doing \"fine and no progression of rash\" Is pushing fluids recommended.    Pt in agreement if rash progresses to seek UC/ED evaluation

## 2024-07-29 NOTE — TELEPHONE ENCOUNTER
To Dr Ponce ( Pt of Dr SAUER)      Please advise.    I called and spoke with pt who stated that he has developed a red rash to his chest and upper back area from the Bactrim.this morning.  He started the prescription on Friday. Since Friday pt has had 7 doses of bactrim he stated.  Denied ant chest pain, shortness of breath, Throat tightness, swelling or other symptoms at this time. Pt sated that rash has not worsened since onset this morning    Pt advised to stop taking Bactrim and to push fluids. Pt does not have any benadryl at home    Pt advised to seek treatment at the UC/ED if symptoms worsen

## 2024-07-29 NOTE — PROGRESS NOTES
César Copeland is a 89 year old male.    HPI:     Chief Complaint   Patient presents with    Consult     Former Dr. DE LA CRUZ patient, pt states to have hx of UTIs. Pt states Pcp placed patient on antibiotics to treat recent UTI but its giving him a rash. Pt unsure what antibiotic was prescribed, but most recent was bactrim on 7/26/2024.        89-year-old male previous patient of Dr. Hood last seen by him April 2022 with a history of kidney stones, BPH, lower urinary tract symptoms.  Presents for recent episode of urinary tract infection, progressive lower urinary tract symptoms.  Had a recent urine infection July 8, 2024 manifest by dysuria frequency and urgency and suprapubic pain on an emergency room visit.  Urine culture grew Enterobacter cloaca.  He was treated with a prolonged course of antibiotics and states his symptoms are improved.  He had been on Bactrim as recently as earlier today but was told to discontinue after the rash immersed along his flanks.  No gross hematuria fevers chills or flank pain.  CT of the abdomen and pelvis stone protocol July 8, 2024 demonstrated nonspecific bladder wall thickening, large BPH but no evidence of obstructing or large kidney stones.    IPSS score 25 quality-of-life index score is 6.  HISTORY:  Past Medical History:    Arthritis    G    Back problem    Blood disorder    BPH (benign prostatic hyperplasia)    Calculus of kidney    Cataract    Coronary atherosclerosis    Essential hypertension    Hearing impairment    hearing aids    History of asbestos exposure    Hyperlipidemia    Lipid screening    Lower GI bleed    Osteoarthritis    Osteoarthritis, shoulder    PONV (postoperative nausea and vomiting)    S/P TURP (transurethral resection of prostate)    Thalassemia trait    Visual impairment      Past Surgical History:   Procedure Laterality Date    Back surgery      C-SPINE SURGERY     Cataract      Colonoscopy      Hernia surgery  2006    Knee replacement surgery       Laminectomy,lumbar  1960    Shoulder arthroscopy      Spine surgery procedure unlisted      Total knee replacement Bilateral       Family History   Problem Relation Age of Onset    Cancer Father 67        gastric    Stroke Mother 57    Cancer Brother     Cancer Brother       Social History:   Social History     Socioeconomic History    Marital status:    Tobacco Use    Smoking status: Former     Current packs/day: 0.00     Types: Cigarettes     Quit date: 1971     Years since quittin.6    Smokeless tobacco: Never   Vaping Use    Vaping status: Never Used   Substance and Sexual Activity    Alcohol use: Yes     Alcohol/week: 2.0 standard drinks of alcohol     Types: 2 Glasses of wine per week     Comment: rarely    Drug use: Never   Other Topics Concern    Caffeine Concern Yes     Comment: Coffee 3 cups daily        Medications (Active prior to today's visit):  Current Outpatient Medications   Medication Sig Dispense Refill    tamsulosin 0.4 MG Oral Cap Take 1 capsule (0.4 mg total) by mouth daily. Take 1/2 hour following the same meal each day 90 capsule 3    dutasteride 0.5 MG Oral Cap Take 1 capsule (0.5 mg total) by mouth daily. 90 capsule 3    sulfamethoxazole-trimethoprim DS (BACTRIM DS) 800-160 MG Oral Tab per tablet Take 1 tablet by mouth 2 (two) times daily for 7 days. 14 tablet 0    PANTOPRAZOLE 40 MG Oral Tab EC TAKE 1 TABLET DAILY 90 tablet 3    METOPROLOL SUCCINATE ER 25 MG Oral Tablet 24 Hr TAKE 1 TABLET ONCE DAILY 90 tablet 3    ATORVASTATIN 10 MG Oral Tab TAKE 1 TABLET DAILY FOR CHOLESTEROL 90 tablet 3    aspirin 81 MG Oral Chew Tab Chew 1 tablet (81 mg total) by mouth daily.      Tadalafil 10 MG Oral Tab Take 1 tablet (10 mg total) by mouth daily as needed for Erectile Dysfunction. 30 tablet 6    Multiple Vitamins-Minerals (CENTRUM SILVER ADULT 50+ OR) Take 1 tablet by mouth daily.         Allergies:  Allergies   Allergen Reactions    Codeine NAUSEA AND VOMITING     \"Nausea &  Vomiting x2 days\"    Penicillins HIVES         ROS:       PHYSICAL EXAM:   Phallus and testicle exams both unremarkable.  Digital prostate exam showing a large prostate at least 95 g without masses or nodules.  Bladder scan for postvoid residual volume 6 mL  ASSESSMENT/PLAN:   Assessment   Encounter Diagnosis   Name Primary?    BPH with obstruction/lower urinary tract symptoms Yes     Recommend:  - We will start him on maximal medical therapy with tamsulosin and dutasteride.  These medication side effects were discussed with him.  - Repeat urinalysis and urine cultures to be done today.  - Follow-up otherwise in 4 months to reassess symptoms.    I spent a total of 25 minutes with patient more than half time in face-to-face discussion.         Orders This Visit:  Orders Placed This Encounter   Procedures    Urinalysis, Routine    Urine Culture, Routine       Meds This Visit:  Requested Prescriptions     Signed Prescriptions Disp Refills    tamsulosin 0.4 MG Oral Cap 90 capsule 3     Sig: Take 1 capsule (0.4 mg total) by mouth daily. Take 1/2 hour following the same meal each day    dutasteride 0.5 MG Oral Cap 90 capsule 3     Sig: Take 1 capsule (0.5 mg total) by mouth daily.       Imaging & Referrals:  None     7/29/2024  Yvon Singer MD

## 2024-08-12 DIAGNOSIS — Z00.00 ANNUAL PHYSICAL EXAM: ICD-10-CM

## 2024-08-12 RX ORDER — ATORVASTATIN CALCIUM 10 MG/1
TABLET, FILM COATED ORAL
Qty: 90 TABLET | Refills: 3 | Status: SHIPPED | OUTPATIENT
Start: 2024-08-12

## 2024-08-12 NOTE — TELEPHONE ENCOUNTER
Refill request is for a maintenance medication and has met the criteria specified in the Ambulatory Medication Refill Standing Order for eligibility, visits, laboratory, alerts and was sent to the requested pharmacy.    Requested Prescriptions     Signed Prescriptions Disp Refills    ATORVASTATIN 10 MG Oral Tab 90 tablet 3     Sig: TAKE 1 TABLET DAILY FOR CHOLESTEROL     Authorizing Provider: VINAY JAMES     Ordering User: JULIETA BHATT

## 2024-09-17 ENCOUNTER — TELEPHONE (OUTPATIENT)
Dept: SURGERY | Facility: CLINIC | Age: 89
End: 2024-09-17

## 2024-09-17 NOTE — TELEPHONE ENCOUNTER
I s/w pt and determined that the Tamsulosin and Dutasteride worked for the last 4 weeks and then they stopped working. States he is having frequency every 1-2 hrs during the day and night and has urgency and if he doesn't get to the BR quickly he feels pain just before urination. Pt asking if he can double them. I told pt that he cannot do that with out permission from [facility]. I told pt that I can either send a msg to [facility] or I can book him an appt for next monday 9/23 at 9:40 am. Pt accepted the appt.       LOV 7/29/24  PHYSICAL EXAM:   Phallus and testicle exams both unremarkable.  Digital prostate exam showing a large prostate at least 95 g without masses or nodules.  Bladder scan for postvoid residual volume 6 mL  ASSESSMENT/PLAN:      Assessment       Encounter Diagnosis   Name Primary?    BPH with obstruction/lower urinary tract symptoms Yes      Recommend:  - We will start him on maximal medical therapy with tamsulosin and dutasteride.  These medication side effects were discussed with him.  - Repeat urinalysis and urine cultures to be done today.  - Follow-up otherwise in 4 months to reassess symptoms.     I spent a total of 25 minutes with patient more than half time in face-to-face discussion.

## 2024-09-23 ENCOUNTER — OFFICE VISIT (OUTPATIENT)
Dept: SURGERY | Facility: CLINIC | Age: 89
End: 2024-09-23

## 2024-09-23 DIAGNOSIS — N13.8 BPH WITH OBSTRUCTION/LOWER URINARY TRACT SYMPTOMS: Primary | ICD-10-CM

## 2024-09-23 DIAGNOSIS — N40.1 BPH WITH OBSTRUCTION/LOWER URINARY TRACT SYMPTOMS: Primary | ICD-10-CM

## 2024-09-23 PROCEDURE — 1159F MED LIST DOCD IN RCRD: CPT | Performed by: UROLOGY

## 2024-09-23 PROCEDURE — 99213 OFFICE O/P EST LOW 20 MIN: CPT | Performed by: UROLOGY

## 2024-09-23 NOTE — PROGRESS NOTES
César Copeland is a 89 year old male.    HPI:     Chief Complaint   Patient presents with    Follow - Up     Pt states he was doing okay with prescriptions initially but afterwards started having uriinary frequency and urgency,        89-year-old male in follow-up to a visit July 29, 2024 for BPH, lower urinary tract symptoms currently on maximal medical therapy with tamsulosin and dutasteride started at last visit.  He states his symptoms continue to be bothersome.  IPSS score is 27 quality-of-life index is 6.  Mostly bothered by incomplete emptying sensation frequency urgency and weak stream.  He states initially after starting the medications his symptoms did improve substantially but they are back at baseline now.  Bladder scan at last visit showed a volume of 6 mL.  Digital prostate exam showed a 95 g prostate at last visit.  He denies constipation.  Urinalysis at last visit was unremarkable.      HISTORY:  Past Medical History:    Arthritis    G    Back problem    Blood disorder    BPH (benign prostatic hyperplasia)    Calculus of kidney    Cataract    Coronary atherosclerosis    Essential hypertension    Hearing impairment    hearing aids    History of asbestos exposure    Hyperlipidemia    Lipid screening    Lower GI bleed    Osteoarthritis    Osteoarthritis, shoulder    PONV (postoperative nausea and vomiting)    S/P TURP (transurethral resection of prostate)    Thalassemia trait    Visual impairment      Past Surgical History:   Procedure Laterality Date    Back surgery      C-SPINE SURGERY     Cataract      Colonoscopy      Hernia surgery  2006    Knee replacement surgery      Laminectomy,lumbar  1960    Shoulder arthroscopy  2006    Spine surgery procedure unlisted      Total knee replacement Bilateral       Family History   Problem Relation Age of Onset    Cancer Father 67        gastric    Stroke Mother 57    Cancer Brother     Cancer Brother       Social History:   Social History     Socioeconomic  History    Marital status:    Tobacco Use    Smoking status: Former     Current packs/day: 0.00     Types: Cigarettes     Quit date: 1971     Years since quittin.7    Smokeless tobacco: Never   Vaping Use    Vaping status: Never Used   Substance and Sexual Activity    Alcohol use: Yes     Alcohol/week: 2.0 standard drinks of alcohol     Types: 2 Glasses of wine per week     Comment: rarely    Drug use: Never   Other Topics Concern    Caffeine Concern Yes     Comment: Coffee 3 cups daily        Medications (Active prior to today's visit):  Current Outpatient Medications   Medication Sig Dispense Refill    ATORVASTATIN 10 MG Oral Tab TAKE 1 TABLET DAILY FOR CHOLESTEROL 90 tablet 3    dutasteride 0.5 MG Oral Cap Take 1 capsule (0.5 mg total) by mouth daily. 90 capsule 3    tamsulosin 0.4 MG Oral Cap Take 1 capsule (0.4 mg total) by mouth daily. Take 1/2 hour following the same meal each day 90 capsule 3    PANTOPRAZOLE 40 MG Oral Tab EC TAKE 1 TABLET DAILY 90 tablet 3    METOPROLOL SUCCINATE ER 25 MG Oral Tablet 24 Hr TAKE 1 TABLET ONCE DAILY 90 tablet 3    aspirin 81 MG Oral Chew Tab Chew 1 tablet (81 mg total) by mouth daily.      Tadalafil 10 MG Oral Tab Take 1 tablet (10 mg total) by mouth daily as needed for Erectile Dysfunction. 30 tablet 6    Multiple Vitamins-Minerals (CENTRUM SILVER ADULT 50+ OR) Take 1 tablet by mouth daily.         Allergies:  Allergies   Allergen Reactions    Codeine NAUSEA AND VOMITING     \"Nausea & Vomiting x2 days\"    Penicillins HIVES    Bactrim [Sulfamethoxazole W/Trimethoprim] RASH         ROS:       PHYSICAL EXAM:     ASSESSMENT/PLAN:   Assessment   Encounter Diagnosis   Name Primary?    BPH with obstruction/lower urinary tract symptoms Yes     Recommend:  - Suggested he continue on maximal medical therapy for the time being.  -He is a poor surgical candidate due to age so would prefer to exhaust medication options.  I asked him to follow-up in 3 months.  If symptoms  are persistent, he may need office cystoscopy to assess prostate and bladder anatomy.           Orders This Visit:  No orders of the defined types were placed in this encounter.      Meds This Visit:  Requested Prescriptions      No prescriptions requested or ordered in this encounter       Imaging & Referrals:  None     9/23/2024  Yvon Singer MD

## 2024-10-28 ENCOUNTER — TELEPHONE (OUTPATIENT)
Dept: INTERNAL MEDICINE CLINIC | Facility: CLINIC | Age: 89
End: 2024-10-28

## 2024-10-28 DIAGNOSIS — R35.0 URINE FREQUENCY: Primary | ICD-10-CM

## 2024-10-28 NOTE — TELEPHONE ENCOUNTER
Patient is calling to ask if Dr D'Amico gives Cortisone Injections?  Patient needs an injection in his right shoulder.  If Dr D'Amico does the injection where can patient be added?    Patient is scheduled with Dr D'Amico on 12/6/24    Please call patient [phone]

## 2024-10-29 ENCOUNTER — TELEPHONE (OUTPATIENT)
Dept: INTERNAL MEDICINE CLINIC | Facility: CLINIC | Age: 89
End: 2024-10-29

## 2024-10-29 ENCOUNTER — LAB ENCOUNTER (OUTPATIENT)
Dept: LAB | Age: 89
End: 2024-10-29
Attending: INTERNAL MEDICINE
Payer: MEDICARE

## 2024-10-29 DIAGNOSIS — R35.0 URINE FREQUENCY: ICD-10-CM

## 2024-10-29 LAB
BILIRUB UR QL: NEGATIVE
COLOR UR: YELLOW
GLUCOSE UR-MCNC: NORMAL MG/DL
KETONES UR-MCNC: NEGATIVE MG/DL
LEUKOCYTE ESTERASE UR QL STRIP.AUTO: 500
NITRITE UR QL STRIP.AUTO: NEGATIVE
PH UR: 5 [PH] (ref 5–8)
PROT UR-MCNC: 20 MG/DL
RBC #/AREA URNS AUTO: >10 /HPF
SP GR UR STRIP: 1.02 (ref 1–1.03)
UROBILINOGEN UR STRIP-ACNC: 2
WBC #/AREA URNS AUTO: >50 /HPF

## 2024-10-29 PROCEDURE — 87077 CULTURE AEROBIC IDENTIFY: CPT

## 2024-10-29 PROCEDURE — 81001 URINALYSIS AUTO W/SCOPE: CPT

## 2024-10-29 PROCEDURE — 87186 SC STD MICRODIL/AGAR DIL: CPT

## 2024-10-29 PROCEDURE — 87086 URINE CULTURE/COLONY COUNT: CPT

## 2024-10-29 NOTE — TELEPHONE ENCOUNTER
Called patient and relayed MD's message. He sees Dr. Berrios and will call his office for an appointment.     Patient states he has been having urinary urgency and frequency for 3 months. He states he initially had a UTI and was given antibiotics. After the infection cleared he continued to have frequency. He saw Dr. Singer and he started him on Dutasteride 0.4mg daily and Tamsulosin 0.4mg daily. He is still taking both, but does not feel they are doing anything. Patient continues to have urinary frequency and urgency.     Patient will give a urine sample to rule out infection and go from there. Urine labs ordered per protocol.     To Dr. D'Amico--

## 2024-10-29 NOTE — TELEPHONE ENCOUNTER
It is recommended for him to go to his orthopedic surgeon for this.  Nursing let him know, if he does not have one then use Dr. Neumann/Tyrell

## 2024-10-29 NOTE — TELEPHONE ENCOUNTER
Nursing, okay to collect a UA and culture as you have ordered here, encouraged him to follow-up with the specialist, or first available physician if he is having an acute problem that may require treatment.  If he continues to worsen or his symptoms become severe he needs to be in the emergency room getting looked at.

## 2024-10-30 ENCOUNTER — TELEPHONE (OUTPATIENT)
Dept: INTERNAL MEDICINE CLINIC | Facility: CLINIC | Age: 89
End: 2024-10-30

## 2024-10-30 RX ORDER — NITROFURANTOIN 25; 75 MG/1; MG/1
100 CAPSULE ORAL 2 TIMES DAILY
Qty: 10 CAPSULE | Refills: 0 | Status: ON HOLD | OUTPATIENT
Start: 2024-10-30 | End: 2024-11-04

## 2024-10-30 NOTE — TELEPHONE ENCOUNTER
To Dr Ponce ( Pt of Dr CORLEY)    Please review abnormal UA cx from PAQ's today.    I called and triaged pt as below.    Pt stated he has been having issues with his bladder since 7-2024. Pt was previously treated with antibiotics and has seen Dr Singer w/o relied of his symptoms    Next visit with Dr Singer is 1-2025 per pt    [x]Frequency  [x]Urgency  [x]Pain/burning  []Blood in urine, If yes: [ ]use of anticoagulants  [x]Low back pain  []Flank pain  []Pelvic/bladder pressure  []Fevers  []chills  []Odor  []Cloudy Urine  []Confusion  [x]Incomplete bladder emptying    []Incontinent bladder    []OTC medication    Amendable to presenting to the lab to provide a urine specimen: []Yes [[]No      NOTES:     Date of symptom onset:   3 months ago

## 2024-10-30 NOTE — TELEPHONE ENCOUNTER
To Dr. D'Amico for initial RX(s)     Previously prescribed by patient's former PCP, Dr. Dubois.     Establishing with you on 12/6/2024.     Thank you!

## 2024-10-31 ENCOUNTER — TELEPHONE (OUTPATIENT)
Dept: INTERNAL MEDICINE CLINIC | Facility: CLINIC | Age: 89
End: 2024-10-31

## 2024-10-31 RX ORDER — METOPROLOL SUCCINATE 25 MG/1
25 TABLET, EXTENDED RELEASE ORAL DAILY
Qty: 90 TABLET | Refills: 3 | Status: ON HOLD | OUTPATIENT
Start: 2024-10-31

## 2024-10-31 NOTE — TELEPHONE ENCOUNTER
Patient contacted and relayed Dr.Damico's message below regarding the urine test results. Patient states that he is taking the Macrobid as prescribed by , will let us know if the symptoms do not resolve.

## 2024-10-31 NOTE — TELEPHONE ENCOUNTER
----- Message from Jeff Anthony D'Amico sent at 10/31/2024  3:54 PM CDT -----  Okay nursing, looks like we have an organism, lets make sure he complies with taking the Macrobid, hopefully that will clear things.  He will need to let us know if the symptoms do not clear

## 2024-11-03 ENCOUNTER — HOSPITAL ENCOUNTER (INPATIENT)
Facility: HOSPITAL | Age: 89
LOS: 8 days | Discharge: HOME HEALTH | End: 2024-11-11
Attending: EMERGENCY MEDICINE | Admitting: HOSPITALIST
Payer: MEDICARE

## 2024-11-03 ENCOUNTER — APPOINTMENT (OUTPATIENT)
Dept: GENERAL RADIOLOGY | Facility: HOSPITAL | Age: 89
End: 2024-11-03
Attending: EMERGENCY MEDICINE
Payer: MEDICARE

## 2024-11-03 ENCOUNTER — APPOINTMENT (OUTPATIENT)
Dept: CT IMAGING | Facility: HOSPITAL | Age: 89
End: 2024-11-03
Attending: EMERGENCY MEDICINE
Payer: MEDICARE

## 2024-11-03 DIAGNOSIS — I71.019 AORTIC DISSECTION DISTAL TO LEFT SUBCLAVIAN (HCC): ICD-10-CM

## 2024-11-03 DIAGNOSIS — I50.9 ACUTE CONGESTIVE HEART FAILURE, UNSPECIFIED HEART FAILURE TYPE (HCC): ICD-10-CM

## 2024-11-03 DIAGNOSIS — I48.91 ATRIAL FIBRILLATION WITH RAPID VENTRICULAR RESPONSE (HCC): Primary | ICD-10-CM

## 2024-11-03 LAB
ANION GAP SERPL CALC-SCNC: 8 MMOL/L (ref 0–18)
APTT PPP: 30.9 SECONDS (ref 23–36)
BASOPHILS # BLD AUTO: 0.05 X10(3) UL (ref 0–0.2)
BASOPHILS NFR BLD AUTO: 0.5 %
BUN BLD-MCNC: 20 MG/DL (ref 9–23)
BUN/CREAT SERPL: 20.4 (ref 10–20)
CALCIUM BLD-MCNC: 9.3 MG/DL (ref 8.7–10.4)
CHLORIDE SERPL-SCNC: 109 MMOL/L (ref 98–112)
CO2 SERPL-SCNC: 25 MMOL/L (ref 21–32)
CREAT BLD-MCNC: 0.98 MG/DL
DEPRECATED RDW RBC AUTO: 43.1 FL (ref 35.1–46.3)
DEPRECATED RDW RBC AUTO: 43.9 FL (ref 35.1–46.3)
EGFRCR SERPLBLD CKD-EPI 2021: 71 ML/MIN/1.73M2 (ref 60–?)
EOSINOPHIL # BLD AUTO: 0.28 X10(3) UL (ref 0–0.7)
EOSINOPHIL NFR BLD AUTO: 2.9 %
ERYTHROCYTE [DISTWIDTH] IN BLOOD BY AUTOMATED COUNT: 18.1 % (ref 11–15)
ERYTHROCYTE [DISTWIDTH] IN BLOOD BY AUTOMATED COUNT: 18.3 % (ref 11–15)
GLUCOSE BLD-MCNC: 224 MG/DL (ref 70–99)
GLUCOSE BLDC GLUCOMTR-MCNC: 128 MG/DL (ref 70–99)
HCT VFR BLD AUTO: 39.2 %
HCT VFR BLD AUTO: 39.3 %
HGB BLD-MCNC: 11.7 G/DL
HGB BLD-MCNC: 12 G/DL
IMM GRANULOCYTES # BLD AUTO: 0.07 X10(3) UL (ref 0–1)
IMM GRANULOCYTES NFR BLD: 0.7 %
LYMPHOCYTES # BLD AUTO: 0.75 X10(3) UL (ref 1–4)
LYMPHOCYTES NFR BLD AUTO: 7.7 %
MAGNESIUM SERPL-MCNC: 1.8 MG/DL (ref 1.6–2.6)
MCH RBC QN AUTO: 21.1 PG (ref 26–34)
MCH RBC QN AUTO: 21.3 PG (ref 26–34)
MCHC RBC AUTO-ENTMCNC: 29.8 G/DL (ref 31–37)
MCHC RBC AUTO-ENTMCNC: 30.6 G/DL (ref 31–37)
MCV RBC AUTO: 69.6 FL
MCV RBC AUTO: 70.9 FL
MONOCYTES # BLD AUTO: 0.94 X10(3) UL (ref 0.1–1)
MONOCYTES NFR BLD AUTO: 9.7 %
NEUTROPHILS # BLD AUTO: 7.65 X10 (3) UL (ref 1.5–7.7)
NEUTROPHILS # BLD AUTO: 7.65 X10(3) UL (ref 1.5–7.7)
NEUTROPHILS NFR BLD AUTO: 78.5 %
NT-PROBNP SERPL-MCNC: 1002 PG/ML (ref ?–450)
OSMOLALITY SERPL CALC.SUM OF ELEC: 304 MOSM/KG (ref 275–295)
PLATELET # BLD AUTO: 252 10(3)UL (ref 150–450)
PLATELET # BLD AUTO: 267 10(3)UL (ref 150–450)
POTASSIUM SERPL-SCNC: 3.9 MMOL/L (ref 3.5–5.1)
RBC # BLD AUTO: 5.54 X10(6)UL
RBC # BLD AUTO: 5.63 X10(6)UL
SODIUM SERPL-SCNC: 142 MMOL/L (ref 136–145)
TROPONIN I SERPL HS-MCNC: 19 NG/L
WBC # BLD AUTO: 11.1 X10(3) UL (ref 4–11)
WBC # BLD AUTO: 9.7 X10(3) UL (ref 4–11)

## 2024-11-03 PROCEDURE — 74174 CTA ABD&PLVS W/CONTRAST: CPT | Performed by: EMERGENCY MEDICINE

## 2024-11-03 PROCEDURE — 71275 CT ANGIOGRAPHY CHEST: CPT | Performed by: EMERGENCY MEDICINE

## 2024-11-03 PROCEDURE — 99223 1ST HOSP IP/OBS HIGH 75: CPT | Performed by: INTERNAL MEDICINE

## 2024-11-03 PROCEDURE — 71260 CT THORAX DX C+: CPT | Performed by: EMERGENCY MEDICINE

## 2024-11-03 PROCEDURE — 71045 X-RAY EXAM CHEST 1 VIEW: CPT | Performed by: EMERGENCY MEDICINE

## 2024-11-03 RX ORDER — HEPARIN SODIUM AND DEXTROSE 10000; 5 [USP'U]/100ML; G/100ML
INJECTION INTRAVENOUS CONTINUOUS
Status: DISCONTINUED | OUTPATIENT
Start: 2024-11-04 | End: 2024-11-04

## 2024-11-03 RX ORDER — ACETAMINOPHEN 500 MG
500 TABLET ORAL EVERY 4 HOURS PRN
Status: DISCONTINUED | OUTPATIENT
Start: 2024-11-03 | End: 2024-11-11

## 2024-11-03 RX ORDER — POLYETHYLENE GLYCOL 3350 17 G/17G
17 POWDER, FOR SOLUTION ORAL DAILY PRN
Status: DISCONTINUED | OUTPATIENT
Start: 2024-11-03 | End: 2024-11-11

## 2024-11-03 RX ORDER — MAGNESIUM OXIDE 400 MG/1
400 TABLET ORAL ONCE
Status: COMPLETED | OUTPATIENT
Start: 2024-11-03 | End: 2024-11-04

## 2024-11-03 RX ORDER — HEPARIN SODIUM AND DEXTROSE 10000; 5 [USP'U]/100ML; G/100ML
INJECTION INTRAVENOUS CONTINUOUS
Status: DISCONTINUED | OUTPATIENT
Start: 2024-11-04 | End: 2024-11-05

## 2024-11-03 RX ORDER — ONDANSETRON 2 MG/ML
4 INJECTION INTRAMUSCULAR; INTRAVENOUS EVERY 6 HOURS PRN
Status: DISCONTINUED | OUTPATIENT
Start: 2024-11-03 | End: 2024-11-10

## 2024-11-03 RX ORDER — METOCLOPRAMIDE HYDROCHLORIDE 5 MG/ML
5 INJECTION INTRAMUSCULAR; INTRAVENOUS EVERY 8 HOURS PRN
Status: DISCONTINUED | OUTPATIENT
Start: 2024-11-03 | End: 2024-11-11

## 2024-11-03 RX ORDER — FUROSEMIDE 10 MG/ML
40 INJECTION INTRAMUSCULAR; INTRAVENOUS ONCE
Status: COMPLETED | OUTPATIENT
Start: 2024-11-03 | End: 2024-11-03

## 2024-11-03 RX ORDER — HEPARIN SODIUM AND DEXTROSE 10000; 5 [USP'U]/100ML; G/100ML
12 INJECTION INTRAVENOUS ONCE
Status: COMPLETED | OUTPATIENT
Start: 2024-11-03 | End: 2024-11-04

## 2024-11-03 RX ORDER — SODIUM PHOSPHATE, DIBASIC AND SODIUM PHOSPHATE, MONOBASIC 7; 19 G/230ML; G/230ML
1 ENEMA RECTAL ONCE AS NEEDED
Status: DISCONTINUED | OUTPATIENT
Start: 2024-11-03 | End: 2024-11-11

## 2024-11-03 RX ORDER — BISACODYL 10 MG
10 SUPPOSITORY, RECTAL RECTAL
Status: DISCONTINUED | OUTPATIENT
Start: 2024-11-03 | End: 2024-11-11

## 2024-11-03 RX ORDER — SENNOSIDES 8.6 MG
17.2 TABLET ORAL NIGHTLY PRN
Status: DISCONTINUED | OUTPATIENT
Start: 2024-11-03 | End: 2024-11-11

## 2024-11-03 RX ORDER — HEPARIN SODIUM AND DEXTROSE 10000; 5 [USP'U]/100ML; G/100ML
12 INJECTION INTRAVENOUS ONCE
Status: DISCONTINUED | OUTPATIENT
Start: 2024-11-03 | End: 2024-11-03

## 2024-11-03 RX ORDER — BENZONATATE 100 MG/1
200 CAPSULE ORAL 3 TIMES DAILY PRN
Status: DISCONTINUED | OUTPATIENT
Start: 2024-11-03 | End: 2024-11-11

## 2024-11-03 RX ORDER — FUROSEMIDE 10 MG/ML
40 INJECTION INTRAMUSCULAR; INTRAVENOUS
Status: DISCONTINUED | OUTPATIENT
Start: 2024-11-04 | End: 2024-11-07

## 2024-11-03 RX ORDER — NITROFURANTOIN 25; 75 MG/1; MG/1
100 CAPSULE ORAL ONCE
Status: COMPLETED | OUTPATIENT
Start: 2024-11-03 | End: 2024-11-03

## 2024-11-03 RX ORDER — METOPROLOL TARTRATE 1 MG/ML
2.5 INJECTION, SOLUTION INTRAVENOUS ONCE
Status: COMPLETED | OUTPATIENT
Start: 2024-11-03 | End: 2024-11-03

## 2024-11-03 RX ORDER — HEPARIN SODIUM 1000 [USP'U]/ML
60 INJECTION, SOLUTION INTRAVENOUS; SUBCUTANEOUS ONCE
Status: DISCONTINUED | OUTPATIENT
Start: 2024-11-03 | End: 2024-11-03

## 2024-11-03 RX ORDER — HEPARIN SODIUM 1000 [USP'U]/ML
60 INJECTION, SOLUTION INTRAVENOUS; SUBCUTANEOUS ONCE
Status: COMPLETED | OUTPATIENT
Start: 2024-11-03 | End: 2024-11-03

## 2024-11-03 NOTE — ED INITIAL ASSESSMENT (HPI)
To ED with c/o chest pain and shortness of breath for the past 3 days. 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

## 2024-11-03 NOTE — ED PROVIDER NOTES
Patient Seen in: [facility]      History     Chief Complaint   Patient presents with    Chest Pain     Stated Complaint: CP    Subjective:   HPI      89-year-old male with history of reported cardiac disease without intervention in the past about 10 years ago states last few days been short of breath.  Supple chest discomfort.  Lives alone and cares for himself.  No fever.  No back pain.    Objective:     Past Medical History:    Arthritis    G    Back problem    Blood disorder    BPH (benign prostatic hyperplasia)    Calculus of kidney    Cataract    Coronary atherosclerosis    Essential hypertension    Hearing impairment    hearing aids    History of asbestos exposure    Hyperlipidemia    Lipid screening    Lower GI bleed    Osteoarthritis    Osteoarthritis, shoulder    PONV (postoperative nausea and vomiting)    S/P TURP (transurethral resection of prostate)    Thalassemia trait    Visual impairment              Past Surgical History:   Procedure Laterality Date    Back surgery      C-SPINE SURGERY     Cataract      Colonoscopy      Hernia surgery  2006    Knee replacement surgery      Laminectomy,lumbar  1960    Shoulder arthroscopy  2006    Spine surgery procedure unlisted      Total knee replacement Bilateral                 Social History     Socioeconomic History    Marital status:    Tobacco Use    Smoking status: Former     Current packs/day: 0.00     Types: Cigarettes     Quit date: 1971     Years since quittin.8    Smokeless tobacco: Never   Vaping Use    Vaping status: Never Used   Substance and Sexual Activity    Alcohol use: Yes     Alcohol/week: 2.0 standard drinks of alcohol     Types: 2 Glasses of wine per week     Comment: rarely    Drug use: Never   Other Topics Concern    Caffeine Concern Yes     Comment: Coffee 3 cups daily                  Physical Exam     ED Triage Vitals [24 1625]   /84   Pulse (!) 137   Resp 22   Temp 97.4 °F (36.3  °C)   Temp src Temporal   SpO2 94 %   O2 Device None (Room air)       Current Vitals:   Vital Signs  BP: 101/82  Pulse: 105  Resp: 14  Temp: 97.4 °F (36.3 °C)  Temp src: Temporal  MAP (mmHg): 89    Oxygen Therapy  SpO2: 93 %  O2 Device: None (Room air)        Physical Exam  Constitutional: Oriented to person, place, and time.  Appears well-developed. No distress.   Head: Normocephalic and atraumatic.   Eyes: Conjunctivae are normal. Pupils are equal, round, and reactive to light.   Neck: Normal range of motion. Neck supple.  No obvious JVD  Cardiovascular: Tachycardic with irregular rhythm and intact and equal distal pulses.    Pulmonary/Chest:Mild tachypnea, questionable crackles at both bases  Abdominal: Soft. There is no tenderness. There is no guarding.   Musculoskeletal: Normal range of motion. No edema or tenderness.   Neurological: Alert and oriented to person, place, and time.  No gross focal deficits  Skin: Skin is warm and dry.   Psychiatric: Normal mood and affect.  Behavior is normal.   Nursing note and vitals reviewed.    Differential diagnosis includes new onset rapid atrial fibrillation, coronary ischemia, electrolyte abnormality, heart failure.      ED Course     Labs Reviewed   BASIC METABOLIC PANEL (8) - Abnormal; Notable for the following components:       Result Value    Glucose 224 (*)     BUN/CREA Ratio 20.4 (*)     Calculated Osmolality 304 (*)     All other components within normal limits   CBC WITH DIFFERENTIAL WITH PLATELET - Abnormal; Notable for the following components:    HGB 11.7 (*)     MCV 70.9 (*)     MCH 21.1 (*)     MCHC 29.8 (*)     RDW 18.1 (*)     Lymphocyte Absolute 0.75 (*)     All other components within normal limits   PRO BETA NATRIURETIC PEPTIDE - Abnormal; Notable for the following components:    Pro-Beta Natriuretic Peptide 1,002 (*)     All other components within normal limits   TROPONIN I HIGH SENSITIVITY - Normal   PTT, ACTIVATED - Normal   MAGNESIUM - Normal    RAINBOW DRAW LAVENDER   RAINBOW DRAW LIGHT GREEN   RAINBOW DRAW BLUE     EKG    Rate, intervals and axes as noted on EKG Report.  Rate: 160  Rhythm: Sinus Rhythm  Reading: ST depression likely resultant from subendocardial effects, plical widened QRS, no gross acute ST elevation                CTA CHEST CTA ABDOMEN CTA PELVIS (CPT=71275/47857)    Result Date: 11/3/2024  PROCEDURE: CTA CHEST CTA ABDOMEN CTA PELVIS (CPT=71275/21729)  COMPARISON: [facility], CT CHEST PE AORTA (IV ONLY) (CPT=71260), 11/03/2024, 6:45 PM.  [facility], CT ABDOMEN+PELVIS KIDNEYSTONE 2D RNDR(NO IV,NO ORAL)(CPT=74176), 7/08/2024, 7:14 AM.  INDICATIONS: EVAL DESCENDING AORTA  TECHNIQUE:   CT images of the chest, abdomen and pelvis were obtained without and with non-ionic intravenous contrast material.  Multi-planar reformatted and 3-D images were created with vessel analysis performed on an independent workstation.  Automated  exposure control for dose reduction was used. Adjustment of the mA and/or kV was done based on the patient's size. Use of iterative reconstruction technique for dose reduction was used. Dose information is transmitted to the ACR (American College of Radiology) NRDR (National Radiology Data Registry) which includes the Dose Index Registry.     FINDINGS:  VASCULATURE/THORACIC AORTA:  There is motion artifact of the aortic root and ascending thoracic aorta, limiting accurate assessment.  Extensive vascular calcifications present.   There is mild 3.2 cm ectasia of the ascending thoracic aorta, without aneurysmal dilation.  No aneurysmal dilation of the aortic arch or descending thoracic aorta.  There is again faint abnormal attenuation seen involving the distal aortic arch beyond the left subclavian artery origin, with suggestion suggestion of curvilinear opacity within the aortic lumen (series 5, image 52).  This heterogeneous attenuation with  thin curvilinear opacity extends to the level  of the upper abdominal aorta just above the level of the right renal artery origin.  This attenuation does not extend into the celiac origin or SMA.  It is noted that this curvilinear attenuation is seen over the same extent as compared to the earlier in the day CT PE examination.  This is also seen on MIP imaging .  The finding is more well defined/conspicuous on the 3D reconstructed images  There is no abdominal aortic aneurysm.  The subtle curvilinear intraluminal density from the thoracic aorta extends to the level of the suprarenal abdominal aorta, just above the right renal artery origin (series 5, image 149).  There is no extension of the attenuation into the celiac or SMA origins.  There is severe atherosclerotic narrowing of the proximal SMA, but with opacification beyond this.   Elsewhere, extensive calcific atherosclerosis.  There is no extension of the abnormal attenuation to involve the proximal aorta or the great vessel origins.  CARDIAC: The heart is not enlarged. There is no bowing of the interventricular septum to suggest right ventricular strain.  Extensive coronary artery calcifications.  MEDIASTINUM/ADRY: Prominent but nonenlarged lymph nodes in the mediastinum. There is diffuse esophageal wall thickening, which can be seen in the setting of chronic gastroesophageal reflux.   LUNGS/PLEURA: The trachea and central bronchi are clear.  There is diffuse bronchial wall thickening, which can be seen in acute or chronic bronchitis.  Mild dependent opacities at the lung bases, thought to represent subsegmental atelectasis.  Trace bilateral pleural effusions  CHEST WALL: No thyroidal mass, within the limitation of artifact. No axillary lymphadenopathy.  LIMITED ABDOMEN: Colon interposed anterior to the liver.   BONES: There is multilevel degenerative disease of the spine. There are flowing ventral osteophytes at multiple consecutive levels, compatible with diffuse idiopathic skeletal hyperostosis (DISH).   There is diffuse osseous demineralization, which limits sensitivity for detection of osseous pathology.  Degenerative changes of the shoulders, with extensive joint bodies bilaterally.  Extensive degenerative change around the right sternoclavicular articulation, which corresponds to the paratracheal thickening seen on same day chest radiograph.   Assessment of the viscera limited due to arterial phase of contrast.  LIVER: Unremarkable.  Colon is interposed anteriorly.  GALLBLADDER: Present  BILIARY: No ductal dilation.  SPLEEN: Not enlarged.  PANCREAS:  No ductal dilation.  Tiny 6 mm low-density cystic renal lesion of the pancreatic tail , likely inconsequential in a patient of this age.  ADRENALS: Unremarkable.  KIDNEYS: No hydronephrosis. No obstructing calculi.  There are fluid density renal lesions bilaterally.  There is renal cortical scarring present.  Excreted contrast within the renal collecting systems, limits assessment.  BOWEL:   No obstruction. Portions of the bowel are under distended and not well assessed.  Small hiatal hernia.  Appendix is unremarkable.  Fecalized material throughout the small bowel, which is nondilated.  May relate to slow transit of enteric contents.  Large amount of stool throughout the colon.  AORTA/VASCULAR:   As above  RETROPERITONEUM/ MESENTERY:  No enlarged adenopathy. No free fluid or free air.  URINARY BLADDER:  Excreted contrast within the urinary bladder.  TURP defect present.  PELVIC NODES: No enlarged adenopathy.    PELVIC ORGANS:  Mild prostatomegaly.  BONES:   There is multilevel degenerative disease of the spine.  There is diffuse osseous demineralization, which limits sensitivity for detection of osseous pathology. There are flowing ventral osteophytes at multiple consecutive levels, compatible with  diffuse idiopathic skeletal hyperostosis (DISH).  ABDOMINAL WALL:  Small fat containing bilateral inguinal hernias.          CONCLUSION:   Unchanged appearance of  intraluminal curvilinear density extending from the distal arch of the thoracic aorta beyond the left subclavian artery origin to the upper abdominal aorta, just above the right renal artery origin, without a well-defined intraluminal flap on routine views, but with somewhat more well-defined suggestion of a flap on 3D reformatted imaging.  Configuration of the finding is relatively unchanged since the earlier CT PE examination.  Overall imaging findings are equivocal for  age indeterminate type B/descending aortic dissection.  Differential would include mixing artifact as well as heterogeneous attenuation related to underlying noncalcified atherosclerotic plaque.  Recommend cardiology consultation.  Follow-up CTA recommended to assess for stability of this finding.      Dictated by (CST): Sonja Wilde MD on 11/03/2024 at 8:18 PM     Finalized by (CST): Sonja Wilde MD on 11/03/2024 at 8:36 PM          CT CHEST PE AORTA (IV ONLY) (CPT=71260)    Result Date: 11/3/2024  PROCEDURE: CT CHEST PE AORTA (IV ONLY) (CPT=71260)  COMPARISON: None.  INDICATIONS: Chest pain.  TECHNIQUE: CT images of the chest were obtained with non-ionic intravenous contrast material.  Automated exposure control for dose reduction was used. Adjustment of the mA and/or kV was done based on the patient's size. Use of iterative reconstruction technique for dose reduction was used. Dose information is transmitted to the ACR (American College of Radiology) NRDR (National Radiology Data Registry) which includes the Dose Index Registry.  FINDINGS:  PULMONARY VASCULATURE: There is adequate opacification of the pulmonary arterial tree. No suspicious filling defects are identified in the main, lobar, segmental, or proximal subsegmental pulmonary artery branches to suggest acute pulmonary embolism.   The distal subsegmental branches are less well assessed. The main pulmonary artery trunk is normal in caliber.  CARDIAC: The heart is not enlarged.  There is no bowing of the interventricular septum to suggest right ventricular strain.  Extensive coronary artery calcifications.  THORACIC AORTA: There is mild 3.2 cm ectasia of the ascending thoracic aorta, without aneurysmal dilation.  There is abnormal attenuation seen involving the distal aortic arch beyond the left subclavian artery origin, with heterogeneous attenuation, as well as suggestion of curvilinear opacities within the aortic lumen (series 3, image 47).  This elongated abnormal signal extends to the level of the edge of the field of view to the upper abdominal aorta.  MEDIASTINUM/ADRY: No mass or lymphadenopathy. There is diffuse esophageal wall thickening, which can be seen in the setting of chronic gastroesophageal reflux.   LUNGS/PLEURA: The trachea and central bronchi are clear.  There is diffuse bronchial wall thickening, which can be seen in acute or chronic bronchitis.  Mild dependent opacities at the lung bases, thought to represent subsegmental atelectasis.  Trace bilateral pleural effusions  CHEST WALL: No thyroidal mass, within the limitation of artifact. No axillary lymphadenopathy.  LIMITED ABDOMEN: Colon interposed anterior to the liver.   BONES: There is multilevel degenerative disease of the spine. There are flowing ventral osteophytes at multiple consecutive levels, compatible with diffuse idiopathic skeletal hyperostosis (DISH).  There is diffuse osseous demineralization, which limits sensitivity for detection of osseous pathology.  Degenerative changes of the shoulders, with extensive joint bodies bilaterally.  Extensive degenerative change around the right sternoclavicular articulation, which may rib correspond to the paratracheal thickening seen on chest radiograph.          CONCLUSION:   Heterogeneous attenuation within the thoracic aorta involving the distal arch to the upper abdominal aorta at the edge of the field of view, with suggestion of internal linear densities in the  aortic lumen which could represent a flap.  Recommend aortic protocol CTA chest abdomen to evaluate for acute descending aortic dissection. Findings were discussed with Dr. Dempsey on 11/03/2024 at 7:23 p.m.  No evidence of acute pulmonary embolism to the level of the first order subsegmental pulmonary artery branches.  There is diffuse bronchial wall thickening, which can be seen in acute or chronic bronchitis.  Trace bilateral pleural effusions with dependent opacities at the lung bases thought to represent subsegmental atelectasis.  Extensive degenerative change around the right sternoclavicular articulation, which is thought to correspond to the paratracheal thickening seen on chest radiograph.  Colon interposed anterior to the liver, corresponding to air under the diaphragm on chest radiograph.     Dictated by (CST): Sonja Wilde MD on 11/03/2024 at 7:17 PM     Finalized by (CST): Sonja Wilde MD on 11/03/2024 at 7:25 PM          XR CHEST AP PORTABLE  (CPT=71045)    Result Date: 11/3/2024  PROCEDURE: XR CHEST AP PORTABLE  (CPT=71045) TIME: 1800 hours.   COMPARISON: [facility], X CHEST PA LAT ROUTINE, 8/13/2015, 12:00 PM.  [facility], X CHEST PA LAT ROUTINE, 4/08/2013, 9:58 AM.  INDICATIONS: Chest pain and shortness of breath x3 days.  TECHNIQUE:   Single view.   FINDINGS:  DEVICES: None. CARDIOMEDIASTINAL:The cardiomediastinal silhouette is within normal limits for size.  There is calcific atherosclerosis of the thoracic aorta. ADRY:     The hilar contours are within normal limits. LUNGS/PLEURA: Soft tissue thickening in the right paratracheal region.  Low lung volumes.  Elevation of the right hemidiaphragm.  Curvilinear opacities at the lung bases.  Blunting of the right costophrenic angle.  Left pleural spaces are clear. BONES/OTHER:  There is multilevel degenerative disease of the spine.  Humeral anchors on the left.  Degenerative changes of the  shoulders bilaterally. There is lucency under the right hemidiaphragm, which appears to be within a colonic loop interposed between the liver and the hemidiaphragm.          CONCLUSION:  Soft tissue thickening in the right paratracheal region.  Underlying consolidation or mass is a possibility.  Correlate contrast enhanced CT chest.   Air under the right hemidiaphragm, thought to be within a portion of the colon interposed between the liver and diaphragm.  Recommend upright chest radiograph for further evaluation if there is concern for pneumoperitoneum.  Alternatively, this could be further evaluated on CT chest examination.  Elevation of the right hemidiaphragm with right basilar opacity thought to represent subsegmental atelectasis.  Left basilar opacity, which may represent subsegmental atelectasis.  Superimposed pneumonitis is a differential in the appropriate clinical setting.  Small right pleural effusion.      Dictated by (CST): Sonja Wilde MD on 11/03/2024 at 6:31 PM     Finalized by (CST): Sonja Wilde MD on 11/03/2024 at 6:34 PM                MDM          Admission disposition: 11/3/2024  9:24 PM           Medical Decision Making  Patient has responded here with increasing doses of Cardizem.  He has diuresed.  His initial CT was called to me by the radiologist and I recommended CT of the chest abdomen pelvis was done.  I did initially speak to CV surgery Dr. Pena who reviewed images and recommended speaking with vascular.  I talked to vascular Dr. Hollis.  She recommended beta-blocker control of the heart rate.  His blood pressure is somewhat soft around 100.  This will need to be watched carefully.  He will be admitted to the PCU.  I will update the hospitalist and cardiologist who I discussed with earlier.  Dr. Hollis is okay with the continued use of heparin for now as well.  She will see the patient tomorrow.    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     Details: 8/20/19 Echo:    Study Conclusions   1. Left ventricle: The cavity size was normal. Wall thickness was      increased in a pattern of mild LVH. Systolic function was mildly      to moderately reduced. The estimated ejection fraction was      40-45%. Dyskinesis of the basal-midinferior myocardium. Doppler      parameters are consistent with abnormal left ventricular      relaxation (grade 1 diastolic dysfunction).   Compared to the prior study there has been no significant interval   change.       12/19/20 EKG w/ sinus rate, otherwise similar appearance  Labs: ordered. Decision-making details documented in ED Course.  Radiology: ordered and independent interpretation performed. Decision-making details documented in ED Course.     Details: By my review there is no obvious evidence of pulmonary edema, pleural effusion, pneumothorax or focal infiltrate on x-ray imaging.  ECG/medicine tests: ordered and independent interpretation performed. Decision-making details documented in ED Course.    Risk  Drug therapy requiring intensive monitoring for toxicity.  Decision regarding hospitalization.    Critical Care  Total time providing critical care: minutes (I spent a total of 75 minutes of critical care time in obtaining history, performing a physical exam, bedside monitoring of interventions, collecting and interpreting tests and discussion with consultants but not including time spent performing procedures.)        Disposition and Plan     Clinical Impression:  1. Atrial fibrillation with rapid  ventricular response (HCC)    2. Acute congestive heart failure, unspecified heart failure type (HCC)    3. 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  We recommend that you schedule follow up care with a primary care provider within the next three months to obtain basic health screening including reassessment of your blood pressure.      Medications Prescribed:  Current Discharge Medication List              Supplementary Documentation:         Hospital Problems       Present on Admission  Date Reviewed: 9/23/2024            ICD-10-CM Noted POA    * (Principal) Atrial fibrillation with rapid ventricular response (HCC) I48.91 11/3/2024 Unknown

## 2024-11-03 NOTE — ED QUICK NOTES
Pt states chest pain has gone away. No justice/sob. \"If I take a deep breathe it hurts\". No hx Afib

## 2024-11-03 NOTE — ED QUICK NOTES
Orders for admission, patient is aware of plan and ready to go upstairs. Any questions, please call ED RN [place] at extension [phone].     Patient Covid vaccination status: Fully vaccinated     COVID Test Ordered in ED: None    COVID Suspicion at Admission: N/A    Running Infusions:    [id number]      None    Mental Status/LOC at time of transport: AOx4    Other pertinent information:   CIWA score: N/A   NIH score:  N/A

## 2024-11-04 ENCOUNTER — APPOINTMENT (OUTPATIENT)
Dept: CV DIAGNOSTICS | Facility: HOSPITAL | Age: 89
End: 2024-11-04
Attending: INTERNAL MEDICINE
Payer: MEDICARE

## 2024-11-04 PROBLEM — Z51.5 PALLIATIVE CARE BY SPECIALIST: Status: ACTIVE | Noted: 2024-11-04

## 2024-11-04 PROBLEM — Z71.89 GOALS OF CARE, COUNSELING/DISCUSSION: Status: ACTIVE | Noted: 2024-11-04

## 2024-11-04 PROBLEM — Z71.89 ADVANCE CARE PLANNING: Status: ACTIVE | Noted: 2024-11-04

## 2024-11-04 LAB
ANION GAP SERPL CALC-SCNC: 10 MMOL/L (ref 0–18)
ANION GAP SERPL CALC-SCNC: 8 MMOL/L (ref 0–18)
APTT PPP: 47.8 SECONDS (ref 23–36)
APTT PPP: 61.6 SECONDS (ref 23–36)
APTT PPP: 63 SECONDS (ref 23–36)
APTT PPP: 74.8 SECONDS (ref 23–36)
APTT PPP: 82.5 SECONDS (ref 23–36)
BASOPHILS # BLD AUTO: 0.1 X10(3) UL (ref 0–0.2)
BASOPHILS NFR BLD AUTO: 1 %
BUN BLD-MCNC: 18 MG/DL (ref 9–23)
BUN BLD-MCNC: 19 MG/DL (ref 9–23)
BUN/CREAT SERPL: 18.4 (ref 10–20)
BUN/CREAT SERPL: 19.2 (ref 10–20)
CALCIUM BLD-MCNC: 9.4 MG/DL (ref 8.7–10.4)
CALCIUM BLD-MCNC: 9.4 MG/DL (ref 8.7–10.4)
CHLORIDE SERPL-SCNC: 106 MMOL/L (ref 98–112)
CHLORIDE SERPL-SCNC: 108 MMOL/L (ref 98–112)
CO2 SERPL-SCNC: 26 MMOL/L (ref 21–32)
CO2 SERPL-SCNC: 26 MMOL/L (ref 21–32)
CREAT BLD-MCNC: 0.98 MG/DL
CREAT BLD-MCNC: 0.99 MG/DL
DEPRECATED RDW RBC AUTO: 43.1 FL (ref 35.1–46.3)
EGFRCR SERPLBLD CKD-EPI 2021: 71 ML/MIN/1.73M2 (ref 60–?)
EGFRCR SERPLBLD CKD-EPI 2021: 71 ML/MIN/1.73M2 (ref 60–?)
EOSINOPHIL # BLD AUTO: 0.25 X10(3) UL (ref 0–0.7)
EOSINOPHIL NFR BLD AUTO: 2.4 %
ERYTHROCYTE [DISTWIDTH] IN BLOOD BY AUTOMATED COUNT: 18.1 % (ref 11–15)
GLUCOSE BLD-MCNC: 123 MG/DL (ref 70–99)
GLUCOSE BLD-MCNC: 131 MG/DL (ref 70–99)
HCT VFR BLD AUTO: 37.5 %
HGB BLD-MCNC: 11.3 G/DL
IMM GRANULOCYTES # BLD AUTO: 0.07 X10(3) UL (ref 0–1)
IMM GRANULOCYTES NFR BLD: 0.7 %
LYMPHOCYTES # BLD AUTO: 0.54 X10(3) UL (ref 1–4)
LYMPHOCYTES NFR BLD AUTO: 5.3 %
MAGNESIUM SERPL-MCNC: 2 MG/DL (ref 1.6–2.6)
MCH RBC QN AUTO: 21.2 PG (ref 26–34)
MCHC RBC AUTO-ENTMCNC: 30.1 G/DL (ref 31–37)
MCV RBC AUTO: 70.5 FL
MONOCYTES # BLD AUTO: 1.25 X10(3) UL (ref 0.1–1)
MONOCYTES NFR BLD AUTO: 12.2 %
NEUTROPHILS # BLD AUTO: 8.02 X10 (3) UL (ref 1.5–7.7)
NEUTROPHILS # BLD AUTO: 8.02 X10(3) UL (ref 1.5–7.7)
NEUTROPHILS NFR BLD AUTO: 78.4 %
OSMOLALITY SERPL CALC.SUM OF ELEC: 294 MOSM/KG (ref 275–295)
OSMOLALITY SERPL CALC.SUM OF ELEC: 302 MOSM/KG (ref 275–295)
PLATELET # BLD AUTO: 245 10(3)UL (ref 150–450)
PLATELET # BLD AUTO: 245 10(3)UL (ref 150–450)
POTASSIUM SERPL-SCNC: 3.9 MMOL/L (ref 3.5–5.1)
POTASSIUM SERPL-SCNC: 4 MMOL/L (ref 3.5–5.1)
Q-T INTERVAL: 294 MS
QRS DURATION: 96 MS
QTC CALCULATION (BEZET): 479 MS
R AXIS: -27 DEGREES
RBC # BLD AUTO: 5.32 X10(6)UL
SODIUM SERPL-SCNC: 140 MMOL/L (ref 136–145)
SODIUM SERPL-SCNC: 144 MMOL/L (ref 136–145)
T AXIS: -39 DEGREES
VENTRICULAR RATE: 160 BPM
WBC # BLD AUTO: 10.2 X10(3) UL (ref 4–11)

## 2024-11-04 PROCEDURE — 99233 SBSQ HOSP IP/OBS HIGH 50: CPT | Performed by: HOSPITALIST

## 2024-11-04 PROCEDURE — 93306 TTE W/DOPPLER COMPLETE: CPT | Performed by: INTERNAL MEDICINE

## 2024-11-04 PROCEDURE — 99222 1ST HOSP IP/OBS MODERATE 55: CPT | Performed by: REGISTERED NURSE

## 2024-11-04 RX ORDER — NITROFURANTOIN 25; 75 MG/1; MG/1
100 CAPSULE ORAL ONCE
Status: COMPLETED | OUTPATIENT
Start: 2024-11-04 | End: 2024-11-04

## 2024-11-04 RX ORDER — PANTOPRAZOLE SODIUM 40 MG/1
40 TABLET, DELAYED RELEASE ORAL
Status: DISCONTINUED | OUTPATIENT
Start: 2024-11-04 | End: 2024-11-11

## 2024-11-04 RX ORDER — CALCIUM CARBONATE 500 MG/1
500 TABLET, CHEWABLE ORAL EVERY 6 HOURS PRN
Status: DISCONTINUED | OUTPATIENT
Start: 2024-11-04 | End: 2024-11-11

## 2024-11-04 RX ORDER — FINASTERIDE 5 MG/1
5 TABLET, FILM COATED ORAL DAILY
Status: DISCONTINUED | OUTPATIENT
Start: 2024-11-04 | End: 2024-11-11

## 2024-11-04 NOTE — CONSULTS
[facility]  part of [facility]  Palliative Care Initial Consult Note    César Copeland Patient Status:  Inpatient    1930 MRN [id number]   Location [facility] 2W/SW Attending Liliana Trevizo MD   Hosp Day # 1 PCP Jeff Anthony D'Amico, DO     Date of Consult: 2024  Patient seen at: [facility]    The  Cures Act makes medical notes like these available to patients in the interest of transparency. Please be advised this is a medical document. Medical documents are intended to carry relevant information, facts as evident, and the clinical opinion of the practitioner. The medical note is intended as peer to peer communication and may appear blunt or direct. It is written in medical language and may contain abbreviations or verbiage that are unfamiliar.     Reason for Consultation: Consult ordered by:: Dr. Trevizo for evaluation of Palliative Care needs and Advance care planning / HPOA;Goals of care discussion.    Subjective     History of Present Illness: César Copeland is a 89 year old male with history of CAD(declined intervention in the past,) HTN, HLD, BPH, OA who was admitted on 11/3/2024 for SOB and chest pain. See below for reviewed labs and imaging. 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 See below for reviewed imaging.     He is being followed by cardiology and vascular surgery services.    I reviewed labs and imaging-see below. History was obtained from [facility] and pt/family.  Today is day 1 of hospitalization. Pt denies any other recent hospitalizations.    Pt is listed as full code in Epic,  and no POLST on file. I reviewed previously completed HCPOA paperwork which shows his dtr Staci as primary HCPOA. Pt's HCPOA paperwork documents that pt does not want CPR or ventilator.     Reviewed symptom needs past 24 hrs:  none    Patient was seen and examined in bed with his family at bedside. Pt is A&OX4 and appears very frail and thin. He tells me his breathing is ok today, remains on nc2L. No signs of respiratory difficulty. He reports mild intermittent pleuritic chest discomfort with deep inspiration. Denies any other pain symptoms. He reports poor appetite with wt loss, denies abdominal pain, n/v and moved his bowels yesterday. VSS.  See physical exam and ROS below.    Review of Systems/Palliative Care symptom needs assessed:   Pertinent items are noted in HPI/below    Fatigue:  Yes  Dyspnea: denies at rest   Current O2 therapy: nc 2L  Cough: denies  Pain Present: +intermittent pleuritic chest discomfort   Non-verbal signs of pain present: NO  Appetite: poor  Constipation: denies  Diarrhea: denies  Nausea/Vomiting: denies  Depression: denies  Anxiety: denies    Medical History: obtained from [employer]  Past Medical History:    Arthritis    G    Back problem    Blood disorder    BPH (benign prostatic hyperplasia)    Calculus of kidney    Cataract    Coronary atherosclerosis    Essential hypertension    Hearing impairment    hearing aids    History of asbestos exposure    Hyperlipidemia    Lipid screening    Lower GI bleed    Osteoarthritis    Osteoarthritis, shoulder    PONV (postoperative nausea and vomiting)    S/P TURP (transurethral resection of prostate)    Thalassemia trait    Visual impairment     Past Surgical History:   Procedure Laterality Date    Back surgery      C-SPINE SURGERY     Cataract      Colonoscopy      Hernia surgery  2006    Knee replacement surgery      Laminectomy,lumbar  1960    Shoulder arthroscopy  2006    Spine surgery procedure unlisted      Total knee replacement Bilateral        Family History: obtained from [employer]  Family History   Problem Relation Age of Onset    Cancer Father 67        gastric    Stroke Mother 57    Cancer Brother     Cancer Brother        Palliative Care Social History:   Marital  Status:   Children: 3 dtrs Staci, Christi and [place]  Living Situation Prior to Admit: lives alone  Occupational History: Retired, worked in construction    Substance History:   reports that he quit smoking about 53 years ago. His smoking use included cigarettes. He has never used smokeless tobacco.  reports current alcohol use of about 2.0 standard drinks of alcohol per week.  reports no history of drug use.  Hx of Substance Use/Abuse: No    Spiritual Assessment:   Gnosticist: Non-Amish   requested: declined    Allergies:  Allergies[1]    Medications:     Current Facility-Administered Medications:     pantoprazole (Protonix) DR tab 40 mg, 40 mg, Oral, QAM AC    calcium carbonate (Tums) chewable tab 500 mg, 500 mg, Oral, Q6H PRN    finasteride (Proscar) tab 5 mg, 5 mg, Oral, Daily    dilTIAZem (cardIZEM) 100 mg in sodium chloride 0.9% 100 mL IVPB-ADDV, 2.5-20 mg/hr, Intravenous, Continuous    heparin (Porcine) 09125 units/250mL infusion ACS/AFIB CONTINUOUS, 200-3,000 Units/hr, Intravenous, Continuous    melatonin tab 3 mg, 3 mg, Oral, Nightly PRN    acetaminophen (Tylenol Extra Strength) tab 500 mg, 500 mg, Oral, Q4H PRN    benzonatate (Tessalon) cap 200 mg, 200 mg, Oral, TID PRN    ondansetron (Zofran) 4 MG/2ML injection 4 mg, 4 mg, Intravenous, Q6H PRN    metoclopramide (Reglan) 5 mg/mL injection 5 mg, 5 mg, Intravenous, Q8H PRN    polyethylene glycol (PEG 3350) (Miralax) 17 g oral packet 17 g, 17 g, Oral, Daily PRN    sennosides (Senokot) tab 17.2 mg, 17.2 mg, Oral, Nightly PRN    bisacodyl (Dulcolax) 10 MG rectal suppository 10 mg, 10 mg, Rectal, Daily PRN    fleet enema (Fleet) rectal enema 133 mL, 1 enema, Rectal, Once PRN    heparin (Porcine) 84483 units/250mL infusion ACS/AFIB CONTINUOUS, 200-3,000 Units/hr, Intravenous, Continuous    furosemide (Lasix) 10 mg/mL injection 40 mg, 40 mg, Intravenous, BID (Diuretic)    esmolol (Brevibloc) 2000 mg/100mL infusion premix,  mcg/kg/min,  Intravenous, Continuous    Nutritional status:  BMI: 20 Weight: 118  Weight loss: down 12 pounds in past 3 months per EPIC documentation  Current Appetite: Poor  Dysphagia: denies    Functional Status History:  ADLs: now mostly in bed, normally was independent at home, still driving  Recent Falls: denies    Palliative Performance Scale:   Prior to admission: 70%  Observed during hospitalization: 40%  % Ambulation Activity Level Self-Care Intake Consciousness   100 Full  Normal  No Disease Full Normal Full   90 Full  Normal  Some Disease Full Normal Full   80 Full  Normal w/effort  Some Disease Full Normal or reduced Full   70 Reduced  Can't Perform Job  Some Disease Full Normal or reduced Full   60 Reduced  Can't Perform Hobby   Significant Disease Occ Assist Normal or reduced Full or confused   50 Mainly sit/lie Can't do any work  Extensive Disease Partial Assist Normal or reduced Full or confused   40 Mainly in bed Can't do any work  Extensive Disease Mainly Assist Normal or reduced Full or confused   30 Bed Bound Can't do any work  Extensive Disease Max Assist  Total Care Reduced  Drowsy/confused   20 Bed Bound Can't do any work  Extensive Disease Max Assist  Total Care Minimal  Drowsy/confused   10 Bed Bound Can't do any work  Extensive Disease Max Assist  Total Care Mouth Care  Drowsy/confused   0 Death        Objective      Vital Signs:  Blood pressure 103/70, pulse 92, temperature 97 °F (36.1 °C), temperature source Temporal, resp. rate 19, height 5' 4\" (1.626 m), weight 118 lb 9.7 oz (53.8 kg), SpO2 98%.  Body mass index is 20.36 kg/m².  Present Level of pain: 0/10  Non-verbal signs of pain present: No    Physical Exam:  General: Alert and in no apparent distress. Weak, frail/thin appearing  HEENT: No focal deficits.  Cardiac: Regular rate and rhythm, S1, S2 normal, no murmur, rub or gallop.  Lungs: Clear breath sounds bilaterally.  Normal excursions and effort.  Abdomen: Soft, non-tender, normal bowel  sounds X 4 quadrants, no rebound or guarding  Extremities: Without clubbing, cyanosis. Peripheral pulses are 2+. BLE Edema not present  Neurologic: Alert and oriented X4, normal affect.  Skin: Warm and dry.    Hematology:  Lab Results   Component Value Date    WBC 10.2 11/04/2024    HGB 11.3 (L) 11/04/2024    HCT 37.5 (L) 11/04/2024    .0 11/04/2024    .0 11/04/2024       Coags:  Lab Results   Component Value Date    PTT 47.8 (H) 11/04/2024       Chemistry:  Lab Results   Component Value Date    CREATSERUM 0.99 11/04/2024    BUN 19 11/04/2024     11/04/2024    K 4.0 11/04/2024     11/04/2024    CO2 26.0 11/04/2024     (H) 11/04/2024    CA 9.4 11/04/2024    ALB 4.3 05/21/2024    ALKPHO 81 05/21/2024    BILT 1.8 (H) 05/21/2024    TP 6.7 05/21/2024    AST 23 05/21/2024    ALT 14 05/21/2024    PSA 8.71 (H) 08/28/2019    MG 2.0 11/04/2024       Imaging:  CTA CHEST CTA ABDOMEN CTA PELVIS (CPT=71275/06291)    Result Date: 11/3/2024  CONCLUSION:   Unchanged appearance of intraluminal curvilinear density extending from the distal arch of the thoracic aorta beyond the left subclavian artery origin to the upper abdominal aorta, just above the right renal artery origin, without a well-defined intraluminal flap on routine views, but with somewhat more well-defined suggestion of a flap on 3D reformatted imaging.  Configuration of the finding is relatively unchanged since the earlier CT PE examination.  Overall imaging findings are equivocal for  age indeterminate type B/descending aortic dissection.  Differential would include mixing artifact as well as heterogeneous attenuation related to underlying noncalcified atherosclerotic plaque.  Recommend cardiology consultation.  Follow-up CTA recommended to assess for stability of this finding.      Dictated by (CST): Sonja Wilde MD on 11/03/2024 at 8:18 PM     Finalized by (CST): Sonja Wilde MD on 11/03/2024 at 8:36 PM          CT CHEST PE  AORTA (IV ONLY) (CPT=71260)    Result Date: 11/3/2024  CONCLUSION:   Heterogeneous attenuation within the thoracic aorta involving the distal arch to the upper abdominal aorta at the edge of the field of view, with suggestion of internal linear densities in the aortic lumen which could represent a flap.  Recommend aortic protocol CTA chest abdomen to evaluate for acute descending aortic dissection. Findings were discussed with Dr. Dempsey on 11/03/2024 at 7:23 p.m.  No evidence of acute pulmonary embolism to the level of the first order subsegmental pulmonary artery branches.  There is diffuse bronchial wall thickening, which can be seen in acute or chronic bronchitis.  Trace bilateral pleural effusions with dependent opacities at the lung bases thought to represent subsegmental atelectasis.  Extensive degenerative change around the right sternoclavicular articulation, which is thought to correspond to the paratracheal thickening seen on chest radiograph.  Colon interposed anterior to the liver, corresponding to air under the diaphragm on chest radiograph.     Dictated by (CST): Sonja Wilde MD on 11/03/2024 at 7:17 PM     Finalized by (CST): Sonja Wilde MD on 11/03/2024 at 7:25 PM          XR CHEST AP PORTABLE  (CPT=71045)    Result Date: 11/3/2024  CONCLUSION:  Soft tissue thickening in the right paratracheal region.  Underlying consolidation or mass is a possibility.  Correlate contrast enhanced CT chest.   Air under the right hemidiaphragm, thought to be within a portion of the colon interposed between the liver and diaphragm.  Recommend upright chest radiograph for further evaluation if there is concern for pneumoperitoneum.  Alternatively, this could be further evaluated on CT chest examination.  Elevation of the right hemidiaphragm with right basilar opacity thought to represent subsegmental atelectasis.  Left basilar opacity, which may represent subsegmental atelectasis.  Superimposed pneumonitis  is a differential in the appropriate clinical setting.  Small right pleural effusion.      Dictated by (CST): Sonja Wilde MD on 11/03/2024 at 6:31 PM     Finalized by (CST): Sonja Wilde MD on 11/03/2024 at 6:34 PM            Summary of GOC Discussion        I discussed reason for palliative care consultation with patient and his family including his dtrs Staci, Christi and [place].     I differentiated the palliative treatment-focus model versus the hospice comfort-focused philosophy of care. I informed the patient/family that having palliative care support does not limit medical treatment options or decisions to those who wish to continue curative or restorative medical therapies. I discussed the benefits of palliative care to include assistance with arising symptom management needs, an extra layer of support, to ensure GOC are respected throughout healthcare continuum, and assist with transition to hospice care when appropriate.  Palliative care handout provided.    Outpatient/Community Palliative Care Services:  Usually visit once per 4 weeks depending on contracted agency guidelines  Focus on GOC and symptom management   Palliative Care criteria:  Not altered by prognosis   Does not limit curative or restorative therapies      Outpatient Hospice services:  24/7 phone triage services   RN visit one or more times per week depending on need  Home health aid to assist in ADLs/hygiene   Hospice criteria:  Less than six-month prognosis   Must forego most life-prolonging  measures/treatments   Focus solely on comfort   Must sign onto hospice benefit with agency     Background provided by family:  -Pt says he was normally independent prior to admission.     Prognostic awareness/understanding: Good    -I  discussed current clinical condition and explored previous discussions with MDs.    Hopes/goals/concerns:   -We talked about his possible type B aortic dissection which may be chronic. They tell me he would not  want to be put through any invasive surgery at his age and prefers medical management.     -Pt is hopeful his condition will improve with ongoing supportive care. He wants to return home on dc. Recommended having a community palliative care program following him on dc for extra support and ongoing GOC over time.     -They are agreeable to palliative care following and GOC discussions will be ongoing pending his course.      Advance Care Planning counseling and discussion:     -I discussed the importance of advance care planning prior to crisis with pt. I confirmed that patient's HCPOA is his dtr Staci. Reviewed his previously completed wishes on this document where he clearly specified he doesn't want CPR/ventilator.     -I addressed pt's code status discussed the risks vs benefits of life sustaining treatments in the setting of advancing age and in his clinical picture.     -Patient expressed wishes for DNAR, DNI-selective, no feeding tubes and  continue supportive medical care. Discussed importance of POLST form completion prior to dc so wishes are respected across all healthcare continuum. Reviewed POLST form with pt and he prefers to complete document when he doesn't have all his visitors here.     -Provided emotional support to pt/family who are coping adequately.     Procedures:  No intubation  No feeding tubes    Assessment and Recommendations      Problem List:    Afib with RVR  Abnormal CTA-possible type B ascending aortic dissection  Chest pain  CAD  HTN  HLD  BPH  Recent UTI    Goals of care counseling  -see above for details  -Confirmed wishes for DNAR/DNI-selective and continue supportive care.  -Ongoing GOC discussions will be needed over time.  -Agreeable to palliative care following  -Dispo:  Return home on dc. Recommended community palliative care program to follow on dc. SW to help with dc planning.   -Provided emotional support to pt/family who are coping adequately.    Advance care planning  -see  above for details  -Pt's dtr Staci Wheeler is HCPOA #[phone].  -Previously completed HCPOA paperwork on file in EPIC.   -Provided [place] of [place] for review and will assist with completion prior to dc.    Palliative Performance Scale 40%    Emotion support provided to patient/family today: Yes    A total of 55 mins were spent on this consult, which included all of the following:direct face to face contact, history taking, physical examination, and >50% was spent counseling and coordinating care.    Discussed today's visit with message to Dr. Trevizo and Nat SAMPSON.    I will continue to follow clinically.    Thank you for allowing Palliative Care services to participate in the care of Mr. César Copeland.       Kat Shearer, ANP-BC, [facility] [id number]  11/4/2024  3:51 PM  Palliative Care Services        [1]   Allergies  Allergen Reactions    Codeine NAUSEA AND VOMITING     \"Nausea & Vomiting x2 days\"    Penicillins HIVES    Bactrim [Sulfamethoxazole W/Trimethoprim] RASH

## 2024-11-04 NOTE — PLAN OF CARE
Pt. Received @ 1883 by this nurse. AxO x4 and following commands. Hep gtt and Esmolol gtt going. Next pTT scheduled. Call bell w/in reach and bed in lowest position.   Problem: Patient Centered Care  Goal: Patient preferences are identified and integrated in the patient's plan of care  Description: Interventions:  - Provide timely, complete, and accurate information to patient/family  - Incorporate patient and family knowledge, values, beliefs, and cultural backgrounds into the planning and delivery of care  - Encourage patient/family to participate in care and decision-making at the level they choose  - Honor patient and family perspectives and choices  Outcome: Progressing     Problem: Patient/Family Goals  Goal: Patient/Family Long Term Goal    Interventions:  - See additional Care Plan goals for specific interventions  Outcome: Progressing  Goal: Patient/Family Short Term Goal    Interventions:   - See additional Care Plan goals for specific interventions  Outcome: Progressing     Problem: CARDIOVASCULAR - ADULT  Goal: Maintains optimal cardiac output and hemodynamic stability  Description: INTERVENTIONS:  - Monitor vital signs, rhythm, and trends  - Monitor for bleeding, hypotension and signs of decreased cardiac output  - Evaluate effectiveness of vasoactive medications to optimize hemodynamic stability  - Monitor arterial and/or venous puncture sites for bleeding and/or hematoma  - Assess quality of pulses, skin color and temperature  - Assess for signs of decreased coronary artery perfusion - ex. Angina  - Evaluate fluid balance, assess for edema, trend weights  Outcome: Progressing  Goal: Absence of cardiac arrhythmias or at baseline  Description: INTERVENTIONS:  - Continuous cardiac monitoring, monitor vital signs, obtain 12 lead EKG if indicated  - Evaluate effectiveness of antiarrhythmic and heart rate control medications as ordered  - Initiate emergency measures for life threatening arrhythmias  -  Monitor electrolytes and administer replacement therapy as ordered  Outcome: Progressing

## 2024-11-04 NOTE — PAYOR COMM NOTE
--------------  ADMISSION REVIEW     Payor: [employer] MED[employer] ADV PPO  Subscriber #:  [id number]  Authorization Number: [id number]    Admit date: 11/3/24  Admit time: 11:09 PM       History   HPI  89-year-old male with history of reported cardiac disease without intervention in the past about 10 years ago states last few days been short of breath.  Supple chest discomfort.  Lives alone and cares for himself.    ED Triage Vitals [11/03/24 1625]   /84   Pulse (!) 137   Resp 22   Temp 97.4 °F (36.3 °C)   Temp src Temporal   SpO2 94 %   O2 Device None (Room air)   Head: Normocephalic and atraumatic.   Eyes: Conjunctivae are normal. Pupils are equal, round, and reactive to light.   Neck: Normal range of motion. Neck supple.  No obvious JVD  Cardiovascular: Tachycardic with irregular rhythm and intact and equal distal pulses.    Pulmonary/Chest:Mild tachypnea, questionable crackles at both bases  Abdominal: Soft. There is no tenderness. There is no guarding.   Musculoskeletal: Normal range of motion. No edema or tenderness.   Neurological: Alert and oriented to person, place, and time.  No gross focal deficits  Skin: Skin is warm and dry.   Psychiatric: Normal mood and affect.  Behavior is normal.     Labs Reviewed   BASIC METABOLIC PANEL (8) - Abnormal; Notable for the following components:       Result Value    Glucose 224 (*)     BUN/CREA Ratio 20.4 (*)     Calculated Osmolality 304 (*)     All other components within normal limits   CBC WITH DIFFERENTIAL WITH PLATELET - Abnormal; Notable for the following components:    HGB 11.7 (*)     MCV 70.9 (*)     MCH 21.1 (*)     MCHC 29.8 (*)     RDW 18.1 (*)     Lymphocyte Absolute 0.75 (*)     All other components within normal limits   PRO BETA NATRIURETIC PEPTIDE - Abnormal; Notable for the following components:    Pro-Beta Natriuretic Peptide 1,002 (*)    EKG    Rate, intervals and axes as noted on EKG Report.  Rate: 160  Rhythm: Sinus Rhythm  Reading: ST depression  likely resultant from subendocardial effects, plical widened QRS, no gross acute ST elevation    CTA CHEST CTA ABDOMEN CTA PELVIS (CPT=71275/59883)  Result Date: 11/3/2024  CONCLUSION:   Unchanged appearance of intraluminal curvilinear density extending from the distal arch of the thoracic aorta beyond the left subclavian artery origin to the upper abdominal aorta, just above the right renal artery origin, without a well-defined intraluminal flap on routine views, but with somewhat more well-defined suggestion of a flap on 3D reformatted imaging.  Configuration of the finding is relatively unchanged since the earlier CT PE examination.  Overall imaging findings are equivocal for  age indeterminate type B/descending aortic dissection.  Differential would include mixing artifact as well as heterogeneous attenuation related to underlying noncalcified atherosclerotic plaque.  Recommend cardiology consultation.  Follow-up CTA recommended to assess for stability of this finding.      Dictated by (CST): Sonja Wilde MD on 11/03/2024 at 8:18 PM     Finalized by (CST): Sonja Wilde MD on 11/03/2024 at 8:36 PM          CT CHEST PE AORTA (IV ONLY) (CPT=71260)  Result Date: 11/3/2024  CONCLUSION:   Heterogeneous attenuation within the thoracic aorta involving the distal arch to the upper abdominal aorta at the edge of the field of view, with suggestion of internal linear densities in the aortic lumen which could represent a flap.  Recommend aortic protocol CTA chest abdomen to evaluate for acute descending aortic dissection. Findings were discussed with Dr. Dempsey on 11/03/2024 at 7:23 p.m.  No evidence of acute pulmonary embolism to the level of the first order subsegmental pulmonary artery branches.  There is diffuse bronchial wall thickening, which can be seen in acute or chronic bronchitis.  Trace bilateral pleural effusions with dependent opacities at the lung bases thought to represent subsegmental atelectasis.   Extensive degenerative change around the right sternoclavicular articulation, which is thought to correspond to the paratracheal thickening seen on chest radiograph.  Colon interposed anterior to the liver, corresponding to air under the diaphragm on chest radiograph.     Dictated by (CST): Sonja Wilde MD on 11/03/2024 at 7:17 PM     Finalized by (CST): Sonja Wilde MD on 11/03/2024 at 7:25 PM          XR CHEST AP PORTABLE  (CPT=71045)  Result Date: 11/3/2024  CONCLUSION:  Soft tissue thickening in the right paratracheal region.  Underlying consolidation or mass is a possibility.  Correlate contrast enhanced CT chest.   Air under the right hemidiaphragm, thought to be within a portion of the colon interposed between the liver and diaphragm.  Recommend upright chest radiograph for further evaluation if there is concern for pneumoperitoneum.  Alternatively, this could be further evaluated on CT chest examination.  Elevation of the right hemidiaphragm with right basilar opacity thought to represent subsegmental atelectasis.  Left basilar opacity, which may represent subsegmental atelectasis.  Superimposed pneumonitis is a differential in the appropriate clinical setting.  Small right pleural effusion.      Dictated by (CST): Sonja Wilde MD on 11/03/2024 at 6:31 PM     Finalized by (CST): Sonja Wilde MD on 11/03/2024 at 6:34 PM         Admission disposition: 11/3/2024  9:24 PM    Disposition and Plan   Clinical Impression:  1. Atrial fibrillation with rapid ventricular response (HCC)    2. Acute congestive heart failure, unspecified heart failure type (HCC)    3. 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 He decided to try to sleep it off but when itcontinued this morning, the patient decided to come in for further care.   He denied any recent illness or travel and stated he has been compliant with all his home meds.     BP (!) 123/92   Pulse (!) 125   Temp 97.4 °F (36.3 °C) (Temporal)   Resp 20   Ht 5' 4\" (1.626 m)   Wt 123 lb 7.3 oz (56 kg)   SpO2 96%   BMI 21.19 kg/m²   General: No acute distress. Alert and oriented x 3.  Respiratory: Clear to auscultation bilaterally. No wheezes. No rhonchi.  Cardiovascular: S1, S2. Afib.0  Abdomen: Soft, nontender, nondistended.  Positive bowel sounds. No rebound, guarding or organomegaly.  Neurologic: No focal neurological deficits. CNII-XII grossly intact.  Extremities: No edema or cyanosis.  Lab 11/03/24  1713   WBC 9.7   HGB 11.7*   MCV 70.9*   .0      Lab 11/03/24  1713   *   BUN 20   CREATSERUM 0.98   CA 9.3      K 3.9      CO2 25.0       ASSESSMENT / PLAN:      Chest pain, sob  New onset Afib  CAD - Hx of multivessel disease found 10 years ago, declined CABG  BNP elevated  CXR reviewed  CT chest pending  Trop and EKG reviewed - Rule out ACS  Lasix given in ER  Started on heparin drip  Echo pending  Cardiology on consult  Appreciate recs  HTN  HLD  Continue home meds  BPH  Continue home meds  Recent UTI  Completed course of abx         11/4/24  CARDIOLOGY  History of Present Illness:   Patient is a 89 year old male with sig PMH/o CAD, hypertension who presents with new onset CP and SOB. In the ED, found to be tachycardic. Rhythm strips and ECG confirming AF with RVR. CTA chest without PE, but did show a descending aortic dissection (distal arch after L subclavian to upper abdominal aorta above the R renal artery).   Initially started on IV BB gtt for heart rate control however BP has been labile.   Labs with negative hs-trop (19).      Assessment and Plan:   Type B aortic dissection  -CTA with dissection at the distal  arch after L subclavian to upper abdominal aorta above the R renal artery  -negative ECG and troponin, no associated ACS  -CT and vascular surgery consulted  -obtain TTE  -continue HR and BP control     AF with RVR, new onset  -presented with CP and SOB  -found to be in AF with RVR  -on IV heparin gtt  -resumed low dose esmolol, rate controlled     CAD   -h/o severe multivessel CAD in 2015, declined revascularization at the time  -currently no signs of ACS, hs-Tn and ECG negative for acute ischemia       Physical Exam:  General: Alert and oriented x 3.   HEENT: Normocephalic, anicteric sclera, neck supple, no thyromegaly or adenopathy.  Neck: No JVD, carotids 2+, no bruits.  Cardiac: Regular rate and rhythm. S1, S2 normal.  Lungs: Clear without wheezes, rales, rhonchi or dullness.  Abdomen: Soft, non-tender. No organosplenomegally, mass or rebound, BS-present.  Extremities: Without clubbing or cyanosis. No edema.    Neurologic: Alert and oriented, normal affect. No focal defects  Skin: Warm and dry.      Lab Results   Component Value Date     WBC 10.2 11/04/2024     HGB 11.3 (L) 11/04/2024     HCT 37.5 (L) 11/04/2024     .0 11/04/2024     .0 11/04/2024     CREATSERUM 0.99 11/04/2024     BUN 19 11/04/2024      11/04/2024     K 4.0 11/04/2024      11/04/2024     CO2 26.0 11/04/2024      (H) 11/04/2024     CA 9.4 11/04/2024         MEDICATIONS ADMINISTERED IN LAST 1 DAY:  heparin (Porcine) 1000 UNIT/ML injection - BOLUS IV 3,400 Units       Date Action Dose Route User    11/3/2024 1918 Given 3,400 Units Intravenous Narinder Ureña, RN          heparin (Porcine) 45209 units/250mL infusion ACS/AFIB CONTINUOUS       Date Action Dose Route User    11/4/2024 1508 New Bag 700 Units/hr Intravenous Nat Oliva, RN    11/4/2024 0629 New Bag 600 Units/hr Intravenous Mireya Rodriguez RN    11/4/2024 0413 New Bag 700 Units/hr Intravenous Rodriguez, CAMILLA Gomes          heparin (Porcine) 39522  units/250mL infusion ACS/AFIB CONTINUOUS       Date Action Dose Route User    11/4/2024 0049 New Bag 700 Units/hr Intravenous Mireya Rodriguez RN          dilTIAZem (cardIZEM) 100 mg in sodium chloride 0.9% 100 mL IVPB-ADDV       Date Action Dose Route User    11/3/2024 1916 Rate/Dose Change 20 mg/hr Intravenous Narinder Ureña RN    11/3/2024 1900 Rate/Dose Change 15 mg/hr Intravenous Virginia Hua RN    11/3/2024 1758 New Bag 10 mg/hr Intravenous Virginia Hua RN    11/3/2024 1700 Rate/Dose Change 10 mg/hr Intravenous Virginia Hua RN          dilTIAZem 10 mg BOLUS FROM BAG infusion       Date Action Dose Route User    11/3/2024 1748 Bolus from Bag 10 mg Intravenous Virginia Hua RN          heparin (Porcine) 96356 units/250mL infusion ED INITIAL DOSE       Date Action Dose Route User    11/3/2024 2128 New Bag 12 Units/kg/hr × 56 kg Intravenous Jasmin Palacios RN     finasteride (Proscar) tab 5 mg       Date Action Dose Route User    11/4/2024 1503 Given 5 mg Oral Nat Oliva RN          furosemide (Lasix) 10 mg/mL injection 40 mg       Date Action Dose Route User    11/3/2024 1900 Given 40 mg Intravenous Virginia Hua RN       magnesium oxide (Mag-Ox) tab 400 mg       Date Action Dose Route User    11/4/2024 0011 Given 400 mg Oral Mireya Rodriguez RN          metoprolol (Lopressor) 5 mg/5mL injection 2.5 mg       Date Action Dose Route User    11/3/2024 2007 Given 2.5 mg Intravenous Narinder Ureña RN          nitrofurantoin monohydrate macro (Macrobid) cap 100 mg       Date Action Dose Route User    11/3/2024 1818 Given 100 mg Oral Virginia Hua RN          nitrofurantoin monohydrate macro (Macrobid) cap 100 mg       Date Action Dose Route User    11/4/2024 1503 Given 100 mg Oral Nat Oliva RN          pantoprazole (Protonix) DR tab 40 mg       Date Action Dose Route User    11/4/2024 1229 Given 40 mg Oral Nat Oliva, RN            Vitals (last day)       Date/Time Temp Pulse  Resp BP SpO2 Weight O2 Device O2 Flow Rate (L/min) [facility]    11/04/24 1300 -- 80 24 122/66 98 % -- -- --     11/04/24 1200 97 °F (36.1 °C) 78 23 111/85 100 % -- Nasal cannula 2 L/min     11/04/24 0800 96.5 °F (35.8 °C) 72 25 72/53 93 % -- Nasal cannula 2 L/min     11/04/24 0745 -- 72 16 84/72 97 % -- -- --     11/04/24 0400 97.2 °F (36.2 °C) 66 19 92/57 94 % -- Nasal cannula 2 L/min     11/04/24 0300 -- 73 22 84/59 98 % -- Nasal cannula 2 L/min     11/04/24 0000 98 °F (36.7 °C) 76 16 94/60 97 % -- Nasal cannula 2 L/min     11/03/24 2323 97.7 °F (36.5 °C) 79 16 81/72 97 % 118 lb 9.7 oz (53.8 kg) Nasal cannula 2 L/min     11/03/24 2300 -- 70 26 95/62 97 % -- Nasal cannula 2 L/min     11/03/24 2250 -- 71 16 84/57 96 % -- Nasal cannula 2 L/min     11/03/24 2245 -- 73 16 87/56 98 % -- Nasal cannula --     11/03/24 2240 -- 71 20 99/68 98 % -- Nasal cannula --     11/03/24 2230 -- 71 18 88/65 97 % -- Nasal cannula 2 L/min     11/03/24 2225 -- 62 20 97/58 100 % -- Nasal cannula --     11/03/24 2218 -- 85 20 81/62 95 % -- None (Room air) --     11/03/24 2000 -- 143 18 144/92 95 % -- None (Room air) --     11/03/24 1845 -- 125 20 -- 96 % -- -- --     11/03/24 1830 -- 131 19 -- 93 % -- High flow/High humidity --     11/03/24 1800 -- 138 20 -- 95 % -- None (Room air) --     11/03/24 1730 -- 155 26 123/92 93 % -- None (Room air) --     11/03/24 1625 97.4 °F (36.3 °C) 137 22 123/84 94 % -- None (Room air) -- RC

## 2024-11-04 NOTE — ED QUICK NOTES
Patient resting on cot comfortably. Male external catheter in place. Updated on POC and is agreeable.

## 2024-11-04 NOTE — CONSULTS
[facility]  part of [facility]    Cardiology Consultation    César Copeland Patient Status:  Inpatient    1930 MRN [id number]   Location [facility] 2W/SW Attending Liliana Trevizo MD   Hosp Day # 1 PCP Jeff Anthony D'Amico, DO     Date of Admission:  11/3/2024  Date of Consult:  2024  Reason for Consultation:     History of Present Illness:   Patient is a 89 year old male with sig PMH/o CAD, hypertension who presents with new onset CP and SOB. In the ED, found to be tachycardic. Rhythm strips and ECG confirming AF with RVR. CTA chest without PE, but did show a descending aortic dissection (distal arch after L subclavian to upper abdominal aorta above the R renal artery).   Initially started on IV BB gtt for heart rate control however BP has been labile.   Labs with negative hs-trop (19).     Assessment and Plan:   Type B aortic dissection  -CTA with dissection at the distal arch after L subclavian to upper abdominal aorta above the R renal artery  -negative ECG and troponin, no associated ACS  -CT and vascular surgery consulted  -obtain TTE  -continue HR and BP control    AF with RVR, new onset  -presented with CP and SOB  -found to be in AF with RVR  -on IV heparin gtt  -resumed low dose esmolol, rate controlled    CAD   -h/o severe multivessel CAD in , declined revascularization at the time  -currently no signs of ACS, hs-Tn and ECG negative for acute ischemia    Past Medical History  Past Medical History:    Arthritis    G    Back problem    Blood disorder    BPH (benign prostatic hyperplasia)    Calculus of kidney    Cataract    Coronary atherosclerosis    Essential hypertension    Hearing impairment    hearing aids    History of asbestos exposure    Hyperlipidemia    Lipid screening    Lower GI bleed    Osteoarthritis    Osteoarthritis, shoulder    PONV (postoperative nausea and vomiting)    S/P TURP (transurethral resection of prostate)    Thalassemia trait     Visual impairment       Past Surgical History  Past Surgical History:   Procedure Laterality Date    Back surgery      C-SPINE SURGERY     Cataract      Colonoscopy      Hernia surgery  2006    Knee replacement surgery      Laminectomy,lumbar  1960    Shoulder arthroscopy  2006    Spine surgery procedure unlisted      Total knee replacement Bilateral        Family History  Family History   Problem Relation Age of Onset    Cancer Father 67        gastric    Stroke Mother 57    Cancer Brother     Cancer Brother        Social History  Pediatric History   Patient Parents    Not on file     Other Topics Concern     Service Not Asked    Blood Transfusions Not Asked    Caffeine Concern Yes     Comment: Coffee 3 cups daily    Occupational Exposure Not Asked    Hobby Hazards Not Asked    Sleep Concern Not Asked    Stress Concern Not Asked    Weight Concern Not Asked    Special Diet Not Asked    Back Care Not Asked    Exercise Not Asked    Bike Helmet Not Asked    Seat Belt Not Asked    Self-Exams Not Asked   Social History Narrative    Not on file           Current Medications:  Current Facility-Administered Medications   Medication Dose Route Frequency    dilTIAZem (cardIZEM) 100 mg in sodium chloride 0.9% 100 mL IVPB-ADDV  2.5-20 mg/hr Intravenous Continuous    heparin (Porcine) 82859 units/250mL infusion ACS/AFIB CONTINUOUS  200-3,000 Units/hr Intravenous Continuous    melatonin tab 3 mg  3 mg Oral Nightly PRN    acetaminophen (Tylenol Extra Strength) tab 500 mg  500 mg Oral Q4H PRN    benzonatate (Tessalon) cap 200 mg  200 mg Oral TID PRN    ondansetron (Zofran) 4 MG/2ML injection 4 mg  4 mg Intravenous Q6H PRN    metoclopramide (Reglan) 5 mg/mL injection 5 mg  5 mg Intravenous Q8H PRN    polyethylene glycol (PEG 3350) (Miralax) 17 g oral packet 17 g  17 g Oral Daily PRN    sennosides (Senokot) tab 17.2 mg  17.2 mg Oral Nightly PRN    bisacodyl (Dulcolax) 10 MG rectal suppository 10 mg  10 mg Rectal Daily PRN     fleet enema (Fleet) rectal enema 133 mL  1 enema Rectal Once PRN    heparin (Porcine) 35409 units/250mL infusion ACS/AFIB CONTINUOUS  200-3,000 Units/hr Intravenous Continuous    furosemide (Lasix) 10 mg/mL injection 40 mg  40 mg Intravenous BID (Diuretic)    esmolol (Brevibloc) 2000 mg/100mL infusion premix   mcg/kg/min Intravenous Continuous     Medications Prior to Admission   Medication Sig    metoprolol succinate ER 25 MG Oral Tablet 24 Hr Take 1 tablet (25 mg total) by mouth daily.    nitrofurantoin monohydrate macro 100 MG Oral Cap Take 1 capsule (100 mg total) by mouth 2 (two) times daily for 5 days.    ATORVASTATIN 10 MG Oral Tab TAKE 1 TABLET DAILY FOR CHOLESTEROL    dutasteride 0.5 MG Oral Cap Take 1 capsule (0.5 mg total) by mouth daily.    tamsulosin 0.4 MG Oral Cap Take 1 capsule (0.4 mg total) by mouth daily. Take 1/2 hour following the same meal each day    PANTOPRAZOLE 40 MG Oral Tab EC TAKE 1 TABLET DAILY    aspirin 81 MG Oral Chew Tab Chew 1 tablet (81 mg total) by mouth daily.    Tadalafil 10 MG Oral Tab Take 1 tablet (10 mg total) by mouth daily as needed for Erectile Dysfunction.       Allergies  Allergies[1]    Review of Systems:   ROS  10 point review of systems completed and negative except as noted.    Physical Exam:   Patient Vitals for the past 24 hrs:   BP Temp Temp src Pulse Resp SpO2 Height Weight   11/04/24 0800 (!) 72/53 96.5 °F (35.8 °C) Temporal 72 25 93 % -- --   11/04/24 0745 (!) 84/72 -- -- 72 16 97 % -- --   11/04/24 0730 102/83 -- -- 68 16 99 % -- --   11/04/24 0700 (!) 122/99 -- -- 69 10 98 % -- --   11/04/24 0600 90/65 -- -- 70 21 100 % -- --   11/04/24 0500 102/66 -- -- 67 21 98 % -- --   11/04/24 0400 92/57 97.2 °F (36.2 °C) Temporal 66 19 94 % -- --   11/04/24 0300 (!) 84/59 -- -- 73 22 98 % -- --   11/04/24 0200 (!) 89/58 -- -- 68 (!) 6 96 % -- --   11/04/24 0100 93/58 -- -- 73 18 97 % -- --   11/04/24 0030 104/66 -- -- 74 21 99 % -- --   11/04/24 0000 94/60 98 °F  (36.7 °C) Temporal 76 16 97 % -- --   11/03/24 2345 99/62 -- -- 80 18 93 % -- --   11/03/24 2330 -- -- -- 75 21 100 % -- --   11/03/24 2323 (!) 81/72 97.7 °F (36.5 °C) Temporal 79 16 97 % -- 118 lb 9.7 oz (53.8 kg)   11/03/24 2300 95/62 -- -- 70 26 97 % -- --   11/03/24 2250 (!) 84/57 -- -- 71 16 96 % -- --   11/03/24 2245 (!) 87/56 -- -- 73 16 98 % -- --   11/03/24 2240 99/68 -- -- 71 20 98 % -- --   11/03/24 2230 (!) 88/65 -- -- 71 18 97 % -- --   11/03/24 2225 97/58 -- -- 62 20 100 % -- --   11/03/24 2218 (!) 81/62 -- -- 85 20 95 % -- --   11/03/24 2210 99/74 -- -- 67 17 99 % -- --   11/03/24 2205 97/65 -- -- 93 18 100 % -- --   11/03/24 2200 116/60 -- -- 71 13 96 % -- --   11/03/24 2155 94/67 -- -- 85 20 100 % -- --   11/03/24 2151 104/67 -- -- 98 20 99 % -- --   11/03/24 2145 95/64 -- -- 86 24 97 % -- --   11/03/24 2140 104/70 -- -- 94 -- 95 % -- --   11/03/24 2135 (!) 125/94 -- -- 91 22 94 % -- --   11/03/24 2030 101/82 -- -- 105 14 93 % -- --   11/03/24 2015 98/71 -- -- 90 25 94 % -- --   11/03/24 2005 109/82 -- -- (!) 133 -- -- -- --   11/03/24 2000 (!) 144/92 -- -- (!) 143 18 95 % -- --   11/03/24 1845 -- -- -- (!) 125 20 96 % -- --   11/03/24 1830 -- -- -- (!) 131 19 93 % -- --   11/03/24 1800 -- -- -- (!) 138 20 95 % -- --   11/03/24 1744 -- -- -- -- -- -- -- 123 lb 7.3 oz (56 kg)   11/03/24 1730 (!) 123/92 -- -- (!) 155 26 93 % -- --   11/03/24 1631 -- -- -- -- -- -- 5' 4\" (1.626 m) 124 lb (56.2 kg)   11/03/24 1625 123/84 97.4 °F (36.3 °C) Temporal (!) 137 22 94 % -- --       Intake/Output:   Last 3 shifts:   Intake/Output                   11/02/24 0700 - 11/03/24 0659 (Not Admitted) 11/03/24 0700 - 11/04/24 0659 11/04/24 0700 - 11/05/24 0659       Intake    I.V.  --  249.1  --    Volume (mL) Heparin -- 62.3 --    Volume (mL) Esmolol -- 186.8 --    Total Intake -- 249.1 --       Output    Urine  --  700  --    Urine -- 500 --    Output (mL) (External Urinary Catheter) -- 200 --    Total Output -- 700 --        Net I/O     -- -450.9 --          Vent Settings:    Hemodynamic parameters (last 24 hours):    Scheduled Meds:    furosemide  40 mg Intravenous BID (Diuretic)     Continuous Infusions:    dilTIAZem Stopped (11/03/24 2127)    continuous dose heparin 600 Units/hr (11/04/24 0629)    continuous dose heparin 700 Units/hr (11/04/24 0049)    esmolol Stopped (11/04/24 0803)       Physical Exam:  General: Alert and oriented x 3. No apparent distress.   HEENT: Normocephalic, anicteric sclera, neck supple, no thyromegaly or adenopathy.  Neck: No JVD, carotids 2+, no bruits.  Cardiac: Regular rate and rhythm. S1, S2 normal.  Lungs: Clear without wheezes, rales, rhonchi or dullness.  Abdomen: Soft, non-tender. No organosplenomegally, mass or rebound, BS-present.  Extremities: Without clubbing or cyanosis. No edema.    Neurologic: Alert and oriented, normal affect. No focal defects  Skin: Warm and dry.     Results:   Laboratory Data:  Lab Results   Component Value Date    WBC 10.2 11/04/2024    HGB 11.3 (L) 11/04/2024    HCT 37.5 (L) 11/04/2024    .0 11/04/2024    .0 11/04/2024    CREATSERUM 0.99 11/04/2024    BUN 19 11/04/2024     11/04/2024    K 4.0 11/04/2024     11/04/2024    CO2 26.0 11/04/2024     (H) 11/04/2024    CA 9.4 11/04/2024    ALB 4.3 05/21/2024    ALKPHO 81 05/21/2024    TP 6.7 05/21/2024    AST 23 05/21/2024    ALT 14 05/21/2024    PTT 82.5 (H) 11/04/2024    T4F 0.9 10/03/2022    TSH 4.464 11/07/2023    PSA 8.71 (H) 08/28/2019    MG 2.0 11/04/2024    B12 746 09/06/2018         Recent Labs   Lab 11/03/24  1713 11/03/24  2335 11/04/24  0309   * 131* 123*   BUN 20 18 19   CREATSERUM 0.98 0.98 0.99   CA 9.3 9.4 9.4    140 144   K 3.9 3.9 4.0    106 108   CO2 25.0 26.0 26.0     Recent Labs   Lab 11/03/24  1713 11/03/24  2335 11/04/24  0309   RBC 5.54 5.63 5.32   HGB 11.7* 12.0* 11.3*   HCT 39.3 39.2 37.5*   MCV 70.9* 69.6* 70.5*   MCH 21.1* 21.3* 21.2*   MCHC  29.8* 30.6* 30.1*   RDW 18.1* 18.3* 18.1*   NEPRELIM 7.65  --  8.02*   WBC 9.7 11.1* 10.2   .0 267.0 245.0  245.0       No results for input(s): \"BNPML\" in the last 168 hours.    No results for input(s): \"TROP\", \"CK\" in the last 168 hours.    Imaging:  CTA CHEST CTA ABDOMEN CTA PELVIS (CPT=71275/86075)    Result Date: 11/3/2024  CONCLUSION:   Unchanged appearance of intraluminal curvilinear density extending from the distal arch of the thoracic aorta beyond the left subclavian artery origin to the upper abdominal aorta, just above the right renal artery origin, without a well-defined intraluminal flap on routine views, but with somewhat more well-defined suggestion of a flap on 3D reformatted imaging.  Configuration of the finding is relatively unchanged since the earlier CT PE examination.  Overall imaging findings are equivocal for  age indeterminate type B/descending aortic dissection.  Differential would include mixing artifact as well as heterogeneous attenuation related to underlying noncalcified atherosclerotic plaque.  Recommend cardiology consultation.  Follow-up CTA recommended to assess for stability of this finding.      Dictated by (CST): Sonja Wilde MD on 11/03/2024 at 8:18 PM     Finalized by (CST): Sonja Wilde MD on 11/03/2024 at 8:36 PM          CT CHEST PE AORTA (IV ONLY) (CPT=71260)    Result Date: 11/3/2024  CONCLUSION:   Heterogeneous attenuation within the thoracic aorta involving the distal arch to the upper abdominal aorta at the edge of the field of view, with suggestion of internal linear densities in the aortic lumen which could represent a flap.  Recommend aortic protocol CTA chest abdomen to evaluate for acute descending aortic dissection. Findings were discussed with Dr. Dempsey on 11/03/2024 at 7:23 p.m.  No evidence of acute pulmonary embolism to the level of the first order subsegmental pulmonary artery branches.  There is diffuse bronchial wall thickening, which  can be seen in acute or chronic bronchitis.  Trace bilateral pleural effusions with dependent opacities at the lung bases thought to represent subsegmental atelectasis.  Extensive degenerative change around the right sternoclavicular articulation, which is thought to correspond to the paratracheal thickening seen on chest radiograph.  Colon interposed anterior to the liver, corresponding to air under the diaphragm on chest radiograph.     Dictated by (CST): Sonja Wilde MD on 11/03/2024 at 7:17 PM     Finalized by (CST): Sonja Wilde MD on 11/03/2024 at 7:25 PM          XR CHEST AP PORTABLE  (CPT=71045)    Result Date: 11/3/2024  CONCLUSION:  Soft tissue thickening in the right paratracheal region.  Underlying consolidation or mass is a possibility.  Correlate contrast enhanced CT chest.   Air under the right hemidiaphragm, thought to be within a portion of the colon interposed between the liver and diaphragm.  Recommend upright chest radiograph for further evaluation if there is concern for pneumoperitoneum.  Alternatively, this could be further evaluated on CT chest examination.  Elevation of the right hemidiaphragm with right basilar opacity thought to represent subsegmental atelectasis.  Left basilar opacity, which may represent subsegmental atelectasis.  Superimposed pneumonitis is a differential in the appropriate clinical setting.  Small right pleural effusion.      Dictated by (CST): Sonja Wilde MD on 11/03/2024 at 6:31 PM     Finalized by (CST): Sonja Wilde MD on 11/03/2024 at 6:34 PM           Thank you for allowing me to participate in the care of your patient.    Tayo Miller, DO  [facility]         [1]   Allergies  Allergen Reactions    Codeine NAUSEA AND VOMITING     \"Nausea & Vomiting x2 days\"    Penicillins HIVES    Bactrim [Sulfamethoxazole W/Trimethoprim] RASH

## 2024-11-04 NOTE — CONSULTS
Vascular Surgery Consult Note     Name: César Copeland  MRN: [id number]  : 1930    Subjective:   HPI:   89-year-old male was admitted yesterday evening with new onset chest pain/SOB.  He was found to be in A-fib with RVR.  He is currently on diltiazem and esmolol, now rate controlled.  CT was obtained in the emergency department, demonstrating an age indeterminant dissection/IMH of the descending thoracic aorta.  There is no evidence of malperfusion.  Highest blood pressure recorded this admission was systolic of 144 mmHg.  On exam, patient denies any abdominal pain, nausea, vomiting.  He currently has no chest pain, states it is only when he is breathing heavily.  He is neurologically intact.    ROS:  A comprehensive 10 point review of systems was completed.  Pertinent positives and negatives noted in the the HPI.    Past Medical History:    Arthritis    G    Back problem    Blood disorder    BPH (benign prostatic hyperplasia)    Calculus of kidney    Cataract    Coronary atherosclerosis    Essential hypertension    Hearing impairment    hearing aids    History of asbestos exposure    Hyperlipidemia    Lipid screening    Lower GI bleed    Osteoarthritis    Osteoarthritis, shoulder    PONV (postoperative nausea and vomiting)    S/P TURP (transurethral resection of prostate)    Thalassemia trait    Visual impairment     Past Surgical History:   Procedure Laterality Date    Back surgery      C-SPINE SURGERY     Cataract      Colonoscopy      Hernia surgery  2006    Knee replacement surgery      Laminectomy,lumbar  1960    Shoulder arthroscopy  2006    Spine surgery procedure unlisted      Total knee replacement Bilateral        Physical Examination  /81   Pulse 78   Temp 96.5 °F (35.8 °C) (Temporal)   Resp 26   Ht 5' 4\" (1.626 m)   Wt 118 lb 9.7 oz (53.8 kg)   SpO2 92%   BMI 20.36 kg/m²     Constitutional Well developed   Neuro Alert & oriented x 3 grossly motor and sensory intact/equal  bilaterally   Cardiovascular Irregularly irregular rhythm   Respiratory Normal work of breathing   GI Abdomen soft, nontender, nondistended   Skin Warm, dry, and intact   Incision N/a      Pulse Exam  Right Left   Radial pulse  DP pulse Radial pulse  DP pulse     Imaging:   CT angiogram 11/3/2024 personally reviewed    Assessment & Plan:  89-year-old male admitted in [facility] with RVR, found to have descending thoracic aortic intramural hematoma (IMH).  Favor this is an incidental finding given the symptoms described on presentation as well as lack of hypertension. It is likely more chronic in nature than acute.  Regardless, this IMH is uncomplicated without evidence of malperfusion. Recommend continued impulse control with systolic BP <140 mmHg and HR <80.  We will repeat a CT angiogram in 24-48 hours.    Shara Thompson MD  [facility]  Vascular Surgery

## 2024-11-05 ENCOUNTER — APPOINTMENT (OUTPATIENT)
Dept: GENERAL RADIOLOGY | Facility: HOSPITAL | Age: 89
End: 2024-11-05
Attending: HOSPITALIST
Payer: MEDICARE

## 2024-11-05 LAB
ANION GAP SERPL CALC-SCNC: 7 MMOL/L (ref 0–18)
APTT PPP: 35.7 SECONDS (ref 23–36)
APTT PPP: 63.8 SECONDS (ref 23–36)
APTT PPP: 79.5 SECONDS (ref 23–36)
BUN BLD-MCNC: 24 MG/DL (ref 9–23)
BUN/CREAT SERPL: 23.1 (ref 10–20)
CALCIUM BLD-MCNC: 9.4 MG/DL (ref 8.7–10.4)
CHLORIDE SERPL-SCNC: 105 MMOL/L (ref 98–112)
CO2 SERPL-SCNC: 27 MMOL/L (ref 21–32)
CREAT BLD-MCNC: 1.04 MG/DL
DEPRECATED RDW RBC AUTO: 41.3 FL (ref 35.1–46.3)
EGFRCR SERPLBLD CKD-EPI 2021: 67 ML/MIN/1.73M2 (ref 60–?)
ERYTHROCYTE [DISTWIDTH] IN BLOOD BY AUTOMATED COUNT: 17.8 % (ref 11–15)
GLUCOSE BLD-MCNC: 147 MG/DL (ref 70–99)
HCT VFR BLD AUTO: 34.5 %
HGB BLD-MCNC: 11.1 G/DL
MAGNESIUM SERPL-MCNC: 2 MG/DL (ref 1.6–2.6)
MCH RBC QN AUTO: 22.1 PG (ref 26–34)
MCHC RBC AUTO-ENTMCNC: 32.2 G/DL (ref 31–37)
MCV RBC AUTO: 68.6 FL
OSMOLALITY SERPL CALC.SUM OF ELEC: 295 MOSM/KG (ref 275–295)
PLATELET # BLD AUTO: 238 10(3)UL (ref 150–450)
POTASSIUM SERPL-SCNC: 3.8 MMOL/L (ref 3.5–5.1)
PROCALCITONIN SERPL-MCNC: 0.09 NG/ML (ref ?–0.05)
RBC # BLD AUTO: 5.03 X10(6)UL
SODIUM SERPL-SCNC: 139 MMOL/L (ref 136–145)
WBC # BLD AUTO: 18.5 X10(3) UL (ref 4–11)

## 2024-11-05 PROCEDURE — 99232 SBSQ HOSP IP/OBS MODERATE 35: CPT | Performed by: REGISTERED NURSE

## 2024-11-05 PROCEDURE — 99233 SBSQ HOSP IP/OBS HIGH 50: CPT | Performed by: HOSPITALIST

## 2024-11-05 PROCEDURE — 71045 X-RAY EXAM CHEST 1 VIEW: CPT | Performed by: HOSPITALIST

## 2024-11-05 RX ORDER — DIGOXIN 0.25 MG/ML
125 INJECTION INTRAMUSCULAR; INTRAVENOUS ONCE
Status: COMPLETED | OUTPATIENT
Start: 2024-11-05 | End: 2024-11-05

## 2024-11-05 RX ORDER — METOPROLOL TARTRATE 25 MG/1
25 TABLET, FILM COATED ORAL
Status: DISCONTINUED | OUTPATIENT
Start: 2024-11-05 | End: 2024-11-06

## 2024-11-05 RX ORDER — METOPROLOL TARTRATE 1 MG/ML
INJECTION, SOLUTION INTRAVENOUS
Status: DISPENSED
Start: 2024-11-05 | End: 2024-11-06

## 2024-11-05 RX ORDER — METOPROLOL TARTRATE 1 MG/ML
5 INJECTION, SOLUTION INTRAVENOUS ONCE
Status: COMPLETED | OUTPATIENT
Start: 2024-11-05 | End: 2024-11-05

## 2024-11-05 NOTE — PROGRESS NOTES
Ongoing goals of care discussion. Vascular will sign off at this point. No need to reimage dissection given patient is asymptomatic.

## 2024-11-05 NOTE — PROGRESS NOTES
[facility] Pall[facility] Liaison received palliative referral for community PC services. Waiting to obtain insurance (verification/authorization) prior to pursuing.    Addendum: Insurance verified. Palliative Liaison will follow-up with patient/family to discuss CPC.      Vane Bauer  Residential Liaison  [phone]

## 2024-11-05 NOTE — PROGRESS NOTES
[facility]  part of [facility]    Cardiology Progress Note    César Copeland Patient Status:  Inpatient    1930 MRN [id number]   Location [facility] 2W/SW Attending Aidee Shaver MD   Hosp Day # 2 PCP Jeff Anthony D'Amico, DO     Interval History   Vascular surgery evaluated patient, assessed to have descending aortic intramural hematoma  Currently reports no CP, SOB, or back pain    Assessment and Plan:   Descending aortic intramural hematoma  -negative ECG and troponin, no associated ACS  -TTE with suboptimal views, EF appears grossly normal  -appreciate vascular management  -continue HR and BP control     AF with RVR, new onset  -presented with CP and SOB  -found to be in AF with RVR  -currently rate controlled, will transition to PO metoprolol tartrate 25mg BID  -switch IV hep gtt to age/weight dosed apixaban (2.5mg BID)     CAD   -h/o severe multivessel CAD in , declined revascularization at the time  -currently no signs of ACS, hs-Tn and ECG negative for acute ischemia    Objective:   Patient Vitals for the past 24 hrs:   BP Temp Temp src Pulse Resp SpO2   24 1100 (!) 114/92 -- -- 85 21 97 %   24 1000 105/65 -- -- 79 23 96 %   24 0900 98/67 -- -- 85 25 95 %   24 0800 104/66 -- -- 78 23 96 %   24 0700 119/57 -- -- 78 16 97 %   24 0530 126/76 -- -- 78 20 97 %   24 0515 135/79 -- -- 81 16 98 %   24 0500 95/61 -- -- 77 23 96 %   24 0400 92/69 97.6 °F (36.4 °C) Temporal 76 24 97 %   24 0200 93/63 -- -- 72 23 97 %   24 0000 115/64 97.3 °F (36.3 °C) Temporal 68 22 99 %   24 2230 116/75 -- -- 72 20 97 %   24 2205 (!) 132/95 -- -- 76 16 96 %   24 2100 103/65 -- -- 75 24 94 %   24 99/66 -- -- 94 26 98 %   24 1900 99/71 98.1 °F (36.7 °C) Temporal 88 22 99 %   24 1800 95/53 -- -- 83 22 97 %   24 1700 117/73 -- -- 88 19 99 %   24 1613 -- 99.1 °F (37.3 °C) Oral  -- -- --   11/04/24 1600 109/73 98.3 °F (36.8 °C) Oral 89 23 97 %   11/04/24 1500 103/70 -- -- 92 19 98 %   11/04/24 1400 (!) 113/96 -- -- 89 20 99 %   11/04/24 1300 122/66 -- -- 80 24 98 %       Intake/Output:   Last 3 shifts:   Intake/Output                   11/03/24 0700 - 11/04/24 0659 11/04/24 0700 - 11/05/24 0659 11/05/24 0700 - 11/06/24 0659       Intake    P.O.  --  340  --    P.O. -- 340 --    I.V.  249.1  382.3  --    Volume (mL) Heparin 62.3 154.6 --    Volume (mL) Esmolol 186.8 227.7 --    Total Intake 249.1 722.3 --       Output    Urine  700  1100  --    Urine 500 -- --    Output (mL) (External Urinary Catheter) 200 1100 --    Total Output 700 1100 --       Net I/O     -450.9 -377.7 --             Vent Settings:      Hemodynamic parameters (last 24 hours):      Scheduled Meds:    pantoprazole  40 mg Oral QAM AC    finasteride  5 mg Oral Daily    furosemide  40 mg Intravenous BID (Diuretic)       Continuous Infusions:    continuous dose heparin 600 Units/hr (11/05/24 1027)    esmolol 25 mcg/kg/min (11/05/24 0520)       Results:     Lab Results   Component Value Date    WBC 18.5 (H) 11/05/2024    HGB 11.1 (L) 11/05/2024    HCT 34.5 (L) 11/05/2024    .0 11/05/2024    CREATSERUM 1.04 11/05/2024    BUN 24 (H) 11/05/2024     11/05/2024    K 3.8 11/05/2024     11/05/2024    CO2 27.0 11/05/2024     (H) 11/05/2024    CA 9.4 11/05/2024    ALB 4.3 05/21/2024    ALKPHO 81 05/21/2024    BILT 1.8 (H) 05/21/2024    TP 6.7 05/21/2024    AST 23 05/21/2024    ALT 14 05/21/2024    PTT 63.8 (H) 11/05/2024    T4F 0.9 10/03/2022    TSH 4.464 11/07/2023    PSA 8.71 (H) 08/28/2019    MG 2.0 11/05/2024    B12 746 09/06/2018       Recent Labs   Lab 11/03/24  2335 11/04/24  0309 11/05/24  0407   * 123* 147*   BUN 18 19 24*   CREATSERUM 0.98 0.99 1.04   CA 9.4 9.4 9.4    144 139   K 3.9 4.0 3.8    108 105   CO2 26.0 26.0 27.0     Recent Labs   Lab 11/03/24  1713 11/03/24  2335  11/04/24  0309 11/05/24  0407   RBC 5.54 5.63 5.32 5.03   HGB 11.7* 12.0* 11.3* 11.1*   HCT 39.3 39.2 37.5* 34.5*   MCV 70.9* 69.6* 70.5* 68.6*   MCH 21.1* 21.3* 21.2* 22.1*   MCHC 29.8* 30.6* 30.1* 32.2   RDW 18.1* 18.3* 18.1* 17.8*   NEPRELIM 7.65  --  8.02*  --    WBC 9.7 11.1* 10.2 18.5*   .0 267.0 245.0  245.0 238.0       No results for input(s): \"BNPML\" in the last 168 hours.    No results for input(s): \"TROP\", \"CK\" in the last 168 hours.    CTA CHEST CTA ABDOMEN CTA PELVIS (CPT=71275/71035)    Result Date: 11/3/2024  CONCLUSION:   Unchanged appearance of intraluminal curvilinear density extending from the distal arch of the thoracic aorta beyond the left subclavian artery origin to the upper abdominal aorta, just above the right renal artery origin, without a well-defined intraluminal flap on routine views, but with somewhat more well-defined suggestion of a flap on 3D reformatted imaging.  Configuration of the finding is relatively unchanged since the earlier CT PE examination.  Overall imaging findings are equivocal for  age indeterminate type B/descending aortic dissection.  Differential would include mixing artifact as well as heterogeneous attenuation related to underlying noncalcified atherosclerotic plaque.  Recommend cardiology consultation.  Follow-up CTA recommended to assess for stability of this finding.      Dictated by (CST): Sonja Wilde MD on 11/03/2024 at 8:18 PM     Finalized by (CST): Sonja Wilde MD on 11/03/2024 at 8:36 PM          CT CHEST PE AORTA (IV ONLY) (CPT=71260)    Result Date: 11/3/2024  CONCLUSION:   Heterogeneous attenuation within the thoracic aorta involving the distal arch to the upper abdominal aorta at the edge of the field of view, with suggestion of internal linear densities in the aortic lumen which could represent a flap.  Recommend aortic protocol CTA chest abdomen to evaluate for acute descending aortic dissection. Findings were discussed with    Ke on 11/03/2024 at 7:23 p.m.  No evidence of acute pulmonary embolism to the level of the first order subsegmental pulmonary artery branches.  There is diffuse bronchial wall thickening, which can be seen in acute or chronic bronchitis.  Trace bilateral pleural effusions with dependent opacities at the lung bases thought to represent subsegmental atelectasis.  Extensive degenerative change around the right sternoclavicular articulation, which is thought to correspond to the paratracheal thickening seen on chest radiograph.  Colon interposed anterior to the liver, corresponding to air under the diaphragm on chest radiograph.     Dictated by (CST): Sonja Wilde MD on 11/03/2024 at 7:17 PM     Finalized by (CST): Sonja Wilde MD on 11/03/2024 at 7:25 PM          XR CHEST AP PORTABLE  (CPT=71045)    Result Date: 11/3/2024  CONCLUSION:  Soft tissue thickening in the right paratracheal region.  Underlying consolidation or mass is a possibility.  Correlate contrast enhanced CT chest.   Air under the right hemidiaphragm, thought to be within a portion of the colon interposed between the liver and diaphragm.  Recommend upright chest radiograph for further evaluation if there is concern for pneumoperitoneum.  Alternatively, this could be further evaluated on CT chest examination.  Elevation of the right hemidiaphragm with right basilar opacity thought to represent subsegmental atelectasis.  Left basilar opacity, which may represent subsegmental atelectasis.  Superimposed pneumonitis is a differential in the appropriate clinical setting.  Small right pleural effusion.      Dictated by (CST): Sonja Wilde MD on 11/03/2024 at 6:31 PM     Finalized by (CST): Sonja Wilde MD on 11/03/2024 at 6:34 PM         EKG 12 Lead    Result Date: 11/4/2024  Atrial fibrillation with rapid ventricular response ST depression, consider subendocardial injury Nonspecific T wave abnormality Abnormal ECG No previous ECGs found  in [facility] Confirmed by LADONNA DICKSON CASH ([id number]) on 2024 5:45:52 PM   CARD ECHO 2D DOPPLER (CPT=93306)    Result Date: 2024  Transthoracic Echocardiogram Name:César Copeland Date: 2024 :  1930 Ht:  (64in)  BP: 129 / 81 MRN:  [id number]    Age:  89years    Wt:  (118lb) HR: 78bpm Loc:  [facility]       Gndr: M          BSA: 1.56m^2 Sonographer: Karlos LUX, RVT Ordering:    Tayo Miller Consulting:  Cherelle Hollis ---------------------------------------------------------------------------- History/Indications:  Aortic dissection. Atrial fibrillation. Congestive heart failure. ---------------------------------------------------------------------------- Procedure information:  A transthoracic complete 2D study was performed. Additional evaluation included M-mode, complete spectral Doppler, and color Doppler.  Patient status:  Inpatient.  Location:  [facility]    Comparison was made to the study of 2019.    This was a routine study. Transthoracic echocardiography for diagnosis, ventricular function evaluation, and assessment of valvular function. Image quality was adequate. The study was technically limited due to poor acoustic window availability, chest wall deformity, and patient supine. ECG rhythm:   Normal sinus ---------------------------------------------------------------------------- Conclusions: 1. Left ventricle: The cavity size was normal. Wall thickness was mildly    increased. Difficult to accurately estimate and assess left ventricle    function due to poor endocardial definition and suboptimal windows. In    certain views, the left ventricle function appears grossly normal. Will    repeat limited study with Definity contrast. Technical limitations of the    study preclude regional wall motion analysis. Doppler parameters are    consistent with abnormal left ventricular relaxation - grade 1 diastolic    dysfunction. 2. Right ventricle: The cavity size was normal. Systolic function was     normal. Recommendations:  Recommend repeating a limited study with Definity contrast for left ventricle function and wall motion analysis. * ---------------------------------------------------------------------------- * Findings: Left ventricle:  The cavity size was normal. Wall thickness was mildly increased. Difficult to accurately estimate and assess left ventricle function due to poor endocardial definition and suboptimal windows. In certain views, the left ventricle function appears grossly normal. Will repeat limited study with Definity contrast. Technical limitations of the study preclude regional wall motion analysis. Doppler parameters are consistent with abnormal left ventricular relaxation - grade 1 diastolic dysfunction. Left atrium:  The atrium was mildly dilated. Right ventricle:  The cavity size was normal. Systolic function was normal. Right atrium:  The atrium was normal in size. Mitral valve:  The annulus was mildly calcified. Leaflet separation was normal.  Doppler:  Transvalvular velocity was within the normal range. There was no evidence for stenosis. There was trivial regurgitation. Aortic valve:   The valve was probably trileaflet. The leaflets were mildly calcified. Cusp separation was normal.  Doppler:  Transvalvular velocity was within the normal range. There was no evidence for stenosis. There was no significant regurgitation. Tricuspid valve:  The valve is structurally normal. Leaflet separation was normal.  Doppler:  Transvalvular velocity was within the normal range. There was no evidence for stenosis. There was trivial regurgitation. Pulmonic valve:   Not well visualized. Pericardium:   There was no pericardial effusion. Aorta: Aortic root: The aortic root was normal. Pulmonary arteries: Systolic pressure could not be accurately estimated. Systemic veins: Inferior vena cava: The IVC was not visualized. ----------------------------------------------------------------------------  Measurements  Left ventricle                    Value        Ref  IVS thickness, ED, PLAX       (H) 1.3   cm     0.6 -                                                 1.0  LV ID, ED, PLAX                   4.9   cm     4.2 -                                                 5.8  LV ID, ES, PLAX                   3.9   cm     2.5 -                                                 4.0  LV PW thickness, ED, PLAX         1.0   cm     0.6 -                                                 1.0  IVS/LV PW ratio, ED, PLAX         1.30         --------  LV PW/LV ID ratio, ED, PLAX       0.2          --------  LV ejection fraction          (L) 42    %      52 - 72  Stroke volume/bsa, 2D             33    ml/m^2 --------  LV e', lateral                (L) 6.3   cm/sec >=10.0  LV E/e', lateral                  7            <=13  LV e', medial                 (L) 4.7   cm/sec >=7.0  LV E/e', medial                   9            --------  LV e', average                    5.5   cm/sec --------  LV E/e', average                  7            <=14  LVOT                              Value        Ref  LVOT ID                           2.3   cm     --------  LVOT peak velocity, S             0.66  m/sec  --------  LVOT VTI, S                       12.2  cm     --------  LVOT peak gradient, S             2     mm Hg  --------  LVOT mean gradient, S             1     mm Hg  --------  Stroke volume (SV), LVOT DP       51    ml     --------  Stroke index (SV/bsa), LVOT       32    ml/m^2 --------  DP  Aortic root                       Value        Ref  Aortic root ID                    3.5   cm     2.3 -                                                 3.8  Ascending aorta                   Value        Ref  Ascending aorta ID                3.5   cm     2.2 -                                                 3.8  Left atrium                       Value        Ref  LA ID, A-P, ES                    3.1   cm     3.0 -                                                  4.0  LA volume, S                      56    ml     18 - 58  LA volume/bsa, S              (H) 36    ml/m^2 16 - 34  LA volume, ES, 1-p A4C        (H) 59    ml     18 - 58  LA volume, ES, 1-p A2C            50    ml     18 - 58  LA volume, ES, A/L                60    ml     --------  LA volume/bsa, ES, A/L        (H) 38    ml/m^2 16 - 34  LA/aortic root ratio              0.89         --------  Mitral valve                      Value        Ref  Mitral E-wave peak velocity       0.41  m/sec  --------  Mitral A-wave peak velocity       0.77  m/sec  --------  Mitral deceleration time          232   ms     --------  Mitral E/A ratio, peak            0.5          --------  Tricuspid valve                   Value        Ref  Tricuspid regurg peak             2.46  m/sec  <=2.8  velocity  Tricuspid peak RV-RA gradient     24    mm Hg  --------  Right ventricle                   Value        Ref  TAPSE, MM                         2.17  cm     >=1.70  RV s', lateral                    19.1  cm/sec >=9.5 Legend: (L)  and  (H)  tracie values outside specified reference range. ---------------------------------------------------------------------------- Prepared and electronically signed by Tayo Miller 11/04/2024 16:23        Exam:     Physical Exam:  General: Alert and oriented x 3. No apparent distress.   HEENT: Normocephalic, anicteric sclera, neck supple, no thyromegaly or adenopathy.  Neck: No JVD, carotids 2+, no bruits.  Cardiac: Regular rate and rhythm. S1, S2 normal.  Lungs: Clear without wheezes, rales, rhonchi or dullness.  Abdomen: Soft, non-tender. No organosplenomegally, mass or rebound, BS-present.  Extremities: Without clubbing or cyanosis. No edema.    Neurologic: Alert and oriented, normal affect. No focal defects  Skin: Warm and dry    Tayo Miller, [facility] [place]

## 2024-11-05 NOTE — PROGRESS NOTES
[facility]  part of [facility]    Progress Note    César Copeland Patient Status:  Inpatient    1930 MRN [id number]   Location [facility] 2W/SW Attending Liliana Trevizo MD   Hosp Day # 1 PCP Jeff Anthony D'Amico,        Subjective:   César Copeland is a(n) 89 year old male who is sitting up in bed. Denies complaints of sob and chest pain.     Objective:   Blood pressure 116/75, pulse 72, temperature 98.1 °F (36.7 °C), temperature source Temporal, resp. rate 20, height 5' 4\" (1.626 m), weight 118 lb 9.7 oz (53.8 kg), SpO2 97%.    Physical Exam:    General: No acute distress.   Respiratory: Clear to auscultation bilaterally. No wheezes. No rhonchi.  Cardiovascular: S1, S2. Regular rate and rhythm. No murmurs, rubs or gallops.   Abdomen: Soft, nontender, nondistended.  Positive bowel sounds. No rebound or guarding.  Neurologic: No focal neurological deficits.   Musculoskeletal: Moves all extremities.  Extremities: No edema.    Results:     Lab Results   Component Value Date    WBC 10.2 2024    HGB 11.3 (L) 2024    HCT 37.5 (L) 2024    .0 2024    .0 2024    CREATSERUM 0.99 2024    BUN 19 2024     2024    K 4.0 2024     2024    CO2 26.0 2024     (H) 2024    CA 9.4 2024    ALB 4.3 2024    ALKPHO 81 2024    BILT 1.8 (H) 2024    TP 6.7 2024    AST 23 2024    ALT 14 2024    PTT 61.6 (H) 2024    T4F 0.9 10/03/2022    TSH 4.464 2023    PSA 8.71 (H) 2019    MG 2.0 2024    B12 746 2018       CTA CHEST CTA ABDOMEN CTA PELVIS (CPT=71275/00051)    Result Date: 11/3/2024  CONCLUSION:   Unchanged appearance of intraluminal curvilinear density extending from the distal arch of the thoracic aorta beyond the left subclavian artery origin to the upper abdominal aorta, just above the right renal artery origin, without a  well-defined intraluminal flap on routine views, but with somewhat more well-defined suggestion of a flap on 3D reformatted imaging.  Configuration of the finding is relatively unchanged since the earlier CT PE examination.  Overall imaging findings are equivocal for  age indeterminate type B/descending aortic dissection.  Differential would include mixing artifact as well as heterogeneous attenuation related to underlying noncalcified atherosclerotic plaque.  Recommend cardiology consultation.  Follow-up CTA recommended to assess for stability of this finding.      Dictated by (CST): Sonja Wilde MD on 11/03/2024 at 8:18 PM     Finalized by (CST): Sonja Wilde MD on 11/03/2024 at 8:36 PM          CT CHEST PE AORTA (IV ONLY) (CPT=71260)    Result Date: 11/3/2024  CONCLUSION:   Heterogeneous attenuation within the thoracic aorta involving the distal arch to the upper abdominal aorta at the edge of the field of view, with suggestion of internal linear densities in the aortic lumen which could represent a flap.  Recommend aortic protocol CTA chest abdomen to evaluate for acute descending aortic dissection. Findings were discussed with Dr. Dempsey on 11/03/2024 at 7:23 p.m.  No evidence of acute pulmonary embolism to the level of the first order subsegmental pulmonary artery branches.  There is diffuse bronchial wall thickening, which can be seen in acute or chronic bronchitis.  Trace bilateral pleural effusions with dependent opacities at the lung bases thought to represent subsegmental atelectasis.  Extensive degenerative change around the right sternoclavicular articulation, which is thought to correspond to the paratracheal thickening seen on chest radiograph.  Colon interposed anterior to the liver, corresponding to air under the diaphragm on chest radiograph.     Dictated by (CST): Sonja Wilde MD on 11/03/2024 at 7:17 PM     Finalized by (CST): Sonja Wilde MD on 11/03/2024 at 7:25 PM           XR CHEST AP PORTABLE  (CPT=71045)    Result Date: 11/3/2024  CONCLUSION:  Soft tissue thickening in the right paratracheal region.  Underlying consolidation or mass is a possibility.  Correlate contrast enhanced CT chest.   Air under the right hemidiaphragm, thought to be within a portion of the colon interposed between the liver and diaphragm.  Recommend upright chest radiograph for further evaluation if there is concern for pneumoperitoneum.  Alternatively, this could be further evaluated on CT chest examination.  Elevation of the right hemidiaphragm with right basilar opacity thought to represent subsegmental atelectasis.  Left basilar opacity, which may represent subsegmental atelectasis.  Superimposed pneumonitis is a differential in the appropriate clinical setting.  Small right pleural effusion.      Dictated by (CST): Sonja Wilde MD on 11/03/2024 at 6:31 PM     Finalized by (CST): Sonja Wilde MD on 11/03/2024 at 6:34 PM         EKG 12 Lead    Result Date: 11/4/2024  Atrial fibrillation with rapid ventricular response ST depression, consider subendocardial injury Nonspecific T wave abnormality Abnormal ECG No previous ECGs found in [facility] Confirmed by LADONNA DICKSON, ANTONELLA ([id number]) on 11/4/2024 5:45:52 PM      pantoprazole  40 mg Oral QAM AC    finasteride  5 mg Oral Daily    furosemide  40 mg Intravenous BID (Diuretic)       calcium carbonate    melatonin    acetaminophen    benzonatate    ondansetron    metoclopramide    polyethylene glycol (PEG 3350)    sennosides    bisacodyl    fleet enema      Assessment and Plan:      Acute Chest Pain  New onset afib  CAD- history of MV disease that was found years ago  - patient declined CABG at that time  - CT chest shows dissection at the distal arch of the left subclavian to upper abdomianl aorta above the R renal artery  - continue on esmolol   - CV surgery consulted   - Echo currently pending  - At this time- no surgical intervention is planned  - Palliative  care consulted     HTN  Dyslipidemia    DVT proph0 heparin drip    DNAR/Select    MDM. High RAFAEL HUTCHINSON MD  11/04/24

## 2024-11-05 NOTE — PALLIATIVE CARE NOTE
[facility]  part of [facility]  Palliative Care Progress Note    César Copeland Patient Status:  Inpatient    1930 MRN [id number]   Location [facility] 2W/SW Attending Aidee Shaver MD   Hosp Day # 2 PCP Jeff Anthony D'Amico,      The  Cures Act makes medical notes like these available to patients in the interest of transparency. Please be advised this is a medical document. Medical documents are intended to carry relevant information, facts as evident, and the clinical opinion of the practitioner. The medical note is intended as peer to peer communication and may appear blunt or direct. It is written in medical language and may contain abbreviations or verbiage that are unfamiliar.       Subjective     Reviewed symptom medications past 24 hrs: tylenol 500 mg po X1, Tums 500 mg po X1    Patient was seen and examined in bed in ICU. Pt is A&OX4 and very pleasant. He tells me he had a fairly good nights rest, but still having some fatigue today. Denies dyspnea symptoms at rest and breathing is comfortable on nc2L. Denies pain symptoms. He ate well for breakfast, does report some intermittent GERD symptoms with eating which he says are chronic, denies abdominal pain, n/v and feeling a bit constipated with no BM in 3 days, but says he is passing gas and hoping to get up to BR today. Cardiology talked with him about repeat CTA and changing his cardiac meds to oral.     See summary of discussion below.     Review of Systems:  Pertinent items are noted in subjective    Allergies:  Allergies[1]    Medications:     Current Facility-Administered Medications:     pantoprazole (Protonix) DR tab 40 mg, 40 mg, Oral, QAM AC    calcium carbonate (Tums) chewable tab 500 mg, 500 mg, Oral, Q6H PRN    finasteride (Proscar) tab 5 mg, 5 mg, Oral, Daily    heparin (Porcine) 20038 units/250mL infusion ACS/AFIB CONTINUOUS, 200-3,000 Units/hr, Intravenous, Continuous    melatonin tab 3  mg, 3 mg, Oral, Nightly PRN    acetaminophen (Tylenol Extra Strength) tab 500 mg, 500 mg, Oral, Q4H PRN    benzonatate (Tessalon) cap 200 mg, 200 mg, Oral, TID PRN    ondansetron (Zofran) 4 MG/2ML injection 4 mg, 4 mg, Intravenous, Q6H PRN    metoclopramide (Reglan) 5 mg/mL injection 5 mg, 5 mg, Intravenous, Q8H PRN    polyethylene glycol (PEG 3350) (Miralax) 17 g oral packet 17 g, 17 g, Oral, Daily PRN    sennosides (Senokot) tab 17.2 mg, 17.2 mg, Oral, Nightly PRN    bisacodyl (Dulcolax) 10 MG rectal suppository 10 mg, 10 mg, Rectal, Daily PRN    fleet enema (Fleet) rectal enema 133 mL, 1 enema, Rectal, Once PRN    furosemide (Lasix) 10 mg/mL injection 40 mg, 40 mg, Intravenous, BID (Diuretic)    esmolol (Brevibloc) 2000 mg/100mL infusion premix,  mcg/kg/min, Intravenous, Continuous    Objective     Vital Signs:  Blood pressure 98/67, pulse 85, temperature 97.6 °F (36.4 °C), temperature source Temporal, resp. rate 25, height 5' 4\" (1.626 m), weight 118 lb 9.7 oz (53.8 kg), SpO2 95%.  Body mass index is 20.36 kg/m².  Present Level of pain: 0/10  Non-verbal signs of pain present: No    Physical Exam:  General: Alert and in no apparent distress. Weak, frail/thin appearing  HEENT: No focal deficits.  Cardiac: Regular rate and rhythm, S1, S2 normal, no murmur, rub or gallop.  Lungs: Clear breath sounds bilaterally.  Normal excursions and effort.  Abdomen: Soft, non-tender, normal bowel sounds X 4 quadrants, no rebound or guarding  Extremities: Without clubbing, cyanosis. Peripheral pulses are 2+. BLE Edema not present  Neurologic: Alert and oriented X4, normal affect.  Skin: Warm and dry.     Prior to admission Palliative performance scale PPSv2 (%): 70      Current PPS 40%    Hematology:  Lab Results   Component Value Date    WBC 18.5 (H) 11/05/2024    HGB 11.1 (L) 11/05/2024    HCT 34.5 (L) 11/05/2024    .0 11/05/2024       Coags:  Lab Results   Component Value Date    PTT 79.5 (H) 11/05/2024        Chemistry:  Lab Results   Component Value Date    CREATSERUM 1.04 11/05/2024    BUN 24 (H) 11/05/2024     11/05/2024    K 3.8 11/05/2024     11/05/2024    CO2 27.0 11/05/2024     (H) 11/05/2024    CA 9.4 11/05/2024    ALB 4.3 05/21/2024    ALKPHO 81 05/21/2024    BILT 1.8 (H) 05/21/2024    TP 6.7 05/21/2024    AST 23 05/21/2024    ALT 14 05/21/2024    PSA 8.71 (H) 08/28/2019    MG 2.0 11/04/2024       Imaging:  CTA CHEST CTA ABDOMEN CTA PELVIS (CPT=71275/88296)    Result Date: 11/3/2024  CONCLUSION:   Unchanged appearance of intraluminal curvilinear density extending from the distal arch of the thoracic aorta beyond the left subclavian artery origin to the upper abdominal aorta, just above the right renal artery origin, without a well-defined intraluminal flap on routine views, but with somewhat more well-defined suggestion of a flap on 3D reformatted imaging.  Configuration of the finding is relatively unchanged since the earlier CT PE examination.  Overall imaging findings are equivocal for  age indeterminate type B/descending aortic dissection.  Differential would include mixing artifact as well as heterogeneous attenuation related to underlying noncalcified atherosclerotic plaque.  Recommend cardiology consultation.  Follow-up CTA recommended to assess for stability of this finding.      Dictated by (CST): Sonja Wilde MD on 11/03/2024 at 8:18 PM     Finalized by (CST): Sonja Wilde MD on 11/03/2024 at 8:36 PM          CT CHEST PE AORTA (IV ONLY) (CPT=71260)    Result Date: 11/3/2024  CONCLUSION:   Heterogeneous attenuation within the thoracic aorta involving the distal arch to the upper abdominal aorta at the edge of the field of view, with suggestion of internal linear densities in the aortic lumen which could represent a flap.  Recommend aortic protocol CTA chest abdomen to evaluate for acute descending aortic dissection. Findings were discussed with Dr. Dempsey on  11/03/2024 at 7:23 p.m.  No evidence of acute pulmonary embolism to the level of the first order subsegmental pulmonary artery branches.  There is diffuse bronchial wall thickening, which can be seen in acute or chronic bronchitis.  Trace bilateral pleural effusions with dependent opacities at the lung bases thought to represent subsegmental atelectasis.  Extensive degenerative change around the right sternoclavicular articulation, which is thought to correspond to the paratracheal thickening seen on chest radiograph.  Colon interposed anterior to the liver, corresponding to air under the diaphragm on chest radiograph.     Dictated by (CST): Sonja Wilde MD on 11/03/2024 at 7:17 PM     Finalized by (CST): Sonja Wilde MD on 11/03/2024 at 7:25 PM          XR CHEST AP PORTABLE  (CPT=71045)    Result Date: 11/3/2024  CONCLUSION:  Soft tissue thickening in the right paratracheal region.  Underlying consolidation or mass is a possibility.  Correlate contrast enhanced CT chest.   Air under the right hemidiaphragm, thought to be within a portion of the colon interposed between the liver and diaphragm.  Recommend upright chest radiograph for further evaluation if there is concern for pneumoperitoneum.  Alternatively, this could be further evaluated on CT chest examination.  Elevation of the right hemidiaphragm with right basilar opacity thought to represent subsegmental atelectasis.  Left basilar opacity, which may represent subsegmental atelectasis.  Superimposed pneumonitis is a differential in the appropriate clinical setting.  Small right pleural effusion.      Dictated by (CST): Sonja Wilde MD on 11/03/2024 at 6:31 PM     Finalized by (CST): Sonja Wilde MD on 11/03/2024 at 6:34 PM           Follow up GOC Discussion      11/5/24-Provided clinical update to pt and family. Reinforced benefits of palliative care and recommended having a community palliative care program follow on dc which they are  agreeable to. Pt wants to continue with supportive care. He is hoping he doesn't get too weak with being in the hospital and is hoping to get OOB today.     I readdressed the importance of POLST form completion so wishes are respected across all healthcare settings. Reviewed POLST in detail and with pt and dtr Staci. Confirmed wishes for DNAR/DNI-selective, no feeding tubes. POLST form completed today, original given to family and copy sent to registration for scan into [employer]. Provided emotional support to pt/family.    Discussed with Patient and Family: Yes  Patient's preference about sharing medical information: speak to pt and family  Patient's decision making preferences: speak to Pt  Code status: DNAR/DNI-selective  Have advanced directives been discussed with patient or healthcare power of : Yes        Healthcare Agent Appointed: Yes  Healthcare Agent's Name: Staci Wheeler  Healthcare Agent's Phone Number: [phone]          Spiritual needs addressed: Patient/family declined Spiritual Care    Palliative disposition: Ongoing discussions      Procedures:  No intubation  No g-tube      Palliative Care Assessment and Recommendations     Problem List:     Afib with RVR  Abnormal CTA-possible type B ascending aortic dissection  Chest pain  CAD  HTN  HLD  BPH  Recent UTI     Goals of care counseling  -see above for details  -Confirmed wishes for DNAR/DNI-selective and continue supportive care.  -Ongoing GOC discussions will be needed over time.  -Agreeable to palliative care following  -Dispo:  Return home on dc. Recommended community palliative care program to follow on dc. SW to help with dc planning.   -Provided emotional support to pt/family who are coping adequately.     Advance care planning  -see above for details  -Pt's dtr Staci Wheeler is [facility] #[phone].  -Previously completed HCPOA paperwork on file in EPIC.   -Completed POLST form today, copy sent to registration for scan into "[employer].       Palliative Performance Scale 40%     Emotion support provided to patient/family today: Yes     A total of 35 mins were spent on this consult, which included all of the following: direct face to face contact, history taking, physical examination, and >50% was spent counseling and coordinating care.    Discussed today's visit with Patricia SAMPSON.      I am out of the office tomorrow and will follow back on Thursday 11/7. Please contact my partners for any arising palliative care needs in my absence.    Kat Shearer, ANP-BC, [facility] [id number]  11/5/2024  11:31 AM  Palliative Care Services          [1]   Allergies  Allergen Reactions    Codeine NAUSEA AND VOMITING     \"Nausea & Vomiting x2 days\"    Penicillins HIVES    Bactrim [Sulfamethoxazole W/Trimethoprim] RASH

## 2024-11-05 NOTE — CM/SW NOTE
Community palliative order placed.  Referral placed to [facility] to see if they can accept him.  PT ordered to determine what his needs are for home.    1730:  [facility] can accept for community palliative  Reserved in [place].    Blanca Jackson MBA BSN RN CRRN   RN Case Manager  [phone]

## 2024-11-05 NOTE — PLAN OF CARE
Problem: Patient Centered Care  Goal: Patient preferences are identified and integrated in the patient's plan of care  Description: Interventions:  - What would you like us to know as we care for you? I hope to go home soon   - Provide timely, complete, and accurate information to patient/family  - Incorporate patient and family knowledge, values, beliefs, and cultural backgrounds into the planning and delivery of care  - Encourage patient/family to participate in care and decision-making at the level they choose  - Honor patient and family perspectives and choices  Outcome: Progressing     Problem: Patient/Family Goals  Goal: Patient/Family Long Term Goal  Description: Patient's Long Term Goal: no chest pain     Interventions:  - assess pain q shift and prn   Monitor EKG strip   Assess labe values hgb, troponin   - See additional Care Plan goals for specific interventions  Outcome: Progressing  Goal: Patient/Family Short Term Goal  Description: Patient's Short Term Goal: eat 50 % of meal     Interventions:   - assess appetite  Choose food choices    - See additional Care Plan goals for specific interventions  Outcome: Progressing     Problem: CARDIOVASCULAR - ADULT  Goal: Maintains optimal cardiac output and hemodynamic stability  Description: INTERVENTIONS:  - Monitor vital signs, rhythm, and trends  - Monitor for bleeding, hypotension and signs of decreased cardiac output  - Evaluate effectiveness of vasoactive medications to optimize hemodynamic stability  - Monitor arterial and/or venous puncture sites for bleeding and/or hematoma  - Assess quality of pulses, skin color and temperature  - Assess for signs of decreased coronary artery perfusion - ex. Angina  - Evaluate fluid balance, assess for edema, trend weights  Outcome: Progressing  Goal: Absence of cardiac arrhythmias or at baseline  Description: INTERVENTIONS:  - Continuous cardiac monitoring, monitor vital signs, obtain 12 lead EKG if indicated  -  Evaluate effectiveness of antiarrhythmic and heart rate control medications as ordered  - Initiate emergency measures for life threatening arrhythmias  - Monitor electrolytes and administer replacement therapy as ordered  Outcome: Progressing

## 2024-11-05 NOTE — PAYOR COMM NOTE
--------------  CONTINUED STAY REVIEW    Payor: [employer] MEDICARE ADV PPO  Subscriber #:  [id number]  Authorization Number: [id number]    Admit date: 11/3/24  Admit time: 11:09 PM        11/5/24    CARDIOLOGY  Interval History   Vascular surgery evaluated patient, assessed to have descending aortic intramural hematoma  Currently reports no CP, SOB, or back pain     Assessment and Plan:   Descending aortic intramural hematoma  -negative ECG and troponin, no associated ACS  -TTE with suboptimal views, EF appears grossly normal  -appreciate vascular management  -continue HR and BP control     AF with RVR, new onset  -presented with CP and SOB  -found to be in AF with RVR  -currently rate controlled, will transition to PO metoprolol tartrate 25mg BID  -switch IV hep gtt to age/weight dosed apixaban (2.5mg BID)     CAD   -h/o severe multivessel CAD in 2015, declined revascularization at the time  -currently no signs of ACS, hs-Tn and ECG negative for acute ischemia     Patient Vitals for the past 24 hrs:    BP Temp Temp src Pulse Resp SpO2   11/05/24 1100 (!) 114/92 -- -- 85 21 97 %      Scheduled Meds:    pantoprazole  40 mg Oral QAM AC    finasteride  5 mg Oral Daily    furosemide  40 mg Intravenous BID (Diuretic)       Continuous Infusions:    continuous dose heparin 600 Units/hr (11/05/24 1027)    esmolol 25 mcg/kg/min (11/05/24 0520)      Lab 11/03/24  2335 11/04/24  0309 11/05/24  0407   * 123* 147*   BUN 18 19 24*   CREATSERUM 0.98 0.99 1.04   CA 9.4 9.4 9.4    144 139   K 3.9 4.0 3.8    108 105   CO2 26.0 26.0 27.0      Lab 11/03/24  1713 11/03/24  2335 11/04/24  0309 11/05/24  0407   RBC 5.54 5.63 5.32 5.03   HGB 11.7* 12.0* 11.3* 11.1*   HCT 39.3 39.2 37.5* 34.5*   MCV 70.9* 69.6* 70.5* 68.6*   MCH 21.1* 21.3* 21.2* 22.1*   MCHC 29.8* 30.6* 30.1* 32.2   RDW 18.1* 18.3* 18.1* 17.8*   NEPRELIM 7.65  --  8.02*  --    WBC 9.7 11.1* 10.2 18.5*   .0 267.0 245.0  245.0 238.0     MEDICATIONS  ADMINISTERED IN LAST 1 DAY:  acetaminophen (Tylenol Extra Strength) tab 500 mg       Date Action Dose Route User    11/4/2024 1652 Given 500 mg Oral Nat Oliva RN          apixaban (Eliquis) tab 2.5 mg       Date Action Dose Route User    11/5/2024 1311 Given 2.5 mg Oral Patricia Adames RN          calcium carbonate (Tums) chewable tab 500 mg       Date Action Dose Route User    11/4/2024 2205 Given 500 mg Oral Debbie Bledsoe RN          heparin (Porcine) 44747 units/250mL infusion ACS/AFIB CONTINUOUS       Date Action Dose Route User    11/5/2024 1300 Hi-Risk Rate/Dose Verify 600 Units/hr Intravenous Patricia Adames RN    11/5/2024 1200 Hi-Risk Rate/Dose Verify 600 Units/hr Intravenous Patricia Adames RN    11/5/2024 1027 New Bag 600 Units/hr Intravenous Patricia Adames RN    11/5/2024 0440 Hi-Risk Rate/Dose Change 600 Units/hr Intravenous Olszewski, Philip, RN    11/4/2024 1508 New Bag 700 Units/hr Intravenous Nat Oliva RN          esmolol (Brevibloc) 2000 mg/100mL infusion premix       Date Action Dose Route User    11/5/2024 1200 Rate/Dose Verify 25 mcg/kg/min × 56 kg Intravenous Patricia Adames RN    11/5/2024 1027 Rate/Dose Verify 25 mcg/kg/min × 56 kg Intravenous Patricia Adames RN    11/5/2024 0800 Rate/Dose Verify 25 mcg/kg/min × 56 kg Intravenous Patricia Adames RN    11/5/2024 0520 Restarted 25 mcg/kg/min × 56 kg Intravenous Olszewski, Philip, RN    11/5/2024 0302 Rate/Dose Change 25 mcg/kg/min × 56 kg Intravenous Olszewski, Philip, RN    11/5/2024 0203 Rate/Dose Change 50 mcg/kg/min × 56 kg Intravenous Olszewski, Philip, RN    11/5/2024 0100 New Bag 75 mcg/kg/min × 56 kg Intravenous Olszewski, Philip, RN    11/4/2024 2216 Rate/Dose Change 100 mcg/kg/min × 56 kg Intravenous Debbie Bledsoe RN    11/4/2024 1933 Rate/Dose Change 75 mcg/kg/min × 56 kg Intravenous Debbie Bledsoe, RN     finasteride (Proscar) tab 5 mg       Date Action Dose Route User     11/5/2024 0809 Given 5 mg Oral Patricia Adames RN    11/4/2024 1503 Given 5 mg Oral Nat Oliva RN          furosemide (Lasix) 10 mg/mL injection 40 mg       Date Action Dose Route User    11/5/2024 0809 Given 40 mg Intravenous Patricia Adames RN    11/4/2024 1611 Given 40 mg Intravenous Nat Oliva RN          metoprolol tartrate (Lopressor) tab 25 mg       Date Action Dose Route User    11/5/2024 1311 Given 25 mg Oral Patricia Adames RN          nitrofurantoin monohydrate macro (Macrobid) cap 100 mg       Date Action Dose Route User    11/4/2024 1503 Given 100 mg Oral Nat Oliva RN          pantoprazole (Protonix) DR tab 40 mg       Date Action Dose Route User    11/5/2024 0607 Given 40 mg Oral Olszewski, Philip, RN            Vitals (last day)       Date/Time Temp Pulse Resp BP SpO2 Weight O2 Device O2 Flow Rate (L/min) [facility]    11/05/24 1301 -- 82 22 98/53 97 % -- -- -- AM    11/05/24 1300 -- 82 18 98/53 97 % -- Nasal cannula -- AM    11/05/24 1200 97.8 °F (36.6 °C) 82 23 105/70 96 % -- Nasal cannula 1 L/min AM    11/05/24 0700 97.8 °F (36.6 °C) 78 16 119/57 97 % -- Nasal cannula 2 L/min AM    11/05/24 0000 97.3 °F (36.3 °C) 68 22 115/64 99 % -- Nasal cannula 2 L/min PO    11/04/24 1613 99.1 °F (37.3 °C) -- -- -- -- -- -- -- SZ    11/04/24 0800 96.5 °F (35.8 °C) 72 25 72/53 93 % -- Nasal cannula 2 L/min SZ    11/04/24 0745 -- 72 16 84/72 97 % -- -- -- SZ    11/04/24 0400 97.2 °F (36.2 °C) 66 19 92/57 94 % -- Nasal cannula 2 L/min AP    11/04/24 0300 -- 73 22 84/59 98 % -- Nasal cannula 2 L/min AP

## 2024-11-05 NOTE — PROGRESS NOTES
[facility]  part of [facility]    Progress Note    César Copeland Patient Status:  Inpatient    1930 MRN [id number]   Location [facility] 2W/SW Attending Aidee Shaver MD   Hosp Day # 2 PCP Jeff Anthony D'Amico, DO     Chief Complaint:     A-fib with RVR    Subjective:   Subjective:    Patient seen and examined this morning  Endorsing shortness of breath and chest pain  Hypotensive afebrile    Objective:   Blood pressure 98/67, pulse 85, temperature 97.6 °F (36.4 °C), temperature source Temporal, resp. rate 25, height 5' 4\" (1.626 m), weight 118 lb 9.7 oz (53.8 kg), SpO2 95%.  Physical Exam    General: does not appear to be in acute distress at this time  HEENT: EOMI PERRLA, atraumatic normocephalic  Cardiac: S1-S2 appreciated  Lungs: Good air entry bilaterally clear to auscultation  Abdomen: Soft nontender nondistended positive bowel sounds  Ext: Peripheral pulses are positive  Skin: No rashes noted      Results:   Lab Results   Component Value Date    WBC 18.5 (H) 2024    HGB 11.1 (L) 2024    HCT 34.5 (L) 2024    .0 2024    CREATSERUM 1.04 2024    BUN 24 (H) 2024     2024    K 3.8 2024     2024    CO2 27.0 2024     (H) 2024    CA 9.4 2024    ALB 4.3 2024    ALKPHO 81 2024    BILT 1.8 (H) 2024    TP 6.7 2024    AST 23 2024    ALT 14 2024    PTT 79.5 (H) 2024    T4F 0.9 10/03/2022    TSH 4.464 2023    PSA 8.71 (H) 2019    MG 2.0 2024    TROPHS 19 2024    B12 746 2018       CTA CHEST CTA ABDOMEN CTA PELVIS (CPT=71275/99986)    Result Date: 11/3/2024  CONCLUSION:   Unchanged appearance of intraluminal curvilinear density extending from the distal arch of the thoracic aorta beyond the left subclavian artery origin to the upper abdominal aorta, just above the right renal artery origin, without a well-defined  intraluminal flap on routine views, but with somewhat more well-defined suggestion of a flap on 3D reformatted imaging.  Configuration of the finding is relatively unchanged since the earlier CT PE examination.  Overall imaging findings are equivocal for  age indeterminate type B/descending aortic dissection.  Differential would include mixing artifact as well as heterogeneous attenuation related to underlying noncalcified atherosclerotic plaque.  Recommend cardiology consultation.  Follow-up CTA recommended to assess for stability of this finding.      Dictated by (CST): Sonja Wilde MD on 11/03/2024 at 8:18 PM     Finalized by (CST): Sonja Wilde MD on 11/03/2024 at 8:36 PM          CT CHEST PE AORTA (IV ONLY) (CPT=71260)    Result Date: 11/3/2024  CONCLUSION:   Heterogeneous attenuation within the thoracic aorta involving the distal arch to the upper abdominal aorta at the edge of the field of view, with suggestion of internal linear densities in the aortic lumen which could represent a flap.  Recommend aortic protocol CTA chest abdomen to evaluate for acute descending aortic dissection. Findings were discussed with Dr. Dempsey on 11/03/2024 at 7:23 p.m.  No evidence of acute pulmonary embolism to the level of the first order subsegmental pulmonary artery branches.  There is diffuse bronchial wall thickening, which can be seen in acute or chronic bronchitis.  Trace bilateral pleural effusions with dependent opacities at the lung bases thought to represent subsegmental atelectasis.  Extensive degenerative change around the right sternoclavicular articulation, which is thought to correspond to the paratracheal thickening seen on chest radiograph.  Colon interposed anterior to the liver, corresponding to air under the diaphragm on chest radiograph.     Dictated by (CST): Sonja Wilde MD on 11/03/2024 at 7:17 PM     Finalized by (CST): Sonja Wilde MD on 11/03/2024 at 7:25 PM          XR CHEST AP  PORTABLE  (CPT=71045)    Result Date: 11/3/2024  CONCLUSION:  Soft tissue thickening in the right paratracheal region.  Underlying consolidation or mass is a possibility.  Correlate contrast enhanced CT chest.   Air under the right hemidiaphragm, thought to be within a portion of the colon interposed between the liver and diaphragm.  Recommend upright chest radiograph for further evaluation if there is concern for pneumoperitoneum.  Alternatively, this could be further evaluated on CT chest examination.  Elevation of the right hemidiaphragm with right basilar opacity thought to represent subsegmental atelectasis.  Left basilar opacity, which may represent subsegmental atelectasis.  Superimposed pneumonitis is a differential in the appropriate clinical setting.  Small right pleural effusion.      Dictated by (CST): Sonja Wilde MD on 11/03/2024 at 6:31 PM     Finalized by (CST): Sonja Wilde MD on 11/03/2024 at 6:34 PM         EKG 12 Lead    Result Date: 11/4/2024  Atrial fibrillation with rapid ventricular response ST depression, consider subendocardial injury Nonspecific T wave abnormality Abnormal ECG No previous ECGs found in [facility] Confirmed by LADONNA DICKSON, BERMAN ([id number]) on 11/4/2024 5:45:52 PM     Assessment & Plan:       Acute Chest Pain  New onset afib  CAD- history of MV disease that was found years ago  - patient declined CABG at that time  - CT chest shows dissection at the distal arch of the left subclavian to upper abdomianl aorta above the R renal artery  - continue on esmolol   - CV surgery consulted   - Echo currently pending  - At this time- no surgical intervention is planned  - Palliative care consulted      Type B Aortic Dissection  -Descending thoracic aortic intramural hematoma  -Prescient vascular surgery recommendations  -Recommend tighter control of BP with SBP less than 140  -Repeat CT angiogram within 24 to 48 hours    HTN  Dyslipidemia     DVT PPX heparin drip     DNAR/Select     Global  A/P  -Appreciate palliative care recommendations  -Patient found to have a leukocytosis today, will perform infectious workup, CXR procalcitonin UA  -Patient currently on Lasix 40 mg IV twice daily continue monitor strict I's and O's  -Currently on esmolol drip and heparin drip  -Appreciate cardiology vascular recommendations  -Reviewed previous consultant notes  -Reviewed CBC, BMP, Mag, and Phos  -Reviewed tests ordered  -Repeat labs in am  -MDM: High, severe exacerbation of chronic illness posing a threat to life. IV medications requiring close inpatient monitoring.         Aidee Shaver MD

## 2024-11-05 NOTE — PLAN OF CARE
Problem: Patient Centered Care  Goal: Patient preferences are identified and integrated in the patient's plan of care  Description: Interventions:  - What would you like us to know as we care for you?   - Provide timely, complete, and accurate information to patient/family  - Incorporate patient and family knowledge, values, beliefs, and cultural backgrounds into the planning and delivery of care  - Encourage patient/family to participate in care and decision-making at the level they choose  - Honor patient and family perspectives and choices  Outcome: Progressing     Problem: Patient/Family Goals  Goal: Patient/Family Long Term Goal  Description: Patient's Long Term Goal:     Interventions:  -   - See additional Care Plan goals for specific interventions  Outcome: Progressing  Goal: Patient/Family Short Term Goal  Description: Patient's Short Term Goal:     Interventions:   -   - See additional Care Plan goals for specific interventions  Outcome: Progressing     Problem: CARDIOVASCULAR - ADULT  Goal: Maintains optimal cardiac output and hemodynamic stability  Description: INTERVENTIONS:  - Monitor vital signs, rhythm, and trends  - Monitor for bleeding, hypotension and signs of decreased cardiac output  - Evaluate effectiveness of vasoactive medications to optimize hemodynamic stability  - Monitor arterial and/or venous puncture sites for bleeding and/or hematoma  - Assess quality of pulses, skin color and temperature  - Assess for signs of decreased coronary artery perfusion - ex. Angina  - Evaluate fluid balance, assess for edema, trend weights  Outcome: Progressing  Goal: Absence of cardiac arrhythmias or at baseline  Description: INTERVENTIONS:  - Continuous cardiac monitoring, monitor vital signs, obtain 12 lead EKG if indicated  - Evaluate effectiveness of antiarrhythmic and heart rate control medications as ordered  - Initiate emergency measures for life threatening arrhythmias  - Monitor electrolytes and  administer replacement therapy as ordered  Outcome: Progressing

## 2024-11-06 ENCOUNTER — APPOINTMENT (OUTPATIENT)
Dept: CV DIAGNOSTICS | Facility: HOSPITAL | Age: 89
End: 2024-11-06
Attending: NURSE PRACTITIONER
Payer: MEDICARE

## 2024-11-06 LAB
ANION GAP SERPL CALC-SCNC: 8 MMOL/L (ref 0–18)
APTT PPP: 37.7 SECONDS (ref 23–36)
ATRIAL RATE: 86 BPM
BASOPHILS # BLD AUTO: 0.05 X10(3) UL (ref 0–0.2)
BASOPHILS NFR BLD AUTO: 0.3 %
BILIRUB UR QL: NEGATIVE
BUN BLD-MCNC: 29 MG/DL (ref 9–23)
BUN/CREAT SERPL: 25.9 (ref 10–20)
CALCIUM BLD-MCNC: 9.6 MG/DL (ref 8.7–10.4)
CHLORIDE SERPL-SCNC: 105 MMOL/L (ref 98–112)
CLARITY UR: CLEAR
CO2 SERPL-SCNC: 28 MMOL/L (ref 21–32)
COLOR UR: YELLOW
CREAT BLD-MCNC: 1.12 MG/DL
DEPRECATED RDW RBC AUTO: 41.3 FL (ref 35.1–46.3)
EGFRCR SERPLBLD CKD-EPI 2021: 61 ML/MIN/1.73M2 (ref 60–?)
EOSINOPHIL # BLD AUTO: 0.3 X10(3) UL (ref 0–0.7)
EOSINOPHIL NFR BLD AUTO: 1.9 %
ERYTHROCYTE [DISTWIDTH] IN BLOOD BY AUTOMATED COUNT: 17.7 % (ref 11–15)
GLUCOSE BLD-MCNC: 118 MG/DL (ref 70–99)
GLUCOSE UR-MCNC: NORMAL MG/DL
HCT VFR BLD AUTO: 36.5 %
HGB BLD-MCNC: 11.8 G/DL
HGB UR QL STRIP.AUTO: NEGATIVE
IMM GRANULOCYTES # BLD AUTO: 0.16 X10(3) UL (ref 0–1)
IMM GRANULOCYTES NFR BLD: 1 %
KETONES UR-MCNC: NEGATIVE MG/DL
LEUKOCYTE ESTERASE UR QL STRIP.AUTO: 250
LYMPHOCYTES # BLD AUTO: 1.06 X10(3) UL (ref 1–4)
LYMPHOCYTES NFR BLD AUTO: 6.7 %
MAGNESIUM SERPL-MCNC: 2.1 MG/DL (ref 1.6–2.6)
MCH RBC QN AUTO: 22.2 PG (ref 26–34)
MCHC RBC AUTO-ENTMCNC: 32.3 G/DL (ref 31–37)
MCV RBC AUTO: 68.6 FL
MONOCYTES # BLD AUTO: 1.74 X10(3) UL (ref 0.1–1)
MONOCYTES NFR BLD AUTO: 11 %
NEUTROPHILS # BLD AUTO: 12.48 X10 (3) UL (ref 1.5–7.7)
NEUTROPHILS # BLD AUTO: 12.48 X10(3) UL (ref 1.5–7.7)
NEUTROPHILS NFR BLD AUTO: 79.1 %
NITRITE UR QL STRIP.AUTO: NEGATIVE
OSMOLALITY SERPL CALC.SUM OF ELEC: 299 MOSM/KG (ref 275–295)
P AXIS: 47 DEGREES
P-R INTERVAL: 122 MS
PH UR: 5.5 [PH] (ref 5–8)
PHOSPHATE SERPL-MCNC: 3.1 MG/DL (ref 2.4–5.1)
PLATELET # BLD AUTO: 231 10(3)UL (ref 150–450)
POTASSIUM SERPL-SCNC: 3.8 MMOL/L (ref 3.5–5.1)
PROT UR-MCNC: 20 MG/DL
Q-T INTERVAL: 374 MS
QRS DURATION: 104 MS
QTC CALCULATION (BEZET): 447 MS
R AXIS: -30 DEGREES
RBC # BLD AUTO: 5.32 X10(6)UL
SODIUM SERPL-SCNC: 141 MMOL/L (ref 136–145)
SP GR UR STRIP: 1.02 (ref 1–1.03)
T AXIS: -7 DEGREES
UROBILINOGEN UR STRIP-ACNC: 2
VENTRICULAR RATE: 86 BPM
WBC # BLD AUTO: 15.8 X10(3) UL (ref 4–11)

## 2024-11-06 PROCEDURE — 93308 TTE F-UP OR LMTD: CPT | Performed by: NURSE PRACTITIONER

## 2024-11-06 PROCEDURE — 93325 DOPPLER ECHO COLOR FLOW MAPG: CPT | Performed by: NURSE PRACTITIONER

## 2024-11-06 PROCEDURE — 99233 SBSQ HOSP IP/OBS HIGH 50: CPT | Performed by: HOSPITALIST

## 2024-11-06 PROCEDURE — 93321 DOPPLER ECHO F-UP/LMTD STD: CPT | Performed by: NURSE PRACTITIONER

## 2024-11-06 RX ORDER — DIGOXIN 0.25 MG/ML
125 INJECTION INTRAMUSCULAR; INTRAVENOUS ONCE
Status: COMPLETED | OUTPATIENT
Start: 2024-11-06 | End: 2024-11-06

## 2024-11-06 NOTE — PROGRESS NOTES
[facility] - CARDIOLOGY PROGRESS NOTE      César Copeland Patient Status:  Inpatient    1930 MRN [id number]   Location [facility] 2W/SW Attending Aidee Shaver MD   Hosp Day # 3 PCP Jeff Anthony D'Amico,          Assessment and Plan:   Descending aortic intramural hematoma  -negative ECG and troponin, no associated ACS  -TTE with suboptimal views, EF appears grossly normal  -appreciate vascular management  -continue HR and BP control     AF with RVR, new onset  -presented with CP and SOB  -found to be in AF with RVR  -currently rate controlled, will transition to PO metoprolol tartrate 25mg BID  -switch IV hep gtt to age/weight dosed apixaban (2.5mg BID)     CAD   -h/o severe multivessel CAD in , declined revascularization at the time  -currently no signs of ACS, hs-Tn and ECG negative for acute ischemia elevated heart rate earlier now improved after 1 dose of digoxin    Plan:  Diuretic and metoprolol held this morning with lower blood pressure.  Does not look more volume overloaded will reassess in a.m.  Heart rates better.  Will resume lower dose metoprolol if blood pressure allows.  If heart rate elevated again can give 1 dose of digoxin.  Echo pending to reassess cardiac structures function and pressures.      Subjective:   César Copeland is a(n) 89 year old male with elevated heart rate earlier now improved after 1 dose of digoxin last night.  Blood pressure borderline  Objective:   Blood pressure (!) 89/66, pulse 109, temperature 97.7 °F (36.5 °C), temperature source Temporal, resp. rate 16, height 5' 4\" (1.626 m), weight 112 lb 10.5 oz (51.1 kg), SpO2 92%.  Intake/Output:        Last 24 hours:   Intake/Output Summary (Last 24 hours) at 2024 1053  Last data filed at 2024 0600  Gross per 24 hour   Intake 301.2 ml   Output 1250 ml   Net -948.8 ml      This shift: No intake/output data recorded.    Exam  Gen: Comfortable,  in no acute distress,    Psych.alert and oriented x3  HEENT: atraumatic, normal conjunctiva, moist mucosa  Neck:supple,no JVD, no bruits  Lungs: clear to ascultation, normal respiratory effort  Cardiac: irregular rate and rhythm, nl S1,S2, no pathologic murmur  Abd: Abdomen soft, nontender, nondistended,  bowel sounds present  Ext:  no clubbing, no cyanosis,no edema  Neuro: no focal deficits  Skin: no rashes or lesions        Scheduled Meds:    cefTRIAXone  1 g Intravenous Q24H    metoprolol tartrate  12.5 mg Oral 2x Daily(Beta Blocker)    apixaban  2.5 mg Oral BID    pantoprazole  40 mg Oral QAM AC    finasteride  5 mg Oral Daily    furosemide  40 mg Intravenous BID (Diuretic)       Results:     Lab Results   Component Value Date    WBC 15.8 (H) 11/06/2024    HGB 11.8 (L) 11/06/2024    HCT 36.5 (L) 11/06/2024    .0 11/06/2024    CREATSERUM 1.12 11/06/2024    BUN 29 (H) 11/06/2024     11/06/2024    K 3.8 11/06/2024     11/06/2024    CO2 28.0 11/06/2024     (H) 11/06/2024    CA 9.6 11/06/2024    ALB 4.3 05/21/2024    ALKPHO 81 05/21/2024    BILT 1.8 (H) 05/21/2024    TP 6.7 05/21/2024    AST 23 05/21/2024    ALT 14 05/21/2024    PTT 37.7 (H) 11/06/2024    T4F 0.9 10/03/2022    TSH 4.464 11/07/2023    PSA 8.71 (H) 08/28/2019    MG 2.1 11/06/2024    PHOS 3.1 11/06/2024    B12 746 09/06/2018       XR CHEST AP PORTABLE  (CPT=71045)    Result Date: 11/5/2024  CONCLUSION: No new abnormality.  Persistent bibasilar opacities, probably atelectasis with or without superimposed consolidation.    elm-remote     Dictated by (CST): Ld Flowers MD on 11/05/2024 at 1:29 PM     Finalized by (CST): Ld Flowers MD on 11/05/2024 at 1:34 PM         EKG 12 Lead    Result Date: 11/6/2024  Sinus rhythm with Premature atrial complexes Left axis deviation Abnormal ECG When compared with ECG of 03-NOV-2024 16:23, Sinus rhythm has replaced Atrial fibrillation Vent. rate has decreased BY  74 BPM ST elevation has  replaced ST depression in Lateral leads       Rivera Covarrubias MD  [facility]  11/6/2024

## 2024-11-06 NOTE — DIETARY NOTE
ADULT NUTRITION INITIAL ASSESSMENT    Pt is at moderate nutrition risk.  Pt does not meet malnutrition criteria.      RECOMMENDATIONS TO MD:   RD liberalized diet and added ONS to promote optimal PO intake.  See Nutrition Intervention for diet and ONS specifics     ADMITTING DIAGNOSIS:  Atrial fibrillation with rapid ventricular response (HCC) [I48.91]  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) [I71.019]  Acute congestive heart failure, unspecified heart failure type (HCC) [I50.9]  PERTINENT PAST MEDICAL HISTORY:   Past Medical History:    Arthritis    G    Back problem    Blood disorder    BPH (benign prostatic hyperplasia)    Calculus of kidney    Cataract    Coronary atherosclerosis    Essential hypertension    Hearing impairment    hearing aids    History of asbestos exposure    Hyperlipidemia    Lipid screening    Lower GI bleed    Osteoarthritis    Osteoarthritis, shoulder    PONV (postoperative nausea and vomiting)    S/P TURP (transurethral resection of prostate)    Thalassemia trait    Visual impairment     PATIENT STATUS:   11/06/24 INITIAL VISIT: Pt assessed r/t screened at risk on initial MST due to unintentional wt loss and decreased/poor appetite/intake. Pt presented to hospital from home with c/o chest pain and SOB. Stable on room air. Transferred to tele unit from CCU just now. Pt lying in bed with family at bedside. Pt reports typical wt 124-130 lbs. Recent wt on home scale 122 lbs. Denies recent changes in appetite/intake. Typically consumes 3 self-prepared meals. Likes ice cream. Receptive to ONS.      FOOD/NUTRITION RELATED HISTORY:  Appetite: Good  Intake:  Pt reporting excellent intake of meal trays. Consumed 100% of 2 recorded trays over the past 48 hrs.   Intake Meeting Needs: Marginal, added oral nutrition supplements (ONS)  Percent Meals Eaten (last 3 days)       Date/Time Percent Meals Eaten (%)    11/04/24 0800 50 %    11/04/24 1500 30 %    11/04/24 2000 100 %    11/06/24 0800 100  %        Food Allergies: No Known Food Allergies (NKFA)  Cultural/Ethnic/Scientology Preferences: Not Obtained    GASTROINTESTINAL: +BM last reported stool output on 11/3 and abdomen is soft, non-tender with active bowel sounds    MEDICATIONS: reviewed; Noted non-cardiac electrolyte replacement protocol; Lasix BID    metoprolol tartrate  12.5 mg Oral 2x Daily(Beta Blocker)    cefepime  1 g Intravenous Q24H    apixaban  2.5 mg Oral BID    pantoprazole  40 mg Oral QAM AC    finasteride  5 mg Oral Daily    furosemide  40 mg Intravenous BID (Diuretic)     LABS: reviewed  Recent Labs     11/04/24  0309 11/05/24  0407 11/06/24  0416   * 147* 118*   BUN 19 24* 29*   CREATSERUM 0.99 1.04 1.12   CA 9.4 9.4 9.6   MG 2.0 2.0 2.1    139 141   K 4.0 3.8 3.8    105 105   CO2 26.0 27.0 28.0   PHOS  --   --  3.1   OSMOCALC 302* 295 299*       NUTRITION RELATED PHYSICAL FINDINGS:  - Nutrition Focused Physical Exam (NFPE): no wasting noted per visual exam. Pt is thin and frail with sarcopenia c/w advanced age.   - Fluid Accumulation: +1 Bilateral Lower extremity see RN documentation for details  - Skin Integrity: intact see RN documentation for details    ANTHROPOMETRICS:  HT: 162.6 cm (5' 4\")  WT: 51.1 kg (112 lb 10.5 oz) (down 8% (10 lbs) from admission wt (123 lbs on 11/3) with on-going diuresis; +1 edema) - questionable accuracy - requested standing scale wt   DOSING WT: 51 kg (113 lbs - today's wt) - wt utilized for anthropometric assessment  BMI: Body mass index is 19.34 kg/m².  BMI CLASSIFICATION: <22 considered underweight for advanced age (>65)  IBW: 130 lbs        87% IBW  Usual Body Wt: 126-130 lbs (per pt report, ~6 wks ago)      87-90% UBW    WEIGHT HISTORY: Pt reports wt loss of 3-6% over the past ~6 wks per home scaled wt.  Patient Weight(s) for the past 336 hrs:   Weight   11/06/24 0600 51.1 kg (112 lb 10.5 oz)   11/03/24 2323 53.8 kg (118 lb 9.7 oz)   11/03/24 1744 56 kg (123 lb 7.3 oz)   11/03/24  1631 56.2 kg (124 lb)     Wt Readings from Last 10 Encounters:   11/06/24 51.1 kg (112 lb 10.5 oz)   07/29/24 54.4 kg (120 lb)   07/10/24 56.4 kg (124 lb 6.4 oz)   07/08/24 59 kg (130 lb)   11/08/23 56.7 kg (125 lb)   05/08/23 56.7 kg (125 lb)   03/06/23 57.2 kg (126 lb)   10/05/22 56.2 kg (124 lb)   03/10/22 54.9 kg (121 lb)   01/05/22 57.6 kg (127 lb)     NUTRITION DIAGNOSIS/PROBLEM:   Increased nutrient needs related to Increased demand for nutrient (protein and calorie needs for COPD) as evidenced by recent unintentional wt loss.    NUTRITION INTERVENTION:     NUTRITION PRESCRIPTION:   Estimated Nutrition needs: --dosing wt of 51 kg - wt taken on 11/6/24  Calories: 3021-7190 calories/day (30-35 calories per kg Dosing wt)  Protein: 61-77 g protein/day (1.2-1.5 g protein/kg Dosing wt)  Fluid Needs: 1275 ml/day (25 ml/kg chronological age method)    - Diet:       Procedures    Sodium restricted diet Sodium Restriction: 3-4 GM NA; Is Patient on Accuchecks? No   *Cardiac restriction liberalized to promote optimal PO intake*     - RD Malnutrition Plan: Adjusted diet to least restrictive to maximize intake, Encouraged small frequent meals with emphasis on high calorie/high protein, and Initiated ONS (oral nutritional supplements)  - Meals and snacks: Encouraged increased PO intake  - Medical Food Supplements: RD added Ensure Compact (220 calories/ 9 g protein each) Daily Vanilla  - Vitamin and mineral supplements: none  - Feeding assistance: independent  - Nutrition education: assess education needs   - Coordination of nutrition care: collaboration with other providers and discussed in Care Rounds   - Discharge and transfer of nutrition care to new setting or provider: monitor plans - from home alone with local family support    MONITOR AND EVALUATE/NUTRITION GOALS:  - Food and Nutrient Intake:      Monitor: adequacy of PO intake and adequacy of supplement intake  - Food and Nutrient Administration:      Monitor:  N/A  - Anthropometric Measurement:    Monitor weight  - Nutrition Goals:      PO and supplement greater than 75% of needs, minimize lean body mass loss, and halt true wt loss    DIETITIAN FOLLOW UP: RD to follow and monitor nutrition status    Nel Urias MS, RD, LDN, [facility]  [phone]

## 2024-11-06 NOTE — PLAN OF CARE
Patient's HR continue range between 100s-130s. Afib-Aflutter. BP on the soft side - [facility] notified (APRN Selin). X2 dose IV digoxin ordered and administered. On 4L NC - [place] noted. BP and HR has improved. On IV lasix BID - strict I&Os and daily weight. Plan to continue monitor HR and BP, monitor respiratory status. Transfer to tele floor when the bed available.    Problem: Patient Centered Care  Goal: Patient preferences are identified and integrated in the patient's plan of care  Description: Interventions:  - What would you like us to know as we care for you? Hard of hearing  - Provide timely, complete, and accurate information to patient/family  - Incorporate patient and family knowledge, values, beliefs, and cultural backgrounds into the planning and delivery of care  - Encourage patient/family to participate in care and decision-making at the level they choose  - Honor patient and family perspectives and choices  Outcome: Progressing     Problem: Patient/Family Goals  Goal: Patient/Family Long Term Goal  Description: Patient's Long Term Goal: Discharge home safely    Interventions:  -  Monitor VS  -Cardiologist consult  - See additional Care Plan goals for specific interventions  Outcome: Progressing  Goal: Patient/Family Short Term Goal  Description: Patient's Short Term Goal: Hemodynamically stable    Interventions:   - Notified cardiologist regarding HR and BP  - IV digoxin administered  -Continue monitor tele  - See additional Care Plan goals for specific interventions  Outcome: Progressing     Problem: CARDIOVASCULAR - ADULT  Goal: Maintains optimal cardiac output and hemodynamic stability  Description: INTERVENTIONS:  - Monitor vital signs, rhythm, and trends  - Monitor for bleeding, hypotension and signs of decreased cardiac output  - Evaluate effectiveness of vasoactive medications to optimize hemodynamic stability  - Monitor arterial and/or venous puncture sites for bleeding and/or hematoma  - Assess  quality of pulses, skin color and temperature  - Assess for signs of decreased coronary artery perfusion - ex. Angina  - Evaluate fluid balance, assess for edema, trend weights  Outcome: Progressing  Goal: Absence of cardiac arrhythmias or at baseline  Description: INTERVENTIONS:  - Continuous cardiac monitoring, monitor vital signs, obtain 12 lead EKG if indicated  - Evaluate effectiveness of antiarrhythmic and heart rate control medications as ordered  - Initiate emergency measures for life threatening arrhythmias  - Monitor electrolytes and administer replacement therapy as ordered  Outcome: Progressing

## 2024-11-06 NOTE — PROGRESS NOTES
Pt away for 2D echo and cardiac work up is ongoing HR ranging from 100's to 130' at rest Pt will continue to follow and progress as medical progress allows, palliative consult has also been ordered.

## 2024-11-06 NOTE — PROGRESS NOTES
[facility]  part of [facility]    Progress Note    César Copeland Patient Status:  Inpatient    1930 MRN [id number]   Location [facility] 2W/SW Attending Aidee Shaver MD   Hosp Day # 3 PCP Jeff Anthony D'Amico, DO     Chief Complaint:     A-fib with RVR    Subjective:   Subjective:    Patient seen and examined this morning  Endorsing shortness of breath and chest pain  Hypotensive  Overnight had uncontrolled HR    Objective:   Blood pressure (!) 89/66, pulse 109, temperature 97.7 °F (36.5 °C), temperature source Temporal, resp. rate 16, height 5' 4\" (1.626 m), weight 112 lb 10.5 oz (51.1 kg), SpO2 92%.  Physical Exam    General: does not appear to be in acute distress at this time  HEENT: EOMI PERRLA, atraumatic normocephalic  Cardiac: S1-S2 appreciated  Lungs: Good air entry bilaterally clear to auscultation  Abdomen: Soft nontender nondistended positive bowel sounds  Ext: Peripheral pulses are positive  Skin: No rashes noted      Results:   Lab Results   Component Value Date    WBC 15.8 (H) 2024    HGB 11.8 (L) 2024    HCT 36.5 (L) 2024    .0 2024    CREATSERUM 1.12 2024    BUN 29 (H) 2024     2024    K 3.8 2024     2024    CO2 28.0 2024     (H) 2024    CA 9.6 2024    ALB 4.3 2024    ALKPHO 81 2024    BILT 1.8 (H) 2024    TP 6.7 2024    AST 23 2024    ALT 14 2024    PTT 37.7 (H) 2024    T4F 0.9 10/03/2022    TSH 4.464 2023    PSA 8.71 (H) 2019    MG 2.1 2024    PHOS 3.1 2024    TROPHS 19 2024    B12 746 2018       XR CHEST AP PORTABLE  (CPT=71045)    Result Date: 2024  CONCLUSION: No new abnormality.  Persistent bibasilar opacities, probably atelectasis with or without superimposed consolidation.    elm-remote     Dictated by (CST): Ld Flowers MD on 2024 at 1:29 PM     Finalized by  (CST): Ld Flowers MD on 11/05/2024 at 1:34 PM         EKG 12 Lead    Result Date: 11/6/2024  Sinus rhythm with Premature atrial complexes Left axis deviation Abnormal ECG When compared with ECG of 03-NOV-2024 16:23, Sinus rhythm has replaced Atrial fibrillation Vent. rate has decreased BY  74 BPM ST elevation has replaced ST depression in Lateral leads     Assessment & Plan:       Acute Chest Pain  New onset afib  CAD- history of MV disease that was found years ago  - patient declined CABG at that time  - CT chest shows dissection at the distal arch of the left subclavian to upper abdomianl aorta above the R renal artery  - continue on esmolol   - CV surgery consulted   - Echo currently pending  - At this time- no surgical intervention is planned  - Palliative care consulted      Type B Aortic Dissection  -Descending thoracic aortic intramural hematoma  -Prescient vascular surgery recommendations  -Recommend tighter control of BP with SBP less than 140  -Repeat CT angiogram within 24 to 48 hours    HTN  Dyslipidemia     DVT PPX heparin drip     DNAR/Select     Global A/P  -Overnight patient had uncontrolled heart rate, was given Lopressor 5mg, and patient was started on Dig - with improved control  -appreciate cardiology recs.  -Appreciate palliative care recommendations  -Procal mildly elevated - UA (+) for pyuria - started on IV rocephin. -Patient currently on Lasix 40 mg IV twice daily continue monitor strict I's and O's  -Currently on esmolol drip and heparin drip  -Appreciate cardiology vascular recommendations  -Reviewed previous consultant notes  -Reviewed CBC, BMP, Mag, and Phos  -Reviewed tests ordered  -Repeat labs in am  -MDM: High, severe exacerbation of chronic illness posing a threat to life. IV medications requiring close inpatient monitoring.         Aidee Shaver MD

## 2024-11-06 NOTE — PROGRESS NOTES
Pt with uncontrolled HR overnight. Lopressor 5 mg IVP ordered earlier. After IV Lopressor, pt was hypotensive and HR was still in the 110s-130s. Digoxin 125 mg IVP was ordered twice with improvement in HR.

## 2024-11-07 ENCOUNTER — APPOINTMENT (OUTPATIENT)
Dept: CT IMAGING | Facility: HOSPITAL | Age: 89
End: 2024-11-07
Attending: NURSE PRACTITIONER
Payer: MEDICARE

## 2024-11-07 LAB
ANION GAP SERPL CALC-SCNC: 8 MMOL/L (ref 0–18)
BASOPHILS # BLD AUTO: 0.06 X10(3) UL (ref 0–0.2)
BASOPHILS NFR BLD AUTO: 0.5 %
BUN BLD-MCNC: 36 MG/DL (ref 9–23)
BUN/CREAT SERPL: 37.1 (ref 10–20)
CALCIUM BLD-MCNC: 9.6 MG/DL (ref 8.7–10.4)
CHLORIDE SERPL-SCNC: 102 MMOL/L (ref 98–112)
CO2 SERPL-SCNC: 31 MMOL/L (ref 21–32)
CREAT BLD-MCNC: 0.97 MG/DL
DEPRECATED RDW RBC AUTO: 41.5 FL (ref 35.1–46.3)
EGFRCR SERPLBLD CKD-EPI 2021: 72 ML/MIN/1.73M2 (ref 60–?)
EOSINOPHIL # BLD AUTO: 0.35 X10(3) UL (ref 0–0.7)
EOSINOPHIL NFR BLD AUTO: 2.6 %
ERYTHROCYTE [DISTWIDTH] IN BLOOD BY AUTOMATED COUNT: 17.3 % (ref 11–15)
GLUCOSE BLD-MCNC: 115 MG/DL (ref 70–99)
HCT VFR BLD AUTO: 36.2 %
HGB BLD-MCNC: 11.6 G/DL
IMM GRANULOCYTES # BLD AUTO: 0.14 X10(3) UL (ref 0–1)
IMM GRANULOCYTES NFR BLD: 1.1 %
LYMPHOCYTES # BLD AUTO: 1.01 X10(3) UL (ref 1–4)
LYMPHOCYTES NFR BLD AUTO: 7.6 %
MAGNESIUM SERPL-MCNC: 2.2 MG/DL (ref 1.6–2.6)
MCH RBC QN AUTO: 22.1 PG (ref 26–34)
MCHC RBC AUTO-ENTMCNC: 32 G/DL (ref 31–37)
MCV RBC AUTO: 69 FL
MONOCYTES # BLD AUTO: 1.07 X10(3) UL (ref 0.1–1)
MONOCYTES NFR BLD AUTO: 8.1 %
NEUTROPHILS # BLD AUTO: 10.66 X10 (3) UL (ref 1.5–7.7)
NEUTROPHILS # BLD AUTO: 10.66 X10(3) UL (ref 1.5–7.7)
NEUTROPHILS NFR BLD AUTO: 80.1 %
OSMOLALITY SERPL CALC.SUM OF ELEC: 301 MOSM/KG (ref 275–295)
PHOSPHATE SERPL-MCNC: 3.6 MG/DL (ref 2.4–5.1)
PLATELET # BLD AUTO: 291 10(3)UL (ref 150–450)
POTASSIUM SERPL-SCNC: 3.5 MMOL/L (ref 3.5–5.1)
RBC # BLD AUTO: 5.25 X10(6)UL
SODIUM SERPL-SCNC: 141 MMOL/L (ref 136–145)
WBC # BLD AUTO: 13.3 X10(3) UL (ref 4–11)

## 2024-11-07 PROCEDURE — 99233 SBSQ HOSP IP/OBS HIGH 50: CPT | Performed by: HOSPITALIST

## 2024-11-07 PROCEDURE — 71275 CT ANGIOGRAPHY CHEST: CPT | Performed by: NURSE PRACTITIONER

## 2024-11-07 PROCEDURE — 99231 SBSQ HOSP IP/OBS SF/LOW 25: CPT | Performed by: REGISTERED NURSE

## 2024-11-07 RX ORDER — FUROSEMIDE 40 MG/1
40 TABLET ORAL
Status: DISCONTINUED | OUTPATIENT
Start: 2024-11-07 | End: 2024-11-08

## 2024-11-07 RX ORDER — POLYETHYLENE GLYCOL 3350 17 G/17G
17 POWDER, FOR SOLUTION ORAL ONCE
Status: COMPLETED | OUTPATIENT
Start: 2024-11-07 | End: 2024-11-07

## 2024-11-07 NOTE — CM/SW NOTE
11/07/24 1000   CM/SW Referral Data   Referral Source Social Work (self-referral)   Reason for Referral Discharge planning   Informant Patient   Medical Hx   Does patient have an established PCP? Yes  (Jeff Anthony D'Amico)   Patient Info   Patient's Current Mental Status at Time of Assessment Alert;Oriented   Patient's Home Environment House   Number of Levels in Home 1   Number of Stair in Home 3 to enter   Patient lives with Alone   Patient Status Prior to Admission   Independent with ADLs and Mobility Yes   Services in place prior to admission DME/Supplies at home   Type of DME/Supplies Straight Cane;Standard Walker   Discharge Needs   Anticipated D/C needs Home health care;To be determined   Choice of Post-Acute Provider   Informed patient of right to choose their preferred provider Yes   List of appropriate post-acute services provided to patient/family with quality data Yes   Patient/family choice pending   Information given to Patient   [facility] financial disclosure given Yes     10:50AM  SW self referred pt for DC Planning.    Met w/ pt at bedside and above assessment completed.  Verified home address and confirmed living alone.    Pt has a single level home w/ apporx 3 steps to enter.  Pt reports having a cane and walker at home but not using them for ambulation. Per pt, he walks only as much as needed each day due to a bad hip. Pt reports still driving (he drove himself to [facility]).    Pt reports having 3 dtrs that are \"spread out all over.\"    Pt confirmed hx w/ [facility] in 2007 and hx w/ HH approx 20 yrs ago post knee surgery. Pt is open to HH again if needed.    HH list of quality data provided at bedside for review. Pt is agreeable to f/up conversations pending how he does w/ PT/OT at [facility].    1:45PM  Per chart, PT eval completed and Anticipated therapy need: Home with Home Healthcare    SW to f/up w/ pt for further discussion and HH choice as schedule permits later today or  tomorrow.    02:50PM  Notified by CORINNE Carrasquillo w/ Palliative Care - pt's dtrs at bedside and asking to speak w/ SW about HH arrangements.    SW presented to bedside - pt's dtrs no longer there. Pt and pt's \"honey\" Paulette at bedside. SW left name and phone # on HH list for pt and dtrs to review.     SW did explain to pt and Paulette that 24hr care would need to be arranged via family/friends or hiring a CG. Pt and Paulette confirmed they have people to stay w/ him upon DC.    PLAN: Home w/ poss HH & [facility] - pending choice & med clear       SW/CM to remain available for support and/or discharge planning.         Fernanda, MSW, LSW [id number]

## 2024-11-07 NOTE — PROGRESS NOTES
[facility]  part of [facility]    Progress Note    César Copeland Patient Status:  Inpatient    1930 MRN [id number]   Location [facility] 2W/SW Attending Aidee Shaver MD   Hosp Day # 4 PCP Jeff Anthony D'Amico, DO     Chief Complaint:     A-fib with RVR    Subjective:   Subjective:    Patient seen and examined this morning  Endorsing shortness of breath and chest pain  Hypotensive  Family at bedside.   Patient states he has good energy    Objective:   Blood pressure 136/79, pulse 82, temperature 98.1 °F (36.7 °C), temperature source Oral, resp. rate 16, height 5' 4\" (1.626 m), weight 123 lb 6.4 oz (56 kg), SpO2 95%.  Physical Exam    General: does not appear to be in acute distress at this time  HEENT: EOMI PERRLA, atraumatic normocephalic  Cardiac: S1-S2 appreciated  Lungs: Good air entry bilaterally clear to auscultation  Abdomen: Soft nontender nondistended positive bowel sounds  Ext: Peripheral pulses are positive  Skin: No rashes noted      Results:   Lab Results   Component Value Date    WBC 13.3 (H) 2024    HGB 11.6 (L) 2024    HCT 36.2 (L) 2024    .0 2024    CREATSERUM 0.97 2024    BUN 36 (H) 2024     2024    K 3.5 2024     2024    CO2 31.0 2024     (H) 2024    CA 9.6 2024    ALB 4.3 2024    ALKPHO 81 2024    BILT 1.8 (H) 2024    TP 6.7 2024    AST 23 2024    ALT 14 2024    PTT 37.7 (H) 2024    T4F 0.9 10/03/2022    TSH 4.464 2023    PSA 8.71 (H) 2019    MG 2.2 2024    PHOS 3.6 2024    TROPHS 19 2024    B12 746 2018       XR CHEST AP PORTABLE  (CPT=71045)    Result Date: 2024  CONCLUSION: No new abnormality.  Persistent bibasilar opacities, probably atelectasis with or without superimposed consolidation.    elm-remote     Dictated by (CST): Ld Flowers MD on 2024 at 1:29 PM      Finalized by (CST): Ld Flowers MD on 11/05/2024 at 1:34 PM         EKG 12 Lead    Result Date: 11/6/2024  Sinus rhythm with Premature atrial complexes Left axis deviation Abnormal ECG When compared with ECG of 03-NOV-2024 16:23, Sinus rhythm has replaced Atrial fibrillation Vent. rate has decreased BY  74 BPM ST elevation has replaced ST depression in Lateral leads Confirmed by NADEGE MIGUEL ([id number]) on 11/6/2024 12:47:12 PM     Assessment & Plan:       Acute Chest Pain  New onset afib  CAD- history of MV disease that was found years ago  - patient declined CABG at that time  - CT chest shows dissection at the distal arch of the left subclavian to upper abdomianl aorta above the R renal artery  - continue on esmolol   - CV surgery consulted   - Echo currently pending  - At this time- no surgical intervention is planned  - Palliative care consulted      Type B Aortic Dissection  -Descending thoracic aortic intramural hematoma  -Prescient vascular surgery recommendations  -Recommend tighter control of BP with SBP less than 140  -Repeat CT angiogram within 24 to 48 hours    HTN  Dyslipidemia     DVT PPX heparin drip     DNAR/Select     Global A/P  -Rates controlled.   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 -Patient currently on Lasix 40 mg IV twice daily continue monitor strict I's and O's  -Currently on esmolol drip and heparin drip  -Appreciate cardiology vascular recommendations  -Reviewed previous consultant notes  -Reviewed CBC, BMP, Mag, and Phos  -Reviewed tests ordered  -Repeat labs in am  -MDM: High, severe exacerbation of chronic illness posing a threat to life. IV medications requiring close inpatient monitoring.         Aidee Shaver MD

## 2024-11-07 NOTE — PALLIATIVE CARE NOTE
[facility]  part of [facility]  Palliative Care Progress Note    César Copeland Patient Status:  Inpatient    1930 MRN [id number]   Location [facility] 2W/SW Attending Aidee Shaver MD   Hosp Day # 4 PCP Jeff Anthony D'Amico,      The  Cures Act makes medical notes like these available to patients in the interest of transparency. Please be advised this is a medical document. Medical documents are intended to carry relevant information, facts as evident, and the clinical opinion of the practitioner. The medical note is intended as peer to peer communication and may appear blunt or direct. It is written in medical language and may contain abbreviations or verbiage that are unfamiliar.       Subjective     Reviewed symptom medications past 24 hrs: none    Patient was seen and examined in the chair with his family in room. Pt is A&OX4 and appears improved today. He says he walked in the hallway today. Denies dyspnea symptoms on RA. Denies any pain issues. He says he is eating well, denies abdominal pain, n/v and feeling constipated with no BM in 4 days. VSS  See summary of discussion below.     Review of Systems:  Pertinent items are noted in subjective    Allergies:  Allergies[1]    Medications:     Current Facility-Administered Medications:     ceFEPIme (Maxipime) 1 g in sodium chloride 0.9% 100 mL IVPB-MBP, 1 g, Intravenous, Q12H    metoprolol tartrate (Lopressor) partial tab 12.5 mg, 12.5 mg, Oral, 2x Daily(Beta Blocker)    apixaban (Eliquis) tab 2.5 mg, 2.5 mg, Oral, BID    pantoprazole (Protonix) DR tab 40 mg, 40 mg, Oral, QAM AC    calcium carbonate (Tums) chewable tab 500 mg, 500 mg, Oral, Q6H PRN    finasteride (Proscar) tab 5 mg, 5 mg, Oral, Daily    melatonin tab 3 mg, 3 mg, Oral, Nightly PRN    acetaminophen (Tylenol Extra Strength) tab 500 mg, 500 mg, Oral, Q4H PRN    benzonatate (Tessalon) cap 200 mg, 200 mg, Oral, TID PRN    ondansetron (Zofran) 4  MG/2ML injection 4 mg, 4 mg, Intravenous, Q6H PRN    metoclopramide (Reglan) 5 mg/mL injection 5 mg, 5 mg, Intravenous, Q8H PRN    polyethylene glycol (PEG 3350) (Miralax) 17 g oral packet 17 g, 17 g, Oral, Daily PRN    sennosides (Senokot) tab 17.2 mg, 17.2 mg, Oral, Nightly PRN    bisacodyl (Dulcolax) 10 MG rectal suppository 10 mg, 10 mg, Rectal, Daily PRN    fleet enema (Fleet) rectal enema 133 mL, 1 enema, Rectal, Once PRN    furosemide (Lasix) 10 mg/mL injection 40 mg, 40 mg, Intravenous, BID (Diuretic)    Objective     Vital Signs:  Blood pressure 141/82, pulse 96, temperature 98.1 °F (36.7 °C), temperature source Oral, resp. rate 16, height 5' 4\" (1.626 m), weight 123 lb 6.4 oz (56 kg), SpO2 95%.  Body mass index is 21.18 kg/m².  Present Level of pain: 0/10  Non-verbal signs of pain present: No    Physical Exam:  General: Alert and in no apparent distress. Weak, frail/thin appearing  HEENT: No focal deficits.  Cardiac: Regular rate and rhythm, S1, S2 normal, no murmur, rub or gallop.  Lungs: Clear breath sounds bilaterally.  Normal excursions and effort.  Abdomen: Soft, non-tender, normal bowel sounds X 4 quadrants, no rebound or guarding  Extremities: Without clubbing, cyanosis. Peripheral pulses are 2+. BLE Edema not present  Neurologic: Alert and oriented X4, normal affect.  Skin: Warm and dry.     Prior to admission Palliative performance scale PPSv2 (%): 70      Current PPS 50%    Hematology:  Lab Results   Component Value Date    WBC 13.3 (H) 11/07/2024    HGB 11.6 (L) 11/07/2024    HCT 36.2 (L) 11/07/2024    .0 11/07/2024       Coags:  Lab Results   Component Value Date    PTT 37.7 (H) 11/06/2024       Chemistry:  Lab Results   Component Value Date    CREATSERUM 0.97 11/07/2024    BUN 36 (H) 11/07/2024     11/07/2024    K 3.5 11/07/2024     11/07/2024    CO2 31.0 11/07/2024     (H) 11/07/2024    CA 9.6 11/07/2024    ALB 4.3 05/21/2024    ALKPHO 81 05/21/2024    BILT 1.8 (H)  05/21/2024    TP 6.7 05/21/2024    AST 23 05/21/2024    ALT 14 05/21/2024    PSA 8.71 (H) 08/28/2019    MG 2.2 11/07/2024    PHOS 3.6 11/07/2024       Imaging:  XR CHEST AP PORTABLE  (CPT=71045)    Result Date: 11/5/2024  CONCLUSION: No new abnormality.  Persistent bibasilar opacities, probably atelectasis with or without superimposed consolidation.    elm-remote     Dictated by (CST): Ld Flowers MD on 11/05/2024 at 1:29 PM     Finalized by (CST): Ld Flowers MD on 11/05/2024 at 1:34 PM           Follow up GOC Discussion        11/7/24-Discussed his clinical condition and he feels overall better. He is hoping to go home soon and discussed HH and [facility] to follow on dc. Provided emotional support to pt/family who are coping adequately.     11/5/24-Provided clinical update to pt and family. Reinforced benefits of palliative care and recommended having a community palliative care program follow on dc which they are agreeable to. Pt wants to continue with supportive care. He is hoping he doesn't get too weak with being in the hospital and is hoping to get OOB today.     I readdressed the importance of POLST form completion so wishes are respected across all healthcare settings. Reviewed POLST in detail and with pt and dtr Staci. Confirmed wishes for DNAR/DNI-selective, no feeding tubes. POLST form completed today, original given to family and copy sent to registration for scan into EPIC. Provided emotional support to pt/family.    Discussed with Patient and Family: Yes  Patient's preference about sharing medical information: speak to pt and family  Patient's decision making preferences: speak to Pt  Code status: DNAR/DNI-selective  Have advanced directives been discussed with patient or healthcare power of : Yes        Healthcare Agent Appointed: Yes  Healthcare Agent's Name: Staci Wheeler  Healthcare Agent's Phone Number: [phone]          Spiritual needs addressed: Patient/family declined  Spiritual Care    Palliative disposition: Community [facility]      Procedures:  No intubation  No g-tube      Palliative Care Assessment and Recommendations     Problem List:     Afib with RVR  Abnormal CTA-possible type B ascending aortic dissection  Chest pain  CAD  HTN  HLD  BPH  Recent UTI    Constipation  -No BM in 4 days  -Add Miralax 17 gm po once, continue Miralax and senna prn     Goals of care counseling  -see above for details  -Confirmed wishes for DNAR/DNI-selective and continue supportive care.  -Ongoing GOC discussions will be needed over time.  -Agreeable to palliative care following  -Dispo:  Return home on dc with  and [facility] palliative care program to follow on dc. SW to help with dc planning.   -Provided emotional support to pt/family who are coping adequately.     Advance care planning  -[phone]  -Previously completed HCPOA paperwork on file in EPIC.   -Previously completed POLST form in EPIC.      Palliative Performance Scale 50%     Emotion support provided to patient/family today: Yes     A total of 25 mins were spent on this consult, which included all of the following: direct face to face contact, history taking, physical examination, and >50% was spent counseling and coordinating care.    Discussed today's visit with message to RN.      I will sign off as GOC are established.    Kat Shearer, ANP-BC, [facility] [id number]  11/7/2024  3:06 PM  Palliative Care Services          [1]   Allergies  Allergen Reactions    Codeine NAUSEA AND VOMITING     \"Nausea & Vomiting x2 days\"    Penicillins HIVES    Bactrim [Sulfamethoxazole W/Trimethoprim] RASH

## 2024-11-07 NOTE — PAYOR COMM NOTE
--------------  CONTINUED STAY REVIEW    Payor: [employer]  Subscriber #:  [id number]  Authorization Number: [id number]    Admit date: 11/3/24  Admit time: 11:09 PM    11/6/24       Endorsing shortness of breath and chest pain  Hypotensive  Overnight had uncontrolled HR     Blood pressure (!) 89/66, pulse 109, temperature 97.7 °F (36.5 °C), temperature source Temporal, resp. rate 16, height 5' 4\" (1.626 m), weight 112 lb 10.5 oz (51.1 kg), SpO2 92%.    HEENT: EOMI PERRLA, atraumatic normocephalic  Cardiac: S1-S2 appreciated  Lungs: Good air entry bilaterally clear to auscultation  Abdomen: Soft nontender nondistended positive bowel sounds  Ext: Peripheral pulses are positive  Skin: No rashes noted     Lab Results   Component Value Date     WBC 15.8 (H) 11/06/2024     HGB 11.8 (L) 11/06/2024     HCT 36.5 (L) 11/06/2024     .0 11/06/2024     CREATSERUM 1.12 11/06/2024     BUN 29 (H) 11/06/2024      11/06/2024     K 3.8 11/06/2024      11/06/2024     CO2 28.0 11/06/2024      (H) 11/06/2024     CA 9.6 11/06/2024     PTT 37.7 (H) 11/06/2024     MG 2.1 11/06/2024     PHOS 3.1 11/06/2024          EKG 12 Lead   Result Date: 11/6/2024  Sinus rhythm with Premature atrial complexes Left axis deviation Abnormal ECG When compared with ECG of 03-NOV-2024 16:23, Sinus rhythm has replaced Atrial fibrillation Vent. rate has decreased BY  74 BPM ST elevation has replaced ST depression in Lateral leads      Assessment & Plan:   Acute Chest Pain  New onset afib  CAD- history of MV disease that was found years ago  - patient declined CABG at that time  - CT chest shows dissection at the distal arch of the left subclavian to upper abdomianl aorta above the R renal artery  - continue on esmolol   - CV surgery consulted   - Echo currently pending  - At this time- no surgical intervention is planned    Type B Aortic Dissection  -Descending thoracic aortic intramural hematoma  -Prescient vascular  surgery recommendations  -Recommend tighter control of BP with SBP less than 140  -Repeat CT angiogram within 24 to 48 hours     HTN  Dyslipidemia     DVT PPX heparin drip      Global A/P  -Overnight patient had uncontrolled heart rate, was given Lopressor 5mg, and patient was started on Dig - with improved control  -appreciate cardiology recs.  -Appreciate palliative care recommendations  -Procal mildly elevated - UA (+) for pyuria - started on IV rocephin. -Patient currently on Lasix 40 mg IV twice daily continue monitor strict I's and O's  -Currently on esmolol drip and heparin drip  -Appreciate cardiology vascular recommendations                      MEDICATIONS ADMINISTERED IN LAST 1 DAY:  apixaban (Eliquis) tab 2.5 mg       Date Action Dose Route User    11/7/2024 0828 Given 2.5 mg Oral Nolvia Patel RN    11/6/2024 2047 Given 2.5 mg Oral Krystal Mendosa RN          ceFEPIme (Maxipime) 1 g in sodium chloride 0.9% 100 mL IVPB-MBP       Date Action Dose Route User    11/6/2024 1432 New Bag 1 g Intravenous Angela Johnson RN          ceFEPIme (Maxipime) 1 g in sodium chloride 0.9% 100 mL IVPB-MBP       Date Action Dose Route User    11/7/2024 1324 New Bag 1 g Intravenous Nolvia Patel RN          finasteride (Proscar) tab 5 mg       Date Action Dose Route User    11/7/2024 0828 Given 5 mg Oral Nolvia Patel RN          furosemide (Lasix) 10 mg/mL injection 40 mg       Date Action Dose Route User    11/7/2024 0829 Given 40 mg Intravenous Nolvia Patel RN    11/6/2024 1733 Given 40 mg Intravenous Ramon Ricks RN          metoprolol tartrate (Lopressor) partial tab 12.5 mg       Date Action Dose Route User    11/7/2024 0531 Given 12.5 mg Oral Krystal Mendosa RN    11/6/2024 1733 Given 12.5 mg Oral Ramon Ricks RN          pantoprazole (Protonix) DR tab 40 mg       Date Action Dose Route User    11/7/2024 0528 Given 40 mg Oral Krystal Mendosa RN            Vitals (last day)       Date/Time Temp Pulse  Resp BP SpO2 Weight O2 Device O2 Flow Rate (L/min) Who    11/07/24 0826 98.1 °F (36.7 °C) 82 16 136/79 95 % -- None (Room air) -- MS    11/07/24 0526 98 °F (36.7 °C) 75 16 121/69 94 % 123 lb 6.4 oz (56 kg) None (Room air) --     11/06/24 1556 97.4 °F (36.3 °C) 76 20 102/53 95 % -- None (Room air) -- MARIANA    11/06/24 1000 -- 109 16 89/66 92 % -- None (Room air) -- BD    11/06/24 0400 98 °F (36.7 °C) 111 21 97/65 97 % -- Nasal cannula 4 L/min     11/06/24 0230 -- 94 20 91/63 96 % -- -- --     11/06/24 0100 -- 107 14 94/69 98 % -- Nasal cannula 4 L/min     11/06/24 0017 -- 129 -- -- -- -- -- --     11/06/24 0007 -- 112 22 80/59 99 % -- Nasal cannula 4 L/min     11/06/24 0000 98.1 °F (36.7 °C) 107 22 80/45 99 % -- Nasal cannula 4 L/min

## 2024-11-07 NOTE — PLAN OF CARE
Patient received from CCU. Receiving IV lasix for CHF. Receiving IV cefepime for UTI. Family at bedside, updated on plan of care. Had echo, see results. Cards optimizing cardiac meds.    Problem: CARDIOVASCULAR - ADULT  Goal: Maintains optimal cardiac output and hemodynamic stability  Description: INTERVENTIONS:  - Monitor vital signs, rhythm, and trends  - Monitor for bleeding, hypotension and signs of decreased cardiac output  - Evaluate effectiveness of vasoactive medications to optimize hemodynamic stability  - Monitor arterial and/or venous puncture sites for bleeding and/or hematoma  - Assess quality of pulses, skin color and temperature  - Assess for signs of decreased coronary artery perfusion - ex. Angina  - Evaluate fluid balance, assess for edema, trend weights  Outcome: Progressing  Goal: Absence of cardiac arrhythmias or at baseline  Description: INTERVENTIONS:  - Continuous cardiac monitoring, monitor vital signs, obtain 12 lead EKG if indicated  - Evaluate effectiveness of antiarrhythmic and heart rate control medications as ordered  - Initiate emergency measures for life threatening arrhythmias  - Monitor electrolytes and administer replacement therapy as ordered  Outcome: Progressing     Problem: Patient/Family Goals  Goal: Patient/Family Long Term Goal  Description: Patient's Long Term Goal: Discharge home safely    Interventions:  -  Monitor VS  -Cardiologist consult  - See additional Care Plan goals for specific interventions  Outcome: Progressing  Goal: Patient/Family Short Term Goal  Description: Patient's Short Term Goal: Hemodynamically stable    Interventions:   - Notified cardiologist regarding HR and BP  - IV digoxin administered  -Continue monitor tele  - See additional Care Plan goals for specific interventions  Outcome: Progressing     Problem: Patient Centered Care  Goal: Patient preferences are identified and integrated in the patient's plan of care  Description: Interventions:  -  What would you like us to know as we care for you? Hard of hearing  - Provide timely, complete, and accurate information to patient/family  - Incorporate patient and family knowledge, values, beliefs, and cultural backgrounds into the planning and delivery of care  - Encourage patient/family to participate in care and decision-making at the level they choose  - Honor patient and family perspectives and choices  Outcome: Progressing

## 2024-11-07 NOTE — DISCHARGE INSTRUCTIONS
Home Health: Sometimes managing your health at home requires assistance.  The [facility] team has recognized your preference to use [facility].  They can be reached by phone at [phone].  The fax number for your reference is [phone].  A representative from the home health agency will contact you or your family to schedule your first visit.        [facility]:  [facility] [place] [address]  Phone: [phone]  Fax: [phone]      Pt back in NSR - on amiodarone 400mg po BID for one week, then 200mg po daily starting 11/18/24.

## 2024-11-07 NOTE — PHYSICAL THERAPY NOTE
PHYSICAL THERAPY EVALUATION - INPATIENT     Room Number: 324/324-A  Evaluation Date: 11/7/2024  Type of Evaluation: Initial   Physician Order: PT Eval and Treat    Presenting Problem: a fib with RVR     Reason for Therapy: Mobility Dysfunction and Discharge Planning    PHYSICAL THERAPY ASSESSMENT   Patient is a 89 year old male admitted 11/3/2024 for a fib with RVR.  Prior to admission, patient's baseline is independent in ADL's and ambulation with no assistive device.  Patient is currently functioning near baseline with bed mobility, transfers, and gait.  Patient is requiring supervision and contact guard assist as a result of the following impairments: medical status.  Physical Therapy will continue to follow for duration of hospitalization.    Patient will benefit from continued skilled PT Services at discharge to promote prior level of function and safety with additional support and return home with home health PT.    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)    Goals of care, counseling/discussion    Advance care planning    Palliative care by specialist    HOME SITUATION  Type of Home: House  Home Layout: One level (basement, with 13 stairs, however patient can avoid the stairs)  Stairs to Enter : 3   Railing: Yes    Stairs to Bedroom: 0    Railing: No    Lives With: Alone (family able to stay with patient at discharge)    Drives: Yes   Patient Regularly Uses: Hearing aides     SUBJECTIVE  \"I can stand  first\"    PHYSICAL THERAPY EXAMINATION   OBJECTIVE  Precautions: None  Fall Risk: Standard fall risk    PAIN ASSESSMENT  Ratin          COGNITION  Overall Cognitive Status:  WFL - within functional limits    RANGE OF MOTION AND STRENGTH ASSESSMENT  Lower extremity ROM is within functional limits  BLE WNL  Lower extremity strength is within functional limits  BLE WNL    BALANCE  Static Sitting: Good  Dynamic Sitting: Fair +  Static Standing: Fair  Dynamic Standing: Fair -    AM-PAC '6-Clicks' INPATIENT SHORT FORM - BASIC MOBILITY  How much difficulty does the patient currently have...  Patient Difficulty: Turning over in bed (including adjusting bedclothes, sheets and blankets)?: A Little   Patient Difficulty: Sitting down on and standing up from a chair with arms (e.g., wheelchair, bedside commode, etc.): A Little   Patient Difficulty: Moving from lying on back to sitting on the side of the bed?: A Little   How much help from another person does the patient currently need...   Help from Another: Moving to and from a bed to a chair (including a wheelchair)?: A Little   Help from Another: Need to walk in hospital room?: A Little   Help from Another: Climbing 3-5 steps with a railing?: A Little     AM-PAC Score:  Raw Score: 18   Approx Degree of Impairment: 46.58%   Standardized Score (AM-PAC Scale): 43.63   CMS Modifier (G-Code): CK    FUNCTIONAL ABILITY STATUS  Functional Mobility/Gait Assessment  Gait Assistance: Supervision  Distance (ft): 200ft  Assistive Device: Rolling walker  Rolling:  not tested   Supine to Sit: independent  Sit to Supine:  not tested   Sit to Stand: supervision    Exercise/Education Provided:  Education Provided To: Patient  Patient Education: Role of Physical Therapy;Plan of Care;Discharge Recommendations;DME Recommendations;Gait Training  Patient's Response to Education: Verbalized Understanding           Skilled Therapy Provided: Patient on room air. Patient currently with SBA to CGA  for mobility during the session with rolling walker. Patient denies falls, does not use rolling walker at home, needs to use rolling walker now. Patient agreeable. Patient's family in room, and is able to assist at D/C. Patient can avoid the basement at this time. Patient has rolling walker at home. The patient's Approx Degree of Impairment: 46.58% has been calculated based on documentation in the [facility] '6 clicks' Inpatient Basic Mobility Short Form.  Research supports that patients with this level of impairment may benefit from home with home health.  Patient received semi-fowlers in bed, agreeable to physical therapy evaluation. Next session anticipate to progress bed mobility, transfers, and gait.    Patient history and/or personal factors that may impact the plan of care include home accessibility concerns. Based on the physical therapy examination of the noted systems and functional activity/participation limitations, the patient presentation is stable given the patient is functioning near baseline level.     Final disposition will be made by interdisciplinary medical team.    Patient End of Session: Up in chair;Needs met;Call light within reach;RN aware of session/findings;All patient questions and concerns addressed;Alarm set;Family present    CURRENT GOALS  Goals to be met by: 11/30/24  Patient Goal Patient's self-stated goal is: to go home   Goal #1 Patient is able to demonstrate supine - sit EOB @ level: independent     Goal #1   Current Status    Goal #2 Patient is able to demonstrate transfers Sit to/from Stand at assistance level: modified independent with walker - rolling     Goal #2  Current Status    Goal #3 Patient is able to ambulate 200 feet with assist device: walker - rolling at assistance level: modified independent   Goal #3   Current Status    Goal #4 Patient will negotiate 2 stairs/one curb w/ assistive device and supervision   Goal #4   Current Status    Goal #5 Patient to demonstrate  independence with home activity/exercise instructions provided to patient in preparation for discharge.   Goal #5   Current Status    Goal #6    Goal #6  Current Status      Patient Evaluation Complexity Level:  History Low - no personal factors and/or co-morbidities   Examination of body systems Low -  addressing 1-2 elements   Clinical Presentation Low- Stable   Clinical Decision Making  Low Complexity     Gait Training: 10 minutes  Therapeutic Activity:  13 minutes

## 2024-11-07 NOTE — PROGRESS NOTES
Progress Note  Eleazarnorma Copeland Patient Status:  Inpatient    1930 MRN [id number]   Location [facility] 3W/SW Attending Aidee Shaver MD   Hosp Day # 4 PCP Jeff Anthony D'Amico, DO     Subjective:  Pt sitting up in chair, denies any chest pain or SOB.     Objective:  /82 (BP Location: Right arm)   Pulse 96   Temp 98.1 °F (36.7 °C) (Oral)   Resp 16   Ht 5' 4\" (1.626 m)   Wt 123 lb 6.4 oz (56 kg)   SpO2 95%   BMI 21.18 kg/m²     Telemetry: NSR      Intake/Output:    Intake/Output Summary (Last 24 hours) at 2024 1454  Last data filed at 2024 1430  Gross per 24 hour   Intake 434 ml   Output 1050 ml   Net -616 ml       Last 3 Weights   24 0526 123 lb 6.4 oz (56 kg)   24 0600 112 lb 10.5 oz (51.1 kg)   24 2323 118 lb 9.7 oz (53.8 kg)   24 1744 123 lb 7.3 oz (56 kg)   24 1631 124 lb (56.2 kg)   24 1419 120 lb (54.4 kg)   07/10/24 0933 124 lb 6.4 oz (56.4 kg)       Labs:  Recent Labs   Lab 24  0407 24  0416 24  0620   * 118* 115*   BUN 24* 29* 36*   CREATSERUM 1.04 1.12 0.97   EGFRCR 67 61 72   CA 9.4 9.6 9.6    141 141   K 3.8 3.8 3.5    105 102   CO2 27.0 28.0 31.0     Recent Labs   Lab 24  0309 24  0407 24  0417 24  0620   RBC 5.32 5.03 5.32 5.25   HGB 11.3* 11.1* 11.8* 11.6*   HCT 37.5* 34.5* 36.5* 36.2*   MCV 70.5* 68.6* 68.6* 69.0*   MCH 21.2* 22.1* 22.2* 22.1*   MCHC 30.1* 32.2 32.3 32.0   RDW 18.1* 17.8* 17.7* 17.3*   NEPRELIM 8.02*  --  12.48* 10.66*   WBC 10.2 18.5* 15.8* 13.3*   .0  245.0 238.0 231.0 291.0         Recent Labs   Lab 24  1713   TROPHS 19     No results found for: \"PT\", \"INR\"    Diagnostics:     Review of Systems   Respiratory: Negative.     Cardiovascular: Negative.          Physical Exam:    Physical Exam  Vitals reviewed.   Constitutional:       General: He is not in acute distress.     Appearance: He is not ill-appearing.   Neck:      Vascular:  No JVD.   Cardiovascular:      Rate and Rhythm: Normal rate and regular rhythm.      Pulses: Normal pulses.      Heart sounds: Normal heart sounds. No murmur heard.     No friction rub. No gallop.   Pulmonary:      Effort: Pulmonary effort is normal. No respiratory distress.      Breath sounds: Normal breath sounds. No stridor. No wheezing, rhonchi or rales.   Musculoskeletal:      Cervical back: Normal range of motion.      Right lower leg: No edema.      Left lower leg: No edema.   Skin:     General: Skin is warm and dry.   Neurological:      Mental Status: He is alert and oriented to person, place, and time.         Medications:   cefepime  1 g Intravenous Q12H    polyethylene glycol (PEG 3350)  17 g Oral Once    metoprolol tartrate  12.5 mg Oral 2x Daily(Beta Blocker)    apixaban  2.5 mg Oral BID    pantoprazole  40 mg Oral QAM AC    finasteride  5 mg Oral Daily    furosemide  40 mg Intravenous BID (Diuretic)         Assessment/Plan:    Descending aortic intramural hematoma  - negative EKG and trop  - no symptoms of ACS  - TTE from yesterday with LVEF 30-35%.   - Vascular surgery: likely chronic in nature than acute, pain for repeating CT angiogram in 24-48hrs    New onset Afib with RVR   - on metoprolol tartrate 12.5mg BID  - now NSR on monitor   - on oral dosing of apixaban 2.5mg per the age and weight    CAD  - H/o multivessel CAD in 2015- declined revascularization at that time  - now with low EF     HFrEF/ Cardiomyopathy- likely ischemic  - Echo with LVEF 30-35% ( 40-45% in 2022)  - GDMT on BB, lasix  - net negative of 2.3L  - euvolemic on exam     Plan;  - CT angiogram to revaluate the intramural hematoma  - continue BB  - euvolemic on exam, will transition lasix to oral   - may need to have LHC with newly reduced EF, will discuss with rounding cardiologist  - will start on 1/2 of dose entresto, since pt's blood pressure is borderline     Plan discussed with pt and wife at bedside     Gracie Naik,  MARY  [facility]  11/7/2024  2:54 PM

## 2024-11-07 NOTE — PLAN OF CARE
IV abx. IV lasix switched to oral. RA. Afebrile. PT session today. Patient ambulating in ahn, no issues.  Call light within reach. CT chest today. Safety precautions in place. Plan: Home w/ [facility] community palliative.   Problem: Patient Centered Care  Goal: Patient preferences are identified and integrated in the patient's plan of care  Description: Interventions:  - What would you like us to know as we care for you? Hard of hearing  - Provide timely, complete, and accurate information to patient/family  - Incorporate patient and family knowledge, values, beliefs, and cultural backgrounds into the planning and delivery of care  - Encourage patient/family to participate in care and decision-making at the level they choose  - Honor patient and family perspectives and choices  Outcome: Progressing     Problem: Patient/Family Goals  Goal: Patient/Family Long Term Goal  Description: Patient's Long Term Goal: Discharge home safely    Interventions:  -  Monitor VS  -Cardiologist consult  - See additional Care Plan goals for specific interventions  Outcome: Progressing  Goal: Patient/Family Short Term Goal  Description: Patient's Short Term Goal: Hemodynamically stable    Interventions:   - Notified cardiologist regarding HR and BP  - IV digoxin administered  -Continue monitor tele  - See additional Care Plan goals for specific interventions  Outcome: Progressing     Problem: CARDIOVASCULAR - ADULT  Goal: Maintains optimal cardiac output and hemodynamic stability  Description: INTERVENTIONS:  - Monitor vital signs, rhythm, and trends  - Monitor for bleeding, hypotension and signs of decreased cardiac output  - Evaluate effectiveness of vasoactive medications to optimize hemodynamic stability  - Monitor arterial and/or venous puncture sites for bleeding and/or hematoma  - Assess quality of pulses, skin color and temperature  - Assess for signs of decreased coronary artery perfusion - ex. Angina  - Evaluate fluid balance,  assess for edema, trend weights  Outcome: Progressing  Goal: Absence of cardiac arrhythmias or at baseline  Description: INTERVENTIONS:  - Continuous cardiac monitoring, monitor vital signs, obtain 12 lead EKG if indicated  - Evaluate effectiveness of antiarrhythmic and heart rate control medications as ordered  - Initiate emergency measures for life threatening arrhythmias  - Monitor electrolytes and administer replacement therapy as ordered  Outcome: Progressing

## 2024-11-08 ENCOUNTER — APPOINTMENT (OUTPATIENT)
Dept: PICC SERVICES | Facility: HOSPITAL | Age: 89
End: 2024-11-08
Payer: MEDICARE

## 2024-11-08 LAB
ANION GAP SERPL CALC-SCNC: 8 MMOL/L (ref 0–18)
BASOPHILS # BLD AUTO: 0.11 X10(3) UL (ref 0–0.2)
BASOPHILS NFR BLD AUTO: 1 %
BUN BLD-MCNC: 33 MG/DL (ref 9–23)
BUN/CREAT SERPL: 39.3 (ref 10–20)
CALCIUM BLD-MCNC: 9.5 MG/DL (ref 8.7–10.4)
CHLORIDE SERPL-SCNC: 104 MMOL/L (ref 98–112)
CO2 SERPL-SCNC: 30 MMOL/L (ref 21–32)
CREAT BLD-MCNC: 0.84 MG/DL
DEPRECATED RDW RBC AUTO: 41.3 FL (ref 35.1–46.3)
EGFRCR SERPLBLD CKD-EPI 2021: 81 ML/MIN/1.73M2 (ref 60–?)
EOSINOPHIL # BLD AUTO: 0.33 X10(3) UL (ref 0–0.7)
EOSINOPHIL NFR BLD AUTO: 2.9 %
ERYTHROCYTE [DISTWIDTH] IN BLOOD BY AUTOMATED COUNT: 17.3 % (ref 11–15)
GLUCOSE BLD-MCNC: 111 MG/DL (ref 70–99)
HCT VFR BLD AUTO: 36.3 %
HGB BLD-MCNC: 11.6 G/DL
IMM GRANULOCYTES # BLD AUTO: 0.21 X10(3) UL (ref 0–1)
IMM GRANULOCYTES NFR BLD: 1.8 %
LYMPHOCYTES # BLD AUTO: 1.31 X10(3) UL (ref 1–4)
LYMPHOCYTES NFR BLD AUTO: 11.4 %
MAGNESIUM SERPL-MCNC: 2.1 MG/DL (ref 1.6–2.6)
MCH RBC QN AUTO: 22.1 PG (ref 26–34)
MCHC RBC AUTO-ENTMCNC: 32 G/DL (ref 31–37)
MCV RBC AUTO: 69.3 FL
MONOCYTES # BLD AUTO: 1.08 X10(3) UL (ref 0.1–1)
MONOCYTES NFR BLD AUTO: 9.4 %
NEUTROPHILS # BLD AUTO: 8.45 X10 (3) UL (ref 1.5–7.7)
NEUTROPHILS # BLD AUTO: 8.45 X10(3) UL (ref 1.5–7.7)
NEUTROPHILS NFR BLD AUTO: 73.5 %
OSMOLALITY SERPL CALC.SUM OF ELEC: 302 MOSM/KG (ref 275–295)
PHOSPHATE SERPL-MCNC: 3.3 MG/DL (ref 2.4–5.1)
PLATELET # BLD AUTO: 303 10(3)UL (ref 150–450)
POTASSIUM SERPL-SCNC: 3.5 MMOL/L (ref 3.5–5.1)
RBC # BLD AUTO: 5.24 X10(6)UL
SODIUM SERPL-SCNC: 142 MMOL/L (ref 136–145)
TSI SER-ACNC: 4.4 UIU/ML (ref 0.55–4.78)
WBC # BLD AUTO: 11.5 X10(3) UL (ref 4–11)

## 2024-11-08 PROCEDURE — 99233 SBSQ HOSP IP/OBS HIGH 50: CPT | Performed by: HOSPITALIST

## 2024-11-08 RX ORDER — FUROSEMIDE 20 MG/1
20 TABLET ORAL DAILY
Status: DISCONTINUED | OUTPATIENT
Start: 2024-11-09 | End: 2024-11-08

## 2024-11-08 RX ORDER — ATORVASTATIN CALCIUM 40 MG/1
40 TABLET, FILM COATED ORAL NIGHTLY
Status: DISCONTINUED | OUTPATIENT
Start: 2024-11-08 | End: 2024-11-11

## 2024-11-08 RX ORDER — METOPROLOL SUCCINATE 25 MG/1
25 TABLET, EXTENDED RELEASE ORAL
Status: DISCONTINUED | OUTPATIENT
Start: 2024-11-09 | End: 2024-11-11

## 2024-11-08 RX ORDER — METOPROLOL TARTRATE 1 MG/ML
2.5 INJECTION, SOLUTION INTRAVENOUS ONCE
Status: COMPLETED | OUTPATIENT
Start: 2024-11-08 | End: 2024-11-08

## 2024-11-08 RX ORDER — FUROSEMIDE 40 MG/1
40 TABLET ORAL DAILY
Status: DISCONTINUED | OUTPATIENT
Start: 2024-11-09 | End: 2024-11-08

## 2024-11-08 RX ORDER — ASPIRIN 81 MG/1
81 TABLET ORAL DAILY
Status: DISCONTINUED | OUTPATIENT
Start: 2024-11-08 | End: 2024-11-11

## 2024-11-08 NOTE — PROGRESS NOTES
Progress Note  Eleazarnorma Copeland Patient Status:  Inpatient    1930 MRN [id number]   Location [facility] 3W/SW Attending Aidee Shaver MD   Hosp Day # 5 PCP Jeff Anthony D'Amico, DO     Subjective:  Pt denies CP, SOB or orthopnea. Occasional lightheadedness but overall feeling well. Wife, daughter at bedside.     Objective:  /63 (BP Location: Left arm)   Pulse 82   Temp 97.7 °F (36.5 °C) (Oral)   Resp 16   Ht 5' 4\" (1.626 m)   Wt 122 lb (55.3 kg)   SpO2 97%   BMI 20.94 kg/m²     Telemetry: NSR, PVC's    Intake/Output:    Intake/Output Summary (Last 24 hours) at 2024 1140  Last data filed at 2024 0849  Gross per 24 hour   Intake 580 ml   Output 500 ml   Net 80 ml       Last 3 Weights   24 0418 122 lb (55.3 kg)   24 0526 123 lb 6.4 oz (56 kg)   24 0600 112 lb 10.5 oz (51.1 kg)   24 2323 118 lb 9.7 oz (53.8 kg)   24 1744 123 lb 7.3 oz (56 kg)   24 1631 124 lb (56.2 kg)   24 1419 120 lb (54.4 kg)   07/10/24 0933 124 lb 6.4 oz (56.4 kg)       Labs:  Recent Labs   Lab 246 24  0620 24  0611   * 115* 111*   BUN 29* 36* 33*   CREATSERUM 1.12 0.97 0.84   EGFRCR 61 72 81   CA 9.6 9.6 9.5    141 142   K 3.8 3.5 3.5    102 104   CO2 28.0 31.0 30.0     Recent Labs   Lab 24  0620 24  0611   RBC 5.32 5.25 5.24   HGB 11.8* 11.6* 11.6*   HCT 36.5* 36.2* 36.3*   MCV 68.6* 69.0* 69.3*   MCH 22.2* 22.1* 22.1*   MCHC 32.3 32.0 32.0   RDW 17.7* 17.3* 17.3*   NEPRELIM 12.48* 10.66* 8.45*   WBC 15.8* 13.3* 11.5*   .0 291.0 303.0         Recent Labs   Lab 24  1713   TROPHS 19       Diagnostics:  CTA CHEST (CPT=71275)    Result Date: 2024  CONCLUSION:   1. Redemonstrated curvilinear density involving the anterior aspect of the descending thoracic aorta with extension into the suprarenal abdominal aorta.  The components of curvilinear density involving the distal aortic arch  are less conspicuous on this study.  These findings again may be secondary to an aortic dissection, heterogeneous blood flow, and/or underlying atherosclerotic disease. 2. 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 3. Trace left pleural effusion.  Interval resolution of the trace right pleural effusion. 4. Mild atelectasis involving the left greater than right lower lobes. 5. Small airways disease. 6. Lesser incidental findings described above.     Dictated by (CST): Josh Temple MD on 11/07/2024 at 5:25 PM     Finalized by (CST): Josh Temple MD on 11/07/2024 at 5:42 PM          Review of Systems   Cardiovascular:  Negative for chest pain, dyspnea on exertion, leg swelling and orthopnea.   Respiratory:  Negative for cough and shortness of breath.        Physical Exam:    Gen: alert, oriented x 3, NAD  Heent: pupils equal, reactive. Mucous membranes moist.   Neck: no jvd  Cardiac: regular rate and rhythm, normal S1,S2, no murmur, clicks, rub or gallop  Lungs: CTA  Abd: soft, NT/ND +bs  Ext: no edema  Skin: Warm, dry  Neuro: No focal deficits      Medications:     cefepime  1 g Intravenous Q12H    sacubitril-valsartan  0.5 tablet Oral BID    furosemide  40 mg Oral BID (Diuretic)    metoprolol tartrate  12.5 mg Oral 2x Daily(Beta Blocker)    apixaban  2.5 mg Oral BID    pantoprazole  40 mg Oral QAM AC    finasteride  5 mg Oral Daily       Assessment:  Descending Aortic Type B Dissection/Intramural Hematoma  Eval by vascular surgery - cons mgmt  New HFrEF, Likely Ischemic Cardiomyopathy  Limited echo w/LVEF 30-35% (40-45% in 2022)  GDMT - on BB, low dose entresto, po furosemide; not yet on MRA, SGLT2i  Diuresis w/IV Lasix, now on oral  Net neg 2.8L  New Onset Atrial Fibrillation, RVR  Initially w/RVR, now maintaining NSR  On lopressor  [id number] - Eliquis 2.5mg po BID (reduced for age,  wt)  CAD, Hx Multivessel Disease  [facility] 2015 - L Main 40%, LAD 95%, 1st Diag 60%, L Circ mid 75%, dist 90%, RCA 65%  Trop WNL - denies chest pain  Hypertension - controlled  Hyperlipidemia - LDL 68, on atorvastatin 10mg  UTI - on antibx  Hx DVT    Plan:  Pt appears euvolemic  - discontinue Lasix.   Add asa 81mg po daily. Start atorvastatin 40mg po nightly for CAD management.   Pt remains in NSR - transition lopressor to toprol 25mg po daily. First dose tomorrow am.   Continue Eliquis 2.5mg po BID for stroke prophylaxis  Continue low dose Entresto 24/26 po BID - half tab.  Can consider MRA, SGLT2i as outpatient as BP allows.   Long discussion with pt and family (wife, daughter at bedside and daughters by phone) regarding LV dysfunction as well as longstanding multivessel CAD. Pt is favoring conservative management but discussions are ongoing re: consideration for possible diagnostic [facility].     Plan of care discussed with patient, family, RN and Dr Miller.  ADDENDUM: 1320  Notified by RN that pt converted back to AFRVR, rates up to 150's-160. BP 90's. Metoprolol 2.5mg IVP x1 given. Rates improved to 120's, remains afib. Start IV amiodarone protocol, bolus and then start gtt at 1mg/min. [facility] access team consulted for extended IV access.       Ladi Guidry, MARY  11/8/2024  11:40 AM  [phone] [facility]  [phone] [facility]        Seen/examined patient independently.  Agree with findings, assessment and plan as outlined above.     Denies any CP or SOB.   Pt is back in AF with RVR.    In regards to AF with RVR, will trial IV metoprolol, if rates remain uncontrolled and/or develops labile BP -> will start amiodarone, Consider urgent cardioversion if unstable.    In regards to ischemic cardiomyopathy, pt would like to second opinion on percutaneous revascularization - as he has turned down CV surgery in the past. In the meantime, will need to optimize him medically, aggressive CAD risk factor management and  GDMT as tolerated. Would hold diuresis to provide more BP room for GDMT.     Discussed extensively with patient and family.     Tayo Miller, DO

## 2024-11-08 NOTE — PROGRESS NOTES
[facility]  part of [facility]    Progress Note    César Copeland Patient Status:  Inpatient    1930 MRN [id number]   Location [facility] 2W/SW Attending Aidee Shaver MD   Hosp Day # 5 PCP Jeff Anthony D'Amico, DO     Chief Complaint:     A-fib with RVR    Subjective:   Subjective:    Patient seen and examined this morning  Endorsing shortness of breath and chest pain  Hypotensive  Family at bedside.   Patient states he has good energy, has been walking in the halls    Objective:   Blood pressure 101/63, pulse 82, temperature 97.7 °F (36.5 °C), temperature source Oral, resp. rate 16, height 5' 4\" (1.626 m), weight 122 lb (55.3 kg), SpO2 97%.  Physical Exam    General: does not appear to be in acute distress at this time  HEENT: EOMI PERRLA, atraumatic normocephalic  Cardiac: S1-S2 appreciated  Lungs: Good air entry bilaterally clear to auscultation  Abdomen: Soft nontender nondistended positive bowel sounds  Ext: Peripheral pulses are positive  Skin: No rashes noted      Results:   Lab Results   Component Value Date    WBC 11.5 (H) 2024    HGB 11.6 (L) 2024    HCT 36.3 (L) 2024    .0 2024    CREATSERUM 0.84 2024    BUN 33 (H) 2024     2024    K 3.5 2024     2024    CO2 30.0 2024     (H) 2024    CA 9.5 2024    ALB 4.3 2024    ALKPHO 81 2024    BILT 1.8 (H) 2024    TP 6.7 2024    AST 23 2024    ALT 14 2024    PTT 37.7 (H) 2024    T4F 0.9 10/03/2022    TSH 4.464 2023    PSA 8.71 (H) 2019    MG 2.1 2024    PHOS 3.3 2024    TROPHS 19 2024    B12 746 2018       CTA CHEST (CPT=71275)    Result Date: 2024  CONCLUSION:   1. Redemonstrated curvilinear density involving the anterior aspect of the descending thoracic aorta with extension into the suprarenal abdominal aorta.  The components of curvilinear  density involving the distal aortic arch are less conspicuous on this study.  These findings again may be secondary to an aortic dissection, heterogeneous blood flow, and/or underlying atherosclerotic disease. 2. 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 3. Trace left pleural effusion.  Interval resolution of the trace right pleural effusion. 4. Mild atelectasis involving the left greater than right lower lobes. 5. Small airways disease. 6. Lesser incidental findings described above.     Dictated by (CST): Josh Temple MD on 11/07/2024 at 5:25 PM     Finalized by (CST): Josh Temple MD on 11/07/2024 at 5:42 PM               Assessment & Plan:       Acute Chest Pain  New onset afib  CAD- history of MV disease that was found years ago  - patient declined CABG at that time  - CT chest shows dissection at the distal arch of the left subclavian to upper abdomianl aorta above the R renal artery  - continue on esmolol   - CV surgery consulted   - Echo currently pending  - At this time- no surgical intervention is planned  - Palliative care consulted      Type B Aortic Dissection  -Descending thoracic aortic intramural hematoma  -Prescient vascular surgery recommendations  -Recommend tighter control of BP with SBP less than 140  -Repeat CT angiogram within 24 to 48 hours    HTN  Dyslipidemia     DVT PPX heparin drip     DNAR/Select     Global A/P  -Rates controlled.   -transferred out of ICU  -continue PT/OT   -repeat CT reviewed - findings c/w the possibility of an aortic dissection.  -appreciate cardiology recs,   -Echo reviewed - c/w Systolic congestive heart failure with EF of 30-35%  -Appreciate palliative care recommendations  -Procal mildly elevated - UA (+) for pyuria Ucx with no growth  -continue monitor strict I's and O's  -Appreciate cardiology vascular recommendations  -Reviewed  previous consultant notes  -Reviewed CBC, BMP, Mag, and Phos  -Reviewed tests ordered  -Repeat labs in am  -MDM: High, severe exacerbation of chronic illness posing a threat to life. IV medications requiring close inpatient monitoring.         Aidee Shaver MD

## 2024-11-08 NOTE — PLAN OF CARE
RA. Afebrile. PT back in Afib RVR HR sustaining in 140s, APN notified- see orders for lopressor. Started on amio gtt per order. Call light within reach. Safety precautions in place. Plan: Home w/ HHC pending medical clearance.   Problem: Patient Centered Care  Goal: Patient preferences are identified and integrated in the patient's plan of care  Description: Interventions:  - What would you like us to know as we care for you? Hard of hearing  - Provide timely, complete, and accurate information to patient/family  - Incorporate patient and family knowledge, values, beliefs, and cultural backgrounds into the planning and delivery of care  - Encourage patient/family to participate in care and decision-making at the level they choose  - Honor patient and family perspectives and choices  Outcome: Progressing     Problem: Patient/Family Goals  Goal: Patient/Family Long Term Goal  Description: Patient's Long Term Goal: Discharge home safely    Interventions:  -  Monitor VS  -Cardiologist consult  - See additional Care Plan goals for specific interventions  Outcome: Progressing  Goal: Patient/Family Short Term Goal  Description: Patient's Short Term Goal: Hemodynamically stable    Interventions:   - Notified cardiologist regarding HR and BP  - IV digoxin administered  -Continue monitor tele  - See additional Care Plan goals for specific interventions  Outcome: Progressing     Problem: CARDIOVASCULAR - ADULT  Goal: Maintains optimal cardiac output and hemodynamic stability  Description: INTERVENTIONS:  - Monitor vital signs, rhythm, and trends  - Monitor for bleeding, hypotension and signs of decreased cardiac output  - Evaluate effectiveness of vasoactive medications to optimize hemodynamic stability  - Monitor arterial and/or venous puncture sites for bleeding and/or hematoma  - Assess quality of pulses, skin color and temperature  - Assess for signs of decreased coronary artery perfusion - ex. Angina  - Evaluate fluid  balance, assess for edema, trend weights  Outcome: Progressing  Goal: Absence of cardiac arrhythmias or at baseline  Description: INTERVENTIONS:  - Continuous cardiac monitoring, monitor vital signs, obtain 12 lead EKG if indicated  - Evaluate effectiveness of antiarrhythmic and heart rate control medications as ordered  - Initiate emergency measures for life threatening arrhythmias  - Monitor electrolytes and administer replacement therapy as ordered  Outcome: Progressing

## 2024-11-08 NOTE — PLAN OF CARE
Problem: Patient Centered Care  Goal: Patient preferences are identified and integrated in the patient's plan of care  Description: Interventions:  - What would you like us to know as we care for you? Hard of hearing  - Provide timely, complete, and accurate information to patient/family  - Incorporate patient and family knowledge, values, beliefs, and cultural backgrounds into the planning and delivery of care  - Encourage patient/family to participate in care and decision-making at the level they choose  - Honor patient and family perspectives and choices  Outcome: Progressing     Problem: Patient/Family Goals  Goal: Patient/Family Long Term Goal  Description: Patient's Long Term Goal: Discharge home safely    Interventions:  -  Monitor VS  -Cardiologist consult  - See additional Care Plan goals for specific interventions  Outcome: Progressing  Goal: Patient/Family Short Term Goal  Description: Patient's Short Term Goal: Hemodynamically stable    Interventions:   - Notified cardiologist regarding HR and BP  - IV digoxin administered  -Continue monitor tele  - See additional Care Plan goals for specific interventions  Outcome: Progressing     Problem: CARDIOVASCULAR - ADULT  Goal: Maintains optimal cardiac output and hemodynamic stability  Description: INTERVENTIONS:  - Monitor vital signs, rhythm, and trends  - Monitor for bleeding, hypotension and signs of decreased cardiac output  - Evaluate effectiveness of vasoactive medications to optimize hemodynamic stability  - Monitor arterial and/or venous puncture sites for bleeding and/or hematoma  - Assess quality of pulses, skin color and temperature  - Assess for signs of decreased coronary artery perfusion - ex. Angina  - Evaluate fluid balance, assess for edema, trend weights  Outcome: Progressing  Goal: Absence of cardiac arrhythmias or at baseline  Description: INTERVENTIONS:  - Continuous cardiac monitoring, monitor vital signs, obtain 12 lead EKG if  indicated  - Evaluate effectiveness of antiarrhythmic and heart rate control medications as ordered  - Initiate emergency measures for life threatening arrhythmias  - Monitor electrolytes and administer replacement therapy as ordered  Outcome: Progressing

## 2024-11-08 NOTE — CM/SW NOTE
09:40AM  SW met w/ pt at bedside for further discussion on HH and f/up for HH agency choice.    Pt stating he wants to see how he does at home before agreeing to HH. Per pt, \"my daughters and girlfriend will help me.\" SW explained HH would be RN and PT/OT. Pt again stating he wants to see how he does at home first.    SW informed pt that if he chooses to DC home w/out HH arranged, then he and family will be responsible to f/up w/ his PCP and have his MD's office arrange it for him. Pt expressed understanding.    SW notified yesterday by Palliative that pt's dtr Staci wanted to speak w/ SW. SW attempted yesterday but dtr was no longer at bedside (see DIEGO note from 11/7).    Attempted pt's dtr/HC POJAMISON Small via phone this AM. There was no answer. SW left Vmail w/ brief update and request to call SW back to confirm DC plans.    10:30AM  Received call from pt's dtrs Staci and Kiersten. DIEGO discussed above w/ them and explained benefits of HH services being arranged prior to DC as opposed to post DC.    Pt's dtrs are agreeable to HH services being arranged. DIEGO informed them of HH list w/ quality data at bedside. They confirmed they briefly looked at it yesterday.    Discussed the information. Pt's dtr confirmed choice is [facility].    [facility] in [place]. Liaison Karen notified. HH instructions added to pt's AVS.    PLAN: Home [facility]/ [facility] - pending med clear      SW/CM to remain available for support and/or discharge planning.         Fernanda, MSW, LSW [id number]

## 2024-11-08 NOTE — PAYOR COMM NOTE
--------------  CONTINUED STAY REVIEW    Payor: [facility] MEDICARE ADV PPO  Subscriber #:  [id number]  Authorization Number: [id number]    Admit date: 11/3/24  Admit time: 11:09 PM        11/7/24              Endorsing shortness of breath and chest pain  Hypotensive    Blood pressure 136/79, pulse 82, temperature 98.1 °F (36.7 °C), temperature source Oral, resp. rate 16, height 5' 4\" (1.626 m), weight 123 lb 6.4 oz (56 kg), SpO2 95%.    HEENT: EOMI PERRLA, atraumatic normocephalic  Cardiac: S1-S2 appreciated  Lungs: Good air entry bilaterally clear to auscultation  Abdomen: Soft nontender nondistended positive bowel sounds  Ext: Peripheral pulses are positive  Skin: No rashes noted     Lab Results   Component Value Date     WBC 13.3 (H) 11/07/2024     HGB 11.6 (L) 11/07/2024     HCT 36.2 (L) 11/07/2024     .0 11/07/2024     CREATSERUM 0.97 11/07/2024     BUN 36 (H) 11/07/2024      11/07/2024     K 3.5 11/07/2024      11/07/2024     CO2 31.0 11/07/2024      (H) 11/07/2024     CA 9.6 11/07/2024     TSH 4.464 11/07/2023     MG 2.2 11/07/2024     PHOS 3.6 11/07/2024      XR CHEST AP PORTABLE  (CPT=71045)     Result Date: 11/5/2024  CONCLUSION:        No new abnormality.  Persistent bibasilar opacities, probably atelectasis with or without superimposed consolidation.    elm-remote     Dictated by (CST): Ld Flowers MD on 11/05/2024 at 1:29 PM     Finalized by (CST): Ld Flowers MD on 11/05/2024 at 1:34 PM         EKG 12 Lead     Result Date: 11/6/2024  Sinus rhythm with Premature atrial complexes Left axis deviation Abnormal ECG When compared with ECG of 03-NOV-2024 16:23, Sinus rhythm has replaced Atrial fibrillation Vent. rate has decreased BY  74 BPM ST elevation has replaced ST depression in Lateral leads Confirmed by NADEGE MIGUEL ([id number]) on 11/6/2024 12:47:12 PM      Assessment & Plan:       Acute Chest Pain  New onset afib  CAD- history of MV disease that was found years ago  - patient  declined CABG at that time  - CT chest shows dissection at the distal arch of the left subclavian to upper abdomianl aorta above the R renal artery  - continue on esmolol   - CV surgery consulted   - Echo currently pending  - At this time- no surgical intervention is planned  - Palliative care consulted      Type B Aortic Dissection  -Descending thoracic aortic intramural hematoma  -Prescient vascular surgery recommendations  -Recommend tighter control of BP with SBP less than 140  -Repeat CT angiogram within 24 to 48 hours     HTN  Dyslipidemia     DVT PPX heparin drip      Global A/P  -Rates controlled.   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 -Patient currently on Lasix 40 mg IV twice daily continue monitor strict I's and O's  -Currently on esmolol drip and heparin drip  -Appreciate cardiology vascular recommendations  -Reviewed previous consultant notes  -Reviewed CBC, BMP, Mag, and Phos                      MEDICATIONS ADMINISTERED IN LAST 1 DAY:  amiodarone in dextrose 4.14% (Cordarone) 360 mg/200mL infusion premix       Date Action Dose Route User    11/8/2024 1627 New Bag 1 mg/min Intravenous Nolvia Patel RN          amiodarone (Cordarone) 150 mg in dextrose 5% 100 mL IV bolus       Date Action Dose Route User    11/8/2024 1555 New Bag 150 mg Intravenous Nolvia Patel RN          apixaban (Eliquis) tab 2.5 mg       Date Action Dose Route User    11/8/2024 0844 Given 2.5 mg Oral Nolvia Patel RN    11/7/2024 2115 Given 2.5 mg Oral Galilea Reagan RN          aspirin DR tab 81 mg       Date Action Dose Route User    11/8/2024 1325 Given 81 mg Oral Nolvia Patel RN          ceFEPIme (Maxipime) 1 g in sodium chloride 0.9% 100 mL IVPB-MBP       Date Action Dose Route User    11/8/2024  0415 New Bag 1 g Intravenous Galilea Reagan RN          finasteride (Proscar) tab 5 mg       Date Action Dose Route User    11/8/2024 0844 Given 5 mg Oral Nolvia Patel RN          furosemide (Lasix) tab 40 mg       Date Action Dose Route User    11/8/2024 0844 Given 40 mg Oral Nolvia Patel RN    11/7/2024 1710 Given 40 mg Oral Nolvia Patel RN          metoprolol tartrate (Lopressor) partial tab 12.5 mg       Date Action Dose Route User    11/8/2024 0415 Given 12.5 mg Oral Galilea Reagan RN    11/7/2024 1710 Given 12.5 mg Oral Nolvia Patel RN          metoprolol (Lopressor) 5 mg/5mL injection 2.5 mg       Date Action Dose Route User    11/8/2024 1325 Given 2.5 mg Intravenous Nolvia Patel RN          pantoprazole (Protonix) DR tab 40 mg       Date Action Dose Route User    11/8/2024 0415 Given 40 mg Oral Galilea Reagan RN          polyethylene glycol (PEG 3350) (Miralax) 17 g oral packet 17 g       Date Action Dose Route User    11/7/2024 1710 Given 17 g Oral Nolvia Patel RN          polyethylene glycol (PEG 3350) (Miralax) 17 g oral packet 17 g       Date Action Dose Route User    11/7/2024 1708 Given 17 g Oral Nolvia Patel RN          sacubitril-valsartan (Entresto) 24-26 MG per tab 0.5 tablet       Date Action Dose Route User    11/8/2024 0844 Given 0.5 tablet Oral Nolvia Patel RN    11/7/2024 2115 Given 0.5 tablet Oral Galilea Reagan RN            Vitals (last day)       Date/Time Temp Pulse Resp BP SpO2 Weight O2 Device O2 Flow Rate (L/min) [facility]    11/08/24 1626 -- 69 -- 90/60 -- -- -- -- MS    11/08/24 1555 -- 126 -- 128/103 -- -- -- -- MS    11/08/24 1303 -- 122 -- 95/66 -- -- -- -- MS    11/08/24 0840 97.7 °F (36.5 °C) 82 16 101/63 97 % -- None (Room air) -- MS    11/08/24 0418 97.7 °F (36.5 °C) 74 16 102/63 95 % 122 lb (55.3 kg) -- -- MW    11/07/24 0826 98.1 °F (36.7 °C) 82 16 136/79 95 % -- None (Room air) -- MS    11/07/24 0526 98 °F (36.7 °C) 75 16 121/69 94 % 123 lb 6.4 oz (56  kg) None (Room air) -- SS

## 2024-11-08 NOTE — HOME CARE LIAISON
[facility] Spoke w/ patient and his daughter who is agreeable with [facility]. Contact information placed on AVS.

## 2024-11-08 NOTE — CM/SW NOTE
07:35AM  Received MDO for Entresto coverage. DIEGO confirmed Rx WAS NOT sent via e-script to pt's pharmacy.       SW/CM will need Rx for Entresto e-scribed to pt's pharmacy 24hrs prior to DC for f/up on coverage and potential OOP costs.     02:25PM  At the time of this note, Entresto Rx yet to be sent to pt's pharmacy for price check.    DIEGO placed Free 30 Day coupon on pt's chart.        CINDI Michael, LSW [id number]

## 2024-11-08 NOTE — CDS QUERY
.How to answer this Query:     1.) DON'T CLICK COSIGN BUTTON FIRST  2.) Click \"3 dots...\" to the right of cosign button and click EDIT on the toolbar.  2.) Type an \"X\" in the bracket for the diagnosis that applies. (You may also add additional clinical details as you feel necessary to substantiate your response).   3.) Finally click \"Sign\" to complete response.  Thank You        CLINICAL DOCUMENTATION CLARIFICATION     Dear Doctor Chhaya,   Clinical information (provided below) includes a diagnosis of Heart Failure. Please specify acuity.     PLEASE (X) DIAGNOSIS THAT APPLIES.          ( x   ) Acute Systolic Heart Failure    (    ) Chronic Systolic Heart Failure    (    ) Other - please specify: ___________________________        Clinical Indicators: Echo 11/8/24 - ejection fraction was 30-35%%  proBNP - 1,002  CXR 11/5 Persistent bibasilar opacities, probably atelectasis with or without superimposed consolidation.     11/8 Cardiology PN - New HFrEF, Likely Ischemic Cardiomyopathy       Risk Factor: 89 y/o with pmh of CAD, hypertension who presents with new onset CP and SOB.     Treatment: Cardiology consult, IV lasix       If you have any questions, please contact Clinical :  Elaine VANCE at [phone]     Thank You!     THIS FORM IS A PERMANENT PART OF THE MEDICAL RECORD

## 2024-11-09 LAB
ANION GAP SERPL CALC-SCNC: 10 MMOL/L (ref 0–18)
BASOPHILS # BLD AUTO: 0.12 X10(3) UL (ref 0–0.2)
BASOPHILS NFR BLD AUTO: 1 %
BUN BLD-MCNC: 36 MG/DL (ref 9–23)
BUN/CREAT SERPL: 38.7 (ref 10–20)
CALCIUM BLD-MCNC: 9.3 MG/DL (ref 8.7–10.4)
CHLORIDE SERPL-SCNC: 105 MMOL/L (ref 98–112)
CO2 SERPL-SCNC: 27 MMOL/L (ref 21–32)
CREAT BLD-MCNC: 0.93 MG/DL
DEPRECATED RDW RBC AUTO: 40.4 FL (ref 35.1–46.3)
EGFRCR SERPLBLD CKD-EPI 2021: 76 ML/MIN/1.73M2 (ref 60–?)
EOSINOPHIL # BLD AUTO: 0.45 X10(3) UL (ref 0–0.7)
EOSINOPHIL NFR BLD AUTO: 3.8 %
ERYTHROCYTE [DISTWIDTH] IN BLOOD BY AUTOMATED COUNT: 17.5 % (ref 11–15)
GLUCOSE BLD-MCNC: 106 MG/DL (ref 70–99)
HCT VFR BLD AUTO: 35.8 %
HGB BLD-MCNC: 11.3 G/DL
IMM GRANULOCYTES # BLD AUTO: 0.24 X10(3) UL (ref 0–1)
IMM GRANULOCYTES NFR BLD: 2 %
LYMPHOCYTES # BLD AUTO: 1.45 X10(3) UL (ref 1–4)
LYMPHOCYTES NFR BLD AUTO: 12.1 %
MAGNESIUM SERPL-MCNC: 2.1 MG/DL (ref 1.6–2.6)
MCH RBC QN AUTO: 21.3 PG (ref 26–34)
MCHC RBC AUTO-ENTMCNC: 31.6 G/DL (ref 31–37)
MCV RBC AUTO: 67.5 FL
MONOCYTES # BLD AUTO: 1.18 X10(3) UL (ref 0.1–1)
MONOCYTES NFR BLD AUTO: 9.8 %
NEUTROPHILS # BLD AUTO: 8.54 X10 (3) UL (ref 1.5–7.7)
NEUTROPHILS # BLD AUTO: 8.54 X10(3) UL (ref 1.5–7.7)
NEUTROPHILS NFR BLD AUTO: 71.3 %
OSMOLALITY SERPL CALC.SUM OF ELEC: 303 MOSM/KG (ref 275–295)
PHOSPHATE SERPL-MCNC: 3.2 MG/DL (ref 2.4–5.1)
PLATELET # BLD AUTO: 365 10(3)UL (ref 150–450)
POTASSIUM SERPL-SCNC: 3.7 MMOL/L (ref 3.5–5.1)
RBC # BLD AUTO: 5.3 X10(6)UL
SODIUM SERPL-SCNC: 142 MMOL/L (ref 136–145)
WBC # BLD AUTO: 12 X10(3) UL (ref 4–11)

## 2024-11-09 NOTE — PLAN OF CARE
Received pt w/ amio gtt paused by day shift RN d/t hypotension. HR Afib/Aflutter 100-110s while awake and at rest, up to 150s when ambulating to bathroom. SBA w/ walker. No complaints overnight. IV abx continued for UTI    Call light within reach, safety precautions in place  Problem: Patient Centered Care  Goal: Patient preferences are identified and integrated in the patient's plan of care  Description: Interventions:  - What would you like us to know as we care for you? Hard of hearing  - Provide timely, complete, and accurate information to patient/family  - Incorporate patient and family knowledge, values, beliefs, and cultural backgrounds into the planning and delivery of care  - Encourage patient/family to participate in care and decision-making at the level they choose  - Honor patient and family perspectives and choices  Outcome: Progressing     Problem: CARDIOVASCULAR - ADULT  Goal: Maintains optimal cardiac output and hemodynamic stability  Description: INTERVENTIONS:  - Monitor vital signs, rhythm, and trends  - Monitor for bleeding, hypotension and signs of decreased cardiac output  - Evaluate effectiveness of vasoactive medications to optimize hemodynamic stability  - Monitor arterial and/or venous puncture sites for bleeding and/or hematoma  - Assess quality of pulses, skin color and temperature  - Assess for signs of decreased coronary artery perfusion - ex. Angina  - Evaluate fluid balance, assess for edema, trend weights  Outcome: Progressing  Goal: Absence of cardiac arrhythmias or at baseline  Description: INTERVENTIONS:  - Continuous cardiac monitoring, monitor vital signs, obtain 12 lead EKG if indicated  - Evaluate effectiveness of antiarrhythmic and heart rate control medications as ordered  - Initiate emergency measures for life threatening arrhythmias  - Monitor electrolytes and administer replacement therapy as ordered  Outcome: Progressing

## 2024-11-09 NOTE — PROGRESS NOTES
[facility]  part of [facility]    Progress Note    César Copeland Patient Status:  Inpatient    1930 MRN [id number]   Location [facility] 2W/SW Attending Aidee Shaver MD   Hosp Day # 6 PCP Jeff Anthony D'Amico, DO     Chief Complaint:     A-fib with RVR    Subjective:   Subjective:    Patient seen and examined this morning  Endorsing shortness of breath and chest pain  Hypotensive  Family at bedside.   Patient states he has good energy, has been walking in the halls.      Objective:   Blood pressure 106/66, pulse (!) 130, temperature 97.7 °F (36.5 °C), temperature source Oral, resp. rate 21, height 5' 4\" (1.626 m), weight 116 lb 6.4 oz (52.8 kg), SpO2 95%.  Physical Exam    General: does not appear to be in acute distress at this time  HEENT: EOMI PERRLA, atraumatic normocephalic  Cardiac: S1-S2 appreciated  Lungs: Good air entry bilaterally clear to auscultation  Abdomen: Soft nontender nondistended positive bowel sounds  Ext: Peripheral pulses are positive  Skin: No rashes noted      Results:   Lab Results   Component Value Date    WBC 12.0 (H) 2024    HGB 11.3 (L) 2024    HCT 35.8 (L) 2024    .0 2024    CREATSERUM 0.84 2024    BUN 33 (H) 2024     2024    K 3.5 2024     2024    CO2 30.0 2024     (H) 2024    CA 9.5 2024    ALB 4.3 2024    ALKPHO 81 2024    BILT 1.8 (H) 2024    TP 6.7 2024    AST 23 2024    ALT 14 2024    PTT 37.7 (H) 2024    T4F 0.9 10/03/2022    TSH 4.403 2024    PSA 8.71 (H) 2019    MG 2.1 2024    PHOS 3.2 2024    TROPHS 19 2024    B12 746 2018       CTA CHEST (CPT=71275)    Result Date: 2024  CONCLUSION:   1. Redemonstrated curvilinear density involving the anterior aspect of the descending thoracic aorta with extension into the suprarenal abdominal aorta.  The components of  curvilinear density involving the distal aortic arch are less conspicuous on this study.  These findings again may be secondary to an aortic dissection, heterogeneous blood flow, and/or underlying atherosclerotic disease. 2. 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 3. Trace left pleural effusion.  Interval resolution of the trace right pleural effusion. 4. Mild atelectasis involving the left greater than right lower lobes. 5. Small airways disease. 6. Lesser incidental findings described above.     Dictated by (CST): Josh Temple MD on 11/07/2024 at 5:25 PM     Finalized by (CST): Josh Temple MD on 11/07/2024 at 5:42 PM               Assessment & Plan:       Acute Chest Pain  New onset afib  CAD- history of MV disease that was found years ago  - patient declined CABG at that time  - CT chest shows dissection at the distal arch of the left subclavian to upper abdomianl aorta above the R renal artery  - continue on esmolol   - CV surgery consulted   - Echo currently pending  - At this time- no surgical intervention is planned  - Palliative care consulted      Type B Aortic Dissection  -Descending thoracic aortic intramural hematoma  -Prescient vascular surgery recommendations  -Recommend tighter control of BP with SBP less than 140  -Repeat CT angiogram within 24 to 48 hours    HTN  Dyslipidemia     DVT PPX heparin drip     DNAR/Select     Global A/P  -Rates controlled.   -transferred out of ICU  -continue PT/OT   -repeat CT reviewed - findings c/w the possibility of an aortic dissection.  -appreciate cardiology recs,   -Echo reviewed - c/w Systolic congestive heart failure with EF of 30-35%  -Appreciate palliative care recommendations  -Procal mildly elevated - UA (+) for pyuria Ucx with no growth  -continue monitor strict I's and O's  -Appreciate cardiology vascular  recommendations  -Reviewed previous consultant notes  -Reviewed CBC, BMP, Mag, and Phos  -Reviewed tests ordered  -Repeat labs in am  -MDM: High, severe exacerbation of chronic illness posing a threat to life. IV medications requiring close inpatient monitoring.         Aidee Shaver MD

## 2024-11-09 NOTE — PROGRESS NOTES
Progress Note  César Copeland Patient Status:  Inpatient    1930 MRN [id number]   Location [facility] 3W/SW Attending Aidee Shaver MD   Hosp Day # 6 PCP Jeff Anthony D'Amico,      Subjective:  Pt denies complaint, back in Afib but rates improving.     Objective:  /86 (BP Location: Right arm)   Pulse 71   Temp 97.7 °F (36.5 °C) (Oral)   Resp 21   Ht 5' 4\" (1.626 m)   Wt 116 lb 6.4 oz (52.8 kg)   SpO2 95%   BMI 19.98 kg/m²     Telemetry: afib 80's-100s    Intake/Output:    Intake/Output Summary (Last 24 hours) at 2024 1325  Last data filed at 2024 1019  Gross per 24 hour   Intake 590 ml   Output 400 ml   Net 190 ml       Last 3 Weights   24 0412 116 lb 6.4 oz (52.8 kg)   24 0418 122 lb (55.3 kg)   24 0526 123 lb 6.4 oz (56 kg)   24 0600 112 lb 10.5 oz (51.1 kg)   24 2323 118 lb 9.7 oz (53.8 kg)   24 1744 123 lb 7.3 oz (56 kg)   24 1631 124 lb (56.2 kg)   24 1419 120 lb (54.4 kg)   07/10/24 0933 124 lb 6.4 oz (56.4 kg)       Labs:  Recent Labs   Lab 24  0416 24  0620 24  0611   * 115* 111*   BUN 29* 36* 33*   CREATSERUM 1.12 0.97 0.84   EGFRCR 61 72 81   CA 9.6 9.6 9.5    141 142   K 3.8 3.5 3.5    102 104   CO2 28.0 31.0 30.0     Recent Labs   Lab 24  0620 24  0611 24  0531   RBC 5.25 5.24 5.30   HGB 11.6* 11.6* 11.3*   HCT 36.2* 36.3* 35.8*   MCV 69.0* 69.3* 67.5*   MCH 22.1* 22.1* 21.3*   MCHC 32.0 32.0 31.6   RDW 17.3* 17.3* 17.5*   NEPRELIM 10.66* 8.45* 8.54*   WBC 13.3* 11.5* 12.0*   .0 303.0 365.0         Recent Labs   Lab 24  1713   TROPHS 19       Diagnostics:  No results found.   Review of Systems   Constitutional: Negative.   Cardiovascular: Negative.    Respiratory: Negative.         Physical Exam:    Gen: alert, oriented x 3, NAD  Heent: pupils equal, reactive. Mucous membranes moist.   Neck: no jvd  Cardiac: irregular rate and rhythm, normal  S1,S2, no murmur, clicks, rub or gallop  Lungs: CTA  Abd: soft, NT/ND +bs  Ext: no edema  Skin: Warm, dry  Neuro: No focal deficits      Medications:     aspirin  81 mg Oral Daily    atorvastatin  40 mg Oral Nightly    metoprolol succinate  25 mg Oral Daily Beta Blocker    sacubitril-valsartan  0.5 tablet Oral BID    cefepime  1 g Intravenous Q12H    apixaban  2.5 mg Oral BID    pantoprazole  40 mg Oral QAM AC    finasteride  5 mg Oral Daily      amiodarone 0.5 mg/min (11/09/24 1021)     Assessment:  Descending Aortic Type B Dissection/Intramural Hematoma  Eval by vascular surgery - cons mgmt  On asa, statin - BP management  New HFrEF, Likely Ischemic Cardiomyopathy  Limited echo w/LVEF 30-35% (40-45% in 2022)  GDMT - on BB, low dose entresto, po furosemide; not yet on MRA, SGLT2i  Diuresis w/IV Lasix - now stopped  Net neg 2.8L  Appears euvolemic  New Onset Atrial Fibrillation, RVR  Initially converted to NSR, now back into AFRVR on 11/8  On IV amiodarone gtt  On lopressor  [id number] - Eliquis 2.5mg po BID (reduced for age, wt)  CAD, Hx Multivessel Disease  [facility] 2015 - L Main 40%, LAD 95%, 1st Diag 60%, L Circ mid 75%, dist 90%, RCA 65%  Trop WNL - denies chest pain  Hypertension - controlled  Hyperlipidemia - LDL 68, on atorvastatin 10mg  UTI - on antibx  Hx DVT    Plan:  Continue asa, statin   Continue toprol, low dose entresto  Continue IV amiodarone gtt today - will transition to oral tomorrow am.   Can consider MRA, SGLT2i as outpatient as BP allows  Discussion with pt and family regarding ischemic cardiomyopathy - they would like to discuss benefit/risk assessment for percutaneous revascularization with interventional team. Previously had turned down surgical revascularization in 2015 favoring conservative management. We will have our interventional team evaluate on Monday. Pt and family are agreeable.     Plan of care discussed with patient, RN.    MARY Warren  11/9/2024  1:25 PM  [phone] [place]  [facility]  [phone] [facility]        Patient seen and examined independently.  Note reviewed and labs reviewed.  Agree with above assessment and plan.  Late entry.  Patient seen in AM 11/9/2024.  Continue rate control strategy of afib with amiodarone and toprol  On eliquis 2.5 mg BID

## 2024-11-09 NOTE — PLAN OF CARE
HR fluctuating from 90s to 150s Afib this morning, cardiology aware and notified. Amio gtt restarted per [facility] AP[facility] Ladi. Rates improved and bp stable. Iv antibiotics continued. Family at bedside.    Problem: Patient Centered Care  Goal: Patient preferences are identified and integrated in the patient's plan of care  Description: Interventions:  - What would you like us to know as we care for you? Hard of hearing  - Provide timely, complete, and accurate information to patient/family  - Incorporate patient and family knowledge, values, beliefs, and cultural backgrounds into the planning and delivery of care  - Encourage patient/family to participate in care and decision-making at the level they choose  - Honor patient and family perspectives and choices  Outcome: Progressing     Problem: Patient/Family Goals  Goal: Patient/Family Long Term Goal  Description: Patient's Long Term Goal: Discharge home safely    Interventions:  -  Monitor VS  -Cardiologist consult  - See additional Care Plan goals for specific interventions  Outcome: Progressing  Goal: Patient/Family Short Term Goal  Description: Patient's Short Term Goal: Hemodynamically stable    Interventions:   - Notified cardiologist regarding HR and BP  - IV digoxin administered  -Continue monitor tele  - See additional Care Plan goals for specific interventions  Outcome: Progressing     Problem: CARDIOVASCULAR - ADULT  Goal: Maintains optimal cardiac output and hemodynamic stability  Description: INTERVENTIONS:  - Monitor vital signs, rhythm, and trends  - Monitor for bleeding, hypotension and signs of decreased cardiac output  - Evaluate effectiveness of vasoactive medications to optimize hemodynamic stability  - Monitor arterial and/or venous puncture sites for bleeding and/or hematoma  - Assess quality of pulses, skin color and temperature  - Assess for signs of decreased coronary artery perfusion - ex. Angina  - Evaluate fluid balance, assess for edema, trend  weights  Outcome: Progressing  Goal: Absence of cardiac arrhythmias or at baseline  Description: INTERVENTIONS:  - Continuous cardiac monitoring, monitor vital signs, obtain 12 lead EKG if indicated  - Evaluate effectiveness of antiarrhythmic and heart rate control medications as ordered  - Initiate emergency measures for life threatening arrhythmias  - Monitor electrolytes and administer replacement therapy as ordered  Outcome: Progressing

## 2024-11-10 PROCEDURE — 99233 SBSQ HOSP IP/OBS HIGH 50: CPT | Performed by: HOSPITALIST

## 2024-11-10 RX ORDER — AMIODARONE HYDROCHLORIDE 200 MG/1
400 TABLET ORAL 2 TIMES DAILY WITH MEALS
Status: DISCONTINUED | OUTPATIENT
Start: 2024-11-10 | End: 2024-11-11

## 2024-11-10 NOTE — PROGRESS NOTES
Progress Note  Eleazarnorma Copeland Patient Status:  Inpatient    1930 MRN [id number]   Location [facility] 3W/SW Attending Aidee Shaver MD   Hosp Day # 7 PCP Jeff Anthony D'Amico,      Subjective:  Pt denies chest pain or SOB. Overall feeling very well. Does c/o occasional neck/jaw pain. None currently     Objective:  /60 (BP Location: Right arm)   Pulse 71   Temp 97.7 °F (36.5 °C) (Oral)   Resp 18   Ht 5' 4\" (1.626 m)   Wt 120 lb 1.6 oz (54.5 kg)   SpO2 99%   BMI 20.62 kg/m²     Telemetry: NSR 70's    Intake/Output:    Intake/Output Summary (Last 24 hours) at 11/10/2024 1344  Last data filed at 11/10/2024 0602  Gross per 24 hour   Intake 50 ml   Output 720 ml   Net -670 ml       Last 3 Weights   11/10/24 0521 120 lb 1.6 oz (54.5 kg)   24 0412 116 lb 6.4 oz (52.8 kg)   24 0418 122 lb (55.3 kg)   24 0526 123 lb 6.4 oz (56 kg)   24 0600 112 lb 10.5 oz (51.1 kg)   24 2323 118 lb 9.7 oz (53.8 kg)   24 1744 123 lb 7.3 oz (56 kg)   24 1631 124 lb (56.2 kg)   24 1419 120 lb (54.4 kg)   07/10/24 0933 124 lb 6.4 oz (56.4 kg)       Labs:  Recent Labs   Lab 24  0624  0611 24  0531   * 111* 106*   BUN 36* 33* 36*   CREATSERUM 0.97 0.84 0.93   EGFRCR 72 81 76   CA 9.6 9.5 9.3    142 142   K 3.5 3.5 3.7    104 105   CO2 31.0 30.0 27.0     Recent Labs   Lab 24  0624  0611 24  0531   RBC 5.25 5.24 5.30   HGB 11.6* 11.6* 11.3*   HCT 36.2* 36.3* 35.8*   MCV 69.0* 69.3* 67.5*   MCH 22.1* 22.1* 21.3*   MCHC 32.0 32.0 31.6   RDW 17.3* 17.3* 17.5*   NEPRELIM 10.66* 8.45* 8.54*   WBC 13.3* 11.5* 12.0*   .0 303.0 365.0         Recent Labs   Lab 24  1713   TROPHS 19       Diagnostics:  No results found.   Review of Systems   Constitutional: Negative.   Cardiovascular:  Negative for chest pain, dyspnea on exertion, leg swelling and orthopnea.   Respiratory:  Negative for cough and  shortness of breath.        Physical Exam:    Gen: alert, oriented x 3, NAD  Heent: pupils equal, reactive. Mucous membranes moist.   Neck: no jvd  Cardiac: regular rate and rhythm, normal S1,S2, no murmur, clicks, rub or gallop  Lungs: CTA  Abd: soft, NT/ND +bs  Ext: no edema  Skin: Warm, dry  Neuro: No focal deficits      Medications:     aspirin  81 mg Oral Daily    atorvastatin  40 mg Oral Nightly    metoprolol succinate  25 mg Oral Daily Beta Blocker    sacubitril-valsartan  0.5 tablet Oral BID    cefepime  1 g Intravenous Q12H    apixaban  2.5 mg Oral BID    pantoprazole  40 mg Oral QAM AC    finasteride  5 mg Oral Daily      amiodarone 0.5 mg/min (11/10/24 2479)       Assessment:  Descending Aortic Type B Dissection/Intramural Hematoma  Eval by vascular surgery - cons mgmt  On asa, statin - BP management  New HFrEF, Likely Ischemic Cardiomyopathy  Limited echo w/LVEF 30-35% (40-45% in 2022)  GDMT - on BB, low dose entresto; not yet on MRA, SGLT2i  Diuresis w/IV Lasix - now stopped  Net neg 2.9L  Appears euvolemic  New Onset Atrial Fibrillation, RVR  Now converted back to NSR on IV amiodarone gtt  On toprol 50mg po daily  [id number] 4 - Eliquis 2.5mg po BID (reduced for age, wt)  CAD, Hx Multivessel Disease  [facility] 2015 - L Main 40%, LAD 95%, 1st Diag 60%, L Circ mid 75%, dist 90%, RCA 65%  Trop WNL - denies chest pain  Hypertension - controlled  Hyperlipidemia - LDL 68, on atorvastatin 10mg  UTI - on antibx  Hx DVT    Plan:  Continue asa, statin   Continue toprol, low dose entresto  Pt back in NSR - discontinue IV amiodarone gtt and transition to amiodarone 400mg po BID for one week, then 200mg po daily.   Continue Eliquis 2.5mg po BID  Can consider MRA, SGLT2i as outpatient as BP allows  Discussion with pt and family regarding ischemic cardiomyopathy - they would like to discuss benefit/risk assessment for percutaneous revascularization with interventional team. Previously had turned down surgical  revascularization in 2015 favoring conservative management. We will have our interventional team evaluate on Monday. Pt and family are agreeable.     Plan of care discussed with patient, RN.    MARY Warren  11/10/2024  1:44 PM  [phone] [facility]  [phone] [facility]        Patient seen and examined independently.  Note reviewed and labs reviewed.  Agree with above assessment and plan.  Stable cardiovascular status today - transition to oral amiodarone for rhythm control strategy.  Low dose Eliquis for stroke preventions.    V> [facility]

## 2024-11-10 NOTE — SPIRITUAL CARE NOTE
Spiritual Care Visit Note    Patient Name: César Copeland Date of Spiritual Care Visit: 11/10/24   : 1930 Primary Dx: Atrial fibrillation with rapid ventricular response (HCC)       Referred By: Referral From:     Spiritual Care Taxonomy:    Intended Effects: Demonstrate caring and concern    Methods: Demonstrate acceptance;Offer emotional support    Interventions: Acknowledge current situation;Acknowledge response to difficult experience;Active listening;Ask guided questions;Ask guided questions about pippa;Explain  role;Provide hospitality    Visit Type/Summary:     - Spiritual Care: Consulted with RN prior to visit. Offered empathic listening and emotional support. Patient and family expressed appreciation for  visit. Provided information regarding how to contact Spiritual Care and left a Spiritual Care information card. Provided support for Patient's spiritual/Yazidism requests.  remains available for follow up.    Spiritual Care support can be requested via an Epic consult. For urgent/immediate needs, please contact the On Call  at: [place]: ext [phone]    Chaplain Resident, Shanti Otto PhD

## 2024-11-10 NOTE — PLAN OF CARE
No complaints overnight. Amio gtt continued. 1 assist w/ walker.    Call light within reach, safety precautions in place  Problem: Patient Centered Care  Goal: Patient preferences are identified and integrated in the patient's plan of care  Description: Interventions:  - What would you like us to know as we care for you? Hard of hearing  - Provide timely, complete, and accurate information to patient/family  - Incorporate patient and family knowledge, values, beliefs, and cultural backgrounds into the planning and delivery of care  - Encourage patient/family to participate in care and decision-making at the level they choose  - Honor patient and family perspectives and choices  Outcome: Progressing     Problem: CARDIOVASCULAR - ADULT  Goal: Maintains optimal cardiac output and hemodynamic stability  Description: INTERVENTIONS:  - Monitor vital signs, rhythm, and trends  - Monitor for bleeding, hypotension and signs of decreased cardiac output  - Evaluate effectiveness of vasoactive medications to optimize hemodynamic stability  - Monitor arterial and/or venous puncture sites for bleeding and/or hematoma  - Assess quality of pulses, skin color and temperature  - Assess for signs of decreased coronary artery perfusion - ex. Angina  - Evaluate fluid balance, assess for edema, trend weights  Outcome: Progressing     Problem: CARDIOVASCULAR - ADULT  Goal: Absence of cardiac arrhythmias or at baseline  Description: INTERVENTIONS:  - Continuous cardiac monitoring, monitor vital signs, obtain 12 lead EKG if indicated  - Evaluate effectiveness of antiarrhythmic and heart rate control medications as ordered  - Initiate emergency measures for life threatening arrhythmias  - Monitor electrolytes and administer replacement therapy as ordered  Outcome: Not Progressing

## 2024-11-10 NOTE — PROGRESS NOTES
[facility]  part of [facility]    Progress Note    César Copeland Patient Status:  Inpatient    1930 MRN [id number]   Location [facility] 2W/SW Attending Aidee Shaver MD   Hosp Day # 7 PCP Jeff Anthony D'Amico, DO     Chief Complaint:     A-fib with RVR    Subjective:   Subjective:    Patient seen and examined this morning  Endorsing shortness of breath and chest pain  Family at bedside.   Patient states he has good energy, has been walking in the halls.  Clinically improved, sitting upright in chair in good spirits      Objective:   Blood pressure 108/60, pulse 71, temperature 97.7 °F (36.5 °C), temperature source Oral, resp. rate 18, height 5' 4\" (1.626 m), weight 120 lb 1.6 oz (54.5 kg), SpO2 99%.  Physical Exam    General: does not appear to be in acute distress at this time  HEENT: EOMI PERRLA, atraumatic normocephalic  Cardiac: S1-S2 appreciated  Lungs: Good air entry bilaterally clear to auscultation  Abdomen: Soft nontender nondistended positive bowel sounds  Ext: Peripheral pulses are positive  Skin: No rashes noted      Results:   Lab Results   Component Value Date    WBC 12.0 (H) 2024    HGB 11.3 (L) 2024    HCT 35.8 (L) 2024    .0 2024    CREATSERUM 0.93 2024    BUN 36 (H) 2024     2024    K 3.7 2024     2024    CO2 27.0 2024     (H) 2024    CA 9.3 2024    ALB 4.3 2024    ALKPHO 81 2024    BILT 1.8 (H) 2024    TP 6.7 2024    AST 23 2024    ALT 14 2024    PTT 37.7 (H) 2024    T4F 0.9 10/03/2022    TSH 4.403 2024    PSA 8.71 (H) 2019    MG 2.1 2024    PHOS 3.2 2024    TROPHS 19 2024    B12 746 2018       No results found.        Assessment & Plan:       Acute Chest Pain  New onset afib  CAD- history of MV disease that was found years ago  - patient declined CABG at that time  - CT chest  shows dissection at the distal arch of the left subclavian to upper abdomianl aorta above the R renal artery  - continue on esmolol   - CV surgery consulted   - Echo currently pending  - At this time- no surgical intervention is planned  - Palliative care consulted      Type B Aortic Dissection  -Descending thoracic aortic intramural hematoma  -Prescient vascular surgery recommendations  -Recommend tighter control of BP with SBP less than 140  -Repeat CT angiogram within 24 to 48 hours    HTN  Dyslipidemia     DVT PPX heparin drip     DNAR/Select     Global A/P  -Rates controlled.   -transferred out of ICU  -continue PT/OT   -repeat CT reviewed - findings c/w the possibility of an aortic dissection.  -discussion with family regarding ischemic evaluation.   -appreciate cardiology recs,   -Echo reviewed - c/w Systolic congestive heart failure with EF of 30-35%  -Appreciate palliative care recommendations  -Procal mildly elevated - UA (+) for pyuria Ucx with no growth  -continue monitor strict I's and O's  -Appreciate cardiology vascular recommendations  -Reviewed previous consultant notes  -Reviewed CBC, BMP, Mag, and Phos  -Reviewed tests ordered  -Repeat labs in am  -MDM: High, severe exacerbation of chronic illness posing a threat to life. IV medications requiring close inpatient monitoring.         Aidee Shaver MD

## 2024-11-11 ENCOUNTER — TELEPHONE (OUTPATIENT)
Dept: INTERNAL MEDICINE CLINIC | Facility: CLINIC | Age: 89
End: 2024-11-11

## 2024-11-11 VITALS
OXYGEN SATURATION: 97 % | RESPIRATION RATE: 18 BRPM | DIASTOLIC BLOOD PRESSURE: 53 MMHG | SYSTOLIC BLOOD PRESSURE: 93 MMHG | BODY MASS INDEX: 20.51 KG/M2 | HEIGHT: 64 IN | WEIGHT: 120.13 LBS | HEART RATE: 68 BPM | TEMPERATURE: 98 F

## 2024-11-11 LAB
ANION GAP SERPL CALC-SCNC: 6 MMOL/L (ref 0–18)
BASOPHILS # BLD AUTO: 0.14 X10(3) UL (ref 0–0.2)
BASOPHILS NFR BLD AUTO: 1.1 %
BUN BLD-MCNC: 28 MG/DL (ref 9–23)
BUN/CREAT SERPL: 34.6 (ref 10–20)
CALCIUM BLD-MCNC: 9.3 MG/DL (ref 8.7–10.4)
CHLORIDE SERPL-SCNC: 106 MMOL/L (ref 98–112)
CO2 SERPL-SCNC: 29 MMOL/L (ref 21–32)
CREAT BLD-MCNC: 0.81 MG/DL
DEPRECATED RDW RBC AUTO: 42.2 FL (ref 35.1–46.3)
EGFRCR SERPLBLD CKD-EPI 2021: 82 ML/MIN/1.73M2 (ref 60–?)
EOSINOPHIL # BLD AUTO: 0.39 X10(3) UL (ref 0–0.7)
EOSINOPHIL NFR BLD AUTO: 3.1 %
ERYTHROCYTE [DISTWIDTH] IN BLOOD BY AUTOMATED COUNT: 17.5 % (ref 11–15)
GLUCOSE BLD-MCNC: 104 MG/DL (ref 70–99)
HCT VFR BLD AUTO: 34.5 %
HGB BLD-MCNC: 10.6 G/DL
IMM GRANULOCYTES # BLD AUTO: 0.23 X10(3) UL (ref 0–1)
IMM GRANULOCYTES NFR BLD: 1.8 %
LYMPHOCYTES # BLD AUTO: 1.19 X10(3) UL (ref 1–4)
LYMPHOCYTES NFR BLD AUTO: 9.3 %
MAGNESIUM SERPL-MCNC: 2 MG/DL (ref 1.6–2.6)
MCH RBC QN AUTO: 21.3 PG (ref 26–34)
MCHC RBC AUTO-ENTMCNC: 30.7 G/DL (ref 31–37)
MCV RBC AUTO: 69.4 FL
MONOCYTES # BLD AUTO: 1.03 X10(3) UL (ref 0.1–1)
MONOCYTES NFR BLD AUTO: 8.1 %
NEUTROPHILS # BLD AUTO: 9.75 X10 (3) UL (ref 1.5–7.7)
NEUTROPHILS # BLD AUTO: 9.75 X10(3) UL (ref 1.5–7.7)
NEUTROPHILS NFR BLD AUTO: 76.6 %
OSMOLALITY SERPL CALC.SUM OF ELEC: 298 MOSM/KG (ref 275–295)
PHOSPHATE SERPL-MCNC: 2.6 MG/DL (ref 2.4–5.1)
PLATELET # BLD AUTO: 370 10(3)UL (ref 150–450)
POTASSIUM SERPL-SCNC: 3.8 MMOL/L (ref 3.5–5.1)
RBC # BLD AUTO: 4.97 X10(6)UL
SODIUM SERPL-SCNC: 141 MMOL/L (ref 136–145)
WBC # BLD AUTO: 12.7 X10(3) UL (ref 4–11)

## 2024-11-11 PROCEDURE — 99239 HOSP IP/OBS DSCHRG MGMT >30: CPT | Performed by: HOSPITALIST

## 2024-11-11 RX ORDER — ATORVASTATIN CALCIUM 40 MG/1
40 TABLET, FILM COATED ORAL NIGHTLY
Qty: 30 TABLET | Refills: 2 | Status: SHIPPED | OUTPATIENT
Start: 2024-11-11

## 2024-11-11 RX ORDER — AMIODARONE HYDROCHLORIDE 400 MG/1
400 TABLET ORAL 2 TIMES DAILY WITH MEALS
Qty: 30 TABLET | Refills: 0 | Status: SHIPPED | OUTPATIENT
Start: 2024-11-11 | End: 2024-11-11

## 2024-11-11 RX ORDER — AMIODARONE HYDROCHLORIDE 200 MG/1
TABLET ORAL
Qty: 118 TABLET | Refills: 0 | Status: SHIPPED | OUTPATIENT
Start: 2024-11-11 | End: 2025-02-16

## 2024-11-11 NOTE — PLAN OF CARE
Monitor BP. Amio switched to po. Currently SR. Plan for Dr Ward to speak with patient/family 11/11.      Problem: Patient Centered Care  Goal: Patient preferences are identified and integrated in the patient's plan of care  Description: Interventions:  - What would you like us to know as we care for you? Hard of hearing  - Provide timely, complete, and accurate information to patient/family  - Incorporate patient and family knowledge, values, beliefs, and cultural backgrounds into the planning and delivery of care  - Encourage patient/family to participate in care and decision-making at the level they choose  - Honor patient and family perspectives and choices  Outcome: Progressing     Problem: Patient/Family Goals  Goal: Patient/Family Long Term Goal  Description: Patient's Long Term Goal: Discharge home safely    Interventions:  -  Monitor VS  -Cardiologist consult  - See additional Care Plan goals for specific interventions  Outcome: Progressing  Goal: Patient/Family Short Term Goal  Description: Patient's Short Term Goal: Hemodynamically stable    Interventions:   - Notified cardiologist regarding HR and BP  - IV digoxin administered  -Continue monitor tele  - See additional Care Plan goals for specific interventions  Outcome: Progressing     Problem: CARDIOVASCULAR - ADULT  Goal: Maintains optimal cardiac output and hemodynamic stability  Description: INTERVENTIONS:  - Monitor vital signs, rhythm, and trends  - Monitor for bleeding, hypotension and signs of decreased cardiac output  - Evaluate effectiveness of vasoactive medications to optimize hemodynamic stability  - Monitor arterial and/or venous puncture sites for bleeding and/or hematoma  - Assess quality of pulses, skin color and temperature  - Assess for signs of decreased coronary artery perfusion - ex. Angina  - Evaluate fluid balance, assess for edema, trend weights  Outcome: Progressing  Goal: Absence of cardiac arrhythmias or at  baseline  Description: INTERVENTIONS:  - Continuous cardiac monitoring, monitor vital signs, obtain 12 lead EKG if indicated  - Evaluate effectiveness of antiarrhythmic and heart rate control medications as ordered  - Initiate emergency measures for life threatening arrhythmias  - Monitor electrolytes and administer replacement therapy as ordered  Outcome: Progressing

## 2024-11-11 NOTE — PROGRESS NOTES
[facility]  part of [facility]    Progress Note    César Copeland Patient Status:  Inpatient    1930 MRN [id number]   Location [facility] 2W/SW Attending Aidee Shaver MD   Hosp Day # 8 PCP Jeff Anthony D'Amico, DO     Chief Complaint:     A-fib with RVR    Subjective:   Subjective:    Patient seen and examined this morning  Endorsing shortness of breath and chest pain  Family at bedside.   Patient states he has good energy, has been walking in the halls.  Clinically improved, sitting upright in chair in good spirits      Objective:   Blood pressure 129/73, pulse 74, temperature 97.9 °F (36.6 °C), temperature source Oral, resp. rate 20, height 5' 4\" (1.626 m), weight 120 lb 1.6 oz (54.5 kg), SpO2 97%.  Physical Exam    General: does not appear to be in acute distress at this time  HEENT: EOMI PERRLA, atraumatic normocephalic  Cardiac: S1-S2 appreciated  Lungs: Good air entry bilaterally clear to auscultation  Abdomen: Soft nontender nondistended positive bowel sounds  Ext: Peripheral pulses are positive  Skin: No rashes noted      Results:   Lab Results   Component Value Date    WBC 12.7 (H) 2024    HGB 10.6 (L) 2024    HCT 34.5 (L) 2024    .0 2024    CREATSERUM 0.81 2024    BUN 28 (H) 2024     2024    K 3.8 2024     2024    CO2 29.0 2024     (H) 2024    CA 9.3 2024    ALB 4.3 2024    ALKPHO 81 2024    BILT 1.8 (H) 2024    TP 6.7 2024    AST 23 2024    ALT 14 2024    PTT 37.7 (H) 2024    T4F 0.9 10/03/2022    TSH 4.403 2024    PSA 8.71 (H) 2019    MG 2.0 2024    PHOS 2.6 2024    TROPHS 19 2024    B12 746 2018       No results found.        Assessment & Plan:       Acute Chest Pain  New onset afib  CAD- history of MV disease that was found years ago  - patient declined CABG at that time  - CT chest  shows dissection at the distal arch of the left subclavian to upper abdomianl aorta above the R renal artery  - continue on esmolol   - CV surgery consulted   - Echo currently pending  - At this time- no surgical intervention is planned  - Palliative care consulted      Type B Aortic Dissection  -Descending thoracic aortic intramural hematoma  -Prescient vascular surgery recommendations  -Recommend tighter control of BP with SBP less than 140  -Repeat CT angiogram within 24 to 48 hours    HTN  Dyslipidemia     DVT PPX heparin drip     DNAR/Select     Global A/P  -Rates controlled.   -transferred out of ICU  -continue PT/OT   -repeat CT reviewed - findings c/w the possibility of an aortic dissection.  -discussion with family regarding ischemic evaluation by interventional team today  -appreciate cardiology recs,   -Echo reviewed - c/w Systolic congestive heart failure with EF of 30-35%  -Appreciate palliative care recommendations  -Procal mildly elevated - UA (+) for pyuria Ucx with no growth  -continue monitor strict I's and O's  -Appreciate cardiology vascular recommendations  -Reviewed previous consultant notes  -Reviewed CBC, BMP, Mag, and Phos  -Reviewed tests ordered  -Repeat labs in am  -MDM: High, severe exacerbation of chronic illness posing a threat to life. IV medications requiring close inpatient monitoring.         Aidee Shaver MD

## 2024-11-11 NOTE — PROGRESS NOTES
Progress Note  César Copeland Patient Status:  Inpatient    1930 MRN [id number]   Location [facility] 3W/SW Attending Aidee Shaver MD   Hosp Day # 8 PCP Jeff Anthony D'Amico,      Subjective:  Pt w/o complaint. Ambulating well in halls.     Objective:  /61 (BP Location: Left arm)   Pulse 63   Temp 97.9 °F (36.6 °C) (Oral)   Resp 20   Ht 5' 4\" (1.626 m)   Wt 120 lb 1.6 oz (54.5 kg)   SpO2 97%   BMI 20.62 kg/m²     Telemetry: NSR 60-70's    Intake/Output:    Intake/Output Summary (Last 24 hours) at 2024 0744  Last data filed at 2024 0547  Gross per 24 hour   Intake --   Output 650 ml   Net -650 ml       Last 3 Weights   11/10/24 0521 120 lb 1.6 oz (54.5 kg)   24 0412 116 lb 6.4 oz (52.8 kg)   24 0418 122 lb (55.3 kg)   24 0526 123 lb 6.4 oz (56 kg)   24 0600 112 lb 10.5 oz (51.1 kg)   24 2323 118 lb 9.7 oz (53.8 kg)   24 1744 123 lb 7.3 oz (56 kg)   24 1631 124 lb (56.2 kg)   24 1419 120 lb (54.4 kg)   07/10/24 0933 124 lb 6.4 oz (56.4 kg)       Labs:  Recent Labs   Lab 24  0620 24  0611 24  0531   * 111* 106*   BUN 36* 33* 36*   CREATSERUM 0.97 0.84 0.93   EGFRCR 72 81 76   CA 9.6 9.5 9.3    142 142   K 3.5 3.5 3.7    104 105   CO2 31.0 30.0 27.0     Recent Labs   Lab 24  0611 24  0531 24  0454   RBC 5.24 5.30 4.97   HGB 11.6* 11.3* 10.6*   HCT 36.3* 35.8* 34.5*   MCV 69.3* 67.5* 69.4*   MCH 22.1* 21.3* 21.3*   MCHC 32.0 31.6 30.7*   RDW 17.3* 17.5* 17.5*   NEPRELIM 8.45* 8.54* 9.75*   WBC 11.5* 12.0* 12.7*   .0 365.0 370.0         No results for input(s): \"TROP\", \"TROPHS\", \"CK\" in the last 168 hours.    Diagnostics:  No results found.   Review of Systems   Cardiovascular:  Negative for chest pain, dyspnea on exertion, leg swelling and orthopnea.   Respiratory:  Negative for cough and shortness of breath.        Physical Exam:    Gen: alert, oriented x 3,  NAD  Heent: pupils equal, reactive. Mucous membranes moist.   Neck: no jvd  Cardiac: regular rate and rhythm, normal S1,S2, no murmur, clicks, rub or gallop  Lungs: CTA  Abd: soft, NT/ND +bs  Ext: no edema  Skin: Warm, dry  Neuro: No focal deficits      Medications:     amiodarone  400 mg Oral BID with meals    aspirin  81 mg Oral Daily    atorvastatin  40 mg Oral Nightly    metoprolol succinate  25 mg Oral Daily Beta Blocker    sacubitril-valsartan  0.5 tablet Oral BID    cefepime  1 g Intravenous Q12H    apixaban  2.5 mg Oral BID    pantoprazole  40 mg Oral QAM AC    finasteride  5 mg Oral Daily       Assessment:  Descending Aortic Type B Dissection/Intramural Hematoma  Eval by vascular surgery - cons mgmt  On asa, statin - BP management  New HFrEF, Likely Ischemic Cardiomyopathy  Limited echo w/LVEF 30-35% (40-45% in 2022)  GDMT - on BB, low dose entresto; not yet on MRA, SGLT2i  S/P Diuresis w/IV Lasix - now stopped, appears euvolemic  Net neg 3.6L  New Onset Atrial Fibrillation, RVR  Converted to NSR s/p IV amio; now on po loading 400mg po BID  On toprol 50mg po daily  [id number] 4 - Eliquis 2.5mg po BID (reduced for age, wt)  CAD, Hx Multivessel Disease  [facility] 2015 - L Main 40%, LAD 95%, 1st Diag 60%, L Circ mid 75%, dist 90%, RCA 65%  Pt declined surgical intervention at that time  Trop WNL - denies chest pain  Hypertension - controlled  Hyperlipidemia - LDL 68, on atorvastatin 10mg  UTI - on antibx  Hx DVT    Plan:  Continue asa, statin   Continue toprol, low dose entresto  Pt back in NSR - on amiodarone 400mg po BID for one week, then 200mg po daily starting 11/18/24.   Continue Eliquis 2.5mg po BID  Can consider MRA, SGLT2i as outpatient as BP allows  Discussion with pt and family regarding ischemic cardiomyopathy - pt would favor conservative management and not undergoing any further procedures  Pt is stable for discharge from cardiology standpoint. F/U in [facility] clinic with Dr Nuñez in 1-2 weeks.     Plan  of care discussed with patient, RN and Dr Nuñez.    Ladi Guidry, APRN  11/11/2024  7:44 AM  [phone] [facility]  [phone] [facility]

## 2024-11-11 NOTE — TELEPHONE ENCOUNTER
Karen from [facility] a intake  from [facility] is calling and asking if doctor would sign home health orders.    [facility] is asking for orders for Physical Therapy Occupational Therapy and Nursing.    Ok to leave a verbal OK  message on her secure voicemail.

## 2024-11-11 NOTE — PHYSICAL THERAPY NOTE
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      Patient is requiring contact guard assist as a result of the following impairments: decreased endurance/aerobic capacity, decreased muscular endurance, and medical status.     Patient continues to function below baseline with bed mobility, transfers, gait, stair negotiation, maintaining seated position, standing prolonged periods, and performing household tasks.  Next session anticipate patient to progress stair negotiation.  Physical Therapy will continue to follow patient for duration of hospitalization.    Patient continues to benefit from continued skilled PT services: at discharge to promote prior level of function and safety with additional support and return home with home health PT.    PLAN DURING HOSPITALIZATION  Nursing Mobility Recommendation : 1 Assist  PT Device Recommendation: None  PT Treatment Plan: Bed mobility;Body mechanics;Patient education;Gait training;Transfer training;Balance training  Frequency (Obs): 3-5x/week     SUBJECTIVE  \"I can drive and do what I want now, right?\"    OBJECTIVE  Precautions: None    PAIN ASSESSMENT   Ratin  Location: no pain today  Management Techniques: Activity promotion    BALANCE  Static Sitting: Good  Dynamic Sitting: Fair +  Static Standing: Fair  Dynamic Standing: Fair -    ACTIVITY TOLERANCE  Pulse: 74 (with walking; 68 sitting in chair)  Heart Rate Source: Monitor (tele)     BP: 129/73 (after  walking: initial  BP sitting 93/53 but NO SYMPTOMS throughout session)  BP Location: Left arm  BP Method: Automatic  Patient Position: Standing     O2 WALK  Oxygen Therapy  SPO2% on Room Air at Rest: 96  SPO2% Ambulation on Room Air: 94    AM-PAC '6-Clicks' INPATIENT SHORT FORM - BASIC MOBILITY  How much difficulty does the patient currently have...  Patient Difficulty: Turning over in bed (including adjusting bedclothes, sheets and blankets)?: None   Patient Difficulty: Sitting down on and standing up from a chair with arms (e.g., wheelchair, bedside commode, etc.): A Little   Patient Difficulty: Moving from lying on back to sitting on the side of the bed?: A Little   How much help from another person does the patient currently need...   Help from Another: Moving to and from a bed to a chair (including a wheelchair)?: A Little   Help from Another: Need to walk in hospital room?: A Little   Help from Another: Climbing 3-5 steps with a railing?: A Little     AM-PAC Score:  Raw Score: 19   Approx Degree of Impairment: 41.77%   Standardized Score (AM-PAC Scale): 45.44   CMS Modifier (G-Code): CK    FUNCTIONAL ABILITY STATUS  Functional Mobility/Gait Assessment  Gait Assistance: Supervision  Distance (ft): 300  Assistive Device: Rolling walker  Pattern: Within Functional Limits (leg length discrepancy, better with shoes)  Stairs: Other (comment) (Education to family and patient about navigating stairs in/out of home with assistance and use of railing. Discussion about posiioning of person assisting as well as sidestepping as an option. All questions answered for granddr/dtr)  Rolling: modified independent  Supine to Sit: supervision  Sit to Supine: supervision  Sit to Stand: supervision    Skilled Therapy Provided: CAMILLA Drake approves participation in therapy session. Patient presents sitting up in chair and agreeable. His granddaughter comes during session and daughter comes at end of session. Education and training to  all for pacing, energy conservation, safe use of RW and rationale especially for distance. Discussion and simulation of stairs as well. Patient walking in halls with cues for pacing, upright posture and need to rest even just standing if needed if he feels SOB. He feels he is much better than at admit and is hopeful to go home. He will have 24 hr care from family and family in room participates in education. They are wondering if he can drive -encouraged to speak with MD and not drive until he has an answer. Encouraged him to be up frequently at home but to also rest when tired to allow healing and to build endurance. All questions answered and needs in reach, family in room. Pt will need RW so one is vended today for home. RN Noelle stevenson, pt ready for dc from PT perspective    The patient's Approx Degree of Impairment: 41.77% has been calculated based on documentation in the [facility] '6 clicks' Inpatient Daily Activity Short Form.  Research supports that patients with this level of impairment may benefit from home with increased assistance and HHPT, family to stay with patient for 24 hr care for now.  Final disposition will be made by interdisciplinary medical team.    THERAPEUTIC EXERCISES  Lower Extremity Alternating marching  Glut sets  Heel raises  LAQ  Quad sets  Toe raises     Position Standing       Patient End of Session: Up in chair;Needs met;Call light within reach;RN aware of session/findings;All patient questions and concerns addressed;Hospital anti-slip socks;Family present;Discussed recommendations with /    CURRENT GOALS   Goals to be met by: 11/30/24  Patient Goal Patient's self-stated goal is: to go home   Goal #1 Patient is able to demonstrate supine - sit EOB @ level: independent      Goal #1   Current Status  in progress, cues for pacing and to ensure he isn't dizzy, take time after transitions prior to standing and walking   Goal #2 Patient is able to demonstrate transfers  Sit to/from Stand at assistance level: modified independent with walker - rolling      Goal #2  Current Status  in progress - cues for pacing and hand placement, use of RW   Goal #3 Patient is able to ambulate 200 feet with assist device: walker - rolling at assistance level: modified independent   Goal #3   Current Status  In progress, able to walk 300' but needs cues for pacing and energy conservation, correct use of RW and posture during mobility   Goal #4 Patient will negotiate 2 stairs/one curb w/ assistive device and supervision   Goal #4   Current Status  In progress - education and discussion with pt and granddtr about technique for step to, use of railing and to have someone with him and they verbalize understanding   Goal #5 Patient to demonstrate independence with home activity/exercise instructions provided to patient in preparation for discharge.   Goal #5   Current Status  In progress   Goal #6     Goal #6  Current Status           Gait Training: 15 minutes  Therapeutic Activity: 19 minutes

## 2024-11-11 NOTE — PLAN OF CARE
Problem: Patient Centered Care  Goal: Patient preferences are identified and integrated in the patient's plan of care  Description: Interventions:  - What would you like us to know as we care for you? Hard of hearing  - Provide timely, complete, and accurate information to patient/family  - Incorporate patient and family knowledge, values, beliefs, and cultural backgrounds into the planning and delivery of care  - Encourage patient/family to participate in care and decision-making at the level they choose  - Honor patient and family perspectives and choices  11/11/2024 1408 by Melva Alves RN  Outcome: Adequate for Discharge  11/11/2024 1156 by Melva Alves RN  Outcome: Progressing     Problem: Patient/Family Goals  Goal: Patient/Family Long Term Goal  Description: Patient's Long Term Goal: Discharge home safely    Interventions:  -  Monitor VS  -Cardiologist consult  - See additional Care Plan goals for specific interventions  11/11/2024 1408 by Melva Alves RN  Outcome: Adequate for Discharge  11/11/2024 1156 by Melva Alves RN  Outcome: Progressing  Goal: Patient/Family Short Term Goal  Description: Patient's Short Term Goal: Hemodynamically stable    Interventions:   - Notified cardiologist regarding HR and BP  - IV digoxin administered  -Continue monitor tele  - See additional Care Plan goals for specific interventions  11/11/2024 1408 by Melva Alves RN  Outcome: Adequate for Discharge  11/11/2024 1156 by Melva Alves RN  Outcome: Progressing     Problem: CARDIOVASCULAR - ADULT  Goal: Maintains optimal cardiac output and hemodynamic stability  Description: INTERVENTIONS:  - Monitor vital signs, rhythm, and trends  - Monitor for bleeding, hypotension and signs of decreased cardiac output  - Evaluate effectiveness of vasoactive medications to optimize hemodynamic stability  - Monitor arterial and/or venous puncture sites for bleeding and/or hematoma  - Assess quality of  pulses, skin color and temperature  - Assess for signs of decreased coronary artery perfusion - ex. Angina  - Evaluate fluid balance, assess for edema, trend weights  11/11/2024 1408 by Melva Alves, RN  Outcome: Adequate for Discharge  11/11/2024 1156 by Melva Alves, RN  Outcome: Progressing  Goal: Absence of cardiac arrhythmias or at baseline  Description: INTERVENTIONS:  - Continuous cardiac monitoring, monitor vital signs, obtain 12 lead EKG if indicated  - Evaluate effectiveness of antiarrhythmic and heart rate control medications as ordered  - Initiate emergency measures for life threatening arrhythmias  - Monitor electrolytes and administer replacement therapy as ordered  11/11/2024 1408 by Melva Alves, RN  Outcome: Adequate for Discharge  11/11/2024 1156 by Melva Alves, RN  Outcome: Progressing

## 2024-11-11 NOTE — PAYOR COMM NOTE
--------------  CONTINUED STAY REVIEW    Payor: [facility] MEDICARE ADV PPO  Subscriber #:  [id number]  Authorization Number: [id number]    Admit date: 11/3/24  Admit time: 11:09 PM    REVIEW DOCUMENTATION:   11-8-24 11/08/24 1626 -- 69 -- 90/60 -- -- -- -- MS   11/08/24 1555 -- 126 Abnormal  -- 128/103 Abnormal  -- -- -- -- MS   11/08/24 1303 -- 122 Abnormal  -- 95/66 -- -- -- -- MS   11/08/24 0840 97.7 °F (36.5 °C) 82 16 101/63 97 % -- None (Room air) -- MS   11/08/24 0418 97.7 °F (36.5 °C) 74 16 102/63 95 % 122 lb (55.3 kg) -- -- MW   11/08/24 0300 -- 75 -- -- -- -- -- -- MW     Recent Labs   Lab 11/08/24  0611   *   BUN 33*   CREATSERUM 0.84   EGFRCR 81   CA 9.5      K 3.5      CO2 30.0          Recent Labs   Lab 11/08/24  0611   RBC 5.24   HGB 11.6*   HCT 36.3*   MCV 69.3*   MCH 22.1*   MCHC 32.0   RDW 17.3*   NEPRELIM 8.45*   WBC 11.5*   .0     Physical Exam:    Gen: alert, oriented x 3, NAD  Heent: pupils equal, reactive. Mucous membranes moist.   Neck: no jvd  Cardiac: regular rate and rhythm, normal S1,S2, no murmur, clicks, rub or gallop  Lungs: CTA  Abd: soft, NT/ND +bs  Ext: no edema  Skin: Warm, dry  Neuro: No focal deficits    Assessment:  Descending Aortic Type B Dissection/Intramural Hematoma  Eval by vascular surgery - cons mgmt  New HFrEF, Likely Ischemic Cardiomyopathy  Limited echo w/LVEF 30-35% (40-45% in 2022)  GDMT - on BB, low dose entresto, po furosemide; not yet on MRA, SGLT2i  Diuresis w/IV Lasix, now on oral  Net neg 2.8L  New Onset Atrial Fibrillation, RVR  Initially w/RVR, now maintaining NSR  On lopressor  [id number] 4 - Eliquis 2.5mg po BID (reduced for age, wt)  CAD, Hx Multivessel Disease  [facility] 2015 - L Main 40%, LAD 95%, 1st Diag 60%, L Circ mid 75%, dist 90%, RCA 65%  Trop WNL - denies chest pain  Hypertension - controlled  Hyperlipidemia - LDL 68, on atorvastatin 10mg  UTI - on antibx  Hx DVT     Plan:  Pt appears euvolemic  - discontinue Lasix.   Add  asa 81mg po daily. Start atorvastatin 40mg po nightly for CAD management.   Pt remains in NSR - transition lopressor to toprol 25mg po daily. First dose tomorrow am.   Continue Eliquis 2.5mg po BID for stroke prophylaxis  Continue low dose Entresto 24/26 po BID - half tab.  Can consider MRA, SGLT2i as outpatient as BP allows.   Long discussion with pt and family (wife, daughter at bedside and daughters by phone) regarding LV dysfunction as well as longstanding multivessel CAD. Pt is favoring conservative management but discussions are ongoing re: consideration for possible diagnostic LHC.      Plan of care discussed with patient, family, RN and Dr Miller.  ADDENDUM: 1320  Notified by RN that pt converted back to AFRVR, rates up to 150's-160. BP 90's. Metoprolol 2.5mg IVP x1 given. Rates improved to 120's, remains afib. Start IV amiodarone protocol, bolus and then start gtt at 1mg/min. [facility] access team consulted for extended IV access.     REVIEW DOCUMENTATION:   11-9-24 11/09/24 1127 -- 71 -- 123/86 -- -- -- -- MM   11/09/24 1019 -- 130 Abnormal  -- 106/66 95 % -- None (Room air) -- MM   11/09/24 0907 97.7 °F (36.5 °C) 101 21 103/88 98 % -- None (Room air) -- MM   11/09/24 0510 97.7 °F (36.5 °C) 97 20 97/72 94 % -- None (Room air) -- TB     back in Afib but rates improving.     Gen: alert, oriented x 3, NAD  Heent: pupils equal, reactive. Mucous membranes moist.   Neck: no jvd  Cardiac: irregular rate and rhythm, normal S1,S2, no murmur, clicks, rub or gallop  Lungs: CTA  Abd: soft, NT/ND +bs  Ext: no edema  Skin: Warm, dry  Neuro: No focal deficits     Assessment:  Descending Aortic Type B Dissection/Intramural Hematoma  Eval by vascular surgery - cons mgmt  On asa, statin - BP management  New HFrEF, Likely Ischemic Cardiomyopathy  Limited echo w/LVEF 30-35% (40-45% in 2022)  GDMT - on BB, low dose entresto, po furosemide; not yet on MRA, SGLT2i  Diuresis w/IV Lasix - now stopped  Net neg 2.8L  Appears  euvolemic  New Onset Atrial Fibrillation, RVR  Initially converted to NSR, now back into AFRVR on 11/8  On IV amiodarone gtt  On lopressor  [id number] 4 - Eliquis 2.5mg po BID (reduced for age, wt)  CAD, Hx Multivessel Disease  [facility] 2015 - L Main 40%, LAD 95%, 1st Diag 60%, L Circ mid 75%, dist 90%, RCA 65%  Trop WNL - denies chest pain  Hypertension - controlled  Hyperlipidemia - LDL 68, on atorvastatin 10mg  UTI - on antibx  Hx DVT     Plan:  Continue asa, statin   Continue toprol, low dose entresto  Continue IV amiodarone gtt today - will transition to oral tomorrow am.   Can consider MRA, SGLT2i as outpatient as BP allows  Discussion with pt and family regarding ischemic cardiomyopathy - they would like to discuss benefit/risk assessment for percutaneous revascularization with interventional team. Previously had turned down surgical revascularization in 2015 favoring conservative management. We will have our interventional team evaluate    REVIEW DOCUMENTATION:   11-10-24       11/10/24 1500 98.1 °F (36.7 °C) 72 20 102/74 96 % -- None (Room air) -- FL   11/10/24 0929 97.7 °F (36.5 °C) 71 18 108/60 99 % -- None (Room air) -- FL      Patient seen and examined this morning  Endorsing shortness of breath and chest pain  Family at bedside.     Assessment & Plan:  Acute Chest Pain  New onset afib  CAD- history of MV disease that was found years ago  - patient declined CABG at that time  - CT chest shows dissection at the distal arch of the left subclavian to upper abdomianl aorta above the R renal artery  - continue on esmolol   - CV surgery consulted   - Echo currently pending  - At this time- no surgical intervention is planned  - Palliative care consulted      Type B Aortic Dissection  -Descending thoracic aortic intramural hematoma  -Prescient vascular surgery recommendations  -Recommend tighter control of BP with SBP less than 140  -Repeat CT angiogram within 24 to 48 hours     HTN  Dyslipidemia     DVT PPX  heparin drip     DNAR/Select     Global A/P  -Rates controlled.   -transferred out of ICU  -continue PT/OT   -repeat CT reviewed - findings c/w the possibility of an aortic dissection.  -discussion with family regarding ischemic evaluation.   -appreciate cardiology recs,   -Echo reviewed - c/w Systolic congestive heart failure with EF of 30-35%  -Appreciate palliative care recommendations  -Procal mildly elevated - UA (+) for pyuria Ucx with no growth  -continue monitor strict I's and O's  -Appreciate cardiology vascular recommendations  -Reviewed previous consultant notes  -Reviewed CBC, BMP, Mag, and Phos  -Reviewed tests ordered  -Repeat labs in am  -MDM: High, severe exacerbation of chronic illness posing a threat to life. IV medications requiring close inpatient monitoring.         MEDICATIONS ADMINISTERED IN LAST 1 DAY:    Medications 11/08 11/09 11/10 11/11    amiodarone (Cordarone) 150 mg in dextrose 5% 100 mL IV bolus  Dose: 150 mg  Freq: Once Route: IV  Last Dose: 150 mg (11/08/24 1555)  Start: 11/08/24 1500 End: 11/08/24 1605   Admin Instructions:   BOLUS  use 0.22 micron filter; change filter with every bag    1555 MS-New Bag           Followed by   amiodarone in dextrose 4.14% (Cordarone) 360 mg/200mL infusion premix  Rate: 33.3 mL/hr Dose: 1 mg/min  Freq: Continuous Route: IV  Last Dose: Stopped (11/08/24 1900)  Start: 11/08/24 1515 End: 11/08/24 2226   Admin Instructions:   Start after bolus and run at 1 mg/min for 6 hours  use 0.22 micron filter and change filter with every bag    1627 MS-New Bag     1900 TB-Paused     2226-D/C'd         Followed by   amiodarone in dextrose 4.14% (Cordarone) 360 mg/200mL infusion premix  Rate: 16.7 mL/hr Dose: 0.5 mg/min  Freq: Continuous Route: IV  Last Dose: Stopped (11/10/24 1500)  Start: 11/08/24 2227 End: 11/10/24 1346   Admin Instructions:   Run at 0.5 mg/min  use 0.22 micron filter and change filter with every bag    (2227 TB)-Not Given      1021  MM-Restarted     2115 TB-New Bag      0938 FL-New Bag     1346-D/C'd  1500 FL-Stopped         amiodarone (Pacerone) tab 400 mg  Dose: 400 mg  Freq: 2 times daily with meals Route: OR  Start: 11/10/24 1400      1500 FL-Given      0857 FL-Given     1800        apixaban (Eliquis) tab 2.5 mg  Dose: 2.5 mg  Freq: 2 times daily Route: OR  Start: 11/05/24 1245    0844 MS-Given     2041 TB-Given      0926 MM-Given     2047 TB-Given      0938 FL-Given     2045 MI-Given      0857 FL-Given     2100        aspirin DR tab 81 mg  Dose: 81 mg  Freq: Daily Route: OR  Start: 11/08/24 1300   Admin Instructions:   Do not crush    1325 MS-Given      0926 MM-Given      0938 FL-Given      0856 FL-Given        atorvastatin (Lipitor) tab 40 mg  Dose: 40 mg  Freq: Nightly Route: OR  Start: 11/08/24 2100    2041 TB-Given      2047 TB-Given      2045 MI-Given      2100        ceFEPIme (Maxipime) 1 g in sodium chloride 0.9% 100 mL IVPB-MBP  Dose: 1 g  Freq: Every 12 hours Route: IV  Last Dose: 1 g (11/11/24 0400)  Start: 11/07/24 1315   Order specific questions:            0415 MW-New Bag     1711 MS-New Bag      0512 TB-New Bag     1610 MM-New Bag      0522 TB-New Bag     1616 FL-New Bag      0400 MI-New Bag     1600        finasteride (Proscar) tab 5 mg  Dose: 5 mg  Freq: Daily Route: OR  Start: 11/04/24 1330   Admin Instructions:   CAUTION: REPRODUCTIVE RISK. Do not crush    0844 MS-Given      0926 MM-Given      0938 FL-Given      0855 FL-Given        furosemide (Lasix) tab 40 mg  Dose: 40 mg  Freq: 2 times daily (diuretic) Route: OR  Start: 11/07/24 1700 End: 11/08/24 1227    0844 MS-Given     1227-D/C'd         metoprolol (Lopressor) 5 mg/5mL injection 2.5 mg  Dose: 2.5 mg  Freq: Once Route: IV  Start: 11/08/24 1330 End: 11/08/24 1325    1325 MS-Given           metoprolol succinate ER (Toprol XL) 24 hr tab 25 mg  Dose: 25 mg  Freq: Daily Beta Blocker Route: OR  Start: 11/09/24 0600   Admin Instructions:   Do not crush     0512 TB-Given       0522 TB-Given      0548 MI-Given        metoprolol tartrate (Lopressor) partial tab 12.5 mg  Dose: 12.5 mg  Freq: 2 times daily(Beta Blocker) Route: OR  Start: 11/06/24 1800 End: 11/08/24 2359   Admin Instructions:   Hold for SBP < 95 and/or HR < 60    0415 MW-Given     (1800 MS)-Not Given [C]     2359-D/C'd         pantoprazole (Protonix) DR tab 40 mg  Dose: 40 mg  Freq: Every morning before breakfast Route: OR  Start: 11/04/24 1130   Admin Instructions:   Do not crush    0415 MW-Given      0512 TB-Given      0522 TB-Given      0548 MI-Given         sacubitril-valsartan (Entresto) 24-26 MG per tab 0.5 tablet  Dose: 0.5 tablet  Freq: 2 times daily Route: OR  Start: 11/08/24 2100   Admin Instructions:   Hold for SBP <100    (2100 TB)-Not Given      0926 MM-Given     2047 TB-Given      0939 FL-Given     2045 MI-Given      0900 FL-Hold [C]     2100         sacubitril-valsartan (Entresto) 24-26 MG per tab 0.5 tablet  Dose: 0.5 tablet  Freq: 2 times daily Route: OR  Start: 11/07/24 2100 End: 11/08/24 1456    0844 MS-Given     1456-D/C'd               Continuous Meds Sorted by Name  for César Copeland as of 11/02/24 through 11/11/24    1 Day 3 Days 7 Days 10 Days < Today >   Legend:       Medications 11/07 11/08 11/09 11/10 11/11    amiodarone (Cordarone) 150 mg in dextrose 5% 100 mL IV bolus  Dose: 150 mg  Freq: Once Route: IV  Last Dose: 150 mg (11/08/24 1555)  Start: 11/08/24 1500 End: 11/08/24 1605   Admin Instructions:   BOLUS  use 0.22 micron filter; change filter with every bag     1555 MS-New Bag           Followed by   amiodarone in dextrose 4.14% (Cordarone) 360 mg/200mL infusion premix  Rate: 33.3 mL/hr Dose: 1 mg/min  Freq: Continuous Route: IV  Last Dose: Stopped (11/08/24 1900)  Start: 11/08/24 1515 End: 11/08/24 2226   Admin Instructions:   Start after bolus and run at 1 mg/min for 6 hours  use 0.22 micron filter and change filter with every bag     1627 MS-New Bag     1900 TB-Paused     2226-D/C'd          Followed by   amiodarone in dextrose 4.14% (Cordarone) 360 mg/200mL infusion premix  Rate: 16.7 mL/hr Dose: 0.5 mg/min  Freq: Continuous Route: IV  Last Dose: Stopped (11/10/24 1500)  Start: 11/08/24 2227 End: 11/10/24 1346   Admin Instructions:   Run at 0.5 mg/min  use 0.22 micron filter and change filter with every bag     (2227 TB)-Not Given      1021 MM-Restarted     2115 TB-New Bag                    Vitals (last day)       Date/Time Temp Pulse Resp BP SpO2 Weight O2 Device O2 Flow Rate (L/min) [facility]    11/10/24 2039 97.5 °F (36.4 °C) 65 20 101/60 95 % -- None (Room air) -- MI    11/10/24 1500 98.1 °F (36.7 °C) 72 20 102/74 96 % -- None (Room air) -- FL    11/10/24 0929 97.7 °F (36.5 °C) 71 18 108/60 99 % -- None (Room air) -- FL    11/10/24 0521 97.7 °F (36.5 °C) 66 18 113/70 94 % 120 lb 1.6 oz (54.5 kg) None (Room air) -- TB

## 2024-11-11 NOTE — SPIRITUAL CARE NOTE
Spiritual Care Visit Note    Patient Name: César Copeland Date of Spiritual Care Visit: 24   : 1930 Primary Dx: Atrial fibrillation with rapid ventricular response (HCC)       Referred By: Referral From: Care Coordination;Nurse    Spiritual Care Taxonomy:    Intended Effects: Build relationship of care and support    Methods: Collaborate with care team member;Encourage self care;Encourage self reflection;Offer spiritual/Temple support;Offer emotional support;Offer support    Interventions: Acknowledge current situation;Acknowledge response to difficult experience;Active listening;Ask guided questions;Ask guided questions about pippa;Explain  role;Provide hospitality    Visit Type/Summary:     - PoA: New PoAH Created: Visited patient in response to PoA for Healthcare consult/request. Created a new PoAH for Healthcare document that, per the patient, accurately reflects their wishes. Gave the patient the original PoAH document along with copies. Confirmed that the PoAH primary agent name and contact information have been entered into the Epic, Advance Directives section. A copy of the new PoAH document has been placed on the patient's paper chart. Family members at bedside. Daughter stated that they probably have a POA if they find it will bring it. Patient agreed to create a new document.   remains available for follow up.    Spiritual Care support can be requested via an Epic consult. For urgent/immediate needs, please contact the On Call  at: [place]: ext [phone]    Hiram PELAEZ  Chaplain Resident  [phone]

## 2024-11-11 NOTE — CM/SW NOTE
11/11/24 1300   Discharge disposition   Expected discharge disposition Home-Health   Post Acute Care Provider [facility]   Discharge transportation Private car       Per CAMILLA Drake - plan for DC today.    Notified liaison Karen and liaison Vane w/ [facility] .    Pt is cleared from SW/CM stand point. CAMILLA Drake and DC CAMILLA Nunez updated.    PLAN: Home w/ [facility]          CINDI Michael, LSW [phone]

## 2024-11-12 NOTE — DISCHARGE SUMMARY
[facility]  part of [facility]     Discharge Summary    César Copeland Patient Status:  Inpatient    1930 MRN [id number]   Location [facility] 3W/SW Attending No att. 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.019]  Acute congestive heart failure, unspecified heart failure type (HCC) [I50.9]    Discharge Diagnosis:   Patient Active Problem List   Diagnosis    Anemia    Esophageal reflux    Sciatica    Thalassemia    IHD (ischemic heart disease)    Fatigue due to treatment    Hypercholesteremia    Pain in leg, unspecified    Osteoarthritis of right hip    Ureterolithiasis    Cystitis    Hydronephrosis, left    Prostate nodule    Elevated PSA    BPH with urinary obstruction    Renal colic on left side    Ureteral stone    Urethral stricture    Dizziness    Pain in right shoulder    Cervical radiculopathy    Renal calculus, right    Encounter for Medicare annual wellness exam    Protein-calorie malnutrition, unspecified severity (HCC)    Essential hypertension    Acute cystitis without hematuria    Atrial fibrillation with rapid ventricular response (HCC)    Acute congestive heart failure, unspecified heart failure type (HCC)    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)    Goals of care, counseling/discussion    Advance care planning    Palliative care by specialist       Reason for Admission:     Afib with RVR    Physical Exam:     General: Patient is alert and oriented x3 does not appear to be in acute distress at this time  HEENT: EOMI PERRLA, atraumatic normocephalic  Cardiac: S1-S2 appreciated  Lungs: Good air entry bilaterally clear to auscultation  Abdomen: Soft nontender nondistended positive bowel sounds  Ext: Peripheral pulses are  positive  Neuro: No focal deficits noted  Psych: Normal mood  Skin: No rashes noted  MSK: Full range of motion intact      Hospital Course:     Acute Chest Pain  New onset afib  CAD- history of MV disease that was found years ago  - patient declined CABG at that time  - CT chest shows dissection at the distal arch of the left subclavian to upper abdomianl aorta above the R renal artery  - continue on esmolol   - CV surgery consulted   - Echo currently pending  - At this time- no surgical intervention is planned  - Palliative care consulted      Type B Aortic Dissection  -Descending thoracic aortic intramural hematoma  -Prescient vascular surgery recommendations  -Recommend tighter control of BP with SBP less than 140  -Repeat CT angiogram within 24 to 48 hours     HTN  Dyslipidemia     DVT PPX heparin drip     DNAR/Select     Global A/P  -Rates controlled.   -transferred out of ICU    History of Present Illness:     Per admitting:   César Copeland is a 89 year old male with a past medical history of HTN, CAD (denied intervention in the past), HLD presented to the ER with complaints of chest pain and shortness of breath that began last night. He decided to try to sleep it off but when itcontinued this morning, the patient decided to come in for further care.   He denied any recent illness or travel and stated he has been compliant with all his home meds.     Disposition: Home Health Care Services    Discharge Condition: Stable    Discharge Medications:   Discharge Medication List as of 11/11/2024  1:56 PM        START taking these medications    Details   apixaban 2.5 MG Oral Tab Take 1 tablet (2.5 mg total) by mouth 2 (two) times daily., Normal, Disp-60 tablet, R-0      sacubitril-valsartan 24-26 MG Oral Tab Take 0.5 tablets by mouth 2 (two) times daily., Normal, Disp-30 tablet, R-0           CONTINUE these medications which have CHANGED    Details   atorvastatin 40 MG Oral Tab Take 1 tablet (40 mg total) by mouth  nightly., Normal, Disp-30 tablet, R-2      Amiodarone HCl 200 MG Oral Tab Take 2 tablets (400 mg total) by mouth 2 (two) times daily with meals for 7 days, THEN 1 tablet (200 mg total) daily., Normal, Disp-118 tablet, R-0           CONTINUE these medications which have NOT CHANGED    Details   metoprolol succinate ER 25 MG Oral Tablet 24 Hr Take 1 tablet (25 mg total) by mouth daily., Normal, Disp-90 tablet, R-3      dutasteride 0.5 MG Oral Cap Take 1 capsule (0.5 mg total) by mouth daily., Normal, Disp-90 capsule, R-3      tamsulosin 0.4 MG Oral Cap Take 1 capsule (0.4 mg total) by mouth daily. Take 1/2 hour following the same meal each day, Normal, Disp-90 capsule, R-3      PANTOPRAZOLE 40 MG Oral Tab EC TAKE 1 TABLET DAILY, Normal, Disp-90 tablet, R-3      aspirin 81 MG Oral Chew Tab Chew 1 tablet (81 mg total) by mouth daily., Historical      Tadalafil 10 MG Oral Tab Take 1 tablet (10 mg total) by mouth daily as needed for Erectile Dysfunction., Normal, Disp-30 tablet, R-6           STOP taking these medications       nitrofurantoin monohydrate macro 100 MG Oral Cap              Total dc time > 30 min    Aidee Shaver MD      Hospital Discharge Diagnoses:  Afib with RVR    Lace+ Score: 78  59-90 High Risk  29-58 Medium Risk  0-28   Low Risk.    TCM Follow-Up Recommendation:  LACE > 58: High Risk of readmission after discharge from the hospital.

## 2024-11-13 ENCOUNTER — TELEPHONE (OUTPATIENT)
Dept: INTERNAL MEDICINE CLINIC | Facility: CLINIC | Age: 89
End: 2024-11-13

## 2024-11-13 NOTE — TELEPHONE ENCOUNTER
CAMILLA Franks from [facility] [phone], called to advise Dr. Dr. D'Amico that patient has started Home Health today.  No call back required

## 2024-11-13 NOTE — PAYOR COMM NOTE
--------------  DISCHARGE REVIEW    Payor: BC[employer] MEDICARE ADV PPO  Subscriber #:  [id number]  Authorization Number: [id number]    Admit date: 11/3/24  Admit time:  11:09 PM  Discharge Date: 2024  2:56 PM     Admitting Physician: Aidee Shaver MD  Attending Physician:  No att. providers found  Primary Care Physician: D'Amico, Jeff Anthony, DO          Discharge Summary Notes        Discharge Summary signed by Aidee Shaver MD at 2024 12:48 PM       Author: Aidee Shaver MD Specialty: HOSPITALIST, Internal Medicine Author Type: Physician    Filed: 2024 12:48 PM Date of Service: 2024 12:46 PM Status: Addendum    : Aidee Shaver MD (Physician)    Related Notes: Original Note by Aidee Shaver MD (Physician) filed at 2024 12:47 PM         [facility]  part of [facility]     Discharge Summary    César Copeland Patient Status:  Inpatient    1930 MRN [id number]   Location [facility] 3W/ Attending No att. 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.019]  Acute congestive heart failure, unspecified heart failure type (HCC) [I50.9]    Discharge Diagnosis:   Patient Active Problem List   Diagnosis    Anemia    Esophageal reflux    Sciatica    Thalassemia    IHD (ischemic heart disease)    Fatigue due to treatment    Hypercholesteremia    Pain in leg, unspecified    Osteoarthritis of right hip    Ureterolithiasis    Cystitis    Hydronephrosis, left    Prostate nodule    Elevated PSA    BPH with urinary obstruction    Renal colic on left side    Ureteral stone    Urethral stricture    Dizziness    Pain in right shoulder    Cervical radiculopathy    Renal calculus, right    Encounter for Medicare  annual wellness exam    Protein-calorie malnutrition, unspecified severity (HCC)    Essential hypertension    Acute cystitis without hematuria    Atrial fibrillation with rapid ventricular response (HCC)    Acute congestive heart failure, unspecified heart failure type (HCC)    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)    Goals of care, counseling/discussion    Advance care planning    Palliative care by specialist       Reason for Admission:     Afib with RVR    Physical Exam:     General: Patient is alert and oriented x3 does not appear to be in acute distress at this time  HEENT: EOMI PERRLA, atraumatic normocephalic  Cardiac: S1-S2 appreciated  Lungs: Good air entry bilaterally clear to auscultation  Abdomen: Soft nontender nondistended positive bowel sounds  Ext: Peripheral pulses are positive  Neuro: No focal deficits noted  Psych: Normal mood  Skin: No rashes noted  MSK: Full range of motion intact      Hospital Course:     Acute Chest Pain  New onset afib  CAD- history of MV disease that was found years ago  - patient declined CABG at that time  - CT chest shows dissection at the distal arch of the left subclavian to upper abdomianl aorta above the R renal artery  - continue on esmolol   - CV surgery consulted   - Echo currently pending  - At this time- no surgical intervention is planned  - Palliative care consulted      Type B Aortic Dissection  -Descending thoracic aortic intramural hematoma  -Prescient vascular surgery recommendations  -Recommend tighter control of BP with SBP less than 140  -Repeat CT angiogram within 24 to 48 hours     HTN  Dyslipidemia     DVT PPX heparin drip     DNAR/Select     Global A/P  -Rates controlled.   -transferred out of ICU    History of Present Illness:     Per admitting:   César Copeland is a 89 year old male with a past medical history of HTN, CAD (denied intervention in the past), HLD presented to the ER with complaints of chest pain and shortness of breath  that began last night. He decided to try to sleep it off but when itcontinued this morning, the patient decided to come in for further care.   He denied any recent illness or travel and stated he has been compliant with all his home meds.     Disposition: Home Health Care Services    Discharge Condition: Stable    Discharge Medications:   Discharge Medication List as of 11/11/2024  1:56 PM        START taking these medications    Details   apixaban 2.5 MG Oral Tab Take 1 tablet (2.5 mg total) by mouth 2 (two) times daily., Normal, Disp-60 tablet, R-0      sacubitril-valsartan 24-26 MG Oral Tab Take 0.5 tablets by mouth 2 (two) times daily., Normal, Disp-30 tablet, R-0           CONTINUE these medications which have CHANGED    Details   atorvastatin 40 MG Oral Tab Take 1 tablet (40 mg total) by mouth nightly., Normal, Disp-30 tablet, R-2      Amiodarone HCl 200 MG Oral Tab Take 2 tablets (400 mg total) by mouth 2 (two) times daily with meals for 7 days, THEN 1 tablet (200 mg total) daily., Normal, Disp-118 tablet, R-0           CONTINUE these medications which have NOT CHANGED    Details   metoprolol succinate ER 25 MG Oral Tablet 24 Hr Take 1 tablet (25 mg total) by mouth daily., Normal, Disp-90 tablet, R-3      dutasteride 0.5 MG Oral Cap Take 1 capsule (0.5 mg total) by mouth daily., Normal, Disp-90 capsule, R-3      tamsulosin 0.4 MG Oral Cap Take 1 capsule (0.4 mg total) by mouth daily. Take 1/2 hour following the same meal each day, Normal, Disp-90 capsule, R-3      PANTOPRAZOLE 40 MG Oral Tab EC TAKE 1 TABLET DAILY, Normal, Disp-90 tablet, R-3      aspirin 81 MG Oral Chew Tab Chew 1 tablet (81 mg total) by mouth daily., Historical      Tadalafil 10 MG Oral Tab Take 1 tablet (10 mg total) by mouth daily as needed for Erectile Dysfunction., Normal, Disp-30 tablet, R-6           STOP taking these medications       nitrofurantoin monohydrate macro 100 MG Oral Cap              Total dc time > 30 min    Aidee  MD Chhaya      Hospital Discharge Diagnoses:  Afib with RVR    Lace+ Score: 78  59-90 High Risk  29-58 Medium Risk  0-28   Low Risk.    TCM Follow-Up Recommendation:  LACE > 58: High Risk of readmission after discharge from the hospital.       Electronically signed by Aidee Shaver MD on 11/12/2024 12:48 PM         REVIEWER COMMENTS

## 2024-11-14 ENCOUNTER — PATIENT OUTREACH (OUTPATIENT)
Dept: CASE MANAGEMENT | Age: 89
End: 2024-11-14

## 2024-11-14 ENCOUNTER — TELEPHONE (OUTPATIENT)
Dept: INTERNAL MEDICINE CLINIC | Facility: CLINIC | Age: 89
End: 2024-11-14

## 2024-11-14 NOTE — TELEPHONE ENCOUNTER
Garry Occupational Therapist / [facility]  called with [facility] for Dr DAmico     Completed occupational therapy evaluation   Patient does not require any further occupational therapy as he is independent with his self care

## 2024-11-14 NOTE — PROGRESS NOTES
TCM reached out for scheduling assistance.    Jeff A. D'Amico, DO  Internal Medicine  [address] [address]  [phone]  Friday 12/6 @12  Existing appointment.    Closing encounter.

## 2024-11-19 ENCOUNTER — TELEPHONE (OUTPATIENT)
Dept: INTERNAL MEDICINE CLINIC | Facility: CLINIC | Age: 89
End: 2024-11-19

## 2024-11-19 DIAGNOSIS — R35.0 URINE FREQUENCY: Primary | ICD-10-CM

## 2024-11-19 DIAGNOSIS — N30.00 ACUTE CYSTITIS WITHOUT HEMATURIA: ICD-10-CM

## 2024-11-19 NOTE — TELEPHONE ENCOUNTER
Ok to recheck, UA and culture, nursing you can notify the family.  We will notify them with results once completed

## 2024-11-19 NOTE — TELEPHONE ENCOUNTER
Please call daughter Giana [phone]     Patient had a cardiologist appointment today with Dr Nuñez   Recommended patient calls PCP to have follow up urine test order placed

## 2024-11-19 NOTE — TELEPHONE ENCOUNTER
To  - called daughter per HIPAA . Patient saw cardiologist who recommended repeat UA and culture- patient just had recently a urinary tract infection ,also hospitalized recently. He is a little \" foggy \" and sometimes confused - UA and culture pending

## 2024-11-22 ENCOUNTER — LAB ENCOUNTER (OUTPATIENT)
Dept: LAB | Facility: HOSPITAL | Age: 89
End: 2024-11-22
Attending: INTERNAL MEDICINE
Payer: MEDICARE

## 2024-11-22 DIAGNOSIS — N30.00 ACUTE CYSTITIS WITHOUT HEMATURIA: ICD-10-CM

## 2024-11-22 LAB
BILIRUB UR QL: NEGATIVE
CLARITY UR: CLEAR
COLOR UR: YELLOW
GLUCOSE UR-MCNC: NORMAL MG/DL
HGB UR QL STRIP.AUTO: NEGATIVE
KETONES UR-MCNC: NEGATIVE MG/DL
LEUKOCYTE ESTERASE UR QL STRIP.AUTO: NEGATIVE
NITRITE UR QL STRIP.AUTO: NEGATIVE
PH UR: 5 [PH] (ref 5–8)
PROT UR-MCNC: NEGATIVE MG/DL
SP GR UR STRIP: 1.01 (ref 1–1.03)
UROBILINOGEN UR STRIP-ACNC: 4

## 2024-11-22 PROCEDURE — 87086 URINE CULTURE/COLONY COUNT: CPT

## 2024-11-22 PROCEDURE — 81003 URINALYSIS AUTO W/O SCOPE: CPT

## 2024-11-26 ENCOUNTER — LAB ENCOUNTER (OUTPATIENT)
Dept: LAB | Facility: HOSPITAL | Age: 89
End: 2024-11-26
Attending: INTERNAL MEDICINE
Payer: MEDICARE

## 2024-11-26 DIAGNOSIS — I25.10 CAD (CORONARY ARTERY DISEASE): Primary | ICD-10-CM

## 2024-11-26 DIAGNOSIS — I48.91 ATRIAL FIBRILLATION WITH RAPID VENTRICULAR RESPONSE (HCC): ICD-10-CM

## 2024-11-26 DIAGNOSIS — I71.019 DISSECTING ANEURYSM OF THORACIC AORTA (HCC): ICD-10-CM

## 2024-11-26 DIAGNOSIS — D72.829 LEUKOCYTOSIS, UNSPECIFIED TYPE: ICD-10-CM

## 2024-11-26 LAB
ANION GAP SERPL CALC-SCNC: 10 MMOL/L (ref 0–18)
BUN BLD-MCNC: 28 MG/DL (ref 9–23)
BUN/CREAT SERPL: 24.8 (ref 10–20)
CALCIUM BLD-MCNC: 9.1 MG/DL (ref 8.7–10.4)
CHLORIDE SERPL-SCNC: 106 MMOL/L (ref 98–112)
CO2 SERPL-SCNC: 25 MMOL/L (ref 21–32)
CREAT BLD-MCNC: 1.13 MG/DL
EGFRCR SERPLBLD CKD-EPI 2021: 60 ML/MIN/1.73M2 (ref 60–?)
FASTING STATUS PATIENT QL REPORTED: NO
GLUCOSE BLD-MCNC: 95 MG/DL (ref 70–99)
OSMOLALITY SERPL CALC.SUM OF ELEC: 297 MOSM/KG (ref 275–295)
POTASSIUM SERPL-SCNC: 3.9 MMOL/L (ref 3.5–5.1)
SODIUM SERPL-SCNC: 141 MMOL/L (ref 136–145)

## 2024-11-26 PROCEDURE — 80048 BASIC METABOLIC PNL TOTAL CA: CPT

## 2024-11-26 PROCEDURE — 36415 COLL VENOUS BLD VENIPUNCTURE: CPT

## 2024-12-06 ENCOUNTER — OFFICE VISIT (OUTPATIENT)
Dept: INTERNAL MEDICINE CLINIC | Facility: CLINIC | Age: 89
End: 2024-12-06

## 2024-12-06 VITALS
DIASTOLIC BLOOD PRESSURE: 64 MMHG | TEMPERATURE: 98 F | SYSTOLIC BLOOD PRESSURE: 102 MMHG | WEIGHT: 122.38 LBS | BODY MASS INDEX: 20.89 KG/M2 | HEART RATE: 67 BPM | OXYGEN SATURATION: 98 % | HEIGHT: 64 IN

## 2024-12-06 DIAGNOSIS — I48.91 ATRIAL FIBRILLATION WITH RAPID VENTRICULAR RESPONSE (HCC): ICD-10-CM

## 2024-12-06 DIAGNOSIS — I50.23 ACUTE ON CHRONIC SYSTOLIC HEART FAILURE (HCC): ICD-10-CM

## 2024-12-06 DIAGNOSIS — R68.2 DRY MOUTH: ICD-10-CM

## 2024-12-06 DIAGNOSIS — I25.9 IHD (ISCHEMIC HEART DISEASE): ICD-10-CM

## 2024-12-06 DIAGNOSIS — N40.1 BPH WITH URINARY OBSTRUCTION: ICD-10-CM

## 2024-12-06 DIAGNOSIS — I71.019 AORTIC DISSECTION DISTAL TO LEFT SUBCLAVIAN (HCC): Primary | ICD-10-CM

## 2024-12-06 DIAGNOSIS — E46 PROTEIN-CALORIE MALNUTRITION, UNSPECIFIED SEVERITY (HCC): ICD-10-CM

## 2024-12-06 DIAGNOSIS — N13.8 BPH WITH URINARY OBSTRUCTION: ICD-10-CM

## 2024-12-06 DIAGNOSIS — I10 ESSENTIAL HYPERTENSION: ICD-10-CM

## 2024-12-06 PROBLEM — Z51.5 PALLIATIVE CARE BY SPECIALIST: Status: RESOLVED | Noted: 2024-11-04 | Resolved: 2024-12-06

## 2024-12-06 PROBLEM — N20.1 URETEROLITHIASIS: Status: RESOLVED | Noted: 2019-08-27 | Resolved: 2024-12-06

## 2024-12-06 PROBLEM — Z71.89 ADVANCE CARE PLANNING: Status: RESOLVED | Noted: 2024-11-04 | Resolved: 2024-12-06

## 2024-12-06 PROBLEM — M25.511 PAIN IN RIGHT SHOULDER: Status: RESOLVED | Noted: 2020-09-08 | Resolved: 2024-12-06

## 2024-12-06 PROBLEM — N30.90 CYSTITIS: Status: RESOLVED | Noted: 2019-08-27 | Resolved: 2024-12-06

## 2024-12-06 PROBLEM — N30.00 ACUTE CYSTITIS WITHOUT HEMATURIA: Status: RESOLVED | Noted: 2024-07-10 | Resolved: 2024-12-06

## 2024-12-06 PROBLEM — Z00.00 ENCOUNTER FOR MEDICARE ANNUAL WELLNESS EXAM: Status: RESOLVED | Noted: 2022-10-05 | Resolved: 2024-12-06

## 2024-12-06 PROBLEM — M54.12 CERVICAL RADICULOPATHY: Status: RESOLVED | Noted: 2020-11-30 | Resolved: 2024-12-06

## 2024-12-06 PROBLEM — Z71.89 GOALS OF CARE, COUNSELING/DISCUSSION: Status: RESOLVED | Noted: 2024-11-04 | Resolved: 2024-12-06

## 2024-12-06 PROBLEM — R42 DIZZINESS: Status: RESOLVED | Noted: 2020-06-08 | Resolved: 2024-12-06

## 2024-12-06 PROCEDURE — 3008F BODY MASS INDEX DOCD: CPT | Performed by: INTERNAL MEDICINE

## 2024-12-06 PROCEDURE — 99214 OFFICE O/P EST MOD 30 MIN: CPT | Performed by: INTERNAL MEDICINE

## 2024-12-06 PROCEDURE — 1111F DSCHRG MED/CURRENT MED MERGE: CPT | Performed by: INTERNAL MEDICINE

## 2024-12-06 PROCEDURE — 3078F DIAST BP <80 MM HG: CPT | Performed by: INTERNAL MEDICINE

## 2024-12-06 PROCEDURE — 1125F AMNT PAIN NOTED PAIN PRSNT: CPT | Performed by: INTERNAL MEDICINE

## 2024-12-06 PROCEDURE — 3074F SYST BP LT 130 MM HG: CPT | Performed by: INTERNAL MEDICINE

## 2024-12-06 PROCEDURE — 1159F MED LIST DOCD IN RCRD: CPT | Performed by: INTERNAL MEDICINE

## 2024-12-06 PROCEDURE — 1170F FXNL STATUS ASSESSED: CPT | Performed by: INTERNAL MEDICINE

## 2024-12-06 PROCEDURE — 1160F RVW MEDS BY RX/DR IN RCRD: CPT | Performed by: INTERNAL MEDICINE

## 2024-12-06 RX ORDER — AMIODARONE HYDROCHLORIDE 200 MG/1
200 TABLET ORAL DAILY
COMMUNITY
Start: 2024-12-06

## 2024-12-06 NOTE — PATIENT INSTRUCTIONS
1. 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 Atrial fibrillation with rapid ventricular response (HCC)  Stable continue rate control agents, anticoagulation, lets have a low threshold for discontinuing these if you are seeing some problems, I like the idea of some lab work in the next month  - amiodarone 200 MG Oral Tab; Take 1 tablet (200 mg total) by mouth daily.  - CBC With Differential With Platelet  - Comp Metabolic Panel (14)    3. Essential hypertension  Stable continue current monitoring management    4. Protein-calorie malnutrition, unspecified severity (HCC)  Stable continue current monitor management    5. BPH with urinary obstruction  Lets continue the medications we have used for the prostate for now, following dose doctors as well    6. Acute on chronic systolic heart failure (HCC)  CHF protocol management, and the weighing device at home, following up with cardiology    7. IHD (ischemic heart disease)  Stable continue current monitor management, high-dose atorvastatin.    8. Dry mouth  This may be a side effect of the medication, for now lets use Gly-Oxide, rinses, watch the caustic mouthwashes, continue lubricants, let me know if we need to go further with this.  - TSH W Reflex To Free T4

## 2024-12-06 NOTE — PROGRESS NOTES
HPI:   César Copeland is a 89 year old male who presents for complains of:   Chief Complaint   Patient presents with    Atrial Fibrillation     Stable.      Hospital follow-up: Patient was not feeling well from a fatigue perspective a few weeks ago, he had some shortness of breath, fatigue, was not feeling well, tried to sleep through the night, in the morning did not feel much better, and did pursue further care after he was urged to do so by his family.  He was evaluated in the emergency room, admitted in stable condition, underwent new diagnosis of atrial fibrillation with RVR, and was also discovered to have an aortic dissection, which he was seen by cardiothoracic surgery, and deemed stable, he was reanticoagulated, placed on rate control agents, now has followed up with cardiology has been discharged home, and remained stable, he is with his daughter today, she confirms the story, he continues to feel well.  He is of advanced age, is to decided to be a DNR with selective treatment this was confirmed, he is a former patient of Dr. Dubois.  Patient claims to be feeling well, we did discuss the serious nature of his current conditions, and he has elected against any further procedures or surgeries at this time, cardiology team and his in-house hospital specialist have respected their wishes.  He does have complaints of dry mouth on the new medication regimen but has been compliant with taking the current medications.    EXAM:   /64   Pulse 67   Temp 97.5 °F (36.4 °C)   Ht 5' 4\" (1.626 m)   Wt 122 lb 6.4 oz (55.5 kg)   SpO2 98%   BMI 21.01 kg/m²   Body mass index is 21.01 kg/m².   Gen. exam: Alert and oriented, in no acute distress   HEENT: Pupils equal and reactive to light and accommodation, moist mucous membranes  Neck exam:  Supple.  Normal thyroid trachea midline, no JVD  Heart exam: Regular rate and irregular rhythm no murmurs no S3 no S4   Lung exam: No rales no rhonchi no wheezes  Abdominal  exam: Soft, nontender, nondistended, positive bowel sounds are normoactive  Extremities exam: no clubbing, no cyanosis, no edema  Skin exam: No obvious wounds, no rashes  Neurological exam: Cranial nerves II through XII intact, no gross deficits  Musculoskeletal exam: Advanced bilateral generalized hand arthritis appreciated, no obvious deformity    ASSESSMENT AND PLAN:   César Copeland is a 89 year old male who presents with the followin. 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 Atrial fibrillation with rapid ventricular response (HCC)  Stable continue rate control agents, anticoagulation, lets have a low threshold for discontinuing these if you are seeing some problems, I like the idea of some lab work in the next month  - amiodarone 200 MG Oral Tab; Take 1 tablet (200 mg total) by mouth daily.  - CBC With Differential With Platelet  - Comp Metabolic Panel (14)    3. Essential hypertension  Stable continue current monitoring management    4. Protein-calorie malnutrition, unspecified severity (HCC)  Stable continue current monitor management    5. BPH with urinary obstruction  Lets continue the medications we have used for the prostate for now, following dose doctors as well    6. Acute on chronic systolic heart failure (HCC)  CHF protocol management, and the weighing device at home, following up with cardiology    7. IHD (ischemic heart disease)  Stable continue current monitor management, high-dose atorvastatin.    8. Dry mouth  This may be a side effect of the medication, for now lets use Gly-Oxide, rinses, watch the caustic mouthwashes, continue lubricants, let me know if we need to go further with this.  - TSH W Reflex To Free T4      Spent 30 minutes obtaining history, evaluating patient, discussing treatment options, diet, exercise, review of available labs and radiology  reports, and completing documentation.    Jeff Anthony D'Amico,   12/6/2024  1:33 PM

## 2024-12-16 ENCOUNTER — TELEPHONE (OUTPATIENT)
Dept: INTERNAL MEDICINE CLINIC | Facility: CLINIC | Age: 89
End: 2024-12-16

## 2024-12-16 NOTE — TELEPHONE ENCOUNTER
Pt. Called stating he needs to have a cortisone injection in his legs.  He states he needs to know which medications he needs to stop before the injection?  He states he is on a couple of different blood thinners.

## 2024-12-17 NOTE — TELEPHONE ENCOUNTER
Spoke with patient's daughter, Staci. Relayed MD message. Staci verbalized understanding.     FYI to Dr. CORLEY:  Staci reports pt had low BP episode over the week (70/40). Pt denied any symptoms at the time (did not experience any dizziness/lightheadedness, weakness, vision changes, chest pain, racing heart sensation, palpitations, or fainting). Staci reports he did get lost on his way to his daughters house, so he had a short episode of confusion, but it resolved (he was alert and orientated when he arrived).    Today BP was 110/76. Denies any new or worsening symptoms since recent office visit on 12/6. Pt alert and oriented.     Advised that pt continue to monitor BPs at home and to call with any low BPs or if he develops any symptoms. Advised ER if symptoms severe. Staci verbalized understanding.

## 2024-12-17 NOTE — TELEPHONE ENCOUNTER
For a knee injection, here is what I would recommend, nursing if you could call and let them know, you may have to speak with his family members:     I would stop the aspirin for 7 days prior to the injection, then resume after the injection is completed    For the apixaban, let him know I would hold this for 48 hours prior to procedure, and then resume the the night of the procedure.

## 2024-12-26 ENCOUNTER — TELEPHONE (OUTPATIENT)
Dept: INTERNAL MEDICINE CLINIC | Facility: CLINIC | Age: 89
End: 2024-12-26

## 2024-12-26 NOTE — TELEPHONE ENCOUNTER
Nat SAMPSON from [facility] called, patient is doing well and will probably be discharged next week

## 2024-12-27 ENCOUNTER — LAB ENCOUNTER (OUTPATIENT)
Dept: LAB | Age: 89
End: 2024-12-27
Attending: INTERNAL MEDICINE
Payer: MEDICARE

## 2024-12-27 LAB
ALBUMIN SERPL-MCNC: 3.7 G/DL (ref 3.2–4.8)
ALBUMIN/GLOB SERPL: 1.9 {RATIO} (ref 1–2)
ALP LIVER SERPL-CCNC: 85 U/L
ALT SERPL-CCNC: 78 U/L
ANION GAP SERPL CALC-SCNC: 8 MMOL/L (ref 0–18)
AST SERPL-CCNC: 38 U/L (ref ?–34)
BASOPHILS # BLD AUTO: 0.11 X10(3) UL (ref 0–0.2)
BASOPHILS NFR BLD AUTO: 1.2 %
BILIRUB SERPL-MCNC: 1.1 MG/DL (ref 0.2–0.9)
BUN BLD-MCNC: 19 MG/DL (ref 9–23)
BUN/CREAT SERPL: 17.6 (ref 10–20)
CALCIUM BLD-MCNC: 8.7 MG/DL (ref 8.7–10.4)
CHLORIDE SERPL-SCNC: 109 MMOL/L (ref 98–112)
CO2 SERPL-SCNC: 27 MMOL/L (ref 21–32)
CREAT BLD-MCNC: 1.08 MG/DL
DEPRECATED RDW RBC AUTO: 47.4 FL (ref 35.1–46.3)
EGFRCR SERPLBLD CKD-EPI 2021: 64 ML/MIN/1.73M2 (ref 60–?)
EOSINOPHIL # BLD AUTO: 0.25 X10(3) UL (ref 0–0.7)
EOSINOPHIL NFR BLD AUTO: 2.8 %
ERYTHROCYTE [DISTWIDTH] IN BLOOD BY AUTOMATED COUNT: 19.3 % (ref 11–15)
FASTING STATUS PATIENT QL REPORTED: YES
GLOBULIN PLAS-MCNC: 2 G/DL (ref 2–3.5)
GLUCOSE BLD-MCNC: 93 MG/DL (ref 70–99)
HCT VFR BLD AUTO: 29.9 %
HGB BLD-MCNC: 9 G/DL
IMM GRANULOCYTES # BLD AUTO: 0.08 X10(3) UL (ref 0–1)
IMM GRANULOCYTES NFR BLD: 0.9 %
LYMPHOCYTES # BLD AUTO: 1.14 X10(3) UL (ref 1–4)
LYMPHOCYTES NFR BLD AUTO: 12.6 %
MCH RBC QN AUTO: 21.4 PG (ref 26–34)
MCHC RBC AUTO-ENTMCNC: 30.1 G/DL (ref 31–37)
MCV RBC AUTO: 71 FL
MONOCYTES # BLD AUTO: 0.83 X10(3) UL (ref 0.1–1)
MONOCYTES NFR BLD AUTO: 9.2 %
NEUTROPHILS # BLD AUTO: 6.62 X10 (3) UL (ref 1.5–7.7)
NEUTROPHILS # BLD AUTO: 6.62 X10(3) UL (ref 1.5–7.7)
NEUTROPHILS NFR BLD AUTO: 73.3 %
OSMOLALITY SERPL CALC.SUM OF ELEC: 300 MOSM/KG (ref 275–295)
PLATELET # BLD AUTO: 248 10(3)UL (ref 150–450)
POTASSIUM SERPL-SCNC: 3.2 MMOL/L (ref 3.5–5.1)
PROT SERPL-MCNC: 5.7 G/DL (ref 5.7–8.2)
RBC # BLD AUTO: 4.21 X10(6)UL
SODIUM SERPL-SCNC: 144 MMOL/L (ref 136–145)
T4 FREE SERPL-MCNC: 1.2 NG/DL (ref 0.8–1.7)
TSI SER-ACNC: 20.39 UIU/ML (ref 0.55–4.78)
WBC # BLD AUTO: 9 X10(3) UL (ref 4–11)

## 2024-12-27 PROCEDURE — 36415 COLL VENOUS BLD VENIPUNCTURE: CPT | Performed by: INTERNAL MEDICINE

## 2024-12-27 PROCEDURE — 85025 COMPLETE CBC W/AUTO DIFF WBC: CPT | Performed by: INTERNAL MEDICINE

## 2024-12-27 PROCEDURE — 80053 COMPREHEN METABOLIC PANEL: CPT | Performed by: INTERNAL MEDICINE

## 2024-12-27 PROCEDURE — 84443 ASSAY THYROID STIM HORMONE: CPT | Performed by: INTERNAL MEDICINE

## 2024-12-27 PROCEDURE — 84439 ASSAY OF FREE THYROXINE: CPT | Performed by: INTERNAL MEDICINE

## 2024-12-31 ENCOUNTER — TELEPHONE (OUTPATIENT)
Dept: INTERNAL MEDICINE CLINIC | Facility: CLINIC | Age: 89
End: 2024-12-31

## 2024-12-31 NOTE — TELEPHONE ENCOUNTER
Nat / Kaitlin called with [facility]   for Dr DAmico     Discharging from Home Health because patient will be going to [place] next week for 2 months     Patient's blood pressure has been running low  Patient has an appointment with cardiologist today    Per daughter's request Nat will be collecting a urinalysis to be sure patient does not have a UTI before he travels

## 2025-01-02 DIAGNOSIS — Z00.00 PHYSICAL EXAM: Primary | ICD-10-CM

## 2025-01-03 ENCOUNTER — LAB ENCOUNTER (OUTPATIENT)
Dept: LAB | Age: OVER 89
End: 2025-01-03
Attending: INTERNAL MEDICINE
Payer: MEDICARE

## 2025-01-03 ENCOUNTER — TELEPHONE (OUTPATIENT)
Dept: INTERNAL MEDICINE CLINIC | Facility: CLINIC | Age: OVER 89
End: 2025-01-03

## 2025-01-03 ENCOUNTER — LAB REQUISITION (OUTPATIENT)
Dept: LAB | Facility: HOSPITAL | Age: OVER 89
End: 2025-01-03
Payer: MEDICARE

## 2025-01-03 DIAGNOSIS — N39.0 URINARY TRACT INFECTION, SITE NOT SPECIFIED: ICD-10-CM

## 2025-01-03 DIAGNOSIS — Z00.00 PHYSICAL EXAM: Primary | ICD-10-CM

## 2025-01-03 LAB
ALBUMIN SERPL-MCNC: 4 G/DL (ref 3.2–4.8)
ALBUMIN/GLOB SERPL: 1.8 {RATIO} (ref 1–2)
ALP LIVER SERPL-CCNC: 91 U/L
ALT SERPL-CCNC: 68 U/L
ANION GAP SERPL CALC-SCNC: 10 MMOL/L (ref 0–18)
AST SERPL-CCNC: 42 U/L (ref ?–34)
BASOPHILS # BLD AUTO: 0.08 X10(3) UL (ref 0–0.2)
BASOPHILS NFR BLD AUTO: 0.9 %
BILIRUB SERPL-MCNC: 1 MG/DL (ref 0.2–0.9)
BILIRUB UR QL: NEGATIVE
BUN BLD-MCNC: 18 MG/DL (ref 9–23)
BUN/CREAT SERPL: 16.7 (ref 10–20)
CALCIUM BLD-MCNC: 9.1 MG/DL (ref 8.7–10.4)
CHLORIDE SERPL-SCNC: 106 MMOL/L (ref 98–112)
CO2 SERPL-SCNC: 25 MMOL/L (ref 21–32)
COLOR UR: YELLOW
CREAT BLD-MCNC: 1.08 MG/DL
DEPRECATED RDW RBC AUTO: 47.8 FL (ref 35.1–46.3)
EGFRCR SERPLBLD CKD-EPI 2021: 64 ML/MIN/1.73M2 (ref 60–?)
EOSINOPHIL # BLD AUTO: 0.19 X10(3) UL (ref 0–0.7)
EOSINOPHIL NFR BLD AUTO: 2.2 %
ERYTHROCYTE [DISTWIDTH] IN BLOOD BY AUTOMATED COUNT: 19.1 % (ref 11–15)
FASTING STATUS PATIENT QL REPORTED: NO
GLOBULIN PLAS-MCNC: 2.2 G/DL (ref 2–3.5)
GLUCOSE BLD-MCNC: 115 MG/DL (ref 70–99)
GLUCOSE UR-MCNC: NORMAL MG/DL
HCT VFR BLD AUTO: 31 %
HGB BLD-MCNC: 9.5 G/DL
HGB UR QL STRIP.AUTO: NEGATIVE
IMM GRANULOCYTES # BLD AUTO: 0.06 X10(3) UL (ref 0–1)
IMM GRANULOCYTES NFR BLD: 0.7 %
KETONES UR-MCNC: NEGATIVE MG/DL
LEUKOCYTE ESTERASE UR QL STRIP.AUTO: 500
LYMPHOCYTES # BLD AUTO: 0.97 X10(3) UL (ref 1–4)
LYMPHOCYTES NFR BLD AUTO: 11.1 %
MCH RBC QN AUTO: 22 PG (ref 26–34)
MCHC RBC AUTO-ENTMCNC: 30.6 G/DL (ref 31–37)
MCV RBC AUTO: 71.9 FL
MONOCYTES # BLD AUTO: 0.92 X10(3) UL (ref 0.1–1)
MONOCYTES NFR BLD AUTO: 10.6 %
NEUTROPHILS # BLD AUTO: 6.49 X10 (3) UL (ref 1.5–7.7)
NEUTROPHILS # BLD AUTO: 6.49 X10(3) UL (ref 1.5–7.7)
NEUTROPHILS NFR BLD AUTO: 74.5 %
NITRITE UR QL STRIP.AUTO: NEGATIVE
OSMOLALITY SERPL CALC.SUM OF ELEC: 295 MOSM/KG (ref 275–295)
PH UR: 5 [PH] (ref 5–8)
PLATELET # BLD AUTO: 252 10(3)UL (ref 150–450)
POTASSIUM SERPL-SCNC: 3.9 MMOL/L (ref 3.5–5.1)
PROT SERPL-MCNC: 6.2 G/DL (ref 5.7–8.2)
PROT UR-MCNC: NEGATIVE MG/DL
RBC # BLD AUTO: 4.31 X10(6)UL
SODIUM SERPL-SCNC: 141 MMOL/L (ref 136–145)
SP GR UR STRIP: 1.02 (ref 1–1.03)
T4 FREE SERPL-MCNC: 1.3 NG/DL (ref 0.8–1.7)
TSI SER-ACNC: 15.58 UIU/ML (ref 0.55–4.78)
UROBILINOGEN UR STRIP-ACNC: NORMAL
WBC # BLD AUTO: 8.7 X10(3) UL (ref 4–11)
WBC #/AREA URNS AUTO: >50 /HPF
WBC CLUMPS UR QL AUTO: PRESENT /HPF

## 2025-01-03 PROCEDURE — 87077 CULTURE AEROBIC IDENTIFY: CPT | Performed by: INTERNAL MEDICINE

## 2025-01-03 PROCEDURE — 85025 COMPLETE CBC W/AUTO DIFF WBC: CPT | Performed by: INTERNAL MEDICINE

## 2025-01-03 PROCEDURE — 81001 URINALYSIS AUTO W/SCOPE: CPT | Performed by: INTERNAL MEDICINE

## 2025-01-03 PROCEDURE — 84439 ASSAY OF FREE THYROXINE: CPT | Performed by: INTERNAL MEDICINE

## 2025-01-03 PROCEDURE — 36415 COLL VENOUS BLD VENIPUNCTURE: CPT | Performed by: INTERNAL MEDICINE

## 2025-01-03 PROCEDURE — 80053 COMPREHEN METABOLIC PANEL: CPT | Performed by: INTERNAL MEDICINE

## 2025-01-03 PROCEDURE — 87086 URINE CULTURE/COLONY COUNT: CPT | Performed by: INTERNAL MEDICINE

## 2025-01-03 PROCEDURE — 87186 SC STD MICRODIL/AGAR DIL: CPT | Performed by: INTERNAL MEDICINE

## 2025-01-03 PROCEDURE — 84443 ASSAY THYROID STIM HORMONE: CPT | Performed by: INTERNAL MEDICINE

## 2025-01-03 NOTE — TELEPHONE ENCOUNTER
----- Message from Jeff Anthony D'Amico sent at 1/2/2025  5:33 AM CST -----  nursing call them, I left this message on [email or web address], try to reiterate-  Mr. Copeland, I see your lab work from last week, and then recent visit with the cardiology team.  I like the idea of what they have done for potassium replacement and some of the medications they have changed.  But we need some follow-up lab work year, it does show your thyroid level is off a bit to so I want to recheck that.  It may just be mildly affected by the amiodarone medication, and may self-correct.  My nursing staff will reach out with instructions in the near future as well, try to get in for lab work later this week.-dr damico    -Nursing it can be nonfasting lab work in the next few days, okay to do over the weekend at the latest.

## 2025-01-03 NOTE — TELEPHONE ENCOUNTER
Spoke with patient. Relayed MD message. Pt verbalized understanding.   He will complete labs this afternoon.

## 2025-01-04 ENCOUNTER — TELEPHONE (OUTPATIENT)
Dept: INTERNAL MEDICINE CLINIC | Facility: CLINIC | Age: OVER 89
End: 2025-01-04

## 2025-01-04 DIAGNOSIS — N39.0 E. COLI UTI: Primary | ICD-10-CM

## 2025-01-04 DIAGNOSIS — B96.20 E. COLI UTI: Primary | ICD-10-CM

## 2025-01-04 RX ORDER — CIPROFLOXACIN 500 MG/1
250 TABLET, FILM COATED ORAL 2 TIMES DAILY
Qty: 7 TABLET | Refills: 0 | Status: SHIPPED | OUTPATIENT
Start: 2025-01-04 | End: 2025-01-11

## 2025-01-06 ENCOUNTER — TELEPHONE (OUTPATIENT)
Dept: INTERNAL MEDICINE CLINIC | Facility: CLINIC | Age: OVER 89
End: 2025-01-06

## 2025-01-06 DIAGNOSIS — N39.0 URINARY TRACT INFECTION WITHOUT HEMATURIA, SITE UNSPECIFIED: Primary | ICD-10-CM

## 2025-01-06 RX ORDER — NITROFURANTOIN 25; 75 MG/1; MG/1
100 CAPSULE ORAL 2 TIMES DAILY
Qty: 14 CAPSULE | Refills: 0 | Status: CANCELLED | OUTPATIENT
Start: 2025-01-06 | End: 2025-01-13

## 2025-01-06 NOTE — TELEPHONE ENCOUNTER
SANDY to Dr. CORLEY     Spoke to patient- stated he mixed up doctors and will call cardiology to clarify losartan prescription       Patient asked that we leave lab ordr at  for  so he can do them in [place]

## 2025-01-06 NOTE — TELEPHONE ENCOUNTER
Patient is calling he is having issues for the last 6 days he takes his B/P medication and his B/P is dropping too low.    This morning after he took his medication it dropped to 86/49    Please call patient [phone]

## 2025-01-06 NOTE — TELEPHONE ENCOUNTER
Please advise - called patient who states his Blood Pressure in the morning 113/60's after taking his Blood Pressure medications Metoprolol Succinate ER 25 mg his Blood Pressure goes down to 86/49 and he feels a little light headed - Losartan was prescribed by Cardiology ( patient told RN it was prescribed by  )  To

## 2025-01-07 NOTE — TELEPHONE ENCOUNTER
Spoke with PCP clemente Lindsey to review nursing's message below and Dr. D'Amico's Result Notes.     Cardiology returned his message about an hour ago and he says, \"they told me if my blood pressure falls below 110 to skip a dose\". Will notify Dr. CORLEY of this as well.     Patient leaves for [place] on Wednesday and will return at the end of Feb. He states he will repeat labs at this time.     He also notes, urinary symptoms have improved since starting Cipro. * Culture shows sensitivity to antibiotic. See final result.

## 2025-01-16 ENCOUNTER — PATIENT MESSAGE (OUTPATIENT)
Dept: INTERNAL MEDICINE CLINIC | Facility: CLINIC | Age: OVER 89
End: 2025-01-16

## 2025-01-17 ENCOUNTER — TELEPHONE (OUTPATIENT)
Dept: INTERNAL MEDICINE CLINIC | Facility: CLINIC | Age: OVER 89
End: 2025-01-17

## 2025-01-17 DIAGNOSIS — I25.9 IHD (ISCHEMIC HEART DISEASE): ICD-10-CM

## 2025-01-17 DIAGNOSIS — I48.91 ATRIAL FIBRILLATION WITH RAPID VENTRICULAR RESPONSE (HCC): ICD-10-CM

## 2025-01-17 DIAGNOSIS — R42 DIZZINESS: ICD-10-CM

## 2025-01-17 DIAGNOSIS — R79.89 ELEVATED TSH: Primary | ICD-10-CM

## 2025-01-17 RX ORDER — POTASSIUM CHLORIDE 750 MG/1
10 TABLET, EXTENDED RELEASE ORAL DAILY
COMMUNITY
Start: 2024-12-31

## 2025-01-17 RX ORDER — LOSARTAN POTASSIUM 25 MG/1
25 TABLET ORAL DAILY
COMMUNITY
Start: 2024-12-31

## 2025-01-17 RX ORDER — FUROSEMIDE 20 MG/1
20 TABLET ORAL DAILY
COMMUNITY

## 2025-01-17 NOTE — TELEPHONE ENCOUNTER
Patient's [employer] messages copied below    I also spoke to patient's daughter Staci, ok per HIPAA  Says yesterday was an \"off day\". Appetite is good but blood pressure still drops during the day.    In the morning BP starts out well but after he takes his first round of medcations, within the next hour he has blurry vision. Says \"the sun is very sensitive on his eyes and makes him feel off in his head\"  Daughter says the blurry vision has been going on for weeks, but worse lately.  I did clarify that this is something that they discussed  at his cardiology visit on 12/31.   Per dtr, they did change medication at that visit: stopped sacubitril-valsartan and switched to losartan 25 mg daily    2 days ago they also spoke to Dr Nuñez office that he had still been having low BPs and they instructed to move metoprolol dose to evening and wear compression socks during they day.    This morning 117/58 pulse 65  Just now /60    They are wondering if his thyroid level could be contributing to some of these symptoms  They would like to have his labs sent to the [facility] in [place] on [address] so they can do them soon  They also want order to recheck his urine    I pended the already ordered labs from 1/3 now for [facility]  To Dr D'Amico---please review and advise if ok for urine testing as well    Please let daughter know when the labs have been faxed--mychart or call      [facility]  [address]   Phone   [phone]  Fax   [phone]     I did advise daughter to contact cardiology if blood pressure continues to remain low and report to an immediate care or ER if blurry vision worsens

## 2025-01-17 NOTE — TELEPHONE ENCOUNTER
César MILLIGAN [facility] Clinical Staff (supporting Jeff Anthony D'Amico, DO)13 hours ago (10:03 PM)       I am also experiencing low blood pressure which has been called into Dr Guzmán.   Starting on 1/14 BP 89/46 HR 74  They had me change my metroprolol to pm and wear compression socks during the day  Today  1/15 AM /56 HR65. 2hrs later BP 99/57 HR 73.  Evening  BP84/45 HR 77  I was feeling tired today.   My daughter Staci is here with me you can reach her at [phone]       César MILLIGAN [facility] Clinical Staff (supporting Jeff Anthony D'Amico, DO)13 hours ago (9:54 PM)       I am in [place] for 2 months.  I can have labs  done at [facility] located on [address]      Can you also request a urine test to  make sure my UTI is clear      Thank you  César Copeland

## 2025-01-17 NOTE — TELEPHONE ENCOUNTER
Noted, nursing can you communicate with them, let them know I did approve all the lab orders here, lets get those done at the outpatient [facility] and have the orders faxed to me, I will notify them when I see results.  Also okay for a UA and reflexive culture that was ordered as well.  I agree with what they have done with the medication adjustment so far,    -Hopefully things start to go in the right direction, she can certainly keep following up with us for updates.

## 2025-01-17 NOTE — TELEPHONE ENCOUNTER
Spoke to patient's daughter Staci (OK per HIPAA) and relayed MD message. Lab orders for CBC, BMP, TSH, and URINALYSIS with reflex.  Staci sta.  Trever will take father to [facility]  Staci verbalized understanding and agrees with plan. .

## 2025-01-20 ENCOUNTER — PATIENT MESSAGE (OUTPATIENT)
Dept: INTERNAL MEDICINE CLINIC | Facility: CLINIC | Age: OVER 89
End: 2025-01-20

## 2025-01-20 NOTE — TELEPHONE ENCOUNTER
Please call daughter to advise if patient does need to fast as his lab appointment is scheduled for 2pm     Ok to leave a message [phone]

## 2025-01-23 LAB
ABSOLUTE BASOPHILS: 61 CELLS/UL (ref 0–200)
ABSOLUTE EOSINOPHILS: 92 CELLS/UL (ref 15–500)
ABSOLUTE LYMPHOCYTES: 964 CELLS/UL (ref 850–3900)
ABSOLUTE MONOCYTES: 857 CELLS/UL (ref 200–950)
ABSOLUTE NEUTROPHILS: ABNORMAL CELLS/UL (ref 1500–7800)
ALBUMIN/GLOBULIN RATIO: 1.8 (CALC) (ref 1–2.5)
ALBUMIN: 3.6 G/DL (ref 3.6–5.1)
ALKALINE PHOSPHATASE: 71 U/L (ref 35–144)
ALT: 32 U/L (ref 9–46)
APPEARANCE: CLEAR
AST: 25 U/L (ref 10–35)
BASOPHILS: 0.4 %
BILIRUBIN, TOTAL: 1.1 MG/DL (ref 0.2–1.2)
BILIRUBIN: NEGATIVE
BUN/CREATININE RATIO: 26 (CALC) (ref 6–22)
BUN: 41 MG/DL (ref 7–25)
CALCIUM: 8.6 MG/DL (ref 8.6–10.3)
CARBON DIOXIDE: 26 MMOL/L (ref 20–32)
CHLORIDE: 108 MMOL/L (ref 98–110)
COLOR: YELLOW
CREATININE: 1.55 MG/DL (ref 0.7–1.22)
EGFR: 41 ML/MIN/1.73M2
EOSINOPHILS: 0.6 %
GLOBULIN: 2 G/DL (CALC) (ref 1.9–3.7)
GLUCOSE: 126 MG/DL (ref 65–139)
GLUCOSE: NEGATIVE
HEMATOCRIT: 30.3 % (ref 38.5–50)
HEMOGLOBIN: 8.7 G/DL (ref 13.2–17.1)
KETONES: NEGATIVE
LEUKOCYTE ESTERASE: NEGATIVE
LYMPHOCYTES: 6.3 %
MCH: 21.3 PG (ref 27–33)
MCHC: 28.7 G/DL (ref 32–36)
MCV: 74.3 FL (ref 80–100)
MONOCYTES: 5.6 %
MPV: 10.9 FL (ref 7.5–12.5)
NEUTROPHILS: 87.1 %
NITRITE: NEGATIVE
OCCULT BLOOD: NEGATIVE
PLATELET COUNT: 298 THOUSAND/UL (ref 140–400)
POTASSIUM: 4.7 MMOL/L (ref 3.5–5.3)
PROTEIN, TOTAL: 5.6 G/DL (ref 6.1–8.1)
PROTEIN: NEGATIVE
RDW: 18.2 % (ref 11–15)
RED BLOOD CELL COUNT: 4.08 MILLION/UL (ref 4.2–5.8)
SODIUM: 142 MMOL/L (ref 135–146)
SPECIFIC GRAVITY: 1.02 (ref 1–1.03)
T4, FREE: 1.5 NG/DL (ref 0.8–1.8)
TSH W/REFLEX TO FT4: 11.38 MIU/L (ref 0.4–4.5)
WHITE BLOOD CELL COUNT: 15.3 THOUSAND/UL (ref 3.8–10.8)

## 2025-01-27 ENCOUNTER — TELEPHONE (OUTPATIENT)
Dept: INTERNAL MEDICINE CLINIC | Facility: CLINIC | Age: OVER 89
End: 2025-01-27

## 2025-01-27 NOTE — TELEPHONE ENCOUNTER
Pt's daughter called looking for lab results.  She saw them on [facility] and see's some abnormal labs, especially the Thyroid labs.

## 2025-01-29 ENCOUNTER — PATIENT MESSAGE (OUTPATIENT)
Dept: INTERNAL MEDICINE CLINIC | Facility: CLINIC | Age: OVER 89
End: 2025-01-29

## 2025-01-29 ENCOUNTER — TELEPHONE (OUTPATIENT)
Dept: INTERNAL MEDICINE CLINIC | Facility: CLINIC | Age: OVER 89
End: 2025-01-29

## 2025-01-29 DIAGNOSIS — D72.829 LEUKOCYTOSIS, UNSPECIFIED TYPE: ICD-10-CM

## 2025-01-29 DIAGNOSIS — Z00.00 PHYSICAL EXAM: Primary | ICD-10-CM

## 2025-01-29 DIAGNOSIS — R79.89 ELEVATED TSH: ICD-10-CM

## 2025-01-29 NOTE — TELEPHONE ENCOUNTER
César MILLIGAN [facility] Clinical Staff (supporting Jeff Anthony D'Amico, DO)1 hour ago (8:25 AM)       I have requested a call back to go over my lab results. I have a question about my thyroid   I have talked with my cardiologist because of symptoms and now they advised me to talk to Dr D’Amico  My skin is itchy I have blurry vision and get tired early in the afternoon.  No energy.  These are new experiences since starting all this medication   Dr Nuñez had me stop taking Losartan because my blood pressure goes very low after 2 hours taking my morning pills

## 2025-01-29 NOTE — TELEPHONE ENCOUNTER
To DR. MILLIGAN - called patient who states he has dry skin knuckles are bleeding -moisturizing cream   In the afternoon gets blurry vision - until taking a nap   DR Nuñez stopped Losartan because Blood Pressure goes very low after 2 hours of taking his morning pills   To

## 2025-01-30 NOTE — TELEPHONE ENCOUNTER
See other messages, there seems to be another question here with the dry knuckles, he certainly can use a combination of lotion and cortisone 10 to the area, if we need to prescribe a more potent steroid cream, it would be if he fails OTC cortisone 10

## 2025-02-05 ENCOUNTER — TELEPHONE (OUTPATIENT)
Dept: INTERNAL MEDICINE CLINIC | Facility: CLINIC | Age: OVER 89
End: 2025-02-05

## 2025-02-05 DIAGNOSIS — Z00.00 ANNUAL PHYSICAL EXAM: Primary | ICD-10-CM

## 2025-02-05 NOTE — TELEPHONE ENCOUNTER
Patient is calling and states that he is at [facility] in   [place] and his lab orders are not in the system.    Patient is asking if we could Fax the order to [phone]    Please Fax as soon and possible and please call patient when the orders have been sent     Patient can be reached at [phone]

## 2025-02-05 NOTE — TELEPHONE ENCOUNTER
Order for THYROID STIMULATING HORMONE (TSH) with reflex , Comprehensive Metabolic Panel (CMP)  and Complete Blood Count (CBC) with diff faxed to [facility] in [place] at [phone]-confirmation recieved

## 2025-02-06 ENCOUNTER — TELEPHONE (OUTPATIENT)
Dept: INTERNAL MEDICINE CLINIC | Facility: CLINIC | Age: OVER 89
End: 2025-02-06

## 2025-02-06 NOTE — TELEPHONE ENCOUNTER
Patients daughter is calling to give doctors last weeks BP readings,.  Date  1/29/24  123/63  heart rate 65  -  2 hours later 99/54  rate 63    1/30   109/56 rate 67  2 hours later 104/53  rate 66    1/31   96/50  rate 64  2 hours later  88/52  rate 62    2/1    109/57  rate 54    2/2    112/56  rate 65  2 hours later  96/52  rate 68    2/3    98/53  rate 64  2 hours later  106/54  rate 62    2/4    102/57   rate 63  2 hours later  82/43   rate 74  (Please note the patient feet were very swollen on the 2/4)  On the 2/5 they were back to normal.    2/5  115/56  rate was 64       Any question please call daughter Staci

## 2025-03-24 ENCOUNTER — TELEPHONE (OUTPATIENT)
Dept: INTERNAL MEDICINE CLINIC | Facility: CLINIC | Age: OVER 89
End: 2025-03-24

## 2025-03-24 ENCOUNTER — HOSPITAL ENCOUNTER (INPATIENT)
Facility: HOSPITAL | Age: OVER 89
LOS: 3 days | Discharge: HOME | End: 2025-03-27
Attending: EMERGENCY MEDICINE | Admitting: INTERNAL MEDICINE
Payer: MEDICARE

## 2025-03-24 ENCOUNTER — LAB ENCOUNTER (OUTPATIENT)
Dept: LAB | Age: OVER 89
End: 2025-03-24
Attending: INTERNAL MEDICINE
Payer: MEDICARE

## 2025-03-24 ENCOUNTER — OFFICE VISIT (OUTPATIENT)
Dept: INTERNAL MEDICINE CLINIC | Facility: CLINIC | Age: OVER 89
End: 2025-03-24

## 2025-03-24 VITALS
TEMPERATURE: 98 F | DIASTOLIC BLOOD PRESSURE: 60 MMHG | BODY MASS INDEX: 22.82 KG/M2 | RESPIRATION RATE: 16 BRPM | WEIGHT: 124 LBS | HEIGHT: 62 IN | OXYGEN SATURATION: 98 % | HEART RATE: 80 BPM | SYSTOLIC BLOOD PRESSURE: 128 MMHG

## 2025-03-24 DIAGNOSIS — K92.2 GASTROINTESTINAL HEMORRHAGE, UNSPECIFIED GASTROINTESTINAL HEMORRHAGE TYPE: ICD-10-CM

## 2025-03-24 DIAGNOSIS — I48.91 ATRIAL FIBRILLATION WITH RAPID VENTRICULAR RESPONSE (HCC): ICD-10-CM

## 2025-03-24 DIAGNOSIS — R42 DIZZINESS: Primary | ICD-10-CM

## 2025-03-24 DIAGNOSIS — I71.019 AORTIC DISSECTION DISTAL TO LEFT SUBCLAVIAN (HCC): ICD-10-CM

## 2025-03-24 DIAGNOSIS — R79.89 ELEVATED TSH: ICD-10-CM

## 2025-03-24 DIAGNOSIS — I50.20 HEART FAILURE WITH REDUCED EJECTION FRACTION (HFREF, <= 40%) (HCC): Primary | ICD-10-CM

## 2025-03-24 DIAGNOSIS — R42 DIZZINESS: ICD-10-CM

## 2025-03-24 DIAGNOSIS — D64.9 SEVERE ANEMIA: Primary | ICD-10-CM

## 2025-03-24 DIAGNOSIS — I10 ESSENTIAL HYPERTENSION: ICD-10-CM

## 2025-03-24 LAB
ALBUMIN SERPL-MCNC: 3.5 G/DL (ref 3.2–4.8)
ALBUMIN/GLOB SERPL: 1.4 {RATIO} (ref 1–2)
ALP LIVER SERPL-CCNC: 109 U/L
ALT SERPL-CCNC: 125 U/L
ANION GAP SERPL CALC-SCNC: 9 MMOL/L (ref 0–18)
ANTIBODY SCREEN: NEGATIVE
AST SERPL-CCNC: 116 U/L (ref ?–34)
BASOPHILS # BLD AUTO: 0.08 X10(3) UL (ref 0–0.2)
BASOPHILS NFR BLD AUTO: 1 %
BILIRUB SERPL-MCNC: 1.2 MG/DL (ref 0.2–0.9)
BILIRUB UR QL: NEGATIVE
BNP SERPL-MCNC: 247 PG/ML (ref ?–100)
BUN BLD-MCNC: 24 MG/DL (ref 9–23)
BUN/CREAT SERPL: 22 (ref 10–20)
CALCIUM BLD-MCNC: 8.5 MG/DL (ref 8.7–10.4)
CHLORIDE SERPL-SCNC: 109 MMOL/L (ref 98–112)
CLARITY UR: CLEAR
CO2 SERPL-SCNC: 24 MMOL/L (ref 21–32)
COLOR UR: YELLOW
CREAT BLD-MCNC: 1.09 MG/DL
DEPRECATED HBV CORE AB SER IA-ACNC: 21 NG/ML
DEPRECATED RDW RBC AUTO: 44.3 FL (ref 35.1–46.3)
EGFRCR SERPLBLD CKD-EPI 2021: 63 ML/MIN/1.73M2 (ref 60–?)
EOSINOPHIL # BLD AUTO: 0.3 X10(3) UL (ref 0–0.7)
EOSINOPHIL NFR BLD AUTO: 3.6 %
ERYTHROCYTE [DISTWIDTH] IN BLOOD BY AUTOMATED COUNT: 19 % (ref 11–15)
FASTING STATUS PATIENT QL REPORTED: NO
GLOBULIN PLAS-MCNC: 2.5 G/DL (ref 2–3.5)
GLUCOSE BLD-MCNC: 108 MG/DL (ref 70–99)
GLUCOSE UR-MCNC: NORMAL MG/DL
GRAN CASTS #/AREA URNS LPF: PRESENT /LPF
HCT VFR BLD AUTO: 21.1 %
HGB BLD-MCNC: 6.4 G/DL
HGB UR QL STRIP.AUTO: NEGATIVE
HYALINE CASTS #/AREA URNS AUTO: PRESENT /LPF
IMM GRANULOCYTES # BLD AUTO: 0.06 X10(3) UL (ref 0–1)
IMM GRANULOCYTES NFR BLD: 0.7 %
INR BLD: 1.7 (ref 0.8–1.2)
IRON SATN MFR SERPL: 3 %
IRON SERPL-MCNC: 10 UG/DL
KETONES UR-MCNC: NEGATIVE MG/DL
LEUKOCYTE ESTERASE UR QL STRIP.AUTO: 25
LYMPHOCYTES # BLD AUTO: 0.93 X10(3) UL (ref 1–4)
LYMPHOCYTES NFR BLD AUTO: 11.2 %
MCH RBC QN AUTO: 20.1 PG (ref 26–34)
MCHC RBC AUTO-ENTMCNC: 30.3 G/DL (ref 31–37)
MCV RBC AUTO: 66.1 FL
MONOCYTES # BLD AUTO: 1.22 X10(3) UL (ref 0.1–1)
MONOCYTES NFR BLD AUTO: 14.7 %
NEUTROPHILS # BLD AUTO: 5.72 X10 (3) UL (ref 1.5–7.7)
NEUTROPHILS # BLD AUTO: 5.72 X10(3) UL (ref 1.5–7.7)
NEUTROPHILS NFR BLD AUTO: 68.8 %
NITRITE UR QL STRIP.AUTO: NEGATIVE
NT-PROBNP SERPL-MCNC: 742 PG/ML (ref ?–450)
OSMOLALITY SERPL CALC.SUM OF ELEC: 299 MOSM/KG (ref 275–295)
PH UR: 5.5 [PH] (ref 5–8)
PLATELET # BLD AUTO: 299 10(3)UL (ref 150–450)
POTASSIUM SERPL-SCNC: 4 MMOL/L (ref 3.5–5.1)
PROT SERPL-MCNC: 6 G/DL (ref 5.7–8.2)
PROTHROMBIN TIME: 20.9 SECONDS (ref 11.6–14.8)
RBC # BLD AUTO: 3.19 X10(6)UL
RH BLOOD TYPE: POSITIVE
RH BLOOD TYPE: POSITIVE
SODIUM SERPL-SCNC: 142 MMOL/L (ref 136–145)
SP GR UR STRIP: 1.02 (ref 1–1.03)
T4 FREE SERPL-MCNC: 1.3 NG/DL (ref 0.8–1.7)
TOTAL IRON BINDING CAPACITY: 301 UG/DL (ref 250–425)
TRANSFERRIN SERPL-MCNC: 234 MG/DL (ref 215–365)
TSI SER-ACNC: 9.23 UIU/ML (ref 0.55–4.78)
UROBILINOGEN UR STRIP-ACNC: 3
WBC # BLD AUTO: 8.3 X10(3) UL (ref 4–11)

## 2025-03-24 PROCEDURE — 84443 ASSAY THYROID STIM HORMONE: CPT | Performed by: INTERNAL MEDICINE

## 2025-03-24 PROCEDURE — 85060 BLOOD SMEAR INTERPRETATION: CPT | Performed by: INTERNAL MEDICINE

## 2025-03-24 PROCEDURE — 80053 COMPREHEN METABOLIC PANEL: CPT | Performed by: INTERNAL MEDICINE

## 2025-03-24 PROCEDURE — 85025 COMPLETE CBC W/AUTO DIFF WBC: CPT | Performed by: INTERNAL MEDICINE

## 2025-03-24 PROCEDURE — 83880 ASSAY OF NATRIURETIC PEPTIDE: CPT

## 2025-03-24 PROCEDURE — 1126F AMNT PAIN NOTED NONE PRSNT: CPT | Performed by: INTERNAL MEDICINE

## 2025-03-24 PROCEDURE — 3008F BODY MASS INDEX DOCD: CPT | Performed by: INTERNAL MEDICINE

## 2025-03-24 PROCEDURE — 99223 1ST HOSP IP/OBS HIGH 75: CPT | Performed by: INTERNAL MEDICINE

## 2025-03-24 PROCEDURE — 1159F MED LIST DOCD IN RCRD: CPT | Performed by: INTERNAL MEDICINE

## 2025-03-24 PROCEDURE — 87086 URINE CULTURE/COLONY COUNT: CPT

## 2025-03-24 PROCEDURE — 3074F SYST BP LT 130 MM HG: CPT | Performed by: INTERNAL MEDICINE

## 2025-03-24 PROCEDURE — 99214 OFFICE O/P EST MOD 30 MIN: CPT | Performed by: INTERNAL MEDICINE

## 2025-03-24 PROCEDURE — 3078F DIAST BP <80 MM HG: CPT | Performed by: INTERNAL MEDICINE

## 2025-03-24 PROCEDURE — 36415 COLL VENOUS BLD VENIPUNCTURE: CPT

## 2025-03-24 PROCEDURE — 30233N1 TRANSFUSION OF NONAUTOLOGOUS RED BLOOD CELLS INTO PERIPHERAL VEIN, PERCUTANEOUS APPROACH: ICD-10-PCS | Performed by: INTERNAL MEDICINE

## 2025-03-24 PROCEDURE — 84439 ASSAY OF FREE THYROXINE: CPT | Performed by: INTERNAL MEDICINE

## 2025-03-24 PROCEDURE — 81001 URINALYSIS AUTO W/SCOPE: CPT

## 2025-03-24 RX ORDER — AMIODARONE HYDROCHLORIDE 100 MG/1
100 TABLET ORAL DAILY
Status: ON HOLD | COMMUNITY

## 2025-03-24 RX ORDER — FINASTERIDE 5 MG/1
5 TABLET, FILM COATED ORAL DAILY
Status: DISCONTINUED | OUTPATIENT
Start: 2025-03-25 | End: 2025-03-27

## 2025-03-24 RX ORDER — AMIODARONE HYDROCHLORIDE 100 MG/1
100 TABLET ORAL DAILY
Status: DISCONTINUED | OUTPATIENT
Start: 2025-03-25 | End: 2025-03-27

## 2025-03-24 RX ORDER — POTASSIUM CHLORIDE 750 MG/1
10 TABLET, EXTENDED RELEASE ORAL DAILY
COMMUNITY

## 2025-03-24 RX ORDER — DEXTROSE MONOHYDRATE AND SODIUM CHLORIDE 5; .9 G/100ML; G/100ML
INJECTION, SOLUTION INTRAVENOUS CONTINUOUS
Status: DISCONTINUED | OUTPATIENT
Start: 2025-03-24 | End: 2025-03-26

## 2025-03-24 RX ORDER — ATORVASTATIN CALCIUM 20 MG/1
20 TABLET, FILM COATED ORAL NIGHTLY
Status: DISCONTINUED | OUTPATIENT
Start: 2025-03-24 | End: 2025-03-27

## 2025-03-24 RX ORDER — ACETAMINOPHEN 500 MG
500 TABLET ORAL EVERY 4 HOURS PRN
Status: DISCONTINUED | OUTPATIENT
Start: 2025-03-24 | End: 2025-03-27

## 2025-03-24 RX ORDER — FUROSEMIDE 20 MG/1
20 TABLET ORAL DAILY
COMMUNITY

## 2025-03-24 RX ORDER — TAMSULOSIN HYDROCHLORIDE 0.4 MG/1
0.4 CAPSULE ORAL DAILY
Status: DISCONTINUED | OUTPATIENT
Start: 2025-03-25 | End: 2025-03-27

## 2025-03-24 RX ORDER — ATORVASTATIN CALCIUM 20 MG/1
20 TABLET, FILM COATED ORAL NIGHTLY
Status: ON HOLD | COMMUNITY

## 2025-03-24 RX ORDER — AMIODARONE HYDROCHLORIDE 100 MG/1
100 TABLET ORAL DAILY
COMMUNITY
Start: 2025-03-24 | End: 2025-03-24 | Stop reason: ALTCHOICE

## 2025-03-24 RX ORDER — AMIODARONE HYDROCHLORIDE 400 MG/1
400 TABLET ORAL 2 TIMES DAILY
COMMUNITY
Start: 2024-11-12 | End: 2025-03-24

## 2025-03-24 NOTE — PROGRESS NOTES
HPI:   César Copeland is a 89 year old male who presents for complains of:   Chief Complaint   Patient presents with    Follow - Up     3 month, daughter concerns that heart medications were d/c'd. Reports blurred visionwith photo sensitivity (room has to be dark0 more fatigued than usual, unsteady gait now walks with a walker.        Dizziness: Patient does claim to have some dizziness when standing, has been attributed to low blood pressure in the past, has had some cardiac issues, aortic dissection, and he has been recently in to see his cardiologist, he is recently been weaned down most of his blood pressure medications, he is only remain on amiodarone which he has stopped over the past 3 days, he seen the cardiology team today, they recommended he restart this at a lower dosage 100 mg daily.  He did verbalize understanding, claims his vision has cleared up today now that he is been off the amiodarone for 3 days.    Vision changes: Patient is feeling some vision changes, blurriness, visual aura at times, he does think this goes along with his blood pressure, and has only been present for a few weeks.  He has been communicating here, and with his specialist, and his blood pressure medications have been weaned.    EXAM:   /60   Pulse 80   Temp 97.9 °F (36.6 °C) (Oral)   Resp 16   Ht 5' 2\" (1.575 m)   Wt 124 lb (56.2 kg)   SpO2 98%   BMI 22.68 kg/m²   Body mass index is 22.68 kg/m².   Gen. exam: Alert and oriented, in no acute distress   HEENT: Pupils equal and reactive to light and accommodation, moist mucous membranes  Neck exam:  Supple.  Normal thyroid trachea midline, no JVD  Heart exam: Regular rate and irregular rhythm no murmurs no S3 no S4   Lung exam: No rales no rhonchi no wheezes  Abdominal exam: Soft, nontender, nondistended, positive bowel sounds are normoactive  Extremities exam: no clubbing, no cyanosis, no edema  Skin exam: No obvious wounds, no rashes  Neurological exam: Cranial  nerves II through XII intact, no gross deficits  Musculoskeletal exam: Advanced bilateral generalized hand arthritis appreciated, no obvious deformity    ASSESSMENT AND PLAN:   César Copeland is a 89 year old male who presents with the followin. Dizziness  Numbers look better today, a few days off the amiodarone, probably should not of her dose, but lets try to follow plan there, I do think there may be some side effects of medications here, but we should always check urine sample, and keep an eye out here  - Urinalysis, Routine; Future  - Urine Culture, Routine; Future    2. Elevated TSH  Repeat the thyroid testing here    3. Atrial fibrillation with rapid ventricular response (HCC)  Stable continue current monitor management follow-up with cardiology    4. Essential hypertension  Much better today    5. 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  - CBC With Differential With Platelet  - Comp Metabolic Panel (14)  - BNP (Brain Natriuretic Peptide) [E]; Future  - TSH W Reflex To Free T4      Spent 30 minutes obtaining history, evaluating patient, discussing treatment options, diet, exercise, review of available labs and radiology reports, and completing documentation.    Jeff Anthony D'Amico, DO  3/24/2025  1:52 PM

## 2025-03-24 NOTE — PATIENT INSTRUCTIONS
1. Dizziness  Numbers look better today, a few days off the amiodarone, probably should not of her dose, but lets try to follow plan there, I do think there may be some side effects of medications here, but we should always check urine sample, and keep an eye out here  - Urinalysis, Routine; Future  - Urine Culture, Routine; Future    2. Elevated TSH  Repeat the thyroid testing here    3. Atrial fibrillation with rapid ventricular response (HCC)  Stable continue current monitor management follow-up with cardiology    4. Essential hypertension  Much better today    5. 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  - CBC With Differential With Platelet  - Comp Metabolic Panel (14)  - BNP (Brain Natriuretic Peptide) [E]; Future  - TSH W Reflex To Free T4

## 2025-03-24 NOTE — TELEPHONE ENCOUNTER
Received a call for critical labs, hemoglobin 6.1.  Please notify patient or patient's family needs to go to the emergency department for symptomatic anemia, need blood transfusion.  Of note seen by Dr. D'Amico today for symptomatic dizziness.  This is likely the cause and should go straight to the emergency department    FYI - Dr. D'Amico

## 2025-03-24 NOTE — TELEPHONE ENCOUNTER
Noted- spoke to patient daughter Staci (ok per HIPAA) relayed MD message. Verbalized understanding. Called sister who was with her father currently - will be taking him right now

## 2025-03-25 LAB
ATRIAL RATE: 83 BPM
BASOPHILS # BLD AUTO: 0.06 X10(3) UL (ref 0–0.2)
BASOPHILS NFR BLD AUTO: 0.7 %
DEPRECATED RDW RBC AUTO: 43.8 FL (ref 35.1–46.3)
EOSINOPHIL # BLD AUTO: 0.23 X10(3) UL (ref 0–0.7)
EOSINOPHIL NFR BLD AUTO: 2.8 %
ERYTHROCYTE [DISTWIDTH] IN BLOOD BY AUTOMATED COUNT: 18.7 % (ref 11–15)
HCT VFR BLD AUTO: 21.1 %
HGB BLD-MCNC: 6.1 G/DL
HGB BLD-MCNC: 6.7 G/DL
HGB BLD-MCNC: 7.8 G/DL
HGB BLD-MCNC: 7.9 G/DL
IMM GRANULOCYTES # BLD AUTO: 0.05 X10(3) UL (ref 0–1)
IMM GRANULOCYTES NFR BLD: 0.6 %
LYMPHOCYTES # BLD AUTO: 0.8 X10(3) UL (ref 1–4)
LYMPHOCYTES NFR BLD AUTO: 9.6 %
MCH RBC QN AUTO: 19.1 PG (ref 26–34)
MCHC RBC AUTO-ENTMCNC: 28.9 G/DL (ref 31–37)
MCV RBC AUTO: 65.9 FL
MONOCYTES # BLD AUTO: 1.34 X10(3) UL (ref 0.1–1)
MONOCYTES NFR BLD AUTO: 16 %
NEUTROPHILS # BLD AUTO: 5.88 X10 (3) UL (ref 1.5–7.7)
NEUTROPHILS # BLD AUTO: 5.88 X10(3) UL (ref 1.5–7.7)
NEUTROPHILS NFR BLD AUTO: 70.3 %
P AXIS: 49 DEGREES
P-R INTERVAL: 132 MS
PLATELET # BLD AUTO: 341 10(3)UL (ref 150–450)
Q-T INTERVAL: 658 MS
QRS DURATION: 92 MS
QTC CALCULATION (BEZET): 773 MS
R AXIS: -16 DEGREES
RBC # BLD AUTO: 3.2 X10(6)UL
T AXIS: -65 DEGREES
VENTRICULAR RATE: 83 BPM
WBC # BLD AUTO: 8.4 X10(3) UL (ref 4–11)

## 2025-03-25 PROCEDURE — 99223 1ST HOSP IP/OBS HIGH 75: CPT | Performed by: INTERNAL MEDICINE

## 2025-03-25 PROCEDURE — 99233 SBSQ HOSP IP/OBS HIGH 50: CPT | Performed by: INTERNAL MEDICINE

## 2025-03-25 RX ORDER — SODIUM CHLORIDE 9 MG/ML
INJECTION, SOLUTION INTRAVENOUS ONCE
Status: COMPLETED | OUTPATIENT
Start: 2025-03-25 | End: 2025-03-25

## 2025-03-25 NOTE — PAYOR COMM NOTE
--------------  ADMISSION REVIEW     Payor: [employer] MED[employer] ADV PPO  Subscriber #:  [id number]  Authorization Number: [id number]    Admit date: 3/24/25  Admit time: 11:26 PM       REVIEW DOCUMENTATION:     ED Provider Notes        ED Provider Notes signed by Raul Booth MD at 3/25/2025  2:07 AM          Patient Seen in: [facility] Emergency Department    History     Chief Complaint   Patient presents with    Abnormal Labs    Difficulty Breathing       HPI    The patient presents to the ED complaining of generalized weakness and shortness of breath with exertion over the past week or 2.  He denies any chest pain, fevers chills or other complaints.  On Eliquis for A-fib.    History reviewed.   Past Medical History:    Arthritis    G    Back problem    Blood disorder    BPH (benign prostatic hyperplasia)    Calculus of kidney    Cataract    Coronary atherosclerosis    Essential hypertension    Hearing impairment    hearing aids    History of asbestos exposure    Hyperlipidemia    Lipid screening    Lower GI bleed    Osteoarthritis    Osteoarthritis, shoulder    PONV (postoperative nausea and vomiting)    S/P TURP (transurethral resection of prostate)    Thalassemia trait    Visual impairment       Physical Exam     ED Triage Vitals [03/24/25 1906]   /39   Pulse 85   Resp 22   Temp 97.8 °F (36.6 °C)   Temp src Temporal   SpO2 98 %   O2 Device None (Room air)       All measures to prevent infection transmission during my interaction with the patient were taken. Handwashing was performed prior to and after the exam.  Stethoscope and any equipment used during my examination was cleaned with super sani-cloth germicidal wipes following the exam.     Physical Exam  Vitals and nursing note reviewed.   Constitutional:       General: He is not in acute distress.     Appearance: He is well-developed. He is not ill-appearing or toxic-appearing.   HENT:      Head: Normocephalic and atraumatic.   Eyes:       General:         Right eye: No discharge.         Left eye: No discharge.      Conjunctiva/sclera: Conjunctivae normal.      Comments: Pale conjunctiva   Neck:      Trachea: No tracheal deviation.   Cardiovascular:      Rate and Rhythm: Normal rate.   Pulmonary:      Effort: Pulmonary effort is normal. No respiratory distress.      Breath sounds: Normal breath sounds. No stridor.   Abdominal:      General: There is no distension.      Palpations: Abdomen is soft.   Musculoskeletal:         General: No deformity.   Skin:     General: Skin is warm and dry.   Neurological:      Mental Status: He is alert and oriented to person, place, and time.   Psychiatric:         Mood and Affect: Mood normal.         Behavior: Behavior normal.         ED Course        Labs Reviewed   CBC WITH DIFFERENTIAL WITH PLATELET - Abnormal; Notable for the following components:       Result Value    RBC 3.19 (*)     HGB 6.4 (*)     HCT 21.1 (*)     MCV 66.1 (*)     MCH 20.1 (*)     MCHC 30.3 (*)     RDW 19.0 (*)     Lymphocyte Absolute 0.93 (*)     Monocyte Absolute 1.22 (*)     All other components within normal limits   PROTHROMBIN TIME (PT) - Abnormal; Notable for the following components:    PT 20.9 (*)     INR 1.70 (*)     All other components within normal limits   IRON AND TIBC - Abnormal; Notable for the following components:    Iron 10 (*)     % Saturation 3 (*)     All other components within normal limits   FERRITIN - Abnormal; Notable for the following components:    Ferritin 21 (*)     All other components within normal limits   HEMOGLOBIN   HEMOGLOBIN   HEMOGLOBIN   HEMOGLOBIN   TYPE AND SCREEN    Narrative:     The following orders were created for panel order Type and screen.                  Procedure                               Abnormality         Status                                     ---------                               -----------         ------                                     ABORH (Blood Type)[[id number]]                                Final result                               Antibody Screen[[id number]]                                  Final result                                                 Please view results for these tests on the individual orders.   PREPARE RBC   ABORH (BLOOD TYPE)   ANTIBODY SCREEN   ABORH CONFIRMATION   RAINBOW DRAW LAVENDER   RAINBOW DRAW LIGHT GREEN   RAINBOW DRAW BLUE   RAINBOW DRAW GOLD   OCCULT BLOOD, STOOL     EKG    Rate, intervals and axes as noted on EKG Report.  Rate: Normal, 83 bpm  Rhythm: Sinus Rhythm  Reading: PVCs, nonspecific ST and T wave abnormality, abnormal EKG           As Interpreted by me    Imaging Results Available and Reviewed while in ED: No results found.  ED Medications Administered:   Medications   amiodarone (Pacerone) tab 100 mg (has no administration in time range)   atorvastatin (Lipitor) tab 20 mg (20 mg Oral Given 3/25/25 0007)   finasteride (Proscar) tab 5 mg (has no administration in time range)   tamsulosin (Flomax) cap 0.4 mg (has no administration in time range)   dextrose 5%-sodium chloride 0.9% infusion ( Intravenous New Bag 3/25/25 0111)   acetaminophen (Tylenol Extra Strength) tab 500 mg (has no administration in time range)   pantoprazole (Protonix) 40 mg in sodium chloride 0.9% PF 10 mL IV push (has no administration in time range)   pantoprazole (Protonix) 40 mg in sodium chloride 0.9% PF 10 mL IV push (40 mg Intravenous Given 3/24/25 2128)         MDM     Vitals:    03/24/25 2328 03/24/25 2339 03/25/25 0010 03/25/25 0108   BP: 114/58  125/60 120/56   BP Location: Right arm  Right arm Right arm   Pulse: 73  73 74   Resp: 18  16 18   Temp: 97.9 °F (36.6 °C)  97 °F (36.1 °C) 98 °F (36.7 °C)   TempSrc: Oral  Oral Oral   SpO2: 94%  96% 94%   Weight:  55.8 kg       *I personally reviewed and interpreted all ED vitals.    Pulse Ox: 94%, Room air, Normal     Monitor Interpretation:   normal sinus rhythm as interpreted by me.  The cardiac monitor was  ordered to monitor heart rate.    Differential Diagnosis/ Diagnostic Considerations: Dehydration, anemia, UTI, other    Complicating Factors: The patient already has does not have any pertinent problems on file. to contribute to the complexity of this ED evaluation.    Medical Decision Making  Patient presents to the ED with progressive weakness and shortness of breath with exertion.  Nondistressed on examination.  Laboratory testing with severe anemia and recent laboratory testing with slowly downtrending hemoglobin likely due to slow GI bleed.  Discussed need for blood transfusion which patient is agreeable with.  Will hold Eliquis -further anticoagulation will likely pose significant risk for the patient.  I discussed with Dr. Baker for admission, Dr. Peralta for GI consultation and Baker cardiology for consultation.  Will make for management.  Blood transfusion started in the ED.    Problems Addressed:  Gastrointestinal hemorrhage, unspecified gastrointestinal hemorrhage type: acute illness or injury that poses a threat to life or bodily functions  Severe anemia: acute illness or injury that poses a threat to life or bodily functions    Amount and/or Complexity of Data Reviewed  Labs: ordered. Decision-making details documented in ED Course.  Discussion of management or test interpretation with external provider(s):  I discussed with Dr. Baker for admission, Dr. Peralta for GI consultation and Baker cardiology for consultation.          Condition upon leaving the department: Stable    Disposition and Plan     Clinical Impression:  1. Severe anemia    2. Gastrointestinal hemorrhage, unspecified gastrointestinal hemorrhage type          Hospital Problems       Present on Admission  Date Reviewed: 3/24/2025            ICD-10-CM Noted POA    * (Principal) Severe anemia D64.9 3/24/2025 Yes    Gastrointestinal hemorrhage, unspecified gastrointestinal hemorrhage type K92.2 3/24/2025 Unknown          Signed by  Raul Booth MD on 3/25/2025  2:07 AM         History and Physical    H&P signed by Kandi Baker MD at 3/25/2025 12:41 AM       [facility]  part of [facility]                                                                                                           History and Physical            Chief Complaint: Anemia, Dizziness     Subjective:    César Copeland is a 89 year old male with history of CAD, A-fib, HFrEF, HTN, HLD, thalassemia trait, BPH and others who was referred to the ED today for evaluation of low hemoglobin.  Patient was seen earlier at PCP office where he reported dizziness especially with standing and also blurry vision.  He was sent for routine labs that came back with hemoglobin 6.1.  Referred to the ED  On arrival to ED, hemoglobin 6.4.  Denies any abnormal bleeding.  No black or bloody stools.  No abdominal pain.  No chest pain or shortness of breath.  He is on chronic anticoagulation with Eliquis.  Also on aspirin.  Denies NSAID use.  Vital stable.  Labs stable except hemoglobin 6.4    Assessment & Plan:    Acute on chronic anemia, GI blood loss suspected  -Admit  -Transfuse to keep hemoglobin greater than 7  -PPI  -Hold aspirin, Eliquis  -Discussed with patient and daughter Kiersten at bedside, given his cardiac comorbidities, considering discontinuing Eliquis and holding off endoscopy  -Will discuss further with consultants in AM.  GI and cardiology on consult  -Check stool occult blood     CAD,  A-fib,  HFrEF/ischemic cardiomyopathy,  HTN,  HLD,  -Cardiology consulted  -Continue home medications as appropriate     Thalassemia trait,  BPH   -Home meds              CONSULT  Assessment & Plan   César Copeland is a 89 year old male w/ PMHx of CAD, a fib on Eliquis, HFrEF, HTN, thalassemia trait, BPH who presented to ER for abnormal lab work. He was seen by PCP on 3/24 noted dizziness. Labs noted hemoglobin of 6.1 and was advised to come to ER. GI consulted  for DANILO.     #DANILO  -sent to ER for hemoglobin of 6.1, ferritin found to be 21  -patient denies overt bleeding, however dizzy and short of breath   -on Eliquis for a fib and baby ASA  -prior EGD/cln about 30 years ago   -etiology possibly AVM, PUD, malignancy   -Discussion had with patient and daughters in regards to EGD/cln, due to patient cardiac hx family declines procedures. Reviewed risks of missed cancer and family and patient both acknowledged understanding at this time,      Recommend:  -IV PPI BID (should be discharged with PPI BID as well)  -IV iron x 3 days  -trend hemoglobin   -monitor for overt bleeding   -no plans for endoscopic evaluation at this time         Thank you for the opportunity to participate in the care of this patient.     Case discussed with Alaina Jacobsen MD and Cinthia SAMPSON.     Chantel Lora PA-C  [facility]          3/25     Sonya Esparza APRN  Nurse Practitioner  Cardiology     Progress Notes     Signed     Date of Service: 3/25/2025  1:00 AM     Signed                    César Copeland Patient Status:  Inpatient    1930 MRN [id number]   Location [facility] 5SW/SE Attending Kandi Baker MD   Hosp Day # 1 PCP Jeff Anthony D'Amico,       Cardiology Nocturnal APN Note     Briefly: (Documentation from chart review)      César Copeland is a 89 y/o male who presented with anemia and has a PMH/PSH of:            Past Medical History:    Arthritis     G    Back problem    Blood disorder    BPH (benign prostatic hyperplasia)    Calculus of kidney    Cataract    Coronary atherosclerosis    Essential hypertension    Hearing impairment     hearing aids    History of asbestos exposure    Hyperlipidemia    Lipid screening    Lower GI bleed    Osteoarthritis    Osteoarthritis, shoulder    PONV (postoperative nausea and vomiting)    S/P TURP (transurethral resection of prostate)    Thalassemia trait    Visual impairment         Primary Cardiologist  Joaquin     Vital Signs:        3/24/2025    11:28 PM 3/24/2025    11:39 PM   Vitals History   /58 125/60   BP Location Right arm Right arm   Pulse 73 73   Resp 18 16   Temp 97.9 °F (36.6 °C) 97 °F (36.1 °C)   SpO2 94 % 96 %   Weight   123 lbs   BMI   22.5 kg/m2         Labs:         Lab Results   Component Value Date     WBC 8.3 03/24/2025     HGB 6.4 03/24/2025     HCT 21.1 03/24/2025     .0 03/24/2025     INR 1.70 03/24/2025         Diagnostics:   No results found.     Allergies:  [Allergies]    [Allergies]        Allergen Reactions    Codeine NAUSEA AND VOMITING       \"Nausea & Vomiting x2 days\"    Penicillins HIVES    Bactrim [Sulfamethoxazole W/Trimethoprim] RASH        Medications:    amiodarone    atorvastatin    finasteride    tamsulosin    dextrose 5%-sodium chloride 0.9%    acetaminophen    pantoprazole     Assessment:   Anemia  - Hgb 6.4  - Transfuse for Hgb <7 per hospitalist  - GI on consult     AF  - Rate controlled  - On amiodarone, which was recently decreased to 100mg due to dizziness  - On Eliquis outpatient - currently held     CAD  - Three vessel disease on [facility] 2015  - Medical management  - ASA on hold currently due to anemia        Plan:     - Continue to monitor overnight  - Formal Cardiology consult to follow in AM.         MARY Donaldson  [facility]  3/25/2025  1:00 AM                      MEDICATIONS ADMINISTERED IN LAST 1 DAY:  Transfuse RBC       Date Action Dose Route User    3/24/2025 2233 New Bag (none) Intravenous Debbie Zheng RN          Transfuse RBC       Date Action Dose Route User    3/25/2025 0304 New Bag (none) Intravenous Jojo Osuna RN          amiodarone (Pacerone) tab 100 mg       Date Action Dose Route User    3/25/2025 0934 Given 100 mg Oral Cinthia Garcia RN          atorvastatin (Lipitor) tab 20 mg       Date Action Dose Route User    3/25/2025 0007 Given 20 mg Oral Jojo Osnua RN          dextrose 5%-sodium  chloride 0.9% infusion       Date Action Dose Route User    3/25/2025 0611 Restarted (none) Intravenous Jojo Osuna RN    3/25/2025 0111 New Bag (none) Intravenous Jojo Osuna RN          finasteride (Proscar) tab 5 mg       Date Action Dose Route User    3/25/2025 0934 Given 5 mg Oral Cinthia Garcia RN          pantoprazole (Protonix) 40 mg in sodium chloride 0.9% PF 10 mL IV push       Date Action Dose Route User    3/24/2025 2128 Given 40 mg Intravenous Debbie Zheng RN          pantoprazole (Protonix) 40 mg in sodium chloride 0.9% PF 10 mL IV push       Date Action Dose Route User    3/25/2025 0931 Given 40 mg Intravenous Cinthia Garcia RN          sodium chloride 0.9% infusion       Date Action Dose Route User    3/25/2025 0305 New Bag (none) Intravenous Jojo Osuna RN          sodium ferric gluconate (Ferrlecit) 125 mg in sodium chloride 0.9% 100mL IVPB premix       Date Action Dose Route User    3/25/2025 1011 New Bag 125 mg Intravenous Cinthia Garcia RN          tamsulosin (Flomax) cap 0.4 mg       Date Action Dose Route User    3/25/2025 0934 Given 0.4 mg Oral Cinthia Garcia RN            Vitals (last day)       Date/Time Temp Pulse Resp BP SpO2 Weight O2 Device O2 Flow Rate (L/min) Who    03/25/25 0923 98.2 °F (36.8 °C) 66 18 124/65 95 % -- Nasal cannula 1 L/min GP    03/25/25 0830 -- 79 -- -- -- -- -- -- DF    03/25/25 0609 98 °F (36.7 °C) 68 18 116/58 98 % -- Nasal cannula 2 L/min RB    03/25/25 0508 98 °F (36.7 °C) 71 16 131/61 98 % -- Nasal cannula 2 L/min RB    03/25/25 0403 98.1 °F (36.7 °C) 66 16 114/57 95 % -- -- -- RB    03/25/25 0315 98 °F (36.7 °C) 70 18 120/63 93 % -- -- --     03/25/25 0304 97.6 °F (36.4 °C) 81 16 132/73 96 % -- -- --     03/25/25 0108 98 °F (36.7 °C) 74 18 120/56 94 % -- None (Room air) --     03/25/25 0010 97 °F (36.1 °C) 73 16 125/60 96 % -- None (Room air) --     03/24/25 2339 -- -- -- -- -- 123 lb (55.8 kg) -- --     03/24/25 5561  97.9 °F (36.6 °C) 73 18 114/58 94 % -- None (Room air) -- RB    03/24/25 2245 98.3 °F (36.8 °C) 71 26 110/54 95 % -- None (Room air) -- MT    03/24/25 2233 98.1 °F (36.7 °C) -- -- -- -- -- -- -- MT    03/24/25 2231 -- 73 20 101/58 95 % -- -- -- MT    03/24/25 2215 -- 72 16 116/53 93 % -- -- -- MT    03/24/25 2145 -- 73 14 107/55 95 % -- -- -- MT    03/24/25 2130 -- 74 16 112/60 96 % -- None (Room air) -- MT    03/24/25 2115 -- 72 19 111/55 97 % -- None (Room air) -- MT    03/24/25 2103 -- -- -- -- -- -- None (Room air) -- MT    03/24/25 2100 -- 74 14 123/52 92 % -- -- -- MT    03/24/25 2045 -- 74 24 119/60 99 % -- -- -- MT    03/24/25 2030 -- 71 16 114/60 97 % -- -- -- MT    03/24/25 1906 97.8 °F (36.6 °C) 85 22 115/39 98 % 123 lb 8 oz (56 kg) None (Room air) -- ZE          CIWA Scores (since admission)       None          Blood Transfusion Record       Product Unit Status Volume Start End            Transfuse RBC       25  [id number]  [id number] Stopped 308.33 mL 03/25/25 0304 03/25/25 0609       25  [id number]  [id number] Completed 03/25/25 0312 646 mL 03/24/25 2233 03/25/25 0108

## 2025-03-25 NOTE — PROGRESS NOTES
[facility]  part of [facility]     Hospitalist Progress Note     César Copeland Patient Status:  Inpatient    1930 MRN [id number]   Location [facility] 5SW/SE Attending Reginaldo Conner MD   Hosp Day # 1 PCP Jeff Anthony D'Amico, DO     Chief Complaint:   Chief Complaint   Patient presents with    Abnormal Labs    Difficulty Breathing        Subjective:     Patient seen sitting in chair.  Family at bedside.  Patient denies acute events overnight.  Patient reports passing stool which was brown.  Denies black tarry stools.  Denies blood in stool.  Patient denies current abdominal pain.  Patient reports to having some mild dizziness with ambulating, but improved from previous.    Objective:      Vital signs:  Vitals:    25 0403 25 0508 25 0609 25 0923   BP: 114/57 131/61 116/58 124/65   BP Location:  Right arm Right arm Right arm   Pulse: 66 71 68 66   Resp: 16 16 18 18   Temp: 98.1 °F (36.7 °C) 98 °F (36.7 °C) 98 °F (36.7 °C) 98.2 °F (36.8 °C)   TempSrc: Oral Oral Oral Oral   SpO2: 95% 98% 98% 95%   Weight:           Intake/Output Summary (Last 24 hours) at 3/25/2025 0940  Last data filed at 3/25/2025 0700  Gross per 24 hour   Intake 1455.16 ml   Output --   Net 1455.16 ml           Physical Exam:    GENERAL: Thin male.  Awake and alert, in no acute distress.  HEART:  Regular rhythm, regular rate  LUNGS:  Air entry was minimally decreased.  No increased work of breathing or wheezes   ABDOMEN: Soft and non-tender.    PSYCHIATRIC: Normal mood    Diagnostic Data:    Labs:    Recent Labs   Lab 25  1421 25  1952 25  2335 25  0152 25  0734   WBC 8.4 8.3  --   --   --    HGB 6.1* 6.4*  --  6.7* 7.9*  7.8*   MCV 65.9* 66.1*  --   --   --    .0 299.0  --   --   --    INR  --   --  1.70*  --   --        Recent Labs   Lab 25  1421   *   BUN 24*   CREATSERUM 1.09   CA 8.5*   ALB 3.5      K 4.0      CO2  24.0   ALKPHO 109   *   *   BILT 1.2*   TP 6.0           Estimated Creatinine Clearance: 32 mL/min (based on SCr of 1.09 mg/dL).    Recent Labs   Lab 03/24/25  2335   PTP 20.9*   INR 1.70*            COVID-19  Lab Results   Component Value Date    COVID19 Detected (A) 12/04/2023    COVID19 Detected (A) 11/28/2022    COVID19 Not Detected 03/08/2022       Pro-Calcitonin  No results for input(s): \"PCT\" in the last 168 hours.    Cardiac  Recent Labs   Lab 03/24/25  1421   PBNP 742*       Inflammatory Markers  Recent Labs   Lab 03/24/25  1952   DAREN 21*       Culture:  No results found for this visit on 03/24/25.    No results found.    EKG 12 Lead    Result Date: 3/25/2025  Sinus rhythm with frequent Premature ventricular complexes ST & T wave abnormality, consider inferior ischemia Abnormal ECG When compared with ECG of 05-NOV-2024 12:57, Premature ventricular complexes are now Present Premature atrial complexes are no longer Present ST no longer elevated in Lateral leads Nonspecific T wave abnormality, worse in Lateral leads QT has lengthened     Medications:    sodium ferric gluconate  125 mg Intravenous Daily    amiodarone  100 mg Oral Daily    atorvastatin  20 mg Oral Nightly    finasteride  5 mg Oral Daily    tamsulosin  0.4 mg Oral Daily    pantoprazole  40 mg Intravenous Q12H       Assessment & Plan:        Severe anemia  Symptomatic anemia  Acute on chronic anemia  -Initial concern for GI blood loss   -Hemoglobin 6.1 on admission  -Status post transfusion of PRBC with good response.  -Hemoglobin is 7.9 today.  -No current signs of active bleeding noted  -Continue to monitor, transfuse to keep hemoglobin greater than 7  -Continue PPI  -Continue holding aspirin, Eliquis  -GI consulted, agree with PPI.  No plans for endoscopic evaluation at this time.  Recommend 3 days of IV iron.    Paroxysmal Atrial Fibrillation  -Rates currently controlled  -Continue amiodarone  -Holding chronic anticoagulation due  to anemia as above  -Telemetry monitoring  -Cardiology on consult, appreciate further recommendations       CAD  Chronic HFrEF/ischemic cardiomyopathy  -No acute signs of exacerbation noted  -Continuing home regimen  - Strict I&Os, Daily weights, Fluid restriction  -Cardiology on consult, appreciate further recommendations    HTN  - controlled  - CPM  - Monitor and adjust as needed    HLD  -Statin       Thalassemia trait  BPH   -Home meds      Plan of care discussed with patient and family at bedside . Discussed management/test result(s) with Rn and GI consultant    Quality:  DVT Prophylaxis: SCDs  CODE status: DNR  Estimated date of discharge: TBD  Discharge is dependent on: clinical stability    Reginaldo Conner MD          This note was prepared using Dragon Medical voice recognition dictation software. As a result errors may occur. When identified these errors have been corrected. While every attempt is made to correct errors during dictation discrepancies may still exist

## 2025-03-25 NOTE — ED QUICK NOTES
Orders for admission, patient is aware of plan and ready to go upstairs. Any questions, please call ED RN Debbie at extension [phone].     Patient Covid vaccination status: Fully vaccinated     COVID Test Ordered in ED: None    COVID Suspicion at Admission: N/A    Running Infusions:      Mental Status/LOC at time of transport: AOx4     Other pertinent information:   CIWA score: N/A   NIH score:  N/A

## 2025-03-25 NOTE — PLAN OF CARE
Problem: Patient Centered Care  Goal: Patient preferences are identified and integrated in the patient's plan of care  Description: Interventions:- What would you like us to know as we care for you?- Provide timely, complete, and accurate information to patient/family- Incorporate patient and family knowledge, values, beliefs, and cultural backgrounds into the planning and delivery of care- Encourage patient/family to participate in care and decision-making at the level they choose- Honor patient and family perspectives and choices  Outcome: Progressing     Problem: Patient/Family Goals  Goal: Patient/Family Long Term Goal  Description: Patient's Long Term Goal: Interventions:- - See additional Care Plan goals for specific interventions  Outcome: Progressing  Goal: Patient/Family Short Term Goal  Description: Patient's Short Term Goal: Interventions: - - See additional Care Plan goals for specific interventions  Outcome: Progressing     Problem: PAIN - ADULT  Goal: Verbalizes/displays adequate comfort level or patient's stated pain goal  Description: INTERVENTIONS:- Encourage pt to monitor pain and request assistance- Assess pain using appropriate pain scale- Administer analgesics based on type and severity of pain and evaluate response- Implement non-pharmacological measures as appropriate and evaluate response- Consider cultural and social influences on pain and pain management- Manage/alleviate anxiety- Utilize distraction and/or relaxation techniques- Monitor for opioid side effects- Notify MD/LIP if interventions unsuccessful or patient reports new pain- Anticipate increased pain with activity and pre-medicate as appropriate  Outcome: Progressing     Problem: RISK FOR INFECTION - ADULT  Goal: Absence of fever/infection during anticipated neutropenic period  Description: INTERVENTIONS- Monitor WBC- Administer growth factors as ordered- Implement neutropenic guidelines  Outcome: Progressing     Problem: SAFETY  ADULT - FALL  Goal: Free from fall injury  Description: INTERVENTIONS:- Assess pt frequently for physical needs- Identify cognitive and physical deficits and behaviors that affect risk of falls.- Dinosaur fall precautions as indicated by assessment.- Educate pt/family on patient safety including physical limitations- Instruct pt to call for assistance with activity based on assessment- Modify environment to reduce risk of injury- Provide assistive devices as appropriate- Consider OT/PT consult to assist with strengthening/mobility- Encourage toileting schedule  Outcome: Progressing     Problem: DISCHARGE PLANNING  Goal: Discharge to home or other facility with appropriate resources  Description: INTERVENTIONS:- Identify barriers to discharge w/pt and caregiver- Include patient/family/discharge partner in discharge planning- Arrange for needed discharge resources and transportation as appropriate- Identify discharge learning needs (meds, wound care, etc)- Arrange for interpreters to assist at discharge as needed- Consider post-discharge preferences of patient/family/discharge partner- Complete POLST form as appropriate- Assess patient's ability to be responsible for managing their own health- Refer to Case Management Department for coordinating discharge planning if the patient needs post-hospital services based on physician/LIP order or complex needs related to functional status, cognitive ability or social support system  Outcome: Progressing     Problem: HEMATOLOGIC - ADULT  Goal: Maintains hematologic stability  Description: INTERVENTIONS- Assess for signs and symptoms of bleeding or hemorrhage- Monitor labs and vital signs for trends- Administer supportive blood products/factors, fluids and medications as ordered and appropriate- Administer supportive blood products/factors as ordered and appropriate  Outcome: Progressing  Goal: Free from bleeding injury  Description: (Example usage: patient with low  platelets)INTERVENTIONS:- Avoid intramuscular injections, enemas and rectal medication administration- Ensure safe mobilization of patient- Hold pressure on venipuncture sites to achieve adequate hemostasis- Assess for signs and symptoms of internal bleeding- Monitor lab trends- Patient is to report abnormal signs of bleeding to staff- Avoid use of toothpicks and dental floss- Use electric shaver for shaving- Use soft bristle tooth brush- Limit straining and forceful nose blowing  Outcome: Progressing

## 2025-03-25 NOTE — PROGRESS NOTES
César Copeland Patient Status:  Inpatient    1930 MRN [id number]   Location [facility] 5SW/SE Attending Kandi Baker MD   Hosp Day # 1 PCP Jeff Anthony D'Amico, DO     Cardiology Nocturnal APN Note    Briefly: (Documentation from chart review)     César Copeland is a 89 y/o male who presented with anemia and has a PMH/PSH of:       Past Medical History:    Arthritis    G    Back problem    Blood disorder    BPH (benign prostatic hyperplasia)    Calculus of kidney    Cataract    Coronary atherosclerosis    Essential hypertension    Hearing impairment    hearing aids    History of asbestos exposure    Hyperlipidemia    Lipid screening    Lower GI bleed    Osteoarthritis    Osteoarthritis, shoulder    PONV (postoperative nausea and vomiting)    S/P TURP (transurethral resection of prostate)    Thalassemia trait    Visual impairment       Primary Cardiologist Joaquin    Vital Signs:       3/24/2025    11:28 PM 3/24/2025    11:39 PM   Vitals History   /58 125/60   BP Location Right arm Right arm   Pulse 73 73   Resp 18 16   Temp 97.9 °F (36.6 °C) 97 °F (36.1 °C)   SpO2 94 % 96 %   Weight  123 lbs   BMI  22.5 kg/m2        Labs:   Lab Results   Component Value Date    WBC 8.3 2025    HGB 6.4 2025    HCT 21.1 2025    .0 2025    INR 1.70 2025       Diagnostics:   No results found.    Allergies:  Allergies[1]    Medications:    amiodarone    atorvastatin    finasteride    tamsulosin    dextrose 5%-sodium chloride 0.9%    acetaminophen    pantoprazole    Assessment:   Anemia  - Hgb 6.4  - Transfuse for Hgb <7 per hospitalist  - GI on consult    AF  - Rate controlled  - On amiodarone, which was recently decreased to 100mg due to dizziness  - On Eliquis outpatient - currently held    CAD  - Three vessel disease on [facility]   - Medical management  - ASA on hold currently due to anemia      Plan:    - Continue to monitor overnight  - Formal Cardiology consult to  follow in AM.       MARY Donaldson  [facility]  3/25/2025  1:00 AM         [1]   Allergies  Allergen Reactions    Codeine NAUSEA AND VOMITING     \"Nausea & Vomiting x2 days\"    Penicillins HIVES    Bactrim [Sulfamethoxazole W/Trimethoprim] RASH

## 2025-03-25 NOTE — CONSULTS
Is this a shared or split note between Advanced Practice Provider and Physician? Yes       [facility]   Gastroenterology Consultation Note    César Copeland  Patient Status:    Inpatient  Date of Admission:         3/24/2025, Hospital day #1  Attending:   Reginaldo Conner MD  PCP:     Jeff Anthony D'Amico, DO    Reason for Consultation:  Anemia     History of Present Illness:  César Copeland is a 89 year old male w/ PMHx of CAD, a fib on Eliquis, HFrEF, HTN, thalassemia trait, BPH who presented to ER for abnormal lab work. He was seen by PCP on 3/24 noted dizziness. Labs noted hemoglobin of 6.1 and was advised to come to ER. GI consulted for DANILO. Per patient he notes at home has had dizziness and shortness of breath. He denies GI symptoms such as abdominal pain, nausea and vomiting. Denies melena, hematochezia, coffee ground emesis and hematemesis. He notes he has been on Eliquis and baby ASA. He notes prior EGD and cln about 30 years ago.       Pertinent Family Hx:  - No known history of esophageal, gastric or colon cancers  - No known IBD  - No known liver pathologies    Endoscopy Hx:  - 30 years ago     Social Hx:  - No tobacco use/No ETOH  - Denies illicit drug use  - Lives with: family   - Occupation: retired       History:  Past Medical History:    Arthritis    G    Back problem    Blood disorder    BPH (benign prostatic hyperplasia)    Calculus of kidney    Cataract    Coronary atherosclerosis    Essential hypertension    Hearing impairment    hearing aids    History of asbestos exposure    Hyperlipidemia    Lipid screening    Lower GI bleed    Osteoarthritis    Osteoarthritis, shoulder    PONV (postoperative nausea and vomiting)    S/P TURP (transurethral resection of prostate)    Thalassemia trait    Visual impairment     Past Surgical History:   Procedure Laterality Date    Back surgery      C-SPINE SURGERY     Cataract      Colonoscopy      Hernia surgery  2006    Knee replacement  surgery      Laminectomy,lumbar  1960    Shoulder arthroscopy  2006    Spine surgery procedure unlisted      Total knee replacement Bilateral      Family History   Problem Relation Age of Onset    Cancer Father 67        gastric    Stroke Mother 57    Cancer Brother     Cancer Brother       reports that he quit smoking about 54 years ago. His smoking use included cigarettes. He has never used smokeless tobacco. He reports current alcohol use of about 2.0 standard drinks of alcohol per week. He reports that he does not use drugs.    Allergies:  Allergies[1]    Medications:    Current Facility-Administered Medications:     sodium ferric gluconate (Ferrlecit) 125 mg in sodium chloride 0.9% 100mL IVPB premix, 125 mg, Intravenous, Daily    amiodarone (Pacerone) tab 100 mg, 100 mg, Oral, Daily    atorvastatin (Lipitor) tab 20 mg, 20 mg, Oral, Nightly    finasteride (Proscar) tab 5 mg, 5 mg, Oral, Daily    tamsulosin (Flomax) cap 0.4 mg, 0.4 mg, Oral, Daily    dextrose 5%-sodium chloride 0.9% infusion, , Intravenous, Continuous    acetaminophen (Tylenol Extra Strength) tab 500 mg, 500 mg, Oral, Q4H PRN    pantoprazole (Protonix) 40 mg in sodium chloride 0.9% PF 10 mL IV push, 40 mg, Intravenous, Q12H    Review of Systems:   CONSTITUTIONAL:  negative for fevers, chills, unintentional weight loss   EYES:  negative for diplopia or change in vision   RESPIRATORY:  negative for severe shortness of breath  CARDIOVASCULAR:  negative for crushing sub-sternal chest pain  GASTROINTESTINAL:  see HPI  GENITOURINARY:  negative for dysuria or gross hematuria  INTEGUMENT/BREAST:  SKIN:  negative for jaundice or new rash   ALLERGIC/IMMUNOLOGIC:  negative for hay fever   ENDOCRINE:  negative for cold intolerance and heat intolerance  MUSCULOSKELETAL:  negative for joint effusion/severe erythema  NEURO: negative for new loss of consciousness or dizziness   BEHAVIOR/PSYCH:  negative for psychotic behavior    Physical Exam:    Blood pressure  124/65, pulse 66, temperature 98.2 °F (36.8 °C), temperature source Oral, resp. rate 18, weight 123 lb (55.8 kg), SpO2 95%. Body mass index is 22.5 kg/m².    Gen: awake, alert patient, NAD  HEENT: EOMI, the sclera appears anicteric, oropharynx clear, mucus membranes appear moist  CV: RRR  Lung: no conversational dyspnea, on 2 L O2  Abdomen: soft NTND abdomen with NABS appreciated   Back: No CVA tenderness   Skin: dry, warm, no jaundice  Ext: no LE edema is evident  Neuro: Alert and interactive  Psych: calm, cooperative    Laboratory Data:  Lab Results   Component Value Date    WBC 8.3 03/24/2025    HGB 7.9 03/25/2025    HGB 7.8 03/25/2025    HCT 21.1 03/24/2025    .0 03/24/2025    INR 1.70 03/24/2025       Imaging:  No results found.    Assessment & Plan   César Copeland is a 89 year old male w/ PMHx of CAD, a fib on Eliquis, HFrEF, HTN, thalassemia trait, BPH who presented to ER for abnormal lab work. He was seen by PCP on 3/24 noted dizziness. Labs noted hemoglobin of 6.1 and was advised to come to ER. GI consulted for DANILO.    #DANILO  -sent to ER for hemoglobin of 6.1, ferritin found to be 21  -patient denies overt bleeding, however dizzy and short of breath   -on Eliquis for a fib and baby ASA  -prior EGD/cln about 30 years ago   -etiology possibly AVM, PUD, malignancy   -Discussion had with patient and daughters in regards to EGD/cln, due to patient cardiac hx family declines procedures. Reviewed risks of missed cancer and family and patient both acknowledged understanding at this time,     Recommend:  -IV PPI BID (should be discharged with PPI BID as well)  -IV iron x 3 days  -trend hemoglobin   -monitor for overt bleeding   -no plans for endoscopic evaluation at this time       Thank you for the opportunity to participate in the care of this patient.    Case discussed with Alaina Jacobsen MD and Cinthia SAMPSON.    Chantel Lora PA-C  [facility]  3/25/2025         [1]    Allergies  Allergen Reactions    Codeine NAUSEA AND VOMITING     \"Nausea & Vomiting x2 days\"    Penicillins HIVES    Bactrim [Sulfamethoxazole W/Trimethoprim] RASH

## 2025-03-25 NOTE — OCCUPATIONAL THERAPY NOTE
OCCUPATIONAL THERAPY EVALUATION - INPATIENT     Room Number: 552/552-A  Evaluation Date: 3/25/2025  Type of Evaluation: Initial  Presenting Problem: severe anemia,weakness,sob    Physician Order: IP Consult to Occupational Therapy  Reason for Therapy: ADL/IADL Dysfunction and Discharge Planning    OCCUPATIONAL THERAPY ASSESSMENT   Patient is a 89 year old male admitted 3/24/2025 for severe anemia.  Prior to admission, patient  was living w/ his daughter in a 1 story house;stairs to basement only. Pt reports being mod I for adls, and ambulation w/ rw as needed. Pt does not drive.  Patient is currently functioning below baseline with adls and functional mobility.  Patient is requiring minimal assist as a result of the following impairments: decreased endurance and sob . Occupational Therapy will continue to follow for duration of hospitalization.    Patient will benefit from continued skilled OT Services at discharge to promote prior level of function and safety with additional support and return home with home health OT.    PLAN DURING HOSPITALIZATION  OT Device Recommendations: TBD        OCCUPATIONAL THERAPY MEDICAL/SOCIAL HISTORY   Problem List  Principal Problem:    Severe anemia  Active Problems:    Gastrointestinal hemorrhage, unspecified gastrointestinal hemorrhage type    HOME SITUATION  Type of Home: House  Home Layout: One level (stairs to basement)  Lives With: Daughter; Family  Toilet and Equipment: Standard height toilet  Shower/Tub and Equipment: Tub-shower combo; Grab bar  Other Equipment: -- (rw)  Drives: No  Patient Regularly Uses: Rolling walker    Stairs in Home: 4 stairs to enter  Use of Assistive Device(s): rw    Prior Level of [place]:  Prior to admission, patient  was living w/ his daughter in a 1 story house;stairs to basement only. Pt reports being mod I for adls, and ambulation w/ rw as needed. Pt does not drive.    SUBJECTIVE  I have to sit down. I'm too short of breath    OCCUPATIONAL  THERAPY EXAMINATION      OBJECTIVE  Precautions: Bed/chair alarm  Fall Risk: Standard fall risk    PAIN ASSESSMENT  Ratin    ACTIVITY TOLERANCE  Limited due to sob and generalized weakness  /71  HR 76    O2 SATURATIONS  96% on 2L w/ activity    Behavioral/Emotional/Social: cooperative    RANGE OF MOTION   Upper extremity ROM is within functional limits     STRENGTH ASSESSMENT  Upper extremity strength is within functional limits     ACTIVITIES OF DAILY LIVING ASSESSMENT  AM-PAC ‘6-Clicks’ Inpatient Daily Activity Short Form  How much help from another person does the patient currently need…  -   Putting on and taking off regular lower body clothing?: A Little  -   Bathing (including washing, rinsing, drying)?: A Little  -   Toileting, which includes using toilet, bedpan or urinal? : None  -   Putting on and taking off regular upper body clothing?: A Little  -   Taking care of personal grooming such as brushing teeth?: None  -   Eating meals?: None    AM-PAC Score:  Score: 21  Approx Degree of Impairment: 32.79%  Standardized Score (AM-PAC Scale): 44.27  CMS Modifier (G-Code): CJ    FUNCTIONAL ADL ASSESSMENT   Eating: independent  Grooming: setup assist  UB Dressing: min assist  LB Dressing: min assist  Toileting: setup assist    Skilled Therapy Provided: OT orders received and chart reviewed. RN contacted prior to start of care. Treatment coordinated w/ PT. Pt agreeable to participation in therapy. Gait belt used during dynamic activity. Pt received in bed, alert and oriented. On initial contact pt transferred supine<>sit at eob w/ supervision. Pt performing sit<>stand bed/toilet/chair w/ cga. Pt ambulating in the room w/ rw and min a. Activity toleratnce limited by fatigue and feeling sob. Pt completed tolieting task w/ set up assist      At end of session pt remaining up in chair w/ all needs in reach and alarm on;daughter at bedside. RN aware of pt's status and performance in therapy      EDUCATION  PROVIDED  Patient Education : Role of Occupational Therapy; Plan of Care; Functional Transfer Techniques; Fall Prevention; Energy Conservation; Posture/Positioning  Patient's Response to Education: Verbalized Understanding  Family/Caregiver's Response to Education: Verbalized Understanding    The patient's Approx Degree of Impairment: 32.79% has been calculated based on documentation in the [facility] '6 clicks' Inpatient Daily Activity Short Form.  Research supports that patients with this level of impairment may benefit from return to home w/ HH.  Final disposition will be made by interdisciplinary medical team.     Patient End of Session: Up in chair, Needs met, Call light within reach, RN aware of session/findings, All patient questions and concerns addressed, Alarm set, Family present    OT Goals  Patients self stated goal is: unstated     Patient will complete functional transfer with supervision  Comment:     Patient will complete dressing task with set up  Comment:     Patient will tolerate standing for 5 minutes in prep for adls with rw and supervision   Comment:              Goals  on: 25  Frequency: 3-5x/week    Patient Evaluation Complexity Level:   Occupational Profile/Medical History MODERATE - Expanded review of history including review of medical or therapy record   Specific performance deficits impacting engagement in ADL/IADL MODERATE  3 - 5 performance deficits   Client Assessment/Performance Deficits MODERATE - Comorbidities and min to mod modifications of tasks    Clinical Decision Making MODERATE - Analysis of occupational profile, detailed assessments, several treatment options    Overall Complexity MODERATE     Therapeutic Activity: 20 minutes

## 2025-03-25 NOTE — PHYSICAL THERAPY NOTE
PHYSICAL THERAPY EVALUATION - INPATIENT     Room Number: 552/552-A  Evaluation Date: 3/25/2025  Type of Evaluation: Initial   Physician Order: PT Eval and Treat    Presenting Problem: Sever anemia (Hgb 7.9 at timeof PT eval)  Co-Morbidities : GI bleed, aortic disection, COPD, Anemia chronic  Reason for Therapy: Mobility Dysfunction and Discharge Planning    PHYSICAL THERAPY ASSESSMENT   Patient is a 89 year old male admitted 3/24/2025 for Sever anemia, weakness.  Prior to admission, patient's baseline is Mod indep with a RW with all mobility and ADL's in the home lives with daughter assist.  Patient is currently functioning below baseline with bed mobility, transfers, gait, stair negotiation, maintaining seated position, standing prolonged periods, and performing household tasks.  Patient is requiring minimal assist as a result of the following impairments: decreased functional strength, decreased endurance/aerobic capacity, pain, impaired standing balance, impaired coordination, impaired motor planning, decreased muscular endurance, difficulty maintaining precautions, and medical status.  Physical Therapy will continue to follow for duration of hospitalization.    Patient will benefit from continued skilled PT Services at discharge to promote prior level of function.  Anticipate patient will return home with home health PT.    PLAN DURING HOSPITALIZATION  Nursing Mobility Recommendation : 1 Assist  PT Device Recommendation: Rolling walker  PT Treatment Plan: Bed mobility, Body mechanics, Endurance, Energy conservation, Patient education, Gait training, Strengthening, Transfer training, Balance training, Range of motion  Rehab Potential : Good  Frequency (Obs): 3-5x/week     PHYSICAL THERAPY MEDICAL/SOCIAL HISTORY   History related to current admission: César Copeland is a 89 year old male with history of CAD, A-fib, HFrEF, HTN, HLD, thalassemia trait, BPH and others who was referred to the ED today for evaluation  of low hemoglobin.  Patient was seen earlier at PCP office where he reported dizziness especially with standing and also blurry vision.  He was sent for routine labs that came back with hemoglobin 6.1.  Referred to the ED  On arrival to ED, hemoglobin 6.4.  Denies any abnormal bleeding.  No black or bloody stools.  No abdominal pain.  No chest pain or shortness of breath.  He is on chronic anticoagulation with Eliquis.  Also on aspirin.  Denies NSAID use.  Vital stable.  Labs stable except hemoglobin 6.4     Problem List  Principal Problem:    Severe anemia  Active Problems:    Gastrointestinal hemorrhage, unspecified gastrointestinal hemorrhage type      HOME SITUATION  Type of Home: House  Home Layout: Able to live on main level, One level  Stairs to Enter : 6   Railing: Yes              Lives With: Daughter, Family              Prior Level of [place]: Lives with supportive daughter and family Mod indep with RW with all mobility and ADL's.     SUBJECTIVE  I feel ok just a little weakness and short of breath with movement.     PHYSICAL THERAPY EXAMINATION   OBJECTIVE  Precautions: Bed/chair alarm  Fall Risk: Standard fall risk    WEIGHT BEARING RESTRICTION       PAIN ASSESSMENT  Ratin          COGNITION  Overall Cognitive Status:  WFL - within functional limits    RANGE OF MOTION AND STRENGTH ASSESSMENT  Upper extremity ROM and strength are within functional limits   Lower extremity ROM is within functional limits   Lower extremity strength is within functional limits 5/5    BALANCE  Static Sitting: Fair +  Dynamic Sitting: Fair  Static Standing: Fair -  Dynamic Standing: Fair -    ACTIVITY TOLERANCE  Pulse: 79     O2 WALK  Oxygen Therapy  SPO2% on Oxygen at Rest: 97  At rest oxygen flow (liters per minute): 1  SPO2% Ambulation on Oxygen: 96  Ambulation oxygen flow (liters per minute): 1    AM-PAC '6-Clicks' INPATIENT SHORT FORM - BASIC MOBILITY  How much difficulty does the patient currently  have...  Patient Difficulty: Turning over in bed (including adjusting bedclothes, sheets and blankets)?: None   Patient Difficulty: Sitting down on and standing up from a chair with arms (e.g., wheelchair, bedside commode, etc.): None   Patient Difficulty: Moving from lying on back to sitting on the side of the bed?: A Little   How much help from another person does the patient currently need...   Help from Another: Moving to and from a bed to a chair (including a wheelchair)?: A Little   Help from Another: Need to walk in hospital room?: A Little   Help from Another: Climbing 3-5 steps with a railing?: A Little     AM-PAC Score:  Raw Score: 20   Approx Degree of Impairment: 35.83%   Standardized Score (AM-PAC Scale): 47.67   CMS Modifier (G-Code):     FUNCTIONAL ABILITY STATUS  Functional Mobility/Gait Assessment  Gait Assistance: Minimum assistance  Distance (ft): 20  Assistive Device: Rolling walker  Pattern: Shuffle (unsteady gait decreased step length and radha)  Rolling: supervision  Supine to Sit: supervision  Sit to Supine: minimal assist  Sit to Stand: minimal assist    Exercise/Education Provided:  Bed mobility  Body mechanics  Energy conservation  Functional activity tolerated  Gait training  Posture  ROM  Strengthening  Lower therapeutic exercise:  Ankle pumps  Heel slides  LAQ    Skilled Therapy Provided: Pt ed with bed mobility and transfers with SBA for bed mobility and Min A with transfers with a RW. Pt ed with amb 20' with RW with shuffling unsteady gait. Pt assisted to the toilet and up to a chair for LE therex x 10 reps for LE strength and ROM. Pt is on track for a return home with [facility] PT with family assist as medical progress allows.     The patient's Approx Degree of Impairment: 35.83% has been calculated based on documentation in the [facility] '6 clicks' Inpatient Basic Mobility Short Form.  Research supports that patients with this level of impairment may benefit from [facility] PT with family  assist.    Final disposition will be made by interdisciplinary medical team.    Patient End of Session: Up in chair, With  staff, Needs met, Call light within reach, RN aware of session/findings, All patient questions and concerns addressed, Alarm set    CURRENT GOALS  Goals to be met by: 4/15/2025  Patient Goal Patient's self-stated goal is: Return home    Goal #1 Patient is able to demonstrate supine - sit EOB @ level: independent     Goal #1   Current Status    Goal #2 Patient is able to demonstrate transfers Sit to/from Stand at assistance level: modified independent with walker - rolling     Goal #2  Current Status    Goal #3 Patient is able to ambulate 300' feet with assist device: walker - rolling at assistance level: modified independent   Goal #3   Current Status    Goal #4 Patient will negotiate 6 stairs/one curb w/ assistive device and supervision   Goal #4   Current Status    Goal #5 Patient to demonstrate independence with home activity/exercise instructions provided to patient in preparation for discharge.   Goal #5   Current Status    Goal #6    Goal #6  Current Status      Patient Evaluation Complexity Level:  History Low - no personal factors and/or co-morbidities   Examination of body systems Low -  addressing 1-2 elements   Clinical Presentation Low- Stable   Clinical Decision Making  Low Complexity     Gait Training: 15 minutes

## 2025-03-25 NOTE — ED PROVIDER NOTES
Patient Seen in: [facility] Emergency Department    History     Chief Complaint   Patient presents with    Abnormal Labs    Difficulty Breathing       HPI    The patient presents to the ED complaining of generalized weakness and shortness of breath with exertion over the past week or 2.  He denies any chest pain, fevers chills or other complaints.  On Eliquis for A-fib.    History reviewed.   Past Medical History:    Arthritis    G    Back problem    Blood disorder    BPH (benign prostatic hyperplasia)    Calculus of kidney    Cataract    Coronary atherosclerosis    Essential hypertension    Hearing impairment    hearing aids    History of asbestos exposure    Hyperlipidemia    Lipid screening    Lower GI bleed    Osteoarthritis    Osteoarthritis, shoulder    PONV (postoperative nausea and vomiting)    S/P TURP (transurethral resection of prostate)    Thalassemia trait    Visual impairment       History reviewed.   Past Surgical History:   Procedure Laterality Date    Back surgery      C-SPINE SURGERY     Cataract      Colonoscopy      Hernia surgery  2006    Knee replacement surgery      Laminectomy,lumbar  1960    Shoulder arthroscopy  2006    Spine surgery procedure unlisted      Total knee replacement Bilateral          Medications :  Prescriptions Prior to Admission[1]     Family History   Problem Relation Age of Onset    Cancer Father 67        gastric    Stroke Mother 57    Cancer Brother     Cancer Brother        Smoking Status:   Social History     Socioeconomic History    Marital status:    Tobacco Use    Smoking status: Former     Current packs/day: 0.00     Types: Cigarettes     Quit date: 1971     Years since quittin.2    Smokeless tobacco: Never   Vaping Use    Vaping status: Never Used   Substance and Sexual Activity    Alcohol use: Yes     Alcohol/week: 2.0 standard drinks of alcohol     Types: 2 Glasses of wine per week     Comment: rarely    Drug use: Never   Other Topics  Concern    Caffeine Concern Yes     Comment: Coffee 3 cups daily       Constitutional and vital signs reviewed.      Social History and Family History elements reviewed from today, pertinent positives to the presenting problem noted.    Physical Exam     ED Triage Vitals [03/24/25 1906]   /39   Pulse 85   Resp 22   Temp 97.8 °F (36.6 °C)   Temp src Temporal   SpO2 98 %   O2 Device None (Room air)       All measures to prevent infection transmission during my interaction with the patient were taken. Handwashing was performed prior to and after the exam.  Stethoscope and any equipment used during my examination was cleaned with super sani-cloth germicidal wipes following the exam.     Physical Exam  Vitals and nursing note reviewed.   Constitutional:       General: He is not in acute distress.     Appearance: He is well-developed. He is not ill-appearing or toxic-appearing.   HENT:      Head: Normocephalic and atraumatic.   Eyes:      General:         Right eye: No discharge.         Left eye: No discharge.      Conjunctiva/sclera: Conjunctivae normal.      Comments: Pale conjunctiva   Neck:      Trachea: No tracheal deviation.   Cardiovascular:      Rate and Rhythm: Normal rate.   Pulmonary:      Effort: Pulmonary effort is normal. No respiratory distress.      Breath sounds: Normal breath sounds. No stridor.   Abdominal:      General: There is no distension.      Palpations: Abdomen is soft.   Musculoskeletal:         General: No deformity.   Skin:     General: Skin is warm and dry.   Neurological:      Mental Status: He is alert and oriented to person, place, and time.   Psychiatric:         Mood and Affect: Mood normal.         Behavior: Behavior normal.         ED Course        Labs Reviewed   CBC WITH DIFFERENTIAL WITH PLATELET - Abnormal; Notable for the following components:       Result Value    RBC 3.19 (*)     HGB 6.4 (*)     HCT 21.1 (*)     MCV 66.1 (*)     MCH 20.1 (*)     MCHC 30.3 (*)     RDW  19.0 (*)     Lymphocyte Absolute 0.93 (*)     Monocyte Absolute 1.22 (*)     All other components within normal limits   PROTHROMBIN TIME (PT) - Abnormal; Notable for the following components:    PT 20.9 (*)     INR 1.70 (*)     All other components within normal limits   IRON AND TIBC - Abnormal; Notable for the following components:    Iron 10 (*)     % Saturation 3 (*)     All other components within normal limits   FERRITIN - Abnormal; Notable for the following components:    Ferritin 21 (*)     All other components within normal limits   HEMOGLOBIN   HEMOGLOBIN   HEMOGLOBIN   HEMOGLOBIN   TYPE AND SCREEN    Narrative:     The following orders were created for panel order Type and screen.                  Procedure                               Abnormality         Status                                     ---------                               -----------         ------                                     ABORH (Blood Type)[[id number]]                               Final result                               Antibody Screen[[id number]]                                  Final result                                                 Please view results for these tests on the individual orders.   PREPARE RBC   ABORH (BLOOD TYPE)   ANTIBODY SCREEN   ABORH CONFIRMATION   RAINBOW DRAW LAVENDER   RAINBOW DRAW LIGHT GREEN   RAINBOW DRAW BLUE   RAINBOW DRAW GOLD   OCCULT BLOOD, STOOL     EKG    Rate, intervals and axes as noted on EKG Report.  Rate: Normal, 83 bpm  Rhythm: Sinus Rhythm  Reading: PVCs, nonspecific ST and T wave abnormality, abnormal EKG           As Interpreted by me    Imaging Results Available and Reviewed while in ED: No results found.  ED Medications Administered:   Medications   amiodarone (Pacerone) tab 100 mg (has no administration in time range)   atorvastatin (Lipitor) tab 20 mg (20 mg Oral Given 3/25/25 0007)   finasteride (Proscar) tab 5 mg (has no administration in time range)   tamsulosin  (Flomax) cap 0.4 mg (has no administration in time range)   dextrose 5%-sodium chloride 0.9% infusion ( Intravenous New Bag 3/25/25 0111)   acetaminophen (Tylenol Extra Strength) tab 500 mg (has no administration in time range)   pantoprazole (Protonix) 40 mg in sodium chloride 0.9% PF 10 mL IV push (has no administration in time range)   pantoprazole (Protonix) 40 mg in sodium chloride 0.9% PF 10 mL IV push (40 mg Intravenous Given 3/24/25 2128)         MDM     Vitals:    03/24/25 2328 03/24/25 2339 03/25/25 0010 03/25/25 0108   BP: 114/58  125/60 120/56   BP Location: Right arm  Right arm Right arm   Pulse: 73  73 74   Resp: 18  16 18   Temp: 97.9 °F (36.6 °C)  97 °F (36.1 °C) 98 °F (36.7 °C)   TempSrc: Oral  Oral Oral   SpO2: 94%  96% 94%   Weight:  55.8 kg       *I personally reviewed and interpreted all ED vitals.    Pulse Ox: 94%, Room air, Normal     Monitor Interpretation:   normal sinus rhythm as interpreted by me.  The cardiac monitor was ordered to monitor heart rate.    Differential Diagnosis/ Diagnostic Considerations: Dehydration, anemia, UTI, other    Complicating Factors: The patient already has does not have any pertinent problems on file. to contribute to the complexity of this ED evaluation.    Medical Decision Making  Patient presents to the ED with progressive weakness and shortness of breath with exertion.  Nondistressed on examination.  Laboratory testing with severe anemia and recent laboratory testing with slowly downtrending hemoglobin likely due to slow GI bleed.  Discussed need for blood transfusion which patient is agreeable with.  Will hold Eliquis -further anticoagulation will likely pose significant risk for the patient.  I discussed with Dr. Baker for admission, Dr. Peralta for GI consultation and [place] [facility] for consultation.  Will make for management.  Blood transfusion started in the ED.    Problems Addressed:  Gastrointestinal hemorrhage, unspecified gastrointestinal  hemorrhage type: acute illness or injury that poses a threat to life or bodily functions  Severe anemia: acute illness or injury that poses a threat to life or bodily functions    Amount and/or Complexity of Data Reviewed  Labs: ordered. Decision-making details documented in ED Course.  Discussion of management or test interpretation with external provider(s):  I discussed with Dr. Baker for admission, Dr. Peralta for GI consultation and [place] cardi[facility] for consultation.          Condition upon leaving the department: Stable    Disposition and Plan     Clinical Impression:  1. Severe anemia    2. Gastrointestinal hemorrhage, unspecified gastrointestinal hemorrhage type        Disposition:  Admit    Follow-up:  No follow-up provider specified.    Medications Prescribed:  Current Discharge Medication List          Hospital Problems       Present on Admission  Date Reviewed: 3/24/2025            ICD-10-CM Noted POA    * (Principal) Severe anemia D64.9 3/24/2025 Yes    Gastrointestinal hemorrhage, unspecified gastrointestinal hemorrhage type K92.2 3/24/2025 Unknown                       [1]   Medications Prior to Admission   Medication Sig Dispense Refill Last Dose/Taking    atorvastatin 20 MG Oral Tab Take 1 tablet (20 mg total) by mouth nightly.   3/23/2025 at  9:30 PM    amiodarone 100 MG Oral Tab Take 1 tablet (100 mg total) by mouth daily.   3/24/2025 at 10:00 AM    apixaban 2.5 MG Oral Tab Take 1 tablet (2.5 mg total) by mouth 2 (two) times daily. 60 tablet 0 3/24/2025 at 10:00 AM    dutasteride 0.5 MG Oral Cap Take 1 capsule (0.5 mg total) by mouth daily. 90 capsule 3 3/24/2025 at 10:00 AM    tamsulosin 0.4 MG Oral Cap Take 1 capsule (0.4 mg total) by mouth daily. Take 1/2 hour following the same meal each day 90 capsule 3 3/24/2025 at 10:00 AM    PANTOPRAZOLE 40 MG Oral Tab EC TAKE 1 TABLET DAILY 90 tablet 3 3/24/2025 at 10:00 AM    aspirin 81 MG Oral Chew Tab Chew 1 tablet (81 mg total) by mouth daily.    3/24/2025 at 10:00 AM    furosemide 20 MG Oral Tab Take 1 tablet (20 mg total) by mouth daily. (Patient not taking: Reported on 3/24/2025)   Not Taking    Potassium Chloride ER 10 MEQ Oral Tab CR Take 1 tablet (10 mEq total) by mouth daily. (Patient not taking: Reported on 3/24/2025)   Not Taking

## 2025-03-25 NOTE — CONSULTS
[facility]  part of [facility]    Cardiology Consultation    César Copeland Patient Status:  Inpatient    1930 MRN [id number]   Location [facility] 5SW/SE Attending Reginaldo Conner MD   Hosp Day # 1 PCP Jeff Anthony D'Amico,      Date of Admission:  3/24/2025  Date of Consult:  3/25/2025  Reason for Consultation:     History of Present Illness:   Patient is a 89 year old male with sig PMH/o chronic type B aortic dissection, paroxysmal AF, CAD, hypertension who presented with LH/dizziness and found to have anemia with Hg of 6.4. Further evaluation revealed a ferritin of 21. Pt has been on apixaban and bASA - has been adherent.   Assessment and Plan:   Fe-deficiency anemia  -not pursuing procedures, declined EGD    HFrEF, likely ischemic CM  -limited TTE with LVEF of 30-35% (2024)    AF, paroxysmal  -currently in SR  -holding AC due to anemia, possible GIB   -discussed with patient and daughter, plan is to not resume AC given increased bleeding risk  -appreciate GI management of anemia, as above    CAD, multivessel  -h/o severe multivessel CAD in , declined revascularization at the time  -currently no signs of ACS, hs-Tn and ECG negative for acute ischemia    Type B aortic dissection, chronic  -conservative management per [facility]    Past Medical History  Past Medical History:    Arthritis    G    Back problem    Blood disorder    BPH (benign prostatic hyperplasia)    Calculus of kidney    Cataract    Coronary atherosclerosis    Essential hypertension    Hearing impairment    hearing aids    History of asbestos exposure    Hyperlipidemia    Lipid screening    Lower GI bleed    Osteoarthritis    Osteoarthritis, shoulder    PONV (postoperative nausea and vomiting)    S/P TURP (transurethral resection of prostate)    Thalassemia trait    Visual impairment       Past Surgical History  Past Surgical History:   Procedure Laterality Date    Back surgery      C-SPINE SURGERY      Cataract      Colonoscopy      Hernia surgery  2006    Knee replacement surgery      Laminectomy,lumbar  1960    Shoulder arthroscopy  2006    Spine surgery procedure unlisted      Total knee replacement Bilateral        Family History  Family History   Problem Relation Age of Onset    Cancer Father 67        gastric    Stroke Mother 57    Cancer Brother     Cancer Brother        Social History  Pediatric History   Patient Parents    Not on file     Other Topics Concern     Service Not Asked    Blood Transfusions Not Asked    Caffeine Concern Yes     Comment: Coffee 3 cups daily    Occupational Exposure Not Asked    Hobby Hazards Not Asked    Sleep Concern Not Asked    Stress Concern Not Asked    Weight Concern Not Asked    Special Diet Not Asked    Back Care Not Asked    Exercise Not Asked    Bike Helmet Not Asked    Seat Belt Not Asked    Self-Exams Not Asked   Social History Narrative    Not on file           Current Medications:  Current Facility-Administered Medications   Medication Dose Route Frequency    sodium ferric gluconate (Ferrlecit) 125 mg in sodium chloride 0.9% 100mL IVPB premix  125 mg Intravenous Daily    amiodarone (Pacerone) tab 100 mg  100 mg Oral Daily    atorvastatin (Lipitor) tab 20 mg  20 mg Oral Nightly    finasteride (Proscar) tab 5 mg  5 mg Oral Daily    tamsulosin (Flomax) cap 0.4 mg  0.4 mg Oral Daily    dextrose 5%-sodium chloride 0.9% infusion   Intravenous Continuous    acetaminophen (Tylenol Extra Strength) tab 500 mg  500 mg Oral Q4H PRN    pantoprazole (Protonix) 40 mg in sodium chloride 0.9% PF 10 mL IV push  40 mg Intravenous Q12H     Medications Prior to Admission   Medication Sig    atorvastatin 20 MG Oral Tab Take 1 tablet (20 mg total) by mouth nightly.    amiodarone 100 MG Oral Tab Take 1 tablet (100 mg total) by mouth daily.    apixaban 2.5 MG Oral Tab Take 1 tablet (2.5 mg total) by mouth 2 (two) times daily.    dutasteride 0.5 MG Oral Cap Take 1 capsule (0.5 mg  total) by mouth daily.    tamsulosin 0.4 MG Oral Cap Take 1 capsule (0.4 mg total) by mouth daily. Take 1/2 hour following the same meal each day    PANTOPRAZOLE 40 MG Oral Tab EC TAKE 1 TABLET DAILY    aspirin 81 MG Oral Chew Tab Chew 1 tablet (81 mg total) by mouth daily.    furosemide 20 MG Oral Tab Take 1 tablet (20 mg total) by mouth daily. (Patient not taking: Reported on 3/24/2025)    Potassium Chloride ER 10 MEQ Oral Tab CR Take 1 tablet (10 mEq total) by mouth daily. (Patient not taking: Reported on 3/24/2025)       Allergies  Allergies[1]    Review of Systems:   ROS  10 point review of systems completed and negative except as noted.    Physical Exam:   Patient Vitals for the past 24 hrs:   BP Temp Temp src Pulse Resp SpO2 Weight   03/25/25 0923 124/65 98.2 °F (36.8 °C) Oral 66 18 95 % --   03/25/25 0830 -- -- -- 79 -- -- --   03/25/25 0609 116/58 98 °F (36.7 °C) Oral 68 18 98 % --   03/25/25 0508 131/61 98 °F (36.7 °C) Oral 71 16 98 % --   03/25/25 0403 114/57 98.1 °F (36.7 °C) Oral 66 16 95 % --   03/25/25 0315 120/63 98 °F (36.7 °C) Oral 70 18 93 % --   03/25/25 0304 132/73 97.6 °F (36.4 °C) Oral 81 16 96 % --   03/25/25 0108 120/56 98 °F (36.7 °C) Oral 74 18 94 % --   03/25/25 0010 125/60 97 °F (36.1 °C) Oral 73 16 96 % --   03/24/25 2339 -- -- -- -- -- -- 123 lb (55.8 kg)   03/24/25 2328 114/58 97.9 °F (36.6 °C) Oral 73 18 94 % --   03/24/25 2245 110/54 98.3 °F (36.8 °C) Oral 71 26 95 % --   03/24/25 2233 -- 98.1 °F (36.7 °C) Oral -- -- -- --   03/24/25 2231 101/58 -- -- 73 20 95 % --   03/24/25 2215 116/53 -- -- 72 16 93 % --   03/24/25 2145 107/55 -- -- 73 14 95 % --   03/24/25 2130 112/60 -- -- 74 16 96 % --   03/24/25 2115 111/55 -- -- 72 19 97 % --   03/24/25 2100 123/52 -- -- 74 14 92 % --   03/24/25 2045 119/60 -- -- 74 24 99 % --   03/24/25 2030 114/60 -- -- 71 16 97 % --   03/24/25 1906 115/39 97.8 °F (36.6 °C) Temporal 85 22 98 % 123 lb 8 oz (56 kg)       Intake/Output:   Last 3 shifts:    Intake/Output                   03/23/25 0700 - 03/24/25 0659 (Not Admitted) 03/24/25 0700 - 03/25/25 0659 03/25/25 0700 - 03/26/25 0659       Intake    P.O.  --  400  50    P.O. -- 400 50    I.V.  --  100.8  20    I.V. -- -- 20    Volume (mL) (dextrose 5%-sodium chloride 0.9% infusion) -- 100.8 --    Blood  --  954.3  --    Volume (Transfuse RBC) -- 308.3 --    Volume (Transfuse RBC) -- 646 --    Total Intake -- 1455.2 70       Output    Urine  --  --  --    Urine Occurrence -- 1 x --    Total Output -- -- --       Net I/O     -- 1455.2 70          Vent Settings:    Hemodynamic parameters (last 24 hours):    Scheduled Meds:    sodium ferric gluconate  125 mg Intravenous Daily    amiodarone  100 mg Oral Daily    atorvastatin  20 mg Oral Nightly    finasteride  5 mg Oral Daily    tamsulosin  0.4 mg Oral Daily    pantoprazole  40 mg Intravenous Q12H     Continuous Infusions:    dextrose 5%-sodium chloride 0.9% 50 mL/hr at 03/25/25 0611       Physical Exam:  General:No apparent distress.   HEENT: Normocephalic, anicteric sclera  Neck: No JVD, carotids 2+, no bruits.  Cardiac: Regular rate and rhythm. S1, S2 normal.   Lungs: Clear without wheezes, rales, rhonchi or dullness.    Abdomen: Soft, non-tender. No organosplenomegally, mass or rebound, BS-present.  Extremities: No edema.    Neurologic: Alert and oriented, normal affect. No focal defects  Skin: Warm and dry.     Results:   Laboratory Data:  Lab Results   Component Value Date    WBC 8.3 03/24/2025    HGB 7.9 (L) 03/25/2025    HGB 7.8 (L) 03/25/2025    HCT 21.1 (L) 03/24/2025    .0 03/24/2025    CREATSERUM 1.09 03/24/2025    BUN 24 (H) 03/24/2025     03/24/2025    K 4.0 03/24/2025     03/24/2025    CO2 24.0 03/24/2025     (H) 03/24/2025    CA 8.5 (L) 03/24/2025    ALB 3.5 03/24/2025    ALKPHO 109 03/24/2025    TP 6.0 03/24/2025     (H) 03/24/2025     (H) 03/24/2025    PTT 37.7 (H) 11/06/2024    INR 1.70 (H) 03/24/2025     PTP 20.9 (H) 03/24/2025    T4F 1.3 03/24/2025    TSH 9.233 (H) 03/24/2025    PSA 8.71 (H) 08/28/2019    MG 2.0 11/11/2024    PHOS 2.6 11/11/2024    B12 746 09/06/2018         Recent Labs   Lab 03/24/25  1421   *   BUN 24*   CREATSERUM 1.09   CA 8.5*      K 4.0      CO2 24.0     Recent Labs   Lab 03/24/25  1421 03/24/25  1952 03/25/25  0152 03/25/25  0734   RBC 3.20* 3.19*  --   --    HGB 6.1* 6.4* 6.7* 7.9*  7.8*   HCT 21.1* 21.1*  --   --    MCV 65.9* 66.1*  --   --    MCH 19.1* 20.1*  --   --    MCHC 28.9* 30.3*  --   --    RDW 18.7* 19.0*  --   --    NEPRELIM 5.88 5.72  --   --    WBC 8.4 8.3  --   --    .0 299.0  --   --        Recent Labs   Lab 03/24/25  1421   BNPML 247*       No results for input(s): \"TROP\", \"CK\" in the last 168 hours.    Imaging:  No results found.    Thank you for allowing me to participate in the care of your patient.    Tayo Miller, DO  [facility]         [1]   Allergies  Allergen Reactions    Codeine NAUSEA AND VOMITING     \"Nausea & Vomiting x2 days\"    Penicillins HIVES    Bactrim [Sulfamethoxazole W/Trimethoprim] RASH

## 2025-03-25 NOTE — H&P
[facility]  part of [facility]                                                                                                          History and Physical     César Copeland Patient Status:  Emergency    1930 MRN [id number]   Location [facility] EMERGENCY DEPARTMENT Attending Raul Booth MD   Hosp Day # 0 PCP Jeff Anthony D'Amico, DO       Chief Complaint: Anemia, Dizziness    Subjective:    César Copeland is a 89 year old male with history of CAD, A-fib, HFrEF, HTN, HLD, thalassemia trait, BPH and others who was referred to the ED today for evaluation of low hemoglobin.  Patient was seen earlier at PCP office where he reported dizziness especially with standing and also blurry vision.  He was sent for routine labs that came back with hemoglobin 6.1.  Referred to the ED  On arrival to ED, hemoglobin 6.4.  Denies any abnormal bleeding.  No black or bloody stools.  No abdominal pain.  No chest pain or shortness of breath.  He is on chronic anticoagulation with Eliquis.  Also on aspirin.  Denies NSAID use.  Vital stable.  Labs stable except hemoglobin 6.4    Chart reviewed: He follows with cardiology for history of CAD:  Last visit 2025: Continue amiodarone and Eliquis  Continue to monitor TSH, hypothyroidism due to amiodarone  Discontinue metoprolol due to fatigue in dizziness  If continues to have dizziness. Recommend decreasing amiodarone to 100 mg once a day  Follow-up with me in 6 months or sooner if needed       GI, cardiology consulted.  Patient to be admitted for further workup.    History/Other:      Past Medical History:  Past Medical History:    Arthritis    G    Back problem    Blood disorder    BPH (benign prostatic hyperplasia)    Calculus of kidney    Cataract    Coronary atherosclerosis    Essential hypertension    Hearing impairment    hearing aids    History of asbestos exposure    Hyperlipidemia    Lipid screening    Lower GI bleed     Osteoarthritis    Osteoarthritis, shoulder    PONV (postoperative nausea and vomiting)    S/P TURP (transurethral resection of prostate)    Thalassemia trait    Visual impairment        Past Surgical History:   Past Surgical History:   Procedure Laterality Date    Back surgery      C-SPINE SURGERY     Cataract      Colonoscopy      Hernia surgery  2006    Knee replacement surgery      Laminectomy,lumbar  1960    Shoulder arthroscopy  2006    Spine surgery procedure unlisted      Total knee replacement Bilateral        Social History:  reports that he quit smoking about 54 years ago. His smoking use included cigarettes. He has never used smokeless tobacco. He reports current alcohol use of about 2.0 standard drinks of alcohol per week. He reports that he does not use drugs.    Family History:   Family History   Problem Relation Age of Onset    Cancer Father 67        gastric    Stroke Mother 57    Cancer Brother     Cancer Brother        Allergies: Allergies[1]    Medications:  Medications Ordered Prior to Encounter[2]    Review of Systems:   A comprehensive 14 point review of systems was completed.    Pertinent positives and negatives noted in the HPI.    Objective:     /60   Pulse 74   Temp 97.8 °F (36.6 °C) (Temporal)   Resp 24   Wt 123 lb 8 oz (56 kg)   SpO2 99%   BMI 22.59 kg/m²   General: No acute distress.    HEENT: Normocephalic, atraumatic.  Neck: Supple.  Respiratory: Normal effort.  CTAB  Cardiovascular: S1, S2 regular.  Normal rate.   Abdomen: Soft, not tender or distended.  Neurologic: Alert, oriented x 3.  Nonfocal.  Musculoskeletal: No tenderness or deformity.  Extremities: Bilateral pedal edema  Psychiatric: Appropriate    Results:      Labs:  Labs Last 24 Hours:   BMP     CBC    Other     Na 142 Cl 109 BUN 24 Glu 108   Hb 6.4   PTT - Procal -   K 4.0 CO2 24.0 Cr 1.09   WBC 8.3 >< .0  INR - CRP -   Renal Lytes Endo    Hct 21.1   Trop - D dim -   eGFR - Ca 8.5 POC Gluc  -    LFT    pBNP 742 Lactic -   eGFR AA - PO4 - A1c -    APk 109 Prot 6.0  LDL -     Mg - TSH 9.233    T maryann 1.2 Alb 3.5          COVID-19 Lab Results    COVID-19  Lab Results   Component Value Date    COVID19 Detected (A) 12/04/2023    COVID19 Detected (A) 11/28/2022    COVID19 Not Detected 03/08/2022       Pro-Calcitonin  No results for input(s): \"PCT\" in the last 168 hours.    Cardiac  Recent Labs   Lab 03/24/25  1421   PBNP 742*       Creatinine Kinase  No results for input(s): \"CK\" in the last 168 hours.    Inflammatory Markers  No results for input(s): \"CRP\", \"DAREN\", \"LDH\", \"DDIMER\" in the last 168 hours.    Imaging: Imaging data reviewed in Epic.    Assessment & Plan:    Acute on chronic anemia, GI blood loss suspected  -Admit  -Transfuse to keep hemoglobin greater than 7  -PPI  -Hold aspirin, Eliquis  -Discussed with patient and daughter Kiersten at bedside, given his cardiac comorbidities, considering discontinuing Eliquis and holding off endoscopy  -Will discuss further with consultants in AM.  GI and cardiology on consult  -Check stool occult blood    CAD,  A-fib,  HFrEF/ischemic cardiomyopathy,  HTN,  HLD,  -Cardiology consulted  -Continue home medications as appropriate    Thalassemia trait,  BPH   -Home meds    Quality:  DVT Prophylaxis: SCD  CODE status: Full code  CHARO: TBD      Plan of care discussed with patient and family. Acknowledged understanding and agrees to plan. Also discussed with the ED physician.    High MDM  Time spent on this admission - examining patient, obtaining history, reviewing previous medical records, going over test results/imaging and discussing plan of care. More than 50% of the time was spent in counseling and/or coordination of care related to suspected blood loss anemia.   All questions answered.     Kandi Baker MD  3/24/2025                   [1]   Allergies  Allergen Reactions    Codeine NAUSEA AND VOMITING     \"Nausea & Vomiting x2 days\"    Penicillins HIVES     Bactrim [Sulfamethoxazole W/Trimethoprim] RASH   [2]   No current facility-administered medications on file prior to encounter.     Current Outpatient Medications on File Prior to Encounter   Medication Sig Dispense Refill    atorvastatin 20 MG Oral Tab Take 1 tablet (20 mg total) by mouth nightly.      amiodarone 100 MG Oral Tab Take 1 tablet (100 mg total) by mouth daily.      [] ciprofloxacin (CIPRO) 500 MG Oral Tab Take 0.5 tablets (250 mg total) by mouth 2 (two) times daily for 7 days. 7 tablet 0    apixaban 2.5 MG Oral Tab Take 1 tablet (2.5 mg total) by mouth 2 (two) times daily. 60 tablet 0    dutasteride 0.5 MG Oral Cap Take 1 capsule (0.5 mg total) by mouth daily. 90 capsule 3    tamsulosin 0.4 MG Oral Cap Take 1 capsule (0.4 mg total) by mouth daily. Take 1/2 hour following the same meal each day 90 capsule 3    PANTOPRAZOLE 40 MG Oral Tab EC TAKE 1 TABLET DAILY 90 tablet 3    aspirin 81 MG Oral Chew Tab Chew 1 tablet (81 mg total) by mouth daily.

## 2025-03-25 NOTE — ED INITIAL ASSESSMENT (HPI)
Pt arrives via wheelchair to ED for c/o abnormal labs (hgb 6.1). Pt had labs done earlier today. +SOB/weakness. Aox4, speaking in full sentences.

## 2025-03-25 NOTE — HISTORICAL OFFICE NOTE
Facility Logo [facility]  [address], [address] [place] [address]  [phone]      César Copeland  Progress Note  Demographics:  Name: César Copeland YOB: 1930  Age: 89, Male Medical Record No: [id number]  Visited Date/Time: 02/28/2025 02:00 PM    Chief Complaints  f/u  History of Present Illness  Patient is here for new patient appointment for me, he was recently in the hospital.  He has history of coronary artery disease with severe triple-vessel disease based on angiogram done in 2015, has been medically managed as per patient's recommendations and wish.  He has been complaining of more tired and fatigue with dizziness in the afternoon.  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  Social History  Smoking status Never smoked  Tobacco usage - No (Current non-smoker (finding))  Review of systems  Cardiovascular No history of Chest pain, Palpitations and Edema  Respiratory No history of SOB  Physical Examination  Constitutional 02 sat 90%  Vitals Left Arm Sitting  / 60 mmHg, Pulse rate 50 bpm, Regular, Height in 5', BMI: 23.8, Weight in 122 lbs (or) 55 kgs and BSA : 1.54 cc/m²  Head/Eyes/Ears/Nose/Mouth/Throat EOMI and PERRLA  Neck No carotid bruits and No JVD  Respiratory Unlabored, Lungs clear with normal breath sounds and Equal bilaterally  Cardiovascular Regular rhythm. Normal and normal S1 and S2    Gastrointestinal Abdomen soft and Non-tender  Gait Normal gait  Upper Extremities No clubbing, No cyanosis and No edema  Lower Extremities Pulses 2+ and equal bilaterally  Skin Warm and dry  Allergies  1.codeine \"Ingredient (IN)\"  [RxNorm:2670](Reaction:Upset stomach [Snomed:138898419], Severity:Moderate)  2.Penicillins [Snomed:2060535](Reaction:Rash [Snomed:[id number]], Severity:Moderate)  Medications (Info obtained by: Verbal)  1.amiodarone 200 mg tablet, Take 1 tablet orally once a day.  2.aspirin 81 MG Oral Chew Tab, Chew 1 tablet (81 mg total) by mouth daily.  3.atorvastatin 20 mg tablet, Take 1 tablet orally once in the evening.  4.dutasteride 0.5 MG Oral Cap, Take 1 capsule (0.5 mg total) by mouth daily.  5.Eliquis 2.5 mg tablet, Take 1 tablet orally 2 times a day.  6.furosemide 20 mg tablet, Take 1 tablet orally once a day.  7.metoprolol succinate ER 25 mg tablet,extended release 24 hr, Take 1 tablet orally once a day.  8.PANTOPRAZOLE 40 MG Oral Tab EC, TAKE 1 TABLET DAILY  9.potassium chloride ER 10 mEq tablet,extended release, 2 tablets daily for 3 days, then 1 tablet once a day thereafter  10.tamsulosin 0.4 MG Oral Cap, Take 1 capsule (0.4 mg total) by mouth daily. Take 1/2 hour following the same meal each day  11.atorvastatin 20 mg tablet, Take 1 tablet orally once in the evening.  12.Eliquis 2.5 mg tablet, Take 1 tablet orally 2 times a day.  13.furosemide 20 mg tablet, Take 1 tablet orally once a day.  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  Recommend decreasing amiodarone to 100 mg once a day  Follow-up with me in 6 months or sooner if needed  Future  appointments  1.Referral Visit - Jeff D'amico ([email or web address]) : (Today)  2.Follow up visit - Wilman Nuñez MD (6 Months)  Miscellaneous  1.Weight monitoring (regime/therapy)  Nurses documentation  refills:none  assistive devices:none  upcoming surgeries or procedures: none     Patient instructions  Continue amiodarone and Eliquis  Continue to monitor TSH, hypothyroidism due to amiodarone  Discontinue metoprolol due to fatigue in dizziness  If continues to have dizziness.  Recommend decreasing amiodarone to 100 mg once a day  Follow-up with me in 6 months or sooner if needed  Lab Details  BASIC METABOLIC PANEL (8)  11/26/2024 03:08:56 PM  GLUCOSE 95 70-99 mg/dL  F  SODIUM 141 136-145 mmol/L  F  POTASSIUM 3.9 3.5-5.1 mmol/L  F  CHLORIDE 106  mmol/L  F  CO2 25.0 21.0-32.0 mmol/L  F  ANION GAP 10 0-18 mmol/L  F  BUN 28 9-23 mg/dL H F  CREATININE 1.13 0.70-1.30 mg/dL  F  BUN/ CREAT RATIO 24.8 10.0-20.0 H F  CALCIUM 9.1 8.7-10.4 mg/dL  F  OSMOLALITY CALCULATED 297 275-295 mOsm/kg H F  E GFR CR 60 >=60 mL/min/1.73m2  F  FASTING PATIENT BMP ANSWER No   F  Care Providers: Wilman Nuñez MD, Ania Marques and Abbi Trammell  Electronically Authenticated by  Wilman Nuñez MD  02/28/2025 04:28:28 PM  Disclaimer: Components of this note were documented using voice recognition system and are subject to errors not corrected at proofreading. Contact the author of this note for any clarifications.

## 2025-03-25 NOTE — PLAN OF CARE
Problem: Patient Centered Care  Goal: Patient preferences are identified and integrated in the patient's plan of care  Description: Interventions:- Provide timely, complete, and accurate information to patient/family- Incorporate patient and family knowledge, values, beliefs, and cultural backgrounds into the planning and delivery of care- Encourage patient/family to participate in care and decision-making at the level they choose- Honor patient and family perspectives and choices  Outcome: Progressing    Problem: PAIN - ADULT  Goal: Verbalizes/displays adequate comfort level or patient's stated pain goal  Description: INTERVENTIONS:- Encourage pt to monitor pain and request assistance- Assess pain using appropriate pain scale- Administer analgesics based on type and severity of pain and evaluate response- Implement non-pharmacological measures as appropriate and evaluate response- Consider cultural and social influences on pain and pain management- Manage/alleviate anxiety- Utilize distraction and/or relaxation techniques- Monitor for opioid side effects- Notify MD/LIP if interventions unsuccessful or patient reports new pain- Anticipate increased pain with activity and pre-medicate as appropriate  Outcome: Progressing     Problem: SAFETY ADULT - FALL  Goal: Free from fall injury  Description: INTERVENTIONS:- Assess pt frequently for physical needs- Identify cognitive and physical deficits and behaviors that affect risk of falls.- [place] fall precautions as indicated by assessment.- Educate pt/family on patient safety including physical limitations- Instruct pt to call for assistance with activity based on assessment- Modify environment to reduce risk of injury- Provide assistive devices as appropriate- Consider OT/PT consult to assist with strengthening/mobility- Encourage toileting schedule  Outcome: Progressing     Problem: DISCHARGE PLANNING  Goal: Discharge to home or other facility with appropriate  resources  Description: INTERVENTIONS:- Identify barriers to discharge w/pt and caregiver- Include patient/family/discharge partner in discharge planning- Arrange for needed discharge resources and transportation as appropriate- Identify discharge learning needs (meds, wound care, etc)- Arrange for interpreters to assist at discharge as needed- Consider post-discharge preferences of patient/family/discharge partner- Complete POLST form as appropriate- Assess patient's ability to be responsible for managing their own health- Refer to Case Management Department for coordinating discharge planning if the patient needs post-hospital services based on physician/LIP order or complex needs related to functional status, cognitive ability or social support system  Outcome: Progressing     Problem: HEMATOLOGIC - ADULT  Goal: Maintains hematologic stability  Description: INTERVENTIONS- Assess for signs and symptoms of bleeding or hemorrhage- Monitor labs and vital signs for trends- Administer supportive blood products/factors, fluids and medications as ordered and appropriate- Administer supportive blood products/factors as ordered and appropriate  Outcome: Progressing

## 2025-03-26 LAB
ANION GAP SERPL CALC-SCNC: 9 MMOL/L (ref 0–18)
BASOPHILS # BLD AUTO: 0.05 X10(3) UL (ref 0–0.2)
BASOPHILS NFR BLD AUTO: 0.7 %
BLOOD TYPE BARCODE: 5100
BLOOD TYPE BARCODE: 5100
BUN BLD-MCNC: 18 MG/DL (ref 9–23)
BUN/CREAT SERPL: 19.8 (ref 10–20)
CALCIUM BLD-MCNC: 7.8 MG/DL (ref 8.7–10.4)
CHLORIDE SERPL-SCNC: 111 MMOL/L (ref 98–112)
CO2 SERPL-SCNC: 23 MMOL/L (ref 21–32)
CREAT BLD-MCNC: 0.91 MG/DL
DEPRECATED RDW RBC AUTO: 64.4 FL (ref 35.1–46.3)
EGFRCR SERPLBLD CKD-EPI 2021: 78 ML/MIN/1.73M2 (ref 60–?)
EOSINOPHIL # BLD AUTO: 0.33 X10(3) UL (ref 0–0.7)
EOSINOPHIL NFR BLD AUTO: 4.6 %
ERYTHROCYTE [DISTWIDTH] IN BLOOD BY AUTOMATED COUNT: 25.2 % (ref 11–15)
GLUCOSE BLD-MCNC: 98 MG/DL (ref 70–99)
HCT VFR BLD AUTO: 26.6 %
HGB BLD-MCNC: 8.1 G/DL
IMM GRANULOCYTES # BLD AUTO: 0.05 X10(3) UL (ref 0–1)
IMM GRANULOCYTES NFR BLD: 0.7 %
LYMPHOCYTES # BLD AUTO: 0.62 X10(3) UL (ref 1–4)
LYMPHOCYTES NFR BLD AUTO: 8.7 %
MCH RBC QN AUTO: 22.1 PG (ref 26–34)
MCHC RBC AUTO-ENTMCNC: 30.5 G/DL (ref 31–37)
MCV RBC AUTO: 72.5 FL
MONOCYTES # BLD AUTO: 0.95 X10(3) UL (ref 0.1–1)
MONOCYTES NFR BLD AUTO: 13.3 %
NEUTROPHILS # BLD AUTO: 5.14 X10 (3) UL (ref 1.5–7.7)
NEUTROPHILS # BLD AUTO: 5.14 X10(3) UL (ref 1.5–7.7)
NEUTROPHILS NFR BLD AUTO: 72 %
OSMOLALITY SERPL CALC.SUM OF ELEC: 298 MOSM/KG (ref 275–295)
PLATELET # BLD AUTO: 270 10(3)UL (ref 150–450)
PLATELET MORPHOLOGY: NORMAL
POTASSIUM SERPL-SCNC: 3.5 MMOL/L (ref 3.5–5.1)
POTASSIUM SERPL-SCNC: 5 MMOL/L (ref 3.5–5.1)
RBC # BLD AUTO: 3.67 X10(6)UL
SODIUM SERPL-SCNC: 143 MMOL/L (ref 136–145)
UNIT VOLUME: 350 ML
UNIT VOLUME: 350 ML
WBC # BLD AUTO: 7.1 X10(3) UL (ref 4–11)

## 2025-03-26 PROCEDURE — 99233 SBSQ HOSP IP/OBS HIGH 50: CPT | Performed by: INTERNAL MEDICINE

## 2025-03-26 RX ORDER — POTASSIUM CHLORIDE 1500 MG/1
40 TABLET, EXTENDED RELEASE ORAL EVERY 4 HOURS
Status: COMPLETED | OUTPATIENT
Start: 2025-03-26 | End: 2025-03-26

## 2025-03-26 RX ORDER — FUROSEMIDE 20 MG/1
20 TABLET ORAL DAILY
Status: DISCONTINUED | OUTPATIENT
Start: 2025-03-26 | End: 2025-03-27

## 2025-03-26 RX ORDER — POTASSIUM CHLORIDE 750 MG/1
10 TABLET, EXTENDED RELEASE ORAL DAILY
Status: DISCONTINUED | OUTPATIENT
Start: 2025-03-26 | End: 2025-03-26

## 2025-03-26 RX ORDER — POTASSIUM CHLORIDE 750 MG/1
10 TABLET, EXTENDED RELEASE ORAL DAILY
Status: DISCONTINUED | OUTPATIENT
Start: 2025-03-27 | End: 2025-03-27

## 2025-03-26 NOTE — CM/SW NOTE
03/26/25 1500   CM/SW Referral Data   Referral Source Social Work (self-referral)   Reason for Referral Discharge planning   Informant Patient;Daughter   Medical Hx   Does patient have an established PCP? Yes   Significant Past Medical/Mental Health Hx CAD, Afib, HTN, HLD   Patient Info   Patient's Current Mental Status at Time of Assessment Alert;Oriented   Patient Communication Issues Hearing impairment   Patient's Home Environment House   Number of Levels in Home 1   Number of Stair in Home 0   Patient lives with Alone   Patient Status Prior to Admission   Independent with ADLs and Mobility Yes   Services in place prior to admission DME/Supplies at home   Type of DME/Supplies Wheeled Walker   Discharge Needs   Anticipated D/C needs Home health care   Choice of Post-Acute Provider   List of appropriate post-acute services provided to patient/family with quality data No - Declined list   Patient declines recommended services Yes     Social work was able to meet with the patient and his daughter Staci at bedside regarding discharge planning.    The patient lives in a 1 level home alone.  The patient is independent with all ADLs at baseline and has family for support if needed.  The patient owns a walker but uses it only outside of the home.     Social work advised the patient and his daughter that the Anticipated therapy need: Home with Home Healthcare.  The patient is declining all home health services at this time.  The patient states he had home health before after a previous admission and it did nothing for him.    The patient and his daughter have no questions or concerns at this time.    SW/CM to remain available for support and/or discharge planning.     Alanna Pacheco MSW, LSW  Discharge Planner [id number]

## 2025-03-26 NOTE — PROGRESS NOTES
Progress Note  Eleazarnorma Copeland Patient Status:  Inpatient    1930 MRN [id number]   Location [facility] 5SW/SE Attending Reginaldo Conner MD   Hosp Day # 2 PCP Jeff Anthony D'Amico, DO     Subjective:  Hgb stable ~8  States occasional sob with exertion which is his baseline    Objective:  /64 (BP Location: Right arm)   Pulse 72   Temp 98.1 °F (36.7 °C) (Oral)   Resp 16   Wt 123 lb (55.8 kg)   SpO2 91%   BMI 22.50 kg/m²     Telemetry: SR    Intake/Output:    Intake/Output Summary (Last 24 hours) at 3/26/2025 0751  Last data filed at 3/26/2025 0600  Gross per 24 hour   Intake 1370 ml   Output --   Net 1370 ml     Last 3 Weights   25 2339 123 lb (55.8 kg)   25 1906 123 lb 8 oz (56 kg)   25 1312 124 lb (56.2 kg)   24 1225 122 lb 6.4 oz (55.5 kg)     Labs:  Recent Labs   Lab 25  1421 25  0610   * 98   BUN 24* 18   CREATSERUM 1.09 0.91   EGFRCR 63 78   CA 8.5* 7.8*    143   K 4.0 3.5    111   CO2 24.0 23.0     Recent Labs   Lab 25  1421 25  1952 25  0152 25  0734 25  0610   RBC 3.20* 3.19*  --   --  3.67*   HGB 6.1* 6.4* 6.7* 7.9*  7.8* 8.1*   HCT 21.1* 21.1*  --   --  26.6*   MCV 65.9* 66.1*  --   --  72.5*   MCH 19.1* 20.1*  --   --  22.1*   MCHC 28.9* 30.3*  --   --  30.5*   RDW 18.7* 19.0*  --   --  25.2*   NEPRELIM 5.88 5.72  --   --  5.14   WBC 8.4 8.3  --   --  7.1   .0 299.0  --   --  270.0     No results for input(s): \"TROP\", \"TROPHS\", \"CK\" in the last 168 hours.  Lab Results   Component Value Date/Time    HDL 50 2024 09:12 AM    LDL 68 2024 09:12 AM    TRIG 108 2024 09:12 AM     No results found for: \"DDIMER\"  Lab Results   Component Value Date    TSH 9.233 (H) 2025     Review of Systems:   Constitutional: No fevers, chills, fatigue or night sweats.  Respiratory: Denies cough, wheezing or shortness of breath.  CV: Denies chest pain, palpitations, orthopnea, PND or  dizziness.  GI: No nausea, vomiting or diarrhea. No blood in stools.    Physical Exam:   General: Alert, cooperative, no distress, appears stated age.  Neck: no JVD.  Lungs: Clear to auscultation bilaterally.  Chest wall: No tenderness or deformity.  Heart: Regular rate and rhythm, S1, S2 normal, no murmur, click, rub or gallop.  Abdomen: Soft, non-tender. Bowel sounds normal. No masses,  No organomegaly.  Extremities: Extremities normal, atraumatic, no cyanosis, 1+ BLE edema, baseline per patient.  Pulses: 2+ and symmetric all extremities.  Neurologic: Grossly intact.    Medications:   potassium chloride  40 mEq Oral Q4H    sodium ferric gluconate  125 mg Intravenous Daily    amiodarone  100 mg Oral Daily    atorvastatin  20 mg Oral Nightly    finasteride  5 mg Oral Daily    tamsulosin  0.4 mg Oral Daily    pantoprazole  40 mg Intravenous Q12H      dextrose 5%-sodium chloride 0.9% 50 mL/hr at 03/26/25 0007     Assessment:    89 year old male with PMH chronic type B aortic dissection, PAF on amiodarone, CAD, HTN, HFrEF, iron deficiency anemia who presented with lightheadedness and dizziness and was found to be anemic with Hgb of 6.    Acute on chronic anemia  Hgb 6.1 on admission  -s/p 2u PRBC  -concern for AVM vs PUD vs malignancy  -GI states no plans for EGD, pt previously declined  -plan for 3 days IV iron  -PPI  -holding DOAC indefinitely     Paroxysmal atrial fibrillation   Maintaining sinus on amiodarone  -will stop eliquis indefinitely due to recurrent anemia  -LVEF 30-35% on 11/2024 echo  -CHADS-VASc= 5 for age, htn, CAD, HFrEF    CAD/multivessel disease  Ischemic Cardiomyopathy  S/p LHC in 2015 revealing multivessel disease with left main 40% stenosis, LAD 95%, 1st diag 60%, L circ mid 75%, distal 90%, RCA 65%  -pt declined surgical intervention   -asa, statin, not on ACE/ARB/ARNI due to hypotension, not on BB due to dizziness    Chronic HFrEF  LVEF 30-35% back in November, 2/2 ischemic  cardiomyopathy  -refused surgical intervention  -not on GDMT due to hypotension and side effects  -was on daily lasix 20mg at home but PTA notes state that he was holding per MD in attempt to \"gain weight\"  -BLE edema 1+, pt states this is baseline, otherwise appears compensated  -can resume home lasix dose to control edema  -not on SGLT2i, current UA abnormal    HTN  Historically hypotensive not allowing addition of GDMT  -BP currently stable 100-120s  -will resume home lasix    Hx Type B aortic dissection  -asa, statin, asa on hold with current anemia      Plan:  -stay off eliquis indefintely  -continue to hold asa, resume when okay per GI  -resume home lasix 20mg daily and KCl  -continue amiodarone, statin  -recommend stopping IV fluids with LE edema and on a general diet  -stable from cardiac standpoint, we will sign off, can follow up with Dr. Nuñez as OP      Plan of care discussed with patient, RN.        Marichuy Mcgrath, MSN, APN, FNP-BC, CCK  3/26/2025  7:51 AM  [phone] [place]  [phone] [place]        =======================================================  Note reviewed and labs reviewed.  Agree with above assessment and plan.  Appreciate patient and family input -> will discontinue further AC given his overall elevated risk for major bleeding.   Continue AAD with amiodarone to maintain SR.   Rest of plan as above.    I have personally performed the medical decision making in its entirety.   Tayo Miller, DO

## 2025-03-26 NOTE — PAYOR COMM NOTE
--------------  CONTINUED STAY REVIEW----REQUESTING ADDITIONAL DAY 3/26      Payor: [employer] MED[employer] ADV PPO  Subscriber #:  [id number]  Authorization Number: [id number]    Admit date: 3/24/25  Admit time: 11:26 PM    REVIEW DOCUMENTATION:       Date of Service: 3/26/2025  7:51 AM     Signed           Progress Note     Subjective:  Hgb stable ~8  States occasional sob with exertion which is his baseline     Objective:  /64 (BP Location: Right arm)   Pulse 72   Temp 98.1 °F (36.7 °C) (Oral)   Resp 16   Wt 123 lb (55.8 kg)   SpO2 91%   BMI 22.50 kg/m²      Telemetry: SR     Intake/Output:     Intake/Output Summary (Last 24 hours) at 3/26/2025 0751  Last data filed at 3/26/2025 0600      Gross per 24 hour   Intake 1370 ml   Output --   Net 1370 ml           Last 3 Weights   03/24/25 2339 123 lb (55.8 kg)   03/24/25 1906 123 lb 8 oz (56 kg)   03/24/25 1312 124 lb (56.2 kg)   12/06/24 1225 122 lb 6.4 oz (55.5 kg)      Labs:       Recent Labs   Lab 03/24/25  1421 03/26/25  0610   * 98   BUN 24* 18   CREATSERUM 1.09 0.91   EGFRCR 63 78   CA 8.5* 7.8*    143   K 4.0 3.5    111   CO2 24.0 23.0              Recent Labs   Lab 03/24/25  1421 03/24/25  1952 03/25/25  0152 03/25/25  0734 03/26/25  0610   RBC 3.20* 3.19*  --   --  3.67*   HGB 6.1* 6.4* 6.7* 7.9*  7.8* 8.1*   HCT 21.1* 21.1*  --   --  26.6*   MCV 65.9* 66.1*  --   --  72.5*   MCH 19.1* 20.1*  --   --  22.1*   MCHC 28.9* 30.3*  --   --  30.5*   RDW 18.7* 19.0*  --   --  25.2*   NEPRELIM 5.88 5.72  --   --  5.14   WBC 8.4 8.3  --   --  7.1   .0 299.0  --   --  270.0      No results for input(s): \"TROP\", \"TROPHS\", \"CK\" in the last 168 hours.        Lab Results   Component Value Date/Time     HDL 50 05/21/2024 09:12 AM     LDL 68 05/21/2024 09:12 AM     TRIG 108 05/21/2024 09:12 AM      No results found for: \"DDIMER\"        Lab Results   Component Value Date     TSH 9.233 (H) 03/24/2025      Review of Systems:   Constitutional: No  fevers, chills, fatigue or night sweats.  Respiratory: Denies cough, wheezing or shortness of breath.  CV: Denies chest pain, palpitations, orthopnea, PND or dizziness.  GI: No nausea, vomiting or diarrhea. No blood in stools.     Physical Exam:   General: Alert, cooperative, no distress, appears stated age.  Neck: no JVD.  Lungs: Clear to auscultation bilaterally.  Chest wall: No tenderness or deformity.  Heart: Regular rate and rhythm, S1, S2 normal, no murmur, click, rub or gallop.  Abdomen: Soft, non-tender. Bowel sounds normal. No masses,  No organomegaly.  Extremities: Extremities normal, atraumatic, no cyanosis, 1+ BLE edema, baseline per patient.  Pulses: 2+ and symmetric all extremities.  Neurologic: Grossly intact.     Medications:   potassium chloride  40 mEq Oral Q4H    sodium ferric gluconate  125 mg Intravenous Daily    amiodarone  100 mg Oral Daily    atorvastatin  20 mg Oral Nightly    finasteride  5 mg Oral Daily    tamsulosin  0.4 mg Oral Daily    pantoprazole  40 mg Intravenous Q12H       dextrose 5%-sodium chloride 0.9% 50 mL/hr at 03/26/25 0007      Assessment:     89 year old male with PMH chronic type B aortic dissection, PAF on amiodarone, CAD, HTN, HFrEF, iron deficiency anemia who presented with lightheadedness and dizziness and was found to be anemic with Hgb of 6.     Acute on chronic anemia  Hgb 6.1 on admission  -s/p 2u PRBC  -concern for AVM vs PUD vs malignancy  -GI states no plans for EGD, pt previously declined  -plan for 3 days IV iron  -PPI  -holding DOAC indefinitely      Paroxysmal atrial fibrillation   Maintaining sinus on amiodarone  -will stop eliquis indefinitely due to recurrent anemia  -LVEF 30-35% on 11/2024 echo  -CHADS-VASc= 5 for age, htn, CAD, HFrEF     CAD/multivessel disease  Ischemic Cardiomyopathy  S/p LHC in 2015 revealing multivessel disease with left main 40% stenosis, LAD 95%, 1st diag 60%, L circ mid 75%, distal 90%, RCA 65%  -pt declined surgical  intervention   -asa, statin, not on ACE/ARB/ARNI due to hypotension, not on BB due to dizziness     Chronic HFrEF  LVEF 30-35% back in November, 2/2 ischemic cardiomyopathy  -refused surgical intervention  -not on GDMT due to hypotension and side effects  -was on daily lasix 20mg at home but PTA notes state that he was holding per MD in attempt to \"gain weight\"  -BLE edema 1+, pt states this is baseline, otherwise appears compensated  -can resume home lasix dose to control edema  -not on SGLT2i, current UA abnormal     HTN  Historically hypotensive not allowing addition of GDMT  -BP currently stable 100-120s  -will resume home lasix     Hx Type B aortic dissection  -asa, statin, asa on hold with current anemia        Plan:  -stay off eliquis indefintely  -continue to hold asa, resume when okay per GI  -resume home lasix 20mg daily and KCl  -continue amiodarone, statin  -recommend stopping IV fluids with LE edema and on a general diet  -stable from cardiac standpoint, we will sign off, can follow up with Dr. Nuñez as OP        Plan of care discussed with patient, RN.           Marichuy Mcgrath, MSN, APN, FNP-BC, CCK  3/26/2025  7:51 AM                              MEDICATIONS ADMINISTERED IN LAST 1 DAY:  amiodarone ([place]one) tab 100 mg       Date Action Dose Route User    3/26/2025 0814 Given 100 mg Oral Marcos, Elsy Jones RN          atorvastatin (Lipitor) tab 20 mg       Date Action Dose Route User    3/25/2025 2051 Given 20 mg Oral Magda Anthony RN          dextrose 5%-sodium chloride 0.9% infusion       Date Action Dose Route User    3/26/2025 0007 New Bag (none) Intravenous Magda Anthony RN          finasteride (Proscar) tab 5 mg       Date Action Dose Route User    3/26/2025 0814 Given 5 mg Oral MarcosElsy RN          furosemide (Lasix) tab 20 mg       Date Action Dose Route User    3/26/2025 1031 Given 20 mg Oral Marcos, Elsy Jones RN          pantoprazole (Protonix) 40 mg in  sodium chloride 0.9% PF 10 mL IV push       Date Action Dose Route User    3/26/2025 0814 Given 40 mg Intravenous MarcosElsy roy RN    3/25/2025 2051 Given 40 mg Intravenous Magda Anthony RN          potassium chloride (Klor-Con M20) tab 40 mEq       Date Action Dose Route User    3/26/2025 1211 Given 40 mEq Oral MarcosElsy roy RN    3/26/2025 0819 Given 40 mEq Oral MarcosElsy RN          sodium ferric gluconate (Ferrlecit) 125 mg in sodium chloride 0.9% 100mL IVPB premix       Date Action Dose Route User    3/26/2025 0814 New Bag 125 mg Intravenous Marcos, Elsy Jones RN          tamsulosin (Flomax) cap 0.4 mg       Date Action Dose Route User    3/26/2025 0814 Given 0.4 mg Oral Elsy Rodríguez RN            Vitals (last day)       Date/Time Temp Pulse Resp BP SpO2 Weight O2 Device O2 Flow Rate (L/min) Who    03/26/25 1030 -- -- -- 135/70 -- -- -- -- JV    03/26/25 0806 98.2 °F (36.8 °C) 75 16 127/64 95 % -- None (Room air) -- JV    03/26/25 0505 98.1 °F (36.7 °C) 72 16 109/64 91 % -- None (Room air) -- IB    03/25/25 2043 98.2 °F (36.8 °C) 67 16 125/68 94 % -- None (Room air) -- IB    03/25/25 1626 98 °F (36.7 °C) 68 18 123/64 97 % -- None (Room air) -- GP    03/25/25 0923 98.2 °F (36.8 °C) 66 18 124/65 95 % -- Nasal cannula 1 L/min GP    03/25/25 0830 -- 79 -- -- -- -- -- -- DF    03/25/25 0609 98 °F (36.7 °C) 68 18 116/58 98 % -- Nasal cannula 2 L/min RB    03/25/25 0508 98 °F (36.7 °C) 71 16 131/61 98 % -- Nasal cannula 2 L/min RB    03/25/25 0403 98.1 °F (36.7 °C) 66 16 114/57 95 % -- -- -- RB    03/25/25 0315 98 °F (36.7 °C) 70 18 120/63 93 % -- -- -- RB    03/25/25 0304 97.6 °F (36.4 °C) 81 16 132/73 96 % -- -- -- RB    03/25/25 0108 98 °F (36.7 °C) 74 18 120/56 94 % -- None (Room air) -- RB    03/25/25 0010 97 °F (36.1 °C) 73 16 125/60 96 % -- None (Room air) -- RB          CIWA Scores (since admission)       None          Blood Transfusion Record       Product Unit  Status Volume Start End            Transfuse RBC       25  [id number]  [id number] Stopped 308.33 mL 03/25/25 0304 03/25/25 0609       25  [id number]  [id number] Completed 03/25/25 0312 646 mL 03/24/25 2233 03/25/25 0108

## 2025-03-26 NOTE — PLAN OF CARE
Problem: Patient Centered Care  Goal: Patient preferences are identified and integrated in the patient's plan of care  Description: Interventions:- What would you like us to know as we care for you? - Provide timely, complete, and accurate information to patient/family- Incorporate patient and family knowledge, values, beliefs, and cultural backgrounds into the planning and delivery of care- Encourage patient/family to participate in care and decision-making at the level they choose- Honor patient and family perspectives and choices  Outcome: Progressing     Problem: Patient/Family Goals  Goal: Patient/Family Long Term Goal  Description: Patient's Long Term Goal: Interventions:- - See additional Care Plan goals for specific interventions  Outcome: Progressing  Goal: Patient/Family Short Term Goal  Description: Patient's Short Term Goal: Interventions: - - See additional Care Plan goals for specific interventions  Outcome: Progressing     Problem: PAIN - ADULT  Goal: Verbalizes/displays adequate comfort level or patient's stated pain goal  Description: INTERVENTIONS:- Encourage pt to monitor pain and request assistance- Assess pain using appropriate pain scale- Administer analgesics based on type and severity of pain and evaluate response- Implement non-pharmacological measures as appropriate and evaluate response- Consider cultural and social influences on pain and pain management- Manage/alleviate anxiety- Utilize distraction and/or relaxation techniques- Monitor for opioid side effects- Notify MD/LIP if interventions unsuccessful or patient reports new pain- Anticipate increased pain with activity and pre-medicate as appropriate  Outcome: Progressing     Problem: RISK FOR INFECTION - ADULT  Goal: Absence of fever/infection during anticipated neutropenic period  Description: INTERVENTIONS- Monitor WBC- Administer growth factors as ordered- Implement neutropenic guidelines  Outcome: Progressing     Problem: SAFETY  ADULT - FALL  Goal: Free from fall injury  Description: INTERVENTIONS:- Assess pt frequently for physical needs- Identify cognitive and physical deficits and behaviors that affect risk of falls.- [place] fall precautions as indicated by assessment.- Educate pt/family on patient safety including physical limitations- Instruct pt to call for assistance with activity based on assessment- Modify environment to reduce risk of injury- Provide assistive devices as appropriate- Consider OT/PT consult to assist with strengthening/mobility- Encourage toileting schedule  Outcome: Progressing     Problem: DISCHARGE PLANNING  Goal: Discharge to home or other facility with appropriate resources  Description: INTERVENTIONS:- Identify barriers to discharge w/pt and caregiver- Include patient/family/discharge partner in discharge planning- Arrange for needed discharge resources and transportation as appropriate- Identify discharge learning needs (meds, wound care, etc)- Arrange for interpreters to assist at discharge as needed- Consider post-discharge preferences of patient/family/discharge partner- Complete POLST form as appropriate- Assess patient's ability to be responsible for managing their own health- Refer to Case Management Department for coordinating discharge planning if the patient needs post-hospital services based on physician/LIP order or complex needs related to functional status, cognitive ability or social support system  Outcome: Progressing     Problem: HEMATOLOGIC - ADULT  Goal: Maintains hematologic stability  Description: INTERVENTIONS- Assess for signs and symptoms of bleeding or hemorrhage- Monitor labs and vital signs for trends- Administer supportive blood products/factors, fluids and medications as ordered and appropriate- Administer supportive blood products/factors as ordered and appropriate  Outcome: Progressing  Goal: Free from bleeding injury  Description: (Example usage: patient with low  platelets)INTERVENTIONS:- Avoid intramuscular injections, enemas and rectal medication administration- Ensure safe mobilization of patient- Hold pressure on venipuncture sites to achieve adequate hemostasis- Assess for signs and symptoms of internal bleeding- Monitor lab trends- Patient is to report abnormal signs of bleeding to staff- Avoid use of toothpicks and dental floss- Use electric shaver for shaving- Use soft bristle tooth brush- Limit straining and forceful nose blowing  Outcome: Progressing

## 2025-03-26 NOTE — PHYSICAL THERAPY NOTE
PHYSICAL THERAPY TREATMENT NOTE - INPATIENT     Room Number: 552/552-A       Presenting Problem: Sever anemia (Hgb 7.9 at timeof PT eval)  Co-Morbidities : GI bleed, aortic disection, COPD, Anemia chronic    Problem List  Principal Problem:    Severe anemia  Active Problems:    Gastrointestinal hemorrhage, unspecified gastrointestinal hemorrhage type      PHYSICAL THERAPY ASSESSMENT   Patient demonstrates good  progress this session, goals  updated to reflect patient performance.      Patient is requiring minimal assist as a result of the following impairments: decreased functional strength, decreased endurance/aerobic capacity, pain, impaired standing balance, decreased muscular endurance, and medical status.     Patient continues to function below baseline with bed mobility, transfers, gait, maintaining seated position, standing prolonged periods, and performing household tasks.  Next session anticipate patient to progress bed mobility, transfers, gait, maintaining seated position, standing prolonged periods, and performing household tasks.  Physical Therapy will continue to follow patient for duration of hospitalization.    Patient continues to benefit from continued skilled PT services: at discharge to promote prior level of function and safety with additional support and return home with home health PT.    PLAN DURING HOSPITALIZATION  Nursing Mobility Recommendation : 1 Assist  PT Device Recommendation: Rolling walker  PT Treatment Plan: Bed mobility, Body mechanics, Endurance, Energy conservation, Patient education, Gait training, Range of motion, Strengthening, Transfer training, Balance training  Frequency (Obs): 3-5x/week     SUBJECTIVE  I feel like I may be going home soon once they get my Hgb worked out.     OBJECTIVE  Precautions: Bed/chair alarm    WEIGHT BEARING RESTRICTION       PAIN ASSESSMENT   Ratin  Location: R hip pain  Management Techniques: Activity promotion, Body mechanics, Relaxation,  Repositioning, Breathing techniques    BALANCE  Static Sitting: Good  Dynamic Sitting: Fair +  Static Standing: Fair -  Dynamic Standing: Fair -    ACTIVITY TOLERANCE                          O2 WALK       AM-PAC '6-Clicks' INPATIENT SHORT FORM - BASIC MOBILITY  How much difficulty does the patient currently have...  Patient Difficulty: Turning over in bed (including adjusting bedclothes, sheets and blankets)?: None   Patient Difficulty: Sitting down on and standing up from a chair with arms (e.g., wheelchair, bedside commode, etc.): None   Patient Difficulty: Moving from lying on back to sitting on the side of the bed?: A Little   How much help from another person does the patient currently need...   Help from Another: Moving to and from a bed to a chair (including a wheelchair)?: A Little   Help from Another: Need to walk in hospital room?: A Little   Help from Another: Climbing 3-5 steps with a railing?: A Little     AM-PAC Score:  Raw Score: 20   Approx Degree of Impairment: 35.83%   Standardized Score (AM-PAC Scale): 47.67   CMS Modifier (G-Code): CJ    FUNCTIONAL ABILITY STATUS  Functional Mobility/Gait Assessment  Gait Assistance: Minimum assistance  Distance (ft): 100  Assistive Device: Rolling walker  Pattern: R Decreased stance time, Shuffle (decreased step length and radha DJD of hips antalgic gait)  Rolling: supervision  Supine to Sit: supervision  Sit to Supine: contact guard assist  Sit to Stand: contact guard assist    Skilled Therapy Provided: Pt ed with bed mobility and transfers with RW. Pt ed with amb 100' with RW with Min A with decreased step length. 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 Pt ed with therex in the chair x 10 reps for LE ROM and strength. Pt is on track for a return home with [facility] PT with family assist as medical progress allows.     The patient's Approx Degree of Impairment: 35.83% has been calculated based on documentation in  the [facility] '6 clicks' Inpatient Daily Activity Short Form.  Research supports that patients with this level of impairment may benefit from [facility] PT with family assist.    Final disposition will be made by interdisciplinary medical team.    THERAPEUTIC EXERCISES  Lower Extremity Ankle pumps  Heel slides  LAQ     Position Sitting & Standing       Patient End of Session: Up in chair, With  staff, Needs met, Call light within reach, RN aware of session/findings, All patient questions and concerns addressed, Alarm set    CURRENT GOALS   Goals to be met by: 4/15/2025  Patient Goal Patient's self-stated goal is: Return home    Goal #1 Patient is able to demonstrate supine - sit EOB @ level: independent      Goal #1   Current Status  CGA   Goal #2 Patient is able to demonstrate transfers Sit to/from Stand at assistance level: modified independent with walker - rolling      Goal #2  Current Status  Min A with RW   Goal #3 Patient is able to ambulate 300' feet with assist device: walker - rolling at assistance level: modified independent   Goal #3   Current Status  Min A with ' decreased step length   Goal #4 Patient will negotiate 6 stairs/one curb w/ assistive device and supervision   Goal #4   Current Status  Ongoing   Goal #5 Patient to demonstrate independence with home activity/exercise instructions provided to patient in preparation for discharge.   Goal #5   Current Status  Ongoing     Gait Training: 15 minutes  Therapeutic Exercise: 10 minutes

## 2025-03-26 NOTE — PLAN OF CARE
Problem: Patient Centered Care  Goal: Patient preferences are identified and integrated in the patient's plan of care  Description: Interventions:- What would you like us to know as we care for you? From home alone - Provide timely, complete, and accurate information to patient/family- Incorporate patient and family knowledge, values, beliefs, and cultural backgrounds into the planning and delivery of care- Encourage patient/family to participate in care and decision-making at the level they choose- Honor patient and family perspectives and choices  Outcome: Progressing     Problem: PAIN - ADULT  Goal: Verbalizes/displays adequate comfort level or patient's stated pain goal  Description: INTERVENTIONS:- Encourage pt to monitor pain and request assistance- Assess pain using appropriate pain scale- Administer analgesics based on type and severity of pain and evaluate response- Implement non-pharmacological measures as appropriate and evaluate response- Consider cultural and social influences on pain and pain management- Manage/alleviate anxiety- Utilize distraction and/or relaxation techniques- Monitor for opioid side effects- Notify MD/LIP if interventions unsuccessful or patient reports new pain- Anticipate increased pain with activity and pre-medicate as appropriate  Outcome: Progressing     Problem: RISK FOR INFECTION - ADULT  Goal: Absence of fever/infection during anticipated neutropenic period  Description: INTERVENTIONS- Monitor WBC- Administer growth factors as ordered- Implement neutropenic guidelines  Outcome: Progressing     Problem: SAFETY ADULT - FALL  Goal: Free from fall injury  Description: INTERVENTIONS:- Assess pt frequently for physical needs- Identify cognitive and physical deficits and behaviors that affect risk of falls.- [place] fall precautions as indicated by assessment.- Educate pt/family on patient safety including physical limitations- Instruct pt to call for assistance with activity  based on assessment- Modify environment to reduce risk of injury- Provide assistive devices as appropriate- Consider OT/PT consult to assist with strengthening/mobility- Encourage toileting schedule  Outcome: Progressing     Problem: DISCHARGE PLANNING  Goal: Discharge to home or other facility with appropriate resources  Description: INTERVENTIONS:- Identify barriers to discharge w/pt and caregiver- Include patient/family/discharge partner in discharge planning- Arrange for needed discharge resources and transportation as appropriate- Identify discharge learning needs (meds, wound care, etc)- Arrange for interpreters to assist at discharge as needed- Consider post-discharge preferences of patient/family/discharge partner- Complete POLST form as appropriate- Assess patient's ability to be responsible for managing their own health- Refer to Case Management Department for coordinating discharge planning if the patient needs post-hospital services based on physician/LIP order or complex needs related to functional status, cognitive ability or social support system  Outcome: Progressing     Problem: HEMATOLOGIC - ADULT  Goal: Maintains hematologic stability  Description: INTERVENTIONS- Assess for signs and symptoms of bleeding or hemorrhage- Monitor labs and vital signs for trends- Administer supportive blood products/factors, fluids and medications as ordered and appropriate- Administer supportive blood products/factors as ordered and appropriate  Outcome: Progressing  Goal: Free from bleeding injury  Description: (Example usage: patient with low platelets)INTERVENTIONS:- Avoid intramuscular injections, enemas and rectal medication administration- Ensure safe mobilization of patient- Hold pressure on venipuncture sites to achieve adequate hemostasis- Assess for signs and symptoms of internal bleeding- Monitor lab trends- Patient is to report abnormal signs of bleeding to staff- Avoid use of toothpicks and dental floss-  Use electric shaver for shaving- Use soft bristle tooth brush- Limit straining and forceful nose blowing  Outcome: Progressing

## 2025-03-26 NOTE — PROGRESS NOTES
[facility]  part of [facility]     Hospitalist Progress Note     César Copeland Patient Status:  Inpatient    1930 MRN [id number]   Location [facility] 5SW/SE Attending Reginaldo Conner MD   Hosp Day # 2 PCP Jeff Anthony D'Amico, DO     Chief Complaint:   Chief Complaint   Patient presents with    Abnormal Labs    Difficulty Breathing        Subjective:     Patient seen sitting in chair.  No family at bedside.  Patient denies acute events overnight.  Patient denies further episodes of lightheadedness or dizziness.  Patient remains without signs of active bleeding.  Reports brown stool.    Objective:      Vital signs:  Vitals:    25 1626 25 2043 25 0505 25 0806   BP: 123/64 125/68 109/64 127/64   BP Location: Right arm Right arm Right arm Right arm   Pulse: 68 67 72 75   Resp: 18 16 16 16   Temp: 98 °F (36.7 °C) 98.2 °F (36.8 °C) 98.1 °F (36.7 °C) 98.2 °F (36.8 °C)   TempSrc: Oral Oral Oral Oral   SpO2: 97% 94% 91% 95%   Weight:           Intake/Output Summary (Last 24 hours) at 3/26/2025 0913  Last data filed at 3/26/2025 0600  Gross per 24 hour   Intake 1370 ml   Output --   Net 1370 ml           Physical Exam:    GENERAL: Thin male.  Awake and alert, in no acute distress.  HEART:  Regular rhythm, regular rate  LUNGS:  Air entry was minimally decreased.  No increased work of breathing or wheezes   ABDOMEN: Soft and non-tender.    PSYCHIATRIC: Normal mood    Diagnostic Data:    Labs:    Recent Labs   Lab 25  1421 25  1952 25  2335 25  0152 25  0734 25  0610   WBC 8.4 8.3  --   --   --  7.1   HGB 6.1* 6.4*  --  6.7* 7.9*  7.8* 8.1*   MCV 65.9* 66.1*  --   --   --  72.5*   .0 299.0  --   --   --  270.0   INR  --   --  1.70*  --   --   --        Recent Labs   Lab 25  1421 25  0610   * 98   BUN 24* 18   CREATSERUM 1.09 0.91   CA 8.5* 7.8*   ALB 3.5  --     143   K 4.0 3.5    111   CO2  24.0 23.0   ALKPHO 109  --    *  --    *  --    BILT 1.2*  --    TP 6.0  --            Estimated Creatinine Clearance: 38.3 mL/min (based on SCr of 0.91 mg/dL).    Recent Labs   Lab 03/24/25  2335   PTP 20.9*   INR 1.70*            COVID-19  Lab Results   Component Value Date    COVID19 Detected (A) 12/04/2023    COVID19 Detected (A) 11/28/2022    COVID19 Not Detected 03/08/2022       Pro-Calcitonin  No results for input(s): \"PCT\" in the last 168 hours.    Cardiac  Recent Labs   Lab 03/24/25  1421   PBNP 742*       Inflammatory Markers  Recent Labs   Lab 03/24/25  1952   DAREN 21*       Culture:  No results found for this visit on 03/24/25.    No results found.    EKG 12 Lead    Result Date: 3/25/2025  Sinus rhythm with frequent Premature ventricular complexes ST & T wave abnormality, consider inferior ischemia Abnormal ECG When compared with ECG of 05-NOV-2024 12:57, Premature ventricular complexes are now Present Premature atrial complexes are no longer Present ST no longer elevated in Lateral leads Nonspecific T wave abnormality, worse in Lateral leads QT has lengthened Confirmed by LADONNA LANDA, PATRICK ([id number]) on 3/25/2025 1:22:40 PM     Medications:    potassium chloride  40 mEq Oral Q4H    sodium ferric gluconate  125 mg Intravenous Daily    amiodarone  100 mg Oral Daily    atorvastatin  20 mg Oral Nightly    finasteride  5 mg Oral Daily    tamsulosin  0.4 mg Oral Daily    pantoprazole  40 mg Intravenous Q12H       Assessment & Plan:        Severe anemia  Symptomatic anemia  Acute on chronic anemia  -Initial concern for GI blood loss   -Hemoglobin 6.1 on admission  -Status post transfusion of PRBC with good response.  -Hemoglobin stabilizing, 8.1 today.  -No current signs of active bleeding noted  -Continue to monitor, transfuse to keep hemoglobin greater than 7  -Continue PPI  -Continue holding aspirin, Eliquis  -GI consulted, agree with PPI.  No plans for endoscopic evaluation at this time.   Recommend 3 days of IV iron.  Final dose on 3/27.    Paroxysmal Atrial Fibrillation  -Rates currently controlled  -Continue amiodarone  -Telemetry monitoring  -Cardiology on consult, recommend holding anticoagulation indefinitely.  Appreciate further recommendations       CAD  Chronic HFrEF/ischemic cardiomyopathy  -No acute signs of exacerbation noted  -Continuing home regimen  - Strict I&Os, Daily weights, Fluid restriction  -Cardiology on consult, appreciate further recommendations    HTN  - controlled  - CPM  - Monitor and adjust as needed    HLD  -Statin       Thalassemia trait  BPH   -Home meds      Plan of care discussed with patient at bedside . Discussed management/test result(s) with Rn and GI consultant    Quality:  DVT Prophylaxis: SCDs  CODE status: DNR  Estimated date of discharge: TBD  Discharge is dependent on: clinical stability    53 minutes spent discussing with other providers, examining patient, obtaining history, reviewing medical records, interpreting and communicating test results/imaging, ordering tests/medications, discussing plan of care and documenting information.      Reginaldo Conner MD          This note was prepared using Dragon Medical voice recognition dictation software. As a result errors may occur. When identified these errors have been corrected. While every attempt is made to correct errors during dictation discrepancies may still exist

## 2025-03-27 VITALS
SYSTOLIC BLOOD PRESSURE: 121 MMHG | HEART RATE: 72 BPM | DIASTOLIC BLOOD PRESSURE: 70 MMHG | WEIGHT: 123 LBS | TEMPERATURE: 98 F | RESPIRATION RATE: 18 BRPM | OXYGEN SATURATION: 92 % | BODY MASS INDEX: 23 KG/M2

## 2025-03-27 LAB
ANION GAP SERPL CALC-SCNC: 8 MMOL/L (ref 0–18)
BASOPHILS # BLD AUTO: 0.06 X10(3) UL (ref 0–0.2)
BASOPHILS NFR BLD AUTO: 0.7 %
BUN BLD-MCNC: 17 MG/DL (ref 9–23)
BUN/CREAT SERPL: 16.8 (ref 10–20)
CALCIUM BLD-MCNC: 7.9 MG/DL (ref 8.7–10.4)
CHLORIDE SERPL-SCNC: 110 MMOL/L (ref 98–112)
CO2 SERPL-SCNC: 24 MMOL/L (ref 21–32)
CREAT BLD-MCNC: 1.01 MG/DL
DEPRECATED RDW RBC AUTO: 63.9 FL (ref 35.1–46.3)
EGFRCR SERPLBLD CKD-EPI 2021: 69 ML/MIN/1.73M2 (ref 60–?)
EOSINOPHIL # BLD AUTO: 0.4 X10(3) UL (ref 0–0.7)
EOSINOPHIL NFR BLD AUTO: 4.5 %
ERYTHROCYTE [DISTWIDTH] IN BLOOD BY AUTOMATED COUNT: 26 % (ref 11–15)
GLUCOSE BLD-MCNC: 97 MG/DL (ref 70–99)
HCT VFR BLD AUTO: 26.2 %
HGB BLD-MCNC: 8.2 G/DL
IMM GRANULOCYTES # BLD AUTO: 0.11 X10(3) UL (ref 0–1)
IMM GRANULOCYTES NFR BLD: 1.2 %
LYMPHOCYTES # BLD AUTO: 0.76 X10(3) UL (ref 1–4)
LYMPHOCYTES NFR BLD AUTO: 8.5 %
MCH RBC QN AUTO: 22.3 PG (ref 26–34)
MCHC RBC AUTO-ENTMCNC: 31.3 G/DL (ref 31–37)
MCV RBC AUTO: 71.4 FL
MONOCYTES # BLD AUTO: 1.06 X10(3) UL (ref 0.1–1)
MONOCYTES NFR BLD AUTO: 11.9 %
NEUTROPHILS # BLD AUTO: 6.52 X10 (3) UL (ref 1.5–7.7)
NEUTROPHILS # BLD AUTO: 6.52 X10(3) UL (ref 1.5–7.7)
NEUTROPHILS NFR BLD AUTO: 73.2 %
OSMOLALITY SERPL CALC.SUM OF ELEC: 295 MOSM/KG (ref 275–295)
PLATELET # BLD AUTO: 255 10(3)UL (ref 150–450)
POTASSIUM SERPL-SCNC: 4.1 MMOL/L (ref 3.5–5.1)
RBC # BLD AUTO: 3.67 X10(6)UL
SODIUM SERPL-SCNC: 142 MMOL/L (ref 136–145)
WBC # BLD AUTO: 8.9 X10(3) UL (ref 4–11)

## 2025-03-27 PROCEDURE — 99239 HOSP IP/OBS DSCHRG MGMT >30: CPT | Performed by: INTERNAL MEDICINE

## 2025-03-27 RX ORDER — ASPIRIN 81 MG/1
81 TABLET, CHEWABLE ORAL DAILY
Status: DISCONTINUED | OUTPATIENT
Start: 2025-03-27 | End: 2025-03-27

## 2025-03-27 RX ORDER — FERROUS SULFATE 325(65) MG
325 TABLET, DELAYED RELEASE (ENTERIC COATED) ORAL
Qty: 30 TABLET | Refills: 0 | Status: SHIPPED | OUTPATIENT
Start: 2025-03-27

## 2025-03-27 NOTE — PLAN OF CARE
Problem: Patient Centered Care  Goal: Patient preferences are identified and integrated in the patient's plan of care  Description: Interventions:- What would you like us to know as we care for you? From home alone - Provide timely, complete, and accurate information to patient/family- Incorporate patient and family knowledge, values, beliefs, and cultural backgrounds into the planning and delivery of care- Encourage patient/family to participate in care and decision-making at the level they choose- Honor patient and family perspectives and choices  Outcome: Progressing     Problem: PAIN - ADULT  Goal: Verbalizes/displays adequate comfort level or patient's stated pain goal  Description: INTERVENTIONS:- Encourage pt to monitor pain and request assistance- Assess pain using appropriate pain scale- Administer analgesics based on type and severity of pain and evaluate response- Implement non-pharmacological measures as appropriate and evaluate response- Consider cultural and social influences on pain and pain management- Manage/alleviate anxiety- Utilize distraction and/or relaxation techniques- Monitor for opioid side effects- Notify MD/LIP if interventions unsuccessful or patient reports new pain- Anticipate increased pain with activity and pre-medicate as appropriate  Outcome: Progressing     Problem: RISK FOR INFECTION - ADULT  Goal: Absence of fever/infection during anticipated neutropenic period  Description: INTERVENTIONS- Monitor WBC- Administer growth factors as ordered- Implement neutropenic guidelines  Outcome: Progressing     Problem: SAFETY ADULT - FALL  Goal: Free from fall injury  Description: INTERVENTIONS:- Assess pt frequently for physical needs- Identify cognitive and physical deficits and behaviors that affect risk of falls.- [place] fall precautions as indicated by assessment.- Educate pt/family on patient safety including physical limitations- Instruct pt to call for assistance with activity  based on assessment- Modify environment to reduce risk of injury- Provide assistive devices as appropriate- Consider OT/PT consult to assist with strengthening/mobility- Encourage toileting schedule  Outcome: Progressing     Problem: DISCHARGE PLANNING  Goal: Discharge to home or other facility with appropriate resources  Description: INTERVENTIONS:- Identify barriers to discharge w/pt and caregiver- Include patient/family/discharge partner in discharge planning- Arrange for needed discharge resources and transportation as appropriate- Identify discharge learning needs (meds, wound care, etc)- Arrange for interpreters to assist at discharge as needed- Consider post-discharge preferences of patient/family/discharge partner- Complete POLST form as appropriate- Assess patient's ability to be responsible for managing their own health- Refer to Case Management Department for coordinating discharge planning if the patient needs post-hospital services based on physician/LIP order or complex needs related to functional status, cognitive ability or social support system  Outcome: Progressing     Problem: HEMATOLOGIC - ADULT  Goal: Maintains hematologic stability  Description: INTERVENTIONS- Assess for signs and symptoms of bleeding or hemorrhage- Monitor labs and vital signs for trends- Administer supportive blood products/factors, fluids and medications as ordered and appropriate- Administer supportive blood products/factors as ordered and appropriate  Outcome: Progressing  Goal: Free from bleeding injury  Description: (Example usage: patient with low platelets)INTERVENTIONS:- Avoid intramuscular injections, enemas and rectal medication administration- Ensure safe mobilization of patient- Hold pressure on venipuncture sites to achieve adequate hemostasis- Assess for signs and symptoms of internal bleeding- Monitor lab trends- Patient is to report abnormal signs of bleeding to staff- Avoid use of toothpicks and dental floss-  Use electric shaver for shaving- Use soft bristle tooth brush- Limit straining and forceful nose blowing  Outcome: Progressing

## 2025-03-27 NOTE — PLAN OF CARE
Problem: Patient Centered Care  Goal: Patient preferences are identified and integrated in the patient's plan of care  Description: Interventions:- Provide timely, complete, and accurate information to patient/family- Incorporate patient and family knowledge, values, beliefs, and cultural backgrounds into the planning and delivery of care- Encourage patient/family to participate in care and decision-making at the level they choose- Honor patient and family perspectives and choices  Outcome: Adequate for Discharge     Problem: PAIN - ADULT  Goal: Verbalizes/displays adequate comfort level or patient's stated pain goal  Description: INTERVENTIONS:- Encourage pt to monitor pain and request assistance- Assess pain using appropriate pain scale- Administer analgesics based on type and severity of pain and evaluate response- Implement non-pharmacological measures as appropriate and evaluate response- Consider cultural and social influences on pain and pain management- Manage/alleviate anxiety- Utilize distraction and/or relaxation techniques- Monitor for opioid side effects- Notify MD/LIP if interventions unsuccessful or patient reports new pain- Anticipate increased pain with activity and pre-medicate as appropriate  Outcome: Adequate for Discharge     Problem: RISK FOR INFECTION - ADULT  Goal: Absence of fever/infection during anticipated neutropenic period  Description: INTERVENTIONS- Monitor WBC- Administer growth factors as ordered- Implement neutropenic guidelines  Outcome: Adequate for Discharge     Problem: SAFETY ADULT - FALL  Goal: Free from fall injury  Description: INTERVENTIONS:- Assess pt frequently for physical needs- Identify cognitive and physical deficits and behaviors that affect risk of falls.- [place] fall precautions as indicated by assessment.- Educate pt/family on patient safety including physical limitations- Instruct pt to call for assistance with activity based on assessment- Modify  environment to reduce risk of injury- Provide assistive devices as appropriate- Consider OT/PT consult to assist with strengthening/mobility- Encourage toileting schedule  Outcome: Adequate for Discharge     Problem: DISCHARGE PLANNING  Goal: Discharge to home or other facility with appropriate resources  Description: INTERVENTIONS:- Identify barriers to discharge w/pt and caregiver- Include patient/family/discharge partner in discharge planning- Arrange for needed discharge resources and transportation as appropriate- Identify discharge learning needs (meds, wound care, etc)- Arrange for interpreters to assist at discharge as needed- Consider post-discharge preferences of patient/family/discharge partner- Complete POLST form as appropriate- Assess patient's ability to be responsible for managing their own health- Refer to Case Management Department for coordinating discharge planning if the patient needs post-hospital services based on physician/LIP order or complex needs related to functional status, cognitive ability or social support system  Outcome: Adequate for Discharge     Problem: HEMATOLOGIC - ADULT  Goal: Maintains hematologic stability  Description: INTERVENTIONS- Assess for signs and symptoms of bleeding or hemorrhage- Monitor labs and vital signs for trends- Administer supportive blood products/factors, fluids and medications as ordered and appropriate- Administer supportive blood products/factors as ordered and appropriate  Outcome: Adequate for Discharge  Goal: Free from bleeding injury  Description: (Example usage: patient with low platelets)INTERVENTIONS:- Avoid intramuscular injections, enemas and rectal medication administration- Ensure safe mobilization of patient- Hold pressure on venipuncture sites to achieve adequate hemostasis- Assess for signs and symptoms of internal bleeding- Monitor lab trends- Patient is to report abnormal signs of bleeding to staff- Avoid use of toothpicks and dental  floss- Use electric shaver for shaving- Use soft bristle tooth brush- Limit straining and forceful nose blowing  Outcome: Adequate for Discharge

## 2025-03-28 ENCOUNTER — PATIENT OUTREACH (OUTPATIENT)
Dept: CASE MANAGEMENT | Age: OVER 89
End: 2025-03-28

## 2025-03-28 NOTE — PROGRESS NOTES
TCM request (discharged 03/27)    Dr Jeff D'Amico  Internal Medicine  [address] [place] [address]  [phone]  Patient declined appointment; advised he will if needed  Closing encounter

## 2025-03-31 ENCOUNTER — TELEPHONE (OUTPATIENT)
Dept: INTERNAL MEDICINE CLINIC | Facility: CLINIC | Age: OVER 89
End: 2025-03-31

## 2025-03-31 NOTE — TELEPHONE ENCOUNTER
Daughter Staci called.  César was discharged from [facility] last Thursday and needs a TCM.  Asking to use the MD approval on 4/10/25 as this works well for them?

## 2025-04-02 ENCOUNTER — TELEPHONE (OUTPATIENT)
Dept: INTERNAL MEDICINE CLINIC | Facility: CLINIC | Age: OVER 89
End: 2025-04-02

## 2025-04-02 NOTE — TELEPHONE ENCOUNTER
Please call to advise which medications patient needs to stop before a steroid hip injection  (Patient has not scheduled yet)    Call back to patient's daughter Staci [phone]

## 2025-04-03 NOTE — TELEPHONE ENCOUNTER
I don't believe any medication would need to be stopped prior to a hip injection; additionally they can contact the orthopedic office that will be doing the injection and confirm what medications are ok or not ok to take

## 2025-04-03 NOTE — TELEPHONE ENCOUNTER
Patient's daughter Staci is calling back to ask a question regarding medications to stop before a steroid injection.    Patient needs to get the injection scheduled he had a fall and is experiencing hip pain.    Asking if the doctor on call can answer this in Dr D'Amico's absence.    Phone [phone]

## 2025-04-10 ENCOUNTER — LAB ENCOUNTER (OUTPATIENT)
Dept: LAB | Age: OVER 89
End: 2025-04-10
Attending: INTERNAL MEDICINE
Payer: MEDICARE

## 2025-04-10 ENCOUNTER — OFFICE VISIT (OUTPATIENT)
Dept: INTERNAL MEDICINE CLINIC | Facility: CLINIC | Age: OVER 89
End: 2025-04-10

## 2025-04-10 VITALS
SYSTOLIC BLOOD PRESSURE: 120 MMHG | HEIGHT: 62 IN | BODY MASS INDEX: 22.45 KG/M2 | WEIGHT: 122 LBS | TEMPERATURE: 98 F | HEART RATE: 80 BPM | DIASTOLIC BLOOD PRESSURE: 70 MMHG

## 2025-04-10 DIAGNOSIS — I10 ESSENTIAL HYPERTENSION: Primary | ICD-10-CM
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
## 2025-04-10 DIAGNOSIS — E53.8 B12 DEFICIENCY: ICD-10-CM

## 2025-04-10 DIAGNOSIS — I50.23 ACUTE ON CHRONIC SYSTOLIC HEART FAILURE (HCC): ICD-10-CM

## 2025-04-10 DIAGNOSIS — I48.91 ATRIAL FIBRILLATION WITH RAPID VENTRICULAR RESPONSE (HCC): ICD-10-CM

## 2025-04-10 DIAGNOSIS — D64.9 ANEMIA, UNSPECIFIED TYPE: ICD-10-CM

## 2025-04-10 LAB
ALBUMIN SERPL-MCNC: 3.8 G/DL (ref 3.2–4.8)
ALBUMIN/GLOB SERPL: 1.5 {RATIO} (ref 1–2)
ALP LIVER SERPL-CCNC: 113 U/L (ref 45–117)
ALT SERPL-CCNC: 462 U/L (ref 10–49)
ANION GAP SERPL CALC-SCNC: 11 MMOL/L (ref 0–18)
AST SERPL-CCNC: 559 U/L (ref ?–34)
BASOPHILS # BLD AUTO: 0.11 X10(3) UL (ref 0–0.2)
BASOPHILS NFR BLD AUTO: 0.9 %
BILIRUB SERPL-MCNC: 1.9 MG/DL (ref 0.2–0.9)
BUN BLD-MCNC: 21 MG/DL (ref 9–23)
BUN/CREAT SERPL: 18.6 (ref 10–20)
CALCIUM BLD-MCNC: 8.9 MG/DL (ref 8.7–10.4)
CHLORIDE SERPL-SCNC: 103 MMOL/L (ref 98–112)
CO2 SERPL-SCNC: 26 MMOL/L (ref 21–32)
CREAT BLD-MCNC: 1.13 MG/DL (ref 0.7–1.3)
DEPRECATED RDW RBC AUTO: 73 FL (ref 35.1–46.3)
EGFRCR SERPLBLD CKD-EPI 2021: 60 ML/MIN/1.73M2 (ref 60–?)
EOSINOPHIL # BLD AUTO: 0.27 X10(3) UL (ref 0–0.7)
EOSINOPHIL NFR BLD AUTO: 2.1 %
ERYTHROCYTE [DISTWIDTH] IN BLOOD BY AUTOMATED COUNT: 28.6 % (ref 11–15)
FASTING STATUS PATIENT QL REPORTED: NO
GLOBULIN PLAS-MCNC: 2.6 G/DL (ref 2–3.5)
GLUCOSE BLD-MCNC: 90 MG/DL (ref 70–99)
HCT VFR BLD AUTO: 36.1 % (ref 39–53)
HGB BLD-MCNC: 10.5 G/DL (ref 13–17.5)
IMM GRANULOCYTES # BLD AUTO: 0.17 X10(3) UL (ref 0–1)
IMM GRANULOCYTES NFR BLD: 1.3 %
LYMPHOCYTES # BLD AUTO: 1.11 X10(3) UL (ref 1–4)
LYMPHOCYTES NFR BLD AUTO: 8.8 %
MCH RBC QN AUTO: 21.5 PG (ref 26–34)
MCHC RBC AUTO-ENTMCNC: 29.1 G/DL (ref 31–37)
MCV RBC AUTO: 74 FL (ref 80–100)
MONOCYTES # BLD AUTO: 1.28 X10(3) UL (ref 0.1–1)
MONOCYTES NFR BLD AUTO: 10.1 %
NEUTROPHILS # BLD AUTO: 9.69 X10 (3) UL (ref 1.5–7.7)
NEUTROPHILS # BLD AUTO: 9.69 X10(3) UL (ref 1.5–7.7)
NEUTROPHILS NFR BLD AUTO: 76.8 %
OSMOLALITY SERPL CALC.SUM OF ELEC: 293 MOSM/KG (ref 275–295)
PLATELET # BLD AUTO: 424 10(3)UL (ref 150–450)
PLATELET MORPHOLOGY: NORMAL
POTASSIUM SERPL-SCNC: 4.1 MMOL/L (ref 3.5–5.1)
PROT SERPL-MCNC: 6.4 G/DL (ref 5.7–8.2)
RBC # BLD AUTO: 4.88 X10(6)UL (ref 3.8–5.8)
SODIUM SERPL-SCNC: 140 MMOL/L (ref 136–145)
WBC # BLD AUTO: 12.6 X10(3) UL (ref 4–11)

## 2025-04-10 PROCEDURE — 36415 COLL VENOUS BLD VENIPUNCTURE: CPT | Performed by: INTERNAL MEDICINE

## 2025-04-10 PROCEDURE — 80053 COMPREHEN METABOLIC PANEL: CPT | Performed by: INTERNAL MEDICINE

## 2025-04-10 PROCEDURE — 3008F BODY MASS INDEX DOCD: CPT | Performed by: INTERNAL MEDICINE

## 2025-04-10 PROCEDURE — 1160F RVW MEDS BY RX/DR IN RCRD: CPT | Performed by: INTERNAL MEDICINE

## 2025-04-10 PROCEDURE — 96372 THER/PROPH/DIAG INJ SC/IM: CPT | Performed by: INTERNAL MEDICINE

## 2025-04-10 PROCEDURE — 1125F AMNT PAIN NOTED PAIN PRSNT: CPT | Performed by: INTERNAL MEDICINE

## 2025-04-10 PROCEDURE — 1111F DSCHRG MED/CURRENT MED MERGE: CPT | Performed by: INTERNAL MEDICINE

## 2025-04-10 PROCEDURE — 3078F DIAST BP <80 MM HG: CPT | Performed by: INTERNAL MEDICINE

## 2025-04-10 PROCEDURE — 1159F MED LIST DOCD IN RCRD: CPT | Performed by: INTERNAL MEDICINE

## 2025-04-10 PROCEDURE — 3074F SYST BP LT 130 MM HG: CPT | Performed by: INTERNAL MEDICINE

## 2025-04-10 PROCEDURE — 85025 COMPLETE CBC W/AUTO DIFF WBC: CPT | Performed by: INTERNAL MEDICINE

## 2025-04-10 PROCEDURE — 99214 OFFICE O/P EST MOD 30 MIN: CPT | Performed by: INTERNAL MEDICINE

## 2025-04-10 RX ORDER — CYANOCOBALAMIN 1000 UG/ML
1000 INJECTION, SOLUTION INTRAMUSCULAR; SUBCUTANEOUS ONCE
Status: COMPLETED | OUTPATIENT
Start: 2025-04-10 | End: 2025-04-10

## 2025-04-10 RX ADMIN — CYANOCOBALAMIN 1000 MCG: 1000 INJECTION, SOLUTION INTRAMUSCULAR; SUBCUTANEOUS at 15:25:00

## 2025-04-10 NOTE — PATIENT INSTRUCTIONS
1. Essential hypertension  Lets continue the current plan here, these numbers seem to be holding steady    2. Atrial fibrillation with rapid ventricular response (HCC)  Stable, like the idea of the continued rate control agents, off the anticoagulants as discussed with the specialist    3. Acute on chronic systolic heart failure (HCC)  Stable continue current monitor management, specialist follow-up    4. Anemia, unspecified type  For the blood counts, lets get them drawn today, then we will coordinate this through the home health people over the next few weeks as well.  Serial lab work every 2 to 4 weeks  - CBC With Differential With Platelet  - Comp Metabolic Panel (14)    5. Fall, initial encounter  Fall precautions, lets engage [facility] here, if you do not hear by Monday from them, lets make sure we reach out so we can get this arranged, they should be coming over with the home health nurse to assess, and the physical therapist to work with him a few times a week, they may do more to help with modalities and pain control than actual physical strength building, but I hope they do both  - [facility]    6. B12 deficiency  Stable is try B12 shot  Me know how well this works  - cyanocobalamin (Vitamin B12) 1000 MCG/ML injection 1,000 mcg    -Good luck with the orthopedic specialist, injections today.

## 2025-04-11 NOTE — PROGRESS NOTES
HPI:   César Copeland is a 89 year old male who presents for complains of:   Chief Complaint   Patient presents with    Hospital F/U     Cardiac abnormalities: Patient seems stable, was recently hospitalized for acute blood loss anemia, required transfusions, and was hospitalized for a few days, he had some spontaneous lab work done after he was noted to be dizzy here, he was seen in the office, admitted shortly after getting his lab work done.  He seems stable is now returned home, has been taken off his anticoagulants.  He does have a history of atrial fibrillation, aortic dissection, is following closely with the cardiology team.    Fall: Patient admits to a fall approximately 1 week ago in which she was reaching on the ground for some spilled raw [place] when he slipped forward, impacting the left hip on the counter, and then mated to the ground, he did not have any head trauma, was eventually able to get to his feet, notified his family of the fall approximately 4 days later.  They are with him in the office today, they understand that he wants to minimize his medical interventions at this point, does claim to be compliant with his medications, blood pressure seems very well-controlled.  He seems to be doing well off the anticoagulants.    EXAM:   /70   Pulse 80   Temp 97.6 °F (36.4 °C) (Oral)   Ht 5' 2\" (1.575 m)   Wt 122 lb (55.3 kg)   BMI 22.31 kg/m²   Body mass index is 22.31 kg/m².   Gen. exam: Alert and oriented, in no acute distress   HEENT: Pupils equal and reactive to light and accommodation, moist mucous membranes  Neck exam:  Supple.  Normal thyroid trachea midline, no JVD  Heart exam: Regular rate and irregular rhythm 2/6 AI murmurs no S3 no S4   Lung exam: No rales no rhonchi no wheezes  Abdominal exam: Soft, nontender, nondistended, positive bowel sounds are normoactive  Extremities exam: no clubbing, no cyanosis, no edema  Skin exam: No obvious wounds, no rashes  Neurological exam:  Cranial nerves II through XII intact, no gross deficits  Musculoskeletal exam: Advanced bilateral generalized hand arthritis appreciated, no obvious deformity    ASSESSMENT AND PLAN:   César Copeland is a 89 year old male who presents with the followin. Essential hypertension  Lets continue the current plan here, these numbers seem to be holding steady    2. Atrial fibrillation with rapid ventricular response (HCC)  Stable, like the idea of the continued rate control agents, off the anticoagulants as discussed with the specialist    3. Acute on chronic systolic heart failure (HCC)  Stable continue current monitor management, specialist follow-up    4. Anemia, unspecified type  For the blood counts, lets get them drawn today, then we will coordinate this through the home health people over the next few weeks as well.  Serial lab work every 2 to 4 weeks  - CBC With Differential With Platelet  - Comp Metabolic Panel (14)    5. Fall, initial encounter  Fall precautions, lets engage [facility] here, if you do not hear by Monday from them, lets make sure we reach out so we can get this arranged, they should be coming over with the home health nurse to assess, and the physical therapist to work with him a few times a week, they may do more to help with modalities and pain control than actual physical strength building, but I hope they do both  - [facility]    6. B12 deficiency  Stable is try B12 shot  Me know how well this works  - cyanocobalamin (Vitamin B12) 1000 MCG/ML injection 1,000 mcg    -Good luck with the orthopedic specialist, injections today.      Spent 30 minutes obtaining history, evaluating patient, discussing treatment options, diet, exercise, review of available labs and radiology reports, and completing documentation.    Jeff Anthony D'Amico,   4/10/2025  11:18 PM

## 2025-04-14 ENCOUNTER — TELEPHONE (OUTPATIENT)
Dept: INTERNAL MEDICINE CLINIC | Facility: CLINIC | Age: OVER 89
End: 2025-04-14

## 2025-04-14 DIAGNOSIS — E53.8 B12 DEFICIENCY: Primary | ICD-10-CM

## 2025-04-14 NOTE — TELEPHONE ENCOUNTER
[facility] called stating she saw the patient on Saturday.  She states the pt. Gets B12 injections and she can give them to the pt. At his home if  Will order it for him, and place an order for the B12 and needles at the pharmacy.

## 2025-04-15 RX ORDER — CYANOCOBALAMIN 1000 UG/ML
1000 INJECTION, SOLUTION INTRAMUSCULAR; SUBCUTANEOUS WEEKLY
Qty: 12 ML | Refills: 1 | Status: SHIPPED | OUTPATIENT
Start: 2025-04-15

## 2025-04-16 NOTE — TELEPHONE ENCOUNTER
Called Patel with [facility] and relayed  message-verbalized understanding

## 2025-04-21 ENCOUNTER — TELEPHONE (OUTPATIENT)
Dept: INTERNAL MEDICINE CLINIC | Facility: CLINIC | Age: OVER 89
End: 2025-04-21

## 2025-04-21 DIAGNOSIS — D64.9 ANEMIA, UNSPECIFIED TYPE: Primary | ICD-10-CM

## 2025-04-21 NOTE — TELEPHONE ENCOUNTER
Starr / K[facility] is calling patient was prescribed Ferrous Sulfate 325 mg while in the hospital he was discharged on 4/10    Should patient continue taking the medication?  Will Dr D'Amico prescribe the medication    Please call [phone], confidential VM

## 2025-04-21 NOTE — TELEPHONE ENCOUNTER
Patient's daughter, Staci (Verified HIPAA) called to request refill on Furosemide that was prescribed while in hospital.     Staci stated Dr. D'Amico had advised them he wanted patient to stay on this medication.    Staci also wanted Dr. D'Amico to know that patient has lost 5 lbs since last office visit  on 4/10/25. Patient weighed himself today and he is 118lbs    Staci wants to know if this is something to be concerned about. She stated that patient is eating really well.     Staci's # [phone]

## 2025-04-22 RX ORDER — FERROUS SULFATE 325(65) MG
325 TABLET, DELAYED RELEASE (ENTERIC COATED) ORAL 2 TIMES DAILY WITH MEALS
Qty: 60 TABLET | Refills: 5 | Status: SHIPPED | OUTPATIENT
Start: 2025-04-22

## 2025-04-22 NOTE — TELEPHONE ENCOUNTER
Yes, if he can tolerate this without constipation or other GI side effects I had like him to stay on the iron every day, eventually take this up to twice a day 1 in the morning 1 in the evening, if possible and stay on that for the long-term.  Let him know I did send in a refill of the prescription.

## 2025-04-22 NOTE — TELEPHONE ENCOUNTER
Left detailed voicemail on confidential VM for  [facility] relaying MD message. Advised to call back with any additionall questions or concerns

## 2025-04-23 ENCOUNTER — TELEPHONE (OUTPATIENT)
Dept: INTERNAL MEDICINE CLINIC | Facility: CLINIC | Age: OVER 89
End: 2025-04-23

## 2025-04-23 DIAGNOSIS — N13.8 BPH WITH URINARY OBSTRUCTION: Primary | ICD-10-CM

## 2025-04-23 DIAGNOSIS — N40.1 BPH WITH URINARY OBSTRUCTION: Primary | ICD-10-CM

## 2025-04-23 NOTE — TELEPHONE ENCOUNTER
Please call daughter, Staci, to confirm patient's dose for ferrous sulfate   Patient was taking once a day and directions on latest refill state to take twice a day     Staci's call back # [phone]

## 2025-04-23 NOTE — TELEPHONE ENCOUNTER
Called daughter per HIPAA and relayed message in regards  to taking  Iron twice daily - daughter states patient gets constipated - BM every 3 days and will continue with once daily. Also needs refill on pending medications - were prescribed by DR. Singer - patient is not seeing him anymore - to

## 2025-04-24 RX ORDER — FUROSEMIDE 20 MG/1
20 TABLET ORAL DAILY
Qty: 90 TABLET | Refills: 3 | Status: SHIPPED | OUTPATIENT
Start: 2025-04-24

## 2025-04-24 RX ORDER — TAMSULOSIN HYDROCHLORIDE 0.4 MG/1
0.4 CAPSULE ORAL DAILY
Qty: 90 CAPSULE | Refills: 3 | Status: SHIPPED | OUTPATIENT
Start: 2025-04-24 | End: 2026-04-19

## 2025-04-24 RX ORDER — DUTASTERIDE 0.5 MG/1
0.5 CAPSULE, LIQUID FILLED ORAL DAILY
Qty: 90 CAPSULE | Refills: 3 | Status: SHIPPED | OUTPATIENT
Start: 2025-04-24

## 2025-04-24 NOTE — TELEPHONE ENCOUNTER
Yes, stay on same medications, I am not sure what to tell him about the weight loss, they need to continue to monitor, if he loses any more I need to be notified.

## 2025-04-25 NOTE — TELEPHONE ENCOUNTER
To DR CORLEY called Staci and relayed  message - verbalized understanding. She states patient is only taking Iron tablets once daily - he has a BM every third day

## 2025-04-29 ENCOUNTER — LAB REQUISITION (OUTPATIENT)
Dept: LAB | Facility: HOSPITAL | Age: OVER 89
End: 2025-04-29
Payer: MEDICARE

## 2025-04-29 ENCOUNTER — TELEPHONE (OUTPATIENT)
Dept: INTERNAL MEDICINE CLINIC | Facility: CLINIC | Age: OVER 89
End: 2025-04-29

## 2025-04-29 DIAGNOSIS — D64.9 ANEMIA, UNSPECIFIED: ICD-10-CM

## 2025-04-29 DIAGNOSIS — I50.23 ACUTE ON CHRONIC SYSTOLIC (CONGESTIVE) HEART FAILURE (HCC): ICD-10-CM

## 2025-04-29 DIAGNOSIS — E53.8 B12 DEFICIENCY: ICD-10-CM

## 2025-04-29 LAB
ALBUMIN SERPL-MCNC: 3.8 G/DL (ref 3.2–4.8)
ALBUMIN/GLOB SERPL: 1.7 {RATIO} (ref 1–2)
ALP LIVER SERPL-CCNC: 95 U/L (ref 45–117)
ALT SERPL-CCNC: 93 U/L (ref 10–49)
ANION GAP SERPL CALC-SCNC: 9 MMOL/L (ref 0–18)
AST SERPL-CCNC: 62 U/L (ref ?–34)
BASOPHILS # BLD AUTO: 0.06 X10(3) UL (ref 0–0.2)
BASOPHILS NFR BLD AUTO: 0.5 %
BILIRUB SERPL-MCNC: 1.7 MG/DL (ref 0.2–0.9)
BUN BLD-MCNC: 28 MG/DL (ref 9–23)
BUN/CREAT SERPL: 23.5 (ref 10–20)
CALCIUM BLD-MCNC: 8.7 MG/DL (ref 8.7–10.4)
CHLORIDE SERPL-SCNC: 107 MMOL/L (ref 98–112)
CO2 SERPL-SCNC: 27 MMOL/L (ref 21–32)
CREAT BLD-MCNC: 1.19 MG/DL (ref 0.7–1.3)
DEPRECATED RDW RBC AUTO: 67.4 FL (ref 35.1–46.3)
EGFRCR SERPLBLD CKD-EPI 2021: 56 ML/MIN/1.73M2 (ref 60–?)
EOSINOPHIL # BLD AUTO: 0.28 X10(3) UL (ref 0–0.7)
EOSINOPHIL NFR BLD AUTO: 2.1 %
ERYTHROCYTE [DISTWIDTH] IN BLOOD BY AUTOMATED COUNT: 26.1 % (ref 11–15)
GLOBULIN PLAS-MCNC: 2.2 G/DL (ref 2–3.5)
GLUCOSE BLD-MCNC: 82 MG/DL (ref 70–99)
HCT VFR BLD AUTO: 36.8 % (ref 39–53)
HGB BLD-MCNC: 11.2 G/DL (ref 13–17.5)
IMM GRANULOCYTES # BLD AUTO: 0.2 X10(3) UL (ref 0–1)
IMM GRANULOCYTES NFR BLD: 1.5 %
LYMPHOCYTES # BLD AUTO: 0.98 X10(3) UL (ref 1–4)
LYMPHOCYTES NFR BLD AUTO: 7.4 %
MCH RBC QN AUTO: 22.2 PG (ref 26–34)
MCHC RBC AUTO-ENTMCNC: 30.4 G/DL (ref 31–37)
MCV RBC AUTO: 73 FL (ref 80–100)
MONOCYTES # BLD AUTO: 1.32 X10(3) UL (ref 0.1–1)
MONOCYTES NFR BLD AUTO: 9.9 %
NEUTROPHILS # BLD AUTO: 10.44 X10 (3) UL (ref 1.5–7.7)
NEUTROPHILS # BLD AUTO: 10.44 X10(3) UL (ref 1.5–7.7)
NEUTROPHILS NFR BLD AUTO: 78.6 %
OSMOLALITY SERPL CALC.SUM OF ELEC: 301 MOSM/KG (ref 275–295)
PLATELET # BLD AUTO: 205 10(3)UL (ref 150–450)
PLATELET MORPHOLOGY: NORMAL
POTASSIUM SERPL-SCNC: 4.5 MMOL/L (ref 3.5–5.1)
PROT SERPL-MCNC: 6 G/DL (ref 5.7–8.2)
RBC # BLD AUTO: 5.04 X10(6)UL (ref 3.8–5.8)
SODIUM SERPL-SCNC: 143 MMOL/L (ref 136–145)
WBC # BLD AUTO: 13.3 X10(3) UL (ref 4–11)

## 2025-04-29 PROCEDURE — 80053 COMPREHEN METABOLIC PANEL: CPT | Performed by: INTERNAL MEDICINE

## 2025-04-29 PROCEDURE — 85025 COMPLETE CBC W/AUTO DIFF WBC: CPT | Performed by: INTERNAL MEDICINE

## 2025-04-29 NOTE — TELEPHONE ENCOUNTER
Should be weekly on this patient, nursing please refill supply.  I do want him to stay on every week until further notice

## 2025-04-29 NOTE — TELEPHONE ENCOUNTER
To Dr.Damico to please clarify if the B12 injections are to be weekly or monthly as Rx sent on 4/15/25 for the syringes/needles indicates once monthly but the Cyanocobalamin solution Rx indicates weekly.

## 2025-04-29 NOTE — TELEPHONE ENCOUNTER
[facility] called    Patient received 3 needles for B12   with directions to administer once a month    B12 vial directions state once a week    Please clarify and if once a week send Rx for more needles to [facility] call back # [phone]

## 2025-04-29 NOTE — TELEPHONE ENCOUNTER
RX sent for syringes -once weekly with B 12   Called Nat with [facility] - relayed message in regards to vitamin B 12 -left on confidential VM

## 2025-05-02 ENCOUNTER — TELEPHONE (OUTPATIENT)
Dept: INTERNAL MEDICINE CLINIC | Facility: CLINIC | Age: OVER 89
End: 2025-05-02

## 2025-05-06 ENCOUNTER — TELEPHONE (OUTPATIENT)
Dept: INTERNAL MEDICINE CLINIC | Facility: CLINIC | Age: OVER 89
End: 2025-05-06

## 2025-05-06 NOTE — TELEPHONE ENCOUNTER
Nat from [facility] called.  She has orders to see the pt. Every other week.  She stated the pt. Is receiving B12 injections every week, so she needs an order to see the pt. Every week to administer the B12.

## 2025-05-06 NOTE — TELEPHONE ENCOUNTER
Received fax from [employer] for B-D #[id number] SYR/NDL 3 ML 25GX5/8 INT.    Message: 5/8 inch needle is subcutaneous, 1 or 1 1/2 inch needle are I'm needles, please verify     Placed in green folder

## 2025-05-09 RX ORDER — SYRINGE W-NEEDLE,DISPOSAB,3 ML 25GX5/8"
SYRINGE, EMPTY DISPOSABLE MISCELLANEOUS
Qty: 12 EACH | Refills: 0 | Status: SHIPPED | OUTPATIENT
Start: 2025-05-09

## 2025-05-20 ENCOUNTER — LAB REQUISITION (OUTPATIENT)
Dept: LAB | Facility: HOSPITAL | Age: OVER 89
End: 2025-05-20
Payer: MEDICARE

## 2025-05-20 ENCOUNTER — TELEPHONE (OUTPATIENT)
Dept: INTERNAL MEDICINE CLINIC | Facility: CLINIC | Age: OVER 89
End: 2025-05-20

## 2025-05-20 DIAGNOSIS — E53.8 B12 DEFICIENCY: Primary | ICD-10-CM

## 2025-05-20 DIAGNOSIS — Z01.89 ENCOUNTER FOR OTHER SPECIFIED SPECIAL EXAMINATIONS: ICD-10-CM

## 2025-05-20 LAB
ALBUMIN SERPL-MCNC: 4 G/DL (ref 3.2–4.8)
ALBUMIN/GLOB SERPL: 1.8 {RATIO} (ref 1–2)
ALP LIVER SERPL-CCNC: 95 U/L (ref 45–117)
ALT SERPL-CCNC: 69 U/L (ref 10–49)
ANION GAP SERPL CALC-SCNC: 9 MMOL/L (ref 0–18)
AST SERPL-CCNC: 56 U/L (ref ?–34)
BASOPHILS # BLD AUTO: 0.05 X10(3) UL (ref 0–0.2)
BASOPHILS NFR BLD AUTO: 0.6 %
BILIRUB SERPL-MCNC: 1.5 MG/DL (ref 0.2–0.9)
BUN BLD-MCNC: 21 MG/DL (ref 9–23)
BUN/CREAT SERPL: 19.6 (ref 10–20)
CALCIUM BLD-MCNC: 8.8 MG/DL (ref 8.7–10.4)
CHLORIDE SERPL-SCNC: 104 MMOL/L (ref 98–112)
CO2 SERPL-SCNC: 25 MMOL/L (ref 21–32)
CREAT BLD-MCNC: 1.07 MG/DL (ref 0.7–1.3)
DEPRECATED RDW RBC AUTO: 62.9 FL (ref 35.1–46.3)
EGFRCR SERPLBLD CKD-EPI 2021: 64 ML/MIN/1.73M2 (ref 60–?)
EOSINOPHIL # BLD AUTO: 0.15 X10(3) UL (ref 0–0.7)
EOSINOPHIL NFR BLD AUTO: 1.9 %
ERYTHROCYTE [DISTWIDTH] IN BLOOD BY AUTOMATED COUNT: 25 % (ref 11–15)
FASTING STATUS PATIENT QL REPORTED: YES
GLOBULIN PLAS-MCNC: 2.2 G/DL (ref 2–3.5)
GLUCOSE BLD-MCNC: 100 MG/DL (ref 70–99)
HCT VFR BLD AUTO: 37.4 % (ref 39–53)
HGB BLD-MCNC: 11.2 G/DL (ref 13–17.5)
IMM GRANULOCYTES # BLD AUTO: 0.11 X10(3) UL (ref 0–1)
IMM GRANULOCYTES NFR BLD: 1.4 %
LYMPHOCYTES # BLD AUTO: 0.89 X10(3) UL (ref 1–4)
LYMPHOCYTES NFR BLD AUTO: 11.1 %
MCH RBC QN AUTO: 21.4 PG (ref 26–34)
MCHC RBC AUTO-ENTMCNC: 29.9 G/DL (ref 31–37)
MCV RBC AUTO: 71.4 FL (ref 80–100)
MONOCYTES # BLD AUTO: 0.82 X10(3) UL (ref 0.1–1)
MONOCYTES NFR BLD AUTO: 10.2 %
NEUTROPHILS # BLD AUTO: 6.02 X10 (3) UL (ref 1.5–7.7)
NEUTROPHILS # BLD AUTO: 6.02 X10(3) UL (ref 1.5–7.7)
NEUTROPHILS NFR BLD AUTO: 74.8 %
OSMOLALITY SERPL CALC.SUM OF ELEC: 289 MOSM/KG (ref 275–295)
PLATELET # BLD AUTO: 240 10(3)UL (ref 150–450)
PLATELET MORPHOLOGY: NORMAL
PLATELETS.RETICULATED NFR BLD AUTO: 3.4 % (ref 0–7)
POTASSIUM SERPL-SCNC: 4 MMOL/L (ref 3.5–5.1)
PROT SERPL-MCNC: 6.2 G/DL (ref 5.7–8.2)
RBC # BLD AUTO: 5.24 X10(6)UL (ref 3.8–5.8)
SODIUM SERPL-SCNC: 138 MMOL/L (ref 136–145)
WBC # BLD AUTO: 8 X10(3) UL (ref 4–11)

## 2025-05-20 PROCEDURE — 80053 COMPREHEN METABOLIC PANEL: CPT | Performed by: INTERNAL MEDICINE

## 2025-05-20 PROCEDURE — 85025 COMPLETE CBC W/AUTO DIFF WBC: CPT | Performed by: INTERNAL MEDICINE

## 2025-05-20 NOTE — TELEPHONE ENCOUNTER
Noted, let them know if they are going to do lab work for the B12 injections I would make it before they are due for the next B12 injection, if they are on weekly B12 injections it is 7 days after an injection, if they are on monthly B12 injections as 30 days.  Nursing see if he can explain the timing of the lab work

## 2025-05-20 NOTE — TELEPHONE ENCOUNTER
Spoke to Nat, relayed MD Message. Verbalized understanding.     Order faxed to  [phone]- confirmation received

## 2025-05-20 NOTE — TELEPHONE ENCOUNTER
Nat called to let  know she will draw a B12 lab next time she sees patient. She did not have the correct tube  today. She will also need an order so she can check levels since patient is getting B12 injections now,.

## 2025-05-22 ENCOUNTER — PATIENT MESSAGE (OUTPATIENT)
Dept: INTERNAL MEDICINE CLINIC | Facility: CLINIC | Age: OVER 89
End: 2025-05-22

## 2025-05-22 ENCOUNTER — LAB ENCOUNTER (OUTPATIENT)
Dept: LAB | Age: OVER 89
End: 2025-05-22
Attending: INTERNAL MEDICINE
Payer: MEDICARE

## 2025-05-22 ENCOUNTER — OFFICE VISIT (OUTPATIENT)
Dept: INTERNAL MEDICINE CLINIC | Facility: CLINIC | Age: OVER 89
End: 2025-05-22

## 2025-05-22 VITALS
BODY MASS INDEX: 19.81 KG/M2 | WEIGHT: 116 LBS | HEART RATE: 73 BPM | SYSTOLIC BLOOD PRESSURE: 122 MMHG | DIASTOLIC BLOOD PRESSURE: 56 MMHG | TEMPERATURE: 98 F | HEIGHT: 64 IN | OXYGEN SATURATION: 98 % | RESPIRATION RATE: 16 BRPM

## 2025-05-22 DIAGNOSIS — I50.23 ACUTE ON CHRONIC SYSTOLIC HEART FAILURE (HCC): ICD-10-CM

## 2025-05-22 DIAGNOSIS — R79.89 ELEVATED TSH: ICD-10-CM

## 2025-05-22 DIAGNOSIS — I71.019 AORTIC DISSECTION DISTAL TO LEFT SUBCLAVIAN (HCC): ICD-10-CM

## 2025-05-22 DIAGNOSIS — I48.91 ATRIAL FIBRILLATION WITH RAPID VENTRICULAR RESPONSE (HCC): Primary | ICD-10-CM

## 2025-05-22 DIAGNOSIS — E53.8 B12 DEFICIENCY: ICD-10-CM

## 2025-05-22 DIAGNOSIS — I10 ESSENTIAL HYPERTENSION: ICD-10-CM

## 2025-05-22 PROBLEM — Z87.19 HISTORY OF GASTROINTESTINAL HEMORRHAGE: Status: ACTIVE | Noted: 2025-03-24

## 2025-05-22 LAB
ALBUMIN SERPL-MCNC: 4.1 G/DL (ref 3.2–4.8)
ALBUMIN/GLOB SERPL: 1.7 {RATIO} (ref 1–2)
ALP LIVER SERPL-CCNC: 98 U/L (ref 45–117)
ALT SERPL-CCNC: 66 U/L (ref 10–49)
ANION GAP SERPL CALC-SCNC: 10 MMOL/L (ref 0–18)
AST SERPL-CCNC: 58 U/L (ref ?–34)
BASOPHILS # BLD AUTO: 0.06 X10(3) UL (ref 0–0.2)
BASOPHILS NFR BLD AUTO: 0.7 %
BILIRUB SERPL-MCNC: 1.7 MG/DL (ref 0.2–0.9)
BUN BLD-MCNC: 19 MG/DL (ref 9–23)
BUN/CREAT SERPL: 17.8 (ref 10–20)
CALCIUM BLD-MCNC: 9.2 MG/DL (ref 8.7–10.4)
CHLORIDE SERPL-SCNC: 104 MMOL/L (ref 98–112)
CO2 SERPL-SCNC: 26 MMOL/L (ref 21–32)
CREAT BLD-MCNC: 1.07 MG/DL (ref 0.7–1.3)
DEPRECATED RDW RBC AUTO: 61.3 FL (ref 35.1–46.3)
EGFRCR SERPLBLD CKD-EPI 2021: 64 ML/MIN/1.73M2 (ref 60–?)
EOSINOPHIL # BLD AUTO: 0.11 X10(3) UL (ref 0–0.7)
EOSINOPHIL NFR BLD AUTO: 1.2 %
ERYTHROCYTE [DISTWIDTH] IN BLOOD BY AUTOMATED COUNT: 24.8 % (ref 11–15)
FASTING STATUS PATIENT QL REPORTED: NO
GLOBULIN PLAS-MCNC: 2.4 G/DL (ref 2–3.5)
GLUCOSE BLD-MCNC: 92 MG/DL (ref 70–99)
HCT VFR BLD AUTO: 37.6 % (ref 39–53)
HGB BLD-MCNC: 11.3 G/DL (ref 13–17.5)
IMM GRANULOCYTES # BLD AUTO: 0.09 X10(3) UL (ref 0–1)
IMM GRANULOCYTES NFR BLD: 1 %
LYMPHOCYTES # BLD AUTO: 0.93 X10(3) UL (ref 1–4)
LYMPHOCYTES NFR BLD AUTO: 10.3 %
MCH RBC QN AUTO: 21.4 PG (ref 26–34)
MCHC RBC AUTO-ENTMCNC: 30.1 G/DL (ref 31–37)
MCV RBC AUTO: 71.1 FL (ref 80–100)
MONOCYTES # BLD AUTO: 0.93 X10(3) UL (ref 0.1–1)
MONOCYTES NFR BLD AUTO: 10.3 %
NEUTROPHILS # BLD AUTO: 6.95 X10 (3) UL (ref 1.5–7.7)
NEUTROPHILS # BLD AUTO: 6.95 X10(3) UL (ref 1.5–7.7)
NEUTROPHILS NFR BLD AUTO: 76.5 %
OSMOLALITY SERPL CALC.SUM OF ELEC: 292 MOSM/KG (ref 275–295)
PLATELET # BLD AUTO: 271 10(3)UL (ref 150–450)
PLATELET MORPHOLOGY: NORMAL
PLATELETS.RETICULATED NFR BLD AUTO: 3.3 % (ref 0–7)
POTASSIUM SERPL-SCNC: 4.3 MMOL/L (ref 3.5–5.1)
PROT SERPL-MCNC: 6.5 G/DL (ref 5.7–8.2)
RBC # BLD AUTO: 5.29 X10(6)UL (ref 3.8–5.8)
SODIUM SERPL-SCNC: 140 MMOL/L (ref 136–145)
T4 FREE SERPL-MCNC: 1.5 NG/DL (ref 0.8–1.7)
TSI SER-ACNC: 10.25 UIU/ML (ref 0.55–4.78)
VIT B12 SERPL-MCNC: >2000 PG/ML (ref 211–911)
WBC # BLD AUTO: 9.1 X10(3) UL (ref 4–11)

## 2025-05-22 PROCEDURE — 36415 COLL VENOUS BLD VENIPUNCTURE: CPT | Performed by: INTERNAL MEDICINE

## 2025-05-22 PROCEDURE — 82607 VITAMIN B-12: CPT

## 2025-05-22 PROCEDURE — 1160F RVW MEDS BY RX/DR IN RCRD: CPT | Performed by: INTERNAL MEDICINE

## 2025-05-22 PROCEDURE — 99214 OFFICE O/P EST MOD 30 MIN: CPT | Performed by: INTERNAL MEDICINE

## 2025-05-22 PROCEDURE — 3074F SYST BP LT 130 MM HG: CPT | Performed by: INTERNAL MEDICINE

## 2025-05-22 PROCEDURE — 85025 COMPLETE CBC W/AUTO DIFF WBC: CPT | Performed by: INTERNAL MEDICINE

## 2025-05-22 PROCEDURE — 3078F DIAST BP <80 MM HG: CPT | Performed by: INTERNAL MEDICINE

## 2025-05-22 PROCEDURE — 84439 ASSAY OF FREE THYROXINE: CPT | Performed by: INTERNAL MEDICINE

## 2025-05-22 PROCEDURE — 80053 COMPREHEN METABOLIC PANEL: CPT | Performed by: INTERNAL MEDICINE

## 2025-05-22 PROCEDURE — 1159F MED LIST DOCD IN RCRD: CPT | Performed by: INTERNAL MEDICINE

## 2025-05-22 PROCEDURE — 84443 ASSAY THYROID STIM HORMONE: CPT | Performed by: INTERNAL MEDICINE

## 2025-05-22 PROCEDURE — 1111F DSCHRG MED/CURRENT MED MERGE: CPT | Performed by: INTERNAL MEDICINE

## 2025-05-22 PROCEDURE — 1126F AMNT PAIN NOTED NONE PRSNT: CPT | Performed by: INTERNAL MEDICINE

## 2025-05-22 PROCEDURE — 3008F BODY MASS INDEX DOCD: CPT | Performed by: INTERNAL MEDICINE

## 2025-05-22 NOTE — PROGRESS NOTES
HPI:     César Copeland is a 89 year old male who presents for complains of:   Chief Complaint   Patient presents with    TCM (Transition Of Care Management)     3/24 Admission for anemia  Daughter Staci questions Furosemide 10 mg instead of 20 mg?  Unaware of departure, did not receives letter or My Chart message       Anemia: Patient seems stable after his admission for an anemia, his blood counts have held steady, recent check this week shows a single hemoglobin in the 11's.  He continues with weekly B12 injections, this been coordinated through the home staff/nursing.  He is feeling good, claims he is getting around well, and home nursing is still engaged.    Vision difficulty: Patient does claim that he has a gap in his vision in which his vision goes down for a number of hours in the late morning, through the early afternoon, he avoids large activities during that time, he does think it may be related to some of the medications he is taking.  He claims it is described as mild blurriness, but then does clear a few hours later.    History of CHF/A-fib: Patient seems stable, on current medications, on half dosage amiodarone, doing very well from a volume balance standpoint.    Elevated TSH: Patient did have a recently elevated TSH she is due for recheck, this was not included in the lab work from earlier this week.    His daughters were on the phone in conference our entire visit.    EXAM:   /56   Pulse 73   Temp 97.8 °F (36.6 °C) (Oral)   Resp 16   Ht 5' 4\" (1.626 m)   Wt 116 lb (52.6 kg)   SpO2 98%   BMI 19.91 kg/m²   Body mass index is 19.91 kg/m².   Gen. exam: Alert and oriented, in no acute distress   HEENT: Pupils equal and reactive to light and accommodation, moist mucous membranes  Neck exam:  Supple.  Normal thyroid trachea midline, no JVD  Heart exam: Regular rate and rhythm no murmurs no S3 no S4   Lung exam: No rales no rhonchi no wheezes  Abdominal exam: Soft, nontender, nondistended,  positive bowel sounds are normoactive  Extremities exam: no clubbing, no cyanosis, no edema  Skin exam: No obvious wounds, no rashes, scattered ecchymosis, lower extremities, no hematoma  Neurological exam: Cranial nerves II through XII intact, no gross deficits  Musculoskeletal exam: Missing finger at baseline, moderate bilateral generalized hand arthritis appreciated, no obvious deformity    ASSESSMENT AND PLAN:   César Copeland is a 89 year old male who presents with the followin. Atrial fibrillation with rapid ventricular response (HCC)  Nice job here, rhythm sounds regular, lets stay on the current medications, half dosage of amiodarone 100    2. Acute on chronic systolic heart failure (HCC)  Stable continue current monitor management, euvolemic    3. 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 Essential hypertension  Stable continue current monitoring management    5. Elevated TSH  I do like the idea of checking the TSH again today, trying to straighten this out, likely this is a side effect from the amiodarone medication, so when we are allowed to stop that we will certainly guide you that way.  Nice job here with monitoring and management  - TSH W Reflex To Free T4    6.  Anemia  Iron replacement looks to be going well, keep up the good work with the iron replacement, the blood counts look excellent is keep checking those blood counts every 6 weeks to 12 weeks at this point, keep up with home visits, B12 shots, and serial lab work through them      Spent 30 minutes obtaining history, evaluating patient, discussing treatment options, diet, exercise, review of available labs and radiology reports, and completing documentation.    Jeff Anthony D'Amico, DO  2025  1:50 PM

## 2025-05-22 NOTE — PATIENT INSTRUCTIONS
1. Atrial fibrillation with rapid ventricular response (HCC)  Nice job here, rhythm sounds regular, lets stay on the current medications, half dosage of amiodarone 100    2. Acute on chronic systolic heart failure (HCC)  Stable continue current monitor management, euvolemic    3. 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 Essential hypertension  Stable continue current monitoring management    5. Elevated TSH  I do like the idea of checking the TSH again today, trying to straighten this out, likely this is a side effect from the amiodarone medication, so when we are allowed to stop that we will certainly guide you that way.  Nice job here with monitoring and management  - TSH W Reflex To Free T4    6.  Anemia  Iron replacement looks to be going well, keep up the good work with the iron replacement, the blood counts look excellent is keep checking those blood counts every 6 weeks to 12 weeks at this point, keep up with home visits, B12 shots, and serial lab work through them

## 2025-05-23 ENCOUNTER — TELEPHONE (OUTPATIENT)
Dept: INTERNAL MEDICINE CLINIC | Facility: CLINIC | Age: OVER 89
End: 2025-05-23

## 2025-05-27 ENCOUNTER — LAB REQUISITION (OUTPATIENT)
Dept: LAB | Facility: HOSPITAL | Age: OVER 89
End: 2025-05-27
Payer: MEDICARE

## 2025-05-27 ENCOUNTER — TELEPHONE (OUTPATIENT)
Dept: INTERNAL MEDICINE CLINIC | Facility: CLINIC | Age: OVER 89
End: 2025-05-27

## 2025-05-27 DIAGNOSIS — D64.9 ANEMIA, UNSPECIFIED: ICD-10-CM

## 2025-05-27 LAB — VIT B12 SERPL-MCNC: 1626 PG/ML (ref 211–911)

## 2025-05-27 PROCEDURE — 82607 VITAMIN B-12: CPT | Performed by: INTERNAL MEDICINE

## 2025-05-27 NOTE — TELEPHONE ENCOUNTER
Nat / [facility] called     Patient had vit B level drawn today & received his B12 injection    Please review results and advise if B 12 injections should be adjusted     [phone]

## 2025-05-28 NOTE — TELEPHONE ENCOUNTER
Received fax from [facility] [employer] Vitamin B12 lab results.    Placed in Dr. Damico mailbox.

## 2025-05-30 RX ORDER — PANTOPRAZOLE SODIUM 40 MG/1
40 TABLET, DELAYED RELEASE ORAL DAILY
Qty: 90 TABLET | Refills: 3 | Status: SHIPPED | OUTPATIENT
Start: 2025-05-30

## 2025-05-30 NOTE — TELEPHONE ENCOUNTER
No adjustments needed continue with 1000 mcg weekly on this patient B12 injections.  Nursing please communicate should have orders.

## 2025-06-09 ENCOUNTER — TELEPHONE (OUTPATIENT)
Dept: INTERNAL MEDICINE CLINIC | Facility: CLINIC | Age: OVER 89
End: 2025-06-09

## 2025-06-09 DIAGNOSIS — E78.00 HYPERCHOLESTEREMIA: ICD-10-CM

## 2025-06-09 DIAGNOSIS — I48.91 ATRIAL FIBRILLATION WITH RAPID VENTRICULAR RESPONSE (HCC): Primary | ICD-10-CM

## 2025-06-09 RX ORDER — AMIODARONE HYDROCHLORIDE 100 MG/1
100 TABLET ORAL DAILY
Qty: 30 TABLET | Refills: 3 | Status: SHIPPED | OUTPATIENT
Start: 2025-06-09

## 2025-06-09 RX ORDER — ATORVASTATIN CALCIUM 20 MG/1
20 TABLET, FILM COATED ORAL NIGHTLY
Qty: 30 TABLET | Refills: 3 | Status: SHIPPED | OUTPATIENT
Start: 2025-06-09

## 2025-06-09 NOTE — TELEPHONE ENCOUNTER
Daughter Staci called to speak with Dr DAmico  Re: patient's medications & possible changes     [phone]

## 2025-06-10 ENCOUNTER — LAB REQUISITION (OUTPATIENT)
Dept: LAB | Facility: HOSPITAL | Age: OVER 89
End: 2025-06-10
Payer: MEDICARE

## 2025-06-10 DIAGNOSIS — Z01.89 ENCOUNTER FOR OTHER SPECIFIED SPECIAL EXAMINATIONS: ICD-10-CM

## 2025-06-10 DIAGNOSIS — I50.23 ACUTE ON CHRONIC SYSTOLIC (CONGESTIVE) HEART FAILURE (HCC): ICD-10-CM

## 2025-06-10 DIAGNOSIS — D64.9 ANEMIA, UNSPECIFIED: ICD-10-CM

## 2025-06-10 LAB
ALBUMIN SERPL-MCNC: 4.2 G/DL (ref 3.2–4.8)
ALBUMIN/GLOB SERPL: 1.8 {RATIO} (ref 1–2)
ALP LIVER SERPL-CCNC: 111 U/L (ref 45–117)
ALT SERPL-CCNC: 51 U/L (ref 10–49)
ANION GAP SERPL CALC-SCNC: 11 MMOL/L (ref 0–18)
AST SERPL-CCNC: 49 U/L (ref ?–34)
BASOPHILS # BLD AUTO: 0.1 X10(3) UL (ref 0–0.2)
BASOPHILS NFR BLD AUTO: 1 %
BILIRUB SERPL-MCNC: 2.2 MG/DL (ref 0.2–0.9)
BUN BLD-MCNC: 17 MG/DL (ref 9–23)
BUN/CREAT SERPL: 15.9 (ref 10–20)
CALCIUM BLD-MCNC: 9.2 MG/DL (ref 8.7–10.4)
CHLORIDE SERPL-SCNC: 104 MMOL/L (ref 98–112)
CO2 SERPL-SCNC: 25 MMOL/L (ref 21–32)
CREAT BLD-MCNC: 1.07 MG/DL (ref 0.7–1.3)
DEPRECATED RDW RBC AUTO: 55.4 FL (ref 35.1–46.3)
EGFRCR SERPLBLD CKD-EPI 2021: 64 ML/MIN/1.73M2 (ref 60–?)
EOSINOPHIL # BLD AUTO: 0.16 X10(3) UL (ref 0–0.7)
EOSINOPHIL NFR BLD AUTO: 1.6 %
ERYTHROCYTE [DISTWIDTH] IN BLOOD BY AUTOMATED COUNT: 22.5 % (ref 11–15)
FASTING STATUS PATIENT QL REPORTED: YES
GLOBULIN PLAS-MCNC: 2.4 G/DL (ref 2–3.5)
GLUCOSE BLD-MCNC: 79 MG/DL (ref 70–99)
HCT VFR BLD AUTO: 38.5 % (ref 39–53)
HGB BLD-MCNC: 11.6 G/DL (ref 13–17.5)
IMM GRANULOCYTES # BLD AUTO: 0.1 X10(3) UL (ref 0–1)
IMM GRANULOCYTES NFR BLD: 1 %
LYMPHOCYTES # BLD AUTO: 1.01 X10(3) UL (ref 1–4)
LYMPHOCYTES NFR BLD AUTO: 10.2 %
MCH RBC QN AUTO: 21.4 PG (ref 26–34)
MCHC RBC AUTO-ENTMCNC: 30.1 G/DL (ref 31–37)
MCV RBC AUTO: 71.2 FL (ref 80–100)
MONOCYTES # BLD AUTO: 1.12 X10(3) UL (ref 0.1–1)
MONOCYTES NFR BLD AUTO: 11.3 %
NEUTROPHILS # BLD AUTO: 7.43 X10 (3) UL (ref 1.5–7.7)
NEUTROPHILS # BLD AUTO: 7.43 X10(3) UL (ref 1.5–7.7)
NEUTROPHILS NFR BLD AUTO: 74.9 %
OSMOLALITY SERPL CALC.SUM OF ELEC: 290 MOSM/KG (ref 275–295)
PLATELET # BLD AUTO: 281 10(3)UL (ref 150–450)
PLATELET MORPHOLOGY: NORMAL
PLATELETS.RETICULATED NFR BLD AUTO: 3.4 % (ref 0–7)
POTASSIUM SERPL-SCNC: 3.8 MMOL/L (ref 3.5–5.1)
PROT SERPL-MCNC: 6.6 G/DL (ref 5.7–8.2)
RBC # BLD AUTO: 5.41 X10(6)UL (ref 3.8–5.8)
SODIUM SERPL-SCNC: 140 MMOL/L (ref 136–145)
VIT B12 SERPL-MCNC: 1755 PG/ML (ref 211–911)
WBC # BLD AUTO: 9.9 X10(3) UL (ref 4–11)

## 2025-06-10 PROCEDURE — 82607 VITAMIN B-12: CPT | Performed by: INTERNAL MEDICINE

## 2025-06-10 PROCEDURE — 85025 COMPLETE CBC W/AUTO DIFF WBC: CPT | Performed by: INTERNAL MEDICINE

## 2025-06-10 PROCEDURE — 80053 COMPREHEN METABOLIC PANEL: CPT | Performed by: INTERNAL MEDICINE

## 2025-06-11 ENCOUNTER — TELEPHONE (OUTPATIENT)
Dept: INTERNAL MEDICINE CLINIC | Facility: CLINIC | Age: OVER 89
End: 2025-06-11

## 2025-06-11 NOTE — TELEPHONE ENCOUNTER
[facility] / [facility] called     Patient's B12 level is still high     Asks if B12 frequency can be decreased     Nat's call back [phone]

## 2025-06-16 ENCOUNTER — TELEPHONE (OUTPATIENT)
Dept: INTERNAL MEDICINE CLINIC | Facility: CLINIC | Age: OVER 89
End: 2025-06-16

## 2025-06-16 NOTE — TELEPHONE ENCOUNTER
Patients daughter is calling and would like to get a message to Dr D'Amico.   Daughter Staci states the patients weight has dropped to 116 pounds.  Daughter was advise that if the patients weight drops below 124 pounds the patient should stop taking Furosemide and the Potassium.    Please advise      Daughter also states she would like to discuss the drop in the patient Hemoglobin levels.    Please call and advise

## 2025-06-17 NOTE — TELEPHONE ENCOUNTER
To  - vitamin B 12 is high - patient gets weekly injections - should  he get less. Also daughter is asking about Potassium Tablets - should patient still take them?

## 2025-06-17 NOTE — TELEPHONE ENCOUNTER
Yes I like the idea from an energy perspective to keep this patient supratherapeutic with B12 continue weekly injections.  Also potassium level looks perfect at 3.8, I would continue the supplement here, if we stop it he may get below which may be dangerous.  Continue    - Nursing if you could please communicate

## 2025-06-17 NOTE — TELEPHONE ENCOUNTER
Called daughter per HIPAA and relayed  message--verbalized understanding . Transferred to [facility] to schedule nereida.

## 2025-06-17 NOTE — TELEPHONE ENCOUNTER
Noted, I think the decision to stop the furosemide would be more of a decision based on the overall blood pressure and other factors not just the weight loss.  For now I would just cut the furosemide in half every day, and I think the home nurses are still coming over, but this patient needs to have a full evaluation and set of vital signs as well.  Okay to use same-day sick appointments for me coming up if needed for this patient

## 2025-06-18 ENCOUNTER — TELEPHONE (OUTPATIENT)
Dept: INTERNAL MEDICINE CLINIC | Facility: CLINIC | Age: OVER 89
End: 2025-06-18

## 2025-06-18 NOTE — TELEPHONE ENCOUNTER
Nat/[facility] called with FYI for   Dr Hernandez     Patient's B12 level is high and he wants to hold off on B12 injections until he sees Dr DAmico on Friday / June 20

## 2025-06-20 ENCOUNTER — OFFICE VISIT (OUTPATIENT)
Dept: INTERNAL MEDICINE CLINIC | Facility: CLINIC | Age: OVER 89
End: 2025-06-20

## 2025-06-20 VITALS
SYSTOLIC BLOOD PRESSURE: 110 MMHG | HEIGHT: 64 IN | DIASTOLIC BLOOD PRESSURE: 60 MMHG | WEIGHT: 120 LBS | BODY MASS INDEX: 20.49 KG/M2 | TEMPERATURE: 98 F | HEART RATE: 72 BPM

## 2025-06-20 DIAGNOSIS — D64.9 ANEMIA, UNSPECIFIED TYPE: Primary | ICD-10-CM

## 2025-06-20 DIAGNOSIS — I10 ESSENTIAL HYPERTENSION: ICD-10-CM

## 2025-06-20 DIAGNOSIS — H91.13 PRESBYCUSIS OF BOTH EARS: ICD-10-CM

## 2025-06-20 DIAGNOSIS — N40.1 BPH WITH URINARY OBSTRUCTION: ICD-10-CM

## 2025-06-20 DIAGNOSIS — I50.23 ACUTE ON CHRONIC SYSTOLIC HEART FAILURE (HCC): ICD-10-CM

## 2025-06-20 DIAGNOSIS — H52.4 PRESBYOPIA: ICD-10-CM

## 2025-06-20 DIAGNOSIS — N13.8 BPH WITH URINARY OBSTRUCTION: ICD-10-CM

## 2025-06-20 DIAGNOSIS — Z00.00 PHYSICAL EXAM: ICD-10-CM

## 2025-06-20 DIAGNOSIS — K21.9 GASTROESOPHAGEAL REFLUX DISEASE WITHOUT ESOPHAGITIS: ICD-10-CM

## 2025-06-20 NOTE — PATIENT INSTRUCTIONS
1. Anemia, unspecified type  Stable continue current monitor management, serial lab work next on Tuesday I will notify you with results  I do like the idea of you staying on the B12 at weekly dosages, you are certainly welcome to back off of this if we think symptomatically it is not helping but I would base it off your energy levels and symptoms    2. Essential hypertension  Stable, continue current monitor management, home medications    3. Acute on chronic systolic heart failure (HCC)  Stable line with lab work, continuing current Lasix at 10 mg daily, and the potassium every other day  - TSH W Reflex To Free T4  - CBC With Differential With Platelet  - Comp Metabolic Panel (14)  - BNP (Brain Natriuretic Peptide) [E]; Future    4. Physical exam  Stable, continue current monitor management, home medications    5. Gastroesophageal reflux disease without esophagitis  Stable, continue current monitor management, home medications    6. BPH with urinary obstruction  Okay to continue on the tamsulosin medication, this is 1 that could cause some dizziness at times when standing, but I think your vertigo symptoms are more the Player here.    7. Presbycusis of both ears  Lets see the audiology clinic and get refitted  - Audiology Referral - [facility] ([facility])    8. Presbyopia  Stable, continue current monitor management, home medications, lets see the [facility] doctors to check out.  - Ophthalmology Referral - External    - For the thyroid medicine I would have you stay on that and as we check the level on the lab work, we can fine-tune it after that

## 2025-06-21 NOTE — PROGRESS NOTES
HPI:   César Copeland is a 89 year old male who presents for complains of: Anemia, presbyopia presbycusis    Anemia: Patient has had continual multifactorial anemia, has no direct signs of bleeding, seems to be stable, here with his daughter today, blood counts have remained stable, he is doing some serial checks with home nurses at home, and seems to be stable with this.  They are questioning the B12 dosage, we did discuss the improvement with the fatigue and supratherapeutic levels of B12 and that I recommend he stay on these for multiple reasons, they verbalized understanding.    Hearing loss: Patient is due for a hearing check, is interested in a comprehensive hearing exam, ENT doctor as well, he is in need of new hearing aids.    Vision loss: Patient does have some age-related vision loss, and claims he is due to go in with the ophthalmology checks, but does need a new ophthalmologist.  Is asking for recommendation.  He claims he is able to get along, see enough to perform his activities of daily living, and is not a fall risk at this time.    Aortic dissection: Patient does have history of heart failure, and a known aortic dissection in which his family and he have decided not to have operated on.  He has remained stable, with tight blood pressure control, and close monitoring, he has continued to tolerate anticoagulation, and continues to do well, no chest pain.  He certainly does verbalize understanding of his risks with a very delicate medical issue as a aortic dissection.    EXAM:   /60   Pulse 72   Temp 97.8 °F (36.6 °C) (Oral)   Ht 5' 4\" (1.626 m)   Wt 120 lb (54.4 kg)   BMI 20.60 kg/m²   Body mass index is 20.6 kg/m².   Gen. exam: Alert and oriented, in no acute distress   HEENT: Pupils equal and reactive to light and accommodation, moist mucous membranes  Neck exam:  Supple.  Normal thyroid trachea midline, no JVD  Heart exam: Regular rate and rhythm no murmurs no S3 no S4   Lung exam: No  rales no rhonchi no wheezes  Abdominal exam: Soft, nontender, nondistended, positive bowel sounds are normoactive  Extremities exam: no clubbing, no cyanosis, no edema  Skin exam: No obvious wounds, no rashes  Neurological exam: Cranial nerves II through XII intact, no gross deficits  Musculoskeletal exam: Moderate bilateral generalized hand arthritis appreciated, no obvious deformity    ASSESSMENT AND PLAN:   César Copeland is a 89 year old male who presents with the followin. Anemia, unspecified type  Stable continue current monitor management, serial lab work next on Tuesday I will notify you with results  I do like the idea of you staying on the B12 at weekly dosages, you are certainly welcome to back off of this if we think symptomatically it is not helping but I would base it off your energy levels and symptoms    2. Essential hypertension  Stable, continue current monitor management, home medications    3. Acute on chronic systolic heart failure (HCC)  Stable line with lab work, continuing current Lasix at 10 mg daily, and the potassium every other day  - TSH W Reflex To Free T4  - CBC With Differential With Platelet  - Comp Metabolic Panel (14)  - BNP (Brain Natriuretic Peptide) [E]; Future    4. Physical exam  Stable, continue current monitor management, home medications    5. Gastroesophageal reflux disease without esophagitis  Stable, continue current monitor management, home medications    6. BPH with urinary obstruction  Okay to continue on the tamsulosin medication, this is 1 that could cause some dizziness at times when standing, but I think your vertigo symptoms are more the Player here.    7. Presbycusis of both ears  Lets see the audiology clinic and get refitted  - Audiology Referral - [place] ([facility])    8. Presbyopia  Stable, continue current monitor management, home medications, lets see the [facility] doctors to check out.  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    - For the thyroid medicine I would have you stay on that and as we check the level on the lab work, we can fine-tune it after that      Spent 30 minutes obtaining history, evaluating patient, discussing treatment options, diet, exercise, review of available labs and radiology reports, and completing documentation.    Jeff Anthony D'Amico, DO  6/21/2025  5:46 PM

## 2025-07-01 ENCOUNTER — LAB REQUISITION (OUTPATIENT)
Dept: LAB | Facility: HOSPITAL | Age: OVER 89
End: 2025-07-01
Payer: MEDICARE

## 2025-07-01 DIAGNOSIS — I50.23 ACUTE ON CHRONIC SYSTOLIC (CONGESTIVE) HEART FAILURE (HCC): ICD-10-CM

## 2025-07-01 DIAGNOSIS — D64.9 ANEMIA, UNSPECIFIED: ICD-10-CM

## 2025-07-01 LAB
ALBUMIN SERPL-MCNC: 3.9 G/DL (ref 3.2–4.8)
ALBUMIN/GLOB SERPL: 2 {RATIO} (ref 1–2)
ALP LIVER SERPL-CCNC: 102 U/L (ref 45–117)
ALT SERPL-CCNC: 45 U/L (ref 10–49)
ANION GAP SERPL CALC-SCNC: 10 MMOL/L (ref 0–18)
AST SERPL-CCNC: 43 U/L (ref ?–34)
BASOPHILS # BLD AUTO: 0.06 X10(3) UL (ref 0–0.2)
BASOPHILS NFR BLD AUTO: 0.8 %
BILIRUB SERPL-MCNC: 2 MG/DL (ref 0.2–0.9)
BUN BLD-MCNC: 20 MG/DL (ref 9–23)
BUN/CREAT SERPL: 17.9 (ref 10–20)
CALCIUM BLD-MCNC: 8.9 MG/DL (ref 8.7–10.4)
CHLORIDE SERPL-SCNC: 104 MMOL/L (ref 98–112)
CO2 SERPL-SCNC: 27 MMOL/L (ref 21–32)
CREAT BLD-MCNC: 1.12 MG/DL (ref 0.7–1.3)
DEPRECATED RDW RBC AUTO: 49.5 FL (ref 35.1–46.3)
EGFRCR SERPLBLD CKD-EPI 2021: 60 ML/MIN/1.73M2 (ref 60–?)
EOSINOPHIL # BLD AUTO: 0.19 X10(3) UL (ref 0–0.7)
EOSINOPHIL NFR BLD AUTO: 2.5 %
ERYTHROCYTE [DISTWIDTH] IN BLOOD BY AUTOMATED COUNT: 20.2 % (ref 11–15)
FASTING STATUS PATIENT QL REPORTED: YES
GLOBULIN PLAS-MCNC: 2 G/DL (ref 2–3.5)
GLUCOSE BLD-MCNC: 98 MG/DL (ref 70–99)
HCT VFR BLD AUTO: 35.3 % (ref 39–53)
HGB BLD-MCNC: 10.7 G/DL (ref 13–17.5)
IMM GRANULOCYTES # BLD AUTO: 0.05 X10(3) UL (ref 0–1)
IMM GRANULOCYTES NFR BLD: 0.7 %
LYMPHOCYTES # BLD AUTO: 0.93 X10(3) UL (ref 1–4)
LYMPHOCYTES NFR BLD AUTO: 12.1 %
MCH RBC QN AUTO: 21.4 PG (ref 26–34)
MCHC RBC AUTO-ENTMCNC: 30.3 G/DL (ref 31–37)
MCV RBC AUTO: 70.7 FL (ref 80–100)
MONOCYTES # BLD AUTO: 0.83 X10(3) UL (ref 0.1–1)
MONOCYTES NFR BLD AUTO: 10.8 %
NEUTROPHILS # BLD AUTO: 5.61 X10 (3) UL (ref 1.5–7.7)
NEUTROPHILS # BLD AUTO: 5.61 X10(3) UL (ref 1.5–7.7)
NEUTROPHILS NFR BLD AUTO: 73.1 %
OSMOLALITY SERPL CALC.SUM OF ELEC: 295 MOSM/KG (ref 275–295)
PLATELET # BLD AUTO: 242 10(3)UL (ref 150–450)
PLATELETS.RETICULATED NFR BLD AUTO: 3.9 % (ref 0–7)
POTASSIUM SERPL-SCNC: 3.9 MMOL/L (ref 3.5–5.1)
PROT SERPL-MCNC: 5.9 G/DL (ref 5.7–8.2)
RBC # BLD AUTO: 4.99 X10(6)UL (ref 3.8–5.8)
SODIUM SERPL-SCNC: 141 MMOL/L (ref 136–145)
VIT B12 SERPL-MCNC: 1505 PG/ML (ref 211–911)
WBC # BLD AUTO: 7.7 X10(3) UL (ref 4–11)

## 2025-07-01 PROCEDURE — 80053 COMPREHEN METABOLIC PANEL: CPT | Performed by: INTERNAL MEDICINE

## 2025-07-01 PROCEDURE — 85025 COMPLETE CBC W/AUTO DIFF WBC: CPT | Performed by: INTERNAL MEDICINE

## 2025-07-01 PROCEDURE — 82607 VITAMIN B-12: CPT | Performed by: INTERNAL MEDICINE

## 2025-07-02 DIAGNOSIS — R17 ELEVATED BILIRUBIN: Primary | ICD-10-CM

## 2025-07-08 ENCOUNTER — RESULTS FOLLOW-UP (OUTPATIENT)
Dept: LAB | Facility: HOSPITAL | Age: OVER 89
End: 2025-07-08

## 2025-07-08 ENCOUNTER — LAB REQUISITION (OUTPATIENT)
Dept: LAB | Facility: HOSPITAL | Age: OVER 89
End: 2025-07-08
Payer: MEDICARE

## 2025-07-08 DIAGNOSIS — R17 ELEVATED BILIRUBIN: Primary | ICD-10-CM

## 2025-07-08 DIAGNOSIS — R17 UNSPECIFIED JAUNDICE: ICD-10-CM

## 2025-07-08 LAB
BILIRUB DIRECT SERPL-MCNC: 0.7 MG/DL (ref ?–0.3)
BILIRUB SERPL-MCNC: 1.9 MG/DL (ref 0.2–0.9)

## 2025-07-08 PROCEDURE — 82247 BILIRUBIN TOTAL: CPT | Performed by: INTERNAL MEDICINE

## 2025-07-08 PROCEDURE — 82248 BILIRUBIN DIRECT: CPT | Performed by: INTERNAL MEDICINE

## 2025-07-29 ENCOUNTER — LAB REQUISITION (OUTPATIENT)
Dept: LAB | Facility: HOSPITAL | Age: OVER 89
End: 2025-07-29

## 2025-07-29 DIAGNOSIS — D64.9 ANEMIA, UNSPECIFIED: ICD-10-CM

## 2025-07-29 DIAGNOSIS — I50.23 ACUTE ON CHRONIC SYSTOLIC (CONGESTIVE) HEART FAILURE (HCC): ICD-10-CM

## 2025-07-29 LAB
ALBUMIN SERPL-MCNC: 4.1 G/DL (ref 3.2–4.8)
ALBUMIN/GLOB SERPL: 1.9 (ref 1–2)
ALP LIVER SERPL-CCNC: 112 U/L (ref 45–117)
ALT SERPL-CCNC: 35 U/L (ref 10–49)
ANION GAP SERPL CALC-SCNC: 9 MMOL/L (ref 0–18)
AST SERPL-CCNC: 42 U/L (ref ?–34)
BASOPHILS # BLD AUTO: 0.07 X10(3) UL (ref 0–0.2)
BASOPHILS NFR BLD AUTO: 0.8 %
BILIRUB SERPL-MCNC: 1.8 MG/DL (ref 0.2–0.9)
BUN BLD-MCNC: 20 MG/DL (ref 9–23)
BUN/CREAT SERPL: 18.5 (ref 10–20)
CALCIUM BLD-MCNC: 8.7 MG/DL (ref 8.7–10.4)
CHLORIDE SERPL-SCNC: 107 MMOL/L (ref 98–112)
CO2 SERPL-SCNC: 27 MMOL/L (ref 21–32)
CREAT BLD-MCNC: 1.08 MG/DL (ref 0.7–1.3)
DEPRECATED RDW RBC AUTO: 46.5 FL (ref 35.1–46.3)
EGFRCR SERPLBLD CKD-EPI 2021: 63 ML/MIN/1.73M2 (ref 60–?)
EOSINOPHIL # BLD AUTO: 0.26 X10(3) UL (ref 0–0.7)
EOSINOPHIL NFR BLD AUTO: 3 %
ERYTHROCYTE [DISTWIDTH] IN BLOOD BY AUTOMATED COUNT: 18.9 % (ref 11–15)
GLOBULIN PLAS-MCNC: 2.2 G/DL (ref 2–3.5)
GLUCOSE BLD-MCNC: 78 MG/DL (ref 70–99)
HCT VFR BLD AUTO: 35.9 % (ref 39–53)
HGB BLD-MCNC: 10.8 G/DL (ref 13–17.5)
IMM GRANULOCYTES # BLD AUTO: 0.09 X10(3) UL (ref 0–1)
IMM GRANULOCYTES NFR BLD: 1 %
LYMPHOCYTES # BLD AUTO: 1.13 X10(3) UL (ref 1–4)
LYMPHOCYTES NFR BLD AUTO: 13.2 %
MCH RBC QN AUTO: 21.4 PG (ref 26–34)
MCHC RBC AUTO-ENTMCNC: 30.1 G/DL (ref 31–37)
MCV RBC AUTO: 71.1 FL (ref 80–100)
MONOCYTES # BLD AUTO: 1.14 X10(3) UL (ref 0.1–1)
MONOCYTES NFR BLD AUTO: 13.3 %
NEUTROPHILS # BLD AUTO: 5.89 X10 (3) UL (ref 1.5–7.7)
NEUTROPHILS # BLD AUTO: 5.89 X10(3) UL (ref 1.5–7.7)
NEUTROPHILS NFR BLD AUTO: 68.7 %
OSMOLALITY SERPL CALC.SUM OF ELEC: 297 MOSM/KG (ref 275–295)
PLATELET # BLD AUTO: 236 10(3)UL (ref 150–450)
POTASSIUM SERPL-SCNC: 3.4 MMOL/L (ref 3.5–5.1)
PROT SERPL-MCNC: 6.3 G/DL (ref 5.7–8.2)
RBC # BLD AUTO: 5.05 X10(6)UL (ref 3.8–5.8)
SODIUM SERPL-SCNC: 143 MMOL/L (ref 136–145)
VIT B12 SERPL-MCNC: 1560 PG/ML (ref 211–911)
WBC # BLD AUTO: 8.6 X10(3) UL (ref 4–11)

## 2025-07-29 PROCEDURE — 85025 COMPLETE CBC W/AUTO DIFF WBC: CPT | Performed by: INTERNAL MEDICINE

## 2025-07-29 PROCEDURE — 82607 VITAMIN B-12: CPT | Performed by: INTERNAL MEDICINE

## 2025-07-29 PROCEDURE — 80053 COMPREHEN METABOLIC PANEL: CPT | Performed by: INTERNAL MEDICINE

## 2025-08-15 RX ORDER — SYRINGE W-NEEDLE,DISPOSAB,3 ML 25GX5/8"
SYRINGE, EMPTY DISPOSABLE MISCELLANEOUS
Qty: 12 EACH | Refills: 0 | Status: SHIPPED | OUTPATIENT
Start: 2025-08-15

## 2025-08-19 ENCOUNTER — LAB REQUISITION (OUTPATIENT)
Dept: LAB | Facility: HOSPITAL | Age: OVER 89
End: 2025-08-19

## 2025-08-19 DIAGNOSIS — D64.9 ANEMIA, UNSPECIFIED: ICD-10-CM

## 2025-08-19 DIAGNOSIS — I50.23 ACUTE ON CHRONIC SYSTOLIC (CONGESTIVE) HEART FAILURE (HCC): ICD-10-CM

## 2025-08-19 DIAGNOSIS — D72.829 LEUKOCYTOSIS, UNSPECIFIED TYPE: Primary | ICD-10-CM

## 2025-08-19 LAB
ALBUMIN SERPL-MCNC: 4.1 G/DL (ref 3.2–4.8)
ALBUMIN/GLOB SERPL: 2 (ref 1–2)
ALP LIVER SERPL-CCNC: 90 U/L (ref 45–117)
ALT SERPL-CCNC: 21 U/L (ref 10–49)
ANION GAP SERPL CALC-SCNC: 7 MMOL/L (ref 0–18)
AST SERPL-CCNC: 26 U/L (ref ?–34)
BASOPHILS # BLD AUTO: 0.09 X10(3) UL (ref 0–0.2)
BASOPHILS NFR BLD AUTO: 0.6 %
BILIRUB SERPL-MCNC: 1.8 MG/DL (ref 0.2–0.9)
BUN BLD-MCNC: 26 MG/DL (ref 9–23)
BUN/CREAT SERPL: 24.3 (ref 10–20)
CALCIUM BLD-MCNC: 9.1 MG/DL (ref 8.7–10.4)
CHLORIDE SERPL-SCNC: 106 MMOL/L (ref 98–112)
CO2 SERPL-SCNC: 25 MMOL/L (ref 21–32)
CREAT BLD-MCNC: 1.07 MG/DL (ref 0.7–1.3)
DEPRECATED RDW RBC AUTO: 43.7 FL (ref 35.1–46.3)
EGFRCR SERPLBLD CKD-EPI 2021: 64 ML/MIN/1.73M2 (ref 60–?)
EOSINOPHIL # BLD AUTO: 0.14 X10(3) UL (ref 0–0.7)
EOSINOPHIL NFR BLD AUTO: 0.9 %
ERYTHROCYTE [DISTWIDTH] IN BLOOD BY AUTOMATED COUNT: 18.6 % (ref 11–15)
GLOBULIN PLAS-MCNC: 2.1 G/DL (ref 2–3.5)
GLUCOSE BLD-MCNC: 113 MG/DL (ref 70–99)
HCT VFR BLD AUTO: 37.4 % (ref 39–53)
HGB BLD-MCNC: 11.5 G/DL (ref 13–17.5)
IMM GRANULOCYTES # BLD AUTO: 0.26 X10(3) UL (ref 0–1)
IMM GRANULOCYTES NFR BLD: 1.7 %
LYMPHOCYTES # BLD AUTO: 1.11 X10(3) UL (ref 1–4)
LYMPHOCYTES NFR BLD AUTO: 7.3 %
MCH RBC QN AUTO: 21.4 PG (ref 26–34)
MCHC RBC AUTO-ENTMCNC: 30.7 G/DL (ref 31–37)
MCV RBC AUTO: 69.6 FL (ref 80–100)
MONOCYTES # BLD AUTO: 1.5 X10(3) UL (ref 0.1–1)
MONOCYTES NFR BLD AUTO: 9.9 %
NEUTROPHILS # BLD AUTO: 12.1 X10 (3) UL (ref 1.5–7.7)
NEUTROPHILS # BLD AUTO: 12.1 X10(3) UL (ref 1.5–7.7)
NEUTROPHILS NFR BLD AUTO: 79.6 %
OSMOLALITY SERPL CALC.SUM OF ELEC: 292 MOSM/KG (ref 275–295)
PLATELET # BLD AUTO: 280 10(3)UL (ref 150–450)
POTASSIUM SERPL-SCNC: 4.2 MMOL/L (ref 3.5–5.1)
PROT SERPL-MCNC: 6.2 G/DL (ref 5.7–8.2)
RBC # BLD AUTO: 5.37 X10(6)UL (ref 3.8–5.8)
SODIUM SERPL-SCNC: 138 MMOL/L (ref 136–145)
WBC # BLD AUTO: 15.2 X10(3) UL (ref 4–11)

## 2025-08-19 PROCEDURE — 85025 COMPLETE CBC W/AUTO DIFF WBC: CPT | Performed by: INTERNAL MEDICINE

## 2025-08-19 PROCEDURE — 80053 COMPREHEN METABOLIC PANEL: CPT | Performed by: INTERNAL MEDICINE

## 2025-08-22 ENCOUNTER — LAB ENCOUNTER (OUTPATIENT)
Dept: LAB | Facility: HOSPITAL | Age: OVER 89
End: 2025-08-22
Attending: INTERNAL MEDICINE

## 2025-08-22 DIAGNOSIS — I50.23 ACUTE ON CHRONIC SYSTOLIC HEART FAILURE (HCC): ICD-10-CM

## 2025-08-22 DIAGNOSIS — R17 ELEVATED BILIRUBIN: ICD-10-CM

## 2025-08-22 LAB
ALBUMIN SERPL-MCNC: 4.1 G/DL (ref 3.2–4.8)
ALBUMIN/GLOB SERPL: 2.1 (ref 1–2)
ALP LIVER SERPL-CCNC: 90 U/L (ref 45–117)
ALT SERPL-CCNC: 22 U/L (ref 10–49)
ANION GAP SERPL CALC-SCNC: 9 MMOL/L (ref 0–18)
AST SERPL-CCNC: 27 U/L (ref ?–34)
BASOPHILS # BLD AUTO: 0.06 X10(3) UL (ref 0–0.2)
BASOPHILS NFR BLD AUTO: 0.5 %
BILIRUB DIRECT SERPL-MCNC: 0.6 MG/DL (ref ?–0.3)
BILIRUB SERPL-MCNC: 1.8 MG/DL (ref 0.2–0.9)
BILIRUB UR QL: NEGATIVE
BNP SERPL-MCNC: 135 PG/ML (ref ?–100)
BUN BLD-MCNC: 28 MG/DL (ref 9–23)
BUN/CREAT SERPL: 26.2 (ref 10–20)
CALCIUM BLD-MCNC: 9.1 MG/DL (ref 8.7–10.4)
CHLORIDE SERPL-SCNC: 106 MMOL/L (ref 98–112)
CLARITY UR: CLEAR
CO2 SERPL-SCNC: 26 MMOL/L (ref 21–32)
COLOR UR: YELLOW
CREAT BLD-MCNC: 1.07 MG/DL (ref 0.7–1.3)
DEPRECATED RDW RBC AUTO: 43.2 FL (ref 35.1–46.3)
EGFRCR SERPLBLD CKD-EPI 2021: 64 ML/MIN/1.73M2 (ref 60–?)
EOSINOPHIL # BLD AUTO: 0.3 X10(3) UL (ref 0–0.7)
EOSINOPHIL NFR BLD AUTO: 2.5 %
ERYTHROCYTE [DISTWIDTH] IN BLOOD BY AUTOMATED COUNT: 18.9 % (ref 11–15)
FASTING STATUS PATIENT QL REPORTED: NO
GLOBULIN PLAS-MCNC: 2 G/DL (ref 2–3.5)
GLUCOSE BLD-MCNC: 77 MG/DL (ref 70–99)
GLUCOSE UR-MCNC: NORMAL MG/DL
HCT VFR BLD AUTO: 36.1 % (ref 39–53)
HGB BLD-MCNC: 11.3 G/DL (ref 13–17.5)
HGB UR QL STRIP.AUTO: NEGATIVE
IMM GRANULOCYTES # BLD AUTO: 0.15 X10(3) UL (ref 0–1)
IMM GRANULOCYTES NFR BLD: 1.2 %
KETONES UR-MCNC: NEGATIVE MG/DL
LEUKOCYTE ESTERASE UR QL STRIP.AUTO: 75
LYMPHOCYTES # BLD AUTO: 1.23 X10(3) UL (ref 1–4)
LYMPHOCYTES NFR BLD AUTO: 10.1 %
MCH RBC QN AUTO: 21.3 PG (ref 26–34)
MCHC RBC AUTO-ENTMCNC: 31.3 G/DL (ref 31–37)
MCV RBC AUTO: 68 FL (ref 80–100)
MONOCYTES # BLD AUTO: 1.49 X10(3) UL (ref 0.1–1)
MONOCYTES NFR BLD AUTO: 12.2 %
NEUTROPHILS # BLD AUTO: 8.97 X10 (3) UL (ref 1.5–7.7)
NEUTROPHILS # BLD AUTO: 8.97 X10(3) UL (ref 1.5–7.7)
NEUTROPHILS NFR BLD AUTO: 73.5 %
NITRITE UR QL STRIP.AUTO: NEGATIVE
OSMOLALITY SERPL CALC.SUM OF ELEC: 296 MOSM/KG (ref 275–295)
PH UR: 5 (ref 5–8)
PLATELET # BLD AUTO: 249 10(3)UL (ref 150–450)
POTASSIUM SERPL-SCNC: 4.1 MMOL/L (ref 3.5–5.1)
PROT SERPL-MCNC: 6.1 G/DL (ref 5.7–8.2)
PROT UR-MCNC: NEGATIVE MG/DL
RBC # BLD AUTO: 5.31 X10(6)UL (ref 3.8–5.8)
SODIUM SERPL-SCNC: 141 MMOL/L (ref 136–145)
SP GR UR STRIP: 1.02 (ref 1–1.03)
TSI SER-ACNC: 2.47 UIU/ML (ref 0.55–4.78)
UROBILINOGEN UR STRIP-ACNC: NORMAL
WBC # BLD AUTO: 12.2 X10(3) UL (ref 4–11)

## 2025-08-22 PROCEDURE — 81001 URINALYSIS AUTO W/SCOPE: CPT | Performed by: INTERNAL MEDICINE

## 2025-08-22 PROCEDURE — 87086 URINE CULTURE/COLONY COUNT: CPT | Performed by: INTERNAL MEDICINE

## 2025-08-22 PROCEDURE — 82248 BILIRUBIN DIRECT: CPT

## 2025-08-22 PROCEDURE — 36415 COLL VENOUS BLD VENIPUNCTURE: CPT

## 2025-08-22 PROCEDURE — 80053 COMPREHEN METABOLIC PANEL: CPT | Performed by: INTERNAL MEDICINE

## 2025-08-22 PROCEDURE — 85025 COMPLETE CBC W/AUTO DIFF WBC: CPT | Performed by: INTERNAL MEDICINE

## 2025-08-22 PROCEDURE — 84443 ASSAY THYROID STIM HORMONE: CPT | Performed by: INTERNAL MEDICINE

## 2025-08-22 PROCEDURE — 83880 ASSAY OF NATRIURETIC PEPTIDE: CPT

## (undated) DIAGNOSIS — N20.0 RIGHT KIDNEY STONE: Primary | ICD-10-CM

## (undated) DIAGNOSIS — N39.0 RECURRENT UTI: Primary | ICD-10-CM

## (undated) DIAGNOSIS — A49.8 INFECTION DUE TO ESBL-PRODUCING ESCHERICHIA COLI: Primary | ICD-10-CM

## (undated) DIAGNOSIS — Z16.12 INFECTION DUE TO ESBL-PRODUCING ESCHERICHIA COLI: Primary | ICD-10-CM

## (undated) DEVICE — TECH FEE LASER HOLMIUM LOW WAT

## (undated) DEVICE — ZIPWIRE GUIDEWIRE .035X150 STR

## (undated) DEVICE — SOL H2O 3000ML IRRIG

## (undated) DEVICE — SCD SLEEVE KNEE HI BLEND

## (undated) DEVICE — EVAC URL LDSEN DF4 IBIR 64CM

## (undated) DEVICE — PUMP SAPS 1  ACT 1 WY VLV

## (undated) DEVICE — NITINOL WIRE STR 035

## (undated) DEVICE — TIGERTAIL 6F FLXTIP 70CM

## (undated) DEVICE — PAD EYE OVAL LG

## (undated) DEVICE — DUAL LUMEN CATHETER

## (undated) DEVICE — URINE DRAINAGE BAG,NEEDLE SAMPLING, ANTI-REFLUX DEVICE, DRAIN TUBE: Brand: DOVER

## (undated) DEVICE — SOL H2O 1000ML BTL

## (undated) DEVICE — PLASTC TOOMEY SYRNG DISP

## (undated) DEVICE — CYSTO PACK: Brand: MEDLINE INDUSTRIES, INC.

## (undated) DEVICE — SINGLE-USE DIGITAL FLEXIBLE URETEROSCOPE: Brand: LITHOVUE

## (undated) DEVICE — HYDROGEL COATED URETERAL DILATOR: Brand: NOTTINGHAM ONE-STEP

## (undated) DEVICE — 60 ML SYRINGE,TOOMEY TYPE: Brand: MONOJECT

## (undated) DEVICE — CATH URTH BDX IC 20FR FL 3

## (undated) DEVICE — MOSES 200 FIBER

## (undated) DEVICE — NITINOL STONE RETRIEVAL BASKET: Brand: ZERO TIP

## (undated) DEVICE — GOWN SURG AERO BLUE PERF LG

## (undated) DEVICE — ENDOSCOPIC VALVE WITH ADAPTER.: Brand: SURSEAL® II

## (undated) DEVICE — TIGERTAIL 5F FLXTIP 70CM

## (undated) DEVICE — GAMMEX® NON-LATEX SIZE 7.5, STERILE NEOPRENE POWDER-FREE SURGICAL GLOVE: Brand: GAMMEX

## (undated) DEVICE — SOL  .9 1000ML BTL

## (undated) DEVICE — URETERAL ACCESS SHEATH SET: Brand: NAVIGATOR HD

## (undated) DEVICE — CATH URTH BDX IC 22FR FL 3

## (undated) DEVICE — TOWEL SURG OR 17X30IN BLUE

## (undated) DEVICE — 200 HOLMIUM FIBER-REUSABLE

## (undated) NOTE — ED AVS SNAPSHOT
Abelardo Aranda   MRN: [id number]    Department:  [facility] Emergency Department   Date of Visit:  5/26/2018           Disclosure     Insurance plans vary and the physician(s) referred by the ER may not be covered by your plan.  Please contact within the next three months to obtain basic health screening including reassessment of your blood pressure.     IF THERE IS ANY CHANGE OR WORSENING OF YOUR CONDITION, CALL YOUR PRIMARY CARE PHYSICIAN AT ONCE OR RETURN IMMEDIATELY TO THE EMERGENCY DEPARTMEN

## (undated) NOTE — LETTER
Hospital Discharge Documentation  Please phone to schedule a hospital follow up appointment.     From: [facility] Hospitalist's Office  Phone: [phone]    Patient discharged time/date: 8/29/2019  6:53 PM  Patient discharge disposition:  Home or Self triple-vessel disease for which CABG was recommended 5 years ago. The patient declined at that time and also has significant right hip pain. He was told he could not undergo the hip surgery without bypass surgery first.  He still declined the surgery.   I Philip Lorenzana MD Consulting Physician  Cardiovascular Diseases    Gregory Fonseca MD Consulting Physician  UROLOGY        Surgical Procedures     Case IDs Date Procedure Surgeon Location Status    [id number] 8/28/19 CYSTOSCOPY STENT INSERTION Jazz Gordon after discharge from the hospital.        I spent >30 minutes on this discharge, counseling patient and discussing discharge plans. All questions answered.       Dom Warner MD[JH.1]      Electronically signed by Yadiel Valentine MD on 8/30/2019  8:46 AM   Attribut

## (undated) NOTE — LETTER
[facility]  Authorization for Invasive Procedures  1.  I hereby authorize Dr. Odilia Vidal , my physician and whomever may be designated as the doctor's assistant, to perform the following operation and/or procedure:  Right hip performed for the purposes of advancing medicine, science, and/or education, provided my identity is not revealed. If the procedure has been videotaped, the physician/surgeon will obtain the original videotape.  The hospital will not be responsible for stor My signature below affirms that prior to the time of the procedure, I have explained to the patient and/or his legal representative, the risks and benefits involved in the proposed treatment and any reasonable alternative to the proposed treatment.  I have

## (undated) NOTE — LETTER
Queenie Healy, [address]  [facility]       10/15/19        Patient: Veronica Ramirez   YOB: 1930   Date of Visit: 10/15/2019       Dear  Dr. Dearl Ormond, MD,      Thank you for referring Veronica Ramirez to my practice.

## (undated) NOTE — MR AVS SNAPSHOT
DEVIN Kilmarnock  [facility] [address] [address]  [phone]               Thank you for choosing us for your health care visit with Gallito May. MD Silvino.  We are glad to serve you and happy to provide you with this summary of your visit.   Rinku Where to Get Your Medications      These medications were sent to [address] [facility], [address] [place] [address] [address] [address]     Phone:  [phone] - atorvastatin c

## (undated) NOTE — LETTER
[facility], [place]  Authorization for Invasive Procedures  1.  I hereby authorize Dr. Lester Hurtado , my physician and whomever may be designated as the doctor's assistant, to perform the following operation and/or procedure:  Steroid in performed for the purposes of advancing medicine, science, and/or education, provided my identity is not revealed. If the procedure has been videotaped, the physician/surgeon will obtain the original videotape.  The hospital will not be responsible for stor My signature below affirms that prior to the time of the procedure, I have explained to the patient and/or his legal representative, the risks and benefits involved in the proposed treatment and any reasonable alternative to the proposed treatment.  I have

## (undated) NOTE — LETTER
ADULT VIDEOFLUOROSCOPIC SWALLOWING STUDY:   Referring Physician: Jcarlos Brown      Radiologist: Dr. Karyna Rain   Diagnosis: Dysphagia  Date of Service: 10/31/2019     PATIENT SUMMARY   Chief Complaint: Pt reports difficulty swallowing stating, \"I feel like little pharyngeal response to penetrated material. SLP cued pt to throat clear+swallow in attempt to clear residue from upper airway;however, deemed ineffective.  Nectar thickened liquids via tsp and nectar thickened liquids via cup with chin tuck posture reduced Recommended compensatory strategies:   Sit upright  Small sips  Small bites  Eat slowly  Swallow twice with each bite  Multiple dry swallows  No straw  Use chin tuck for liquids and solids    Medication Administration:  Take whole in pureed    Further Foll Date:  10/31/2019  Referring Physician: [address] [place] [address] VIDEOFLUOROSCOPIC SWALLOWING STUDY:    Referring Physician: Brant Adames      Radiologist: Dr. Ammy Haro   Diagnosis: Dysphagia  Date of Service: 10/31/2019     PATIENT SUMMARY   Chief Complaint: Pt re across thin liquids via tsp and cup via chin tuck. No protective pharyngeal response to penetrated material. SLP cued pt to throat clear+swallow in attempt to clear residue from upper airway;however, deemed ineffective.  Nectar thickened liquids via tsp and Solids: Mechanical soft chopped  Liquids: Nectar    Recommended compensatory strategies:   Sit upright  Small sips  Small bites  Eat slowly  Swallow twice with each bite  Multiple dry swallows  No straw  Use chin tuck for liquids and solids    Medication A

## (undated) NOTE — LETTER
Hospital Discharge Documentation      From: [facility] Hospitalist's Office  Phone: [phone]    Patient discharged time/date: 3/27/2025    Patient discharge disposition:  Home or Self Care       Discharge Summary - D/C Summary        Discharge Summary signed by Reginaldo Conner MD at 3/28/2025  9:45 AM  Version 1 of 1      Author: Reginaldo Conner MD Service: Internal Medicine Author Type: Physician    Filed: 3/28/2025  9:45 AM Date of Service: 3/27/2025 12:43 PM Status: Signed    : Reginaldo Conner MD (Physician)         [facility]  part of [facility]    Discharge Summary    César Copeland Patient Status:  Inpatient    1930 MRN [id number]   Location [facility] 5SW/SE Attending Reginaldo Conner MD   Hosp Day # 3 PCP Jeff Anthony D'Amico, DO     Date of Admission: 3/24/2025     Date of Discharge: 25      Lace+ Score: 74  59-90 High Risk  29-58 Medium Risk  0-28   Low Risk.    TCM Follow-Up Recommendation:  LACE > 58: High Risk of readmission after discharge from the hospital.    DISCHARGE DX: Principal Problem:    Severe anemia  Active Problems:    Gastrointestinal hemorrhage, unspecified gastrointestinal hemorrhage type       The patient was seen and examined on day of discharge and this discharge summary is in conjunction with any daily progress note from day of discharge.    HPI per admitting physician: \"César Copeland is a 89 year old male with history of CAD, A-fib, HFrEF, HTN, HLD, thalassemia trait, BPH and others who was referred to the ED today for evaluation of low hemoglobin.  Patient was seen earlier at PCP office where he reported dizziness especially with standing and also blurry vision.  He was sent for routine labs that came back with hemoglobin 6.1.  Referred to the ED  On arrival to ED, hemoglobin 6.4.  Denies any abnormal bleeding.  No black or bloody stools.  No abdominal pain.  No chest pain or shortness of breath.  He is on  chronic anticoagulation with Eliquis.  Also on aspirin.  Denies NSAID use.  Vital stable.  Labs stable except hemoglobin 6.4\"    Hospital Course:       Severe anemia  Symptomatic anemia  Acute on chronic anemia  -Initial concern for GI blood loss   -Hemoglobin 6.1 on admission  -Status post transfusion of PRBC with good response.  -Hemoglobin stabilized  -No signs of active bleeding noted  -Continue to monitor, transfuse to keep hemoglobin greater than 7  -Continue PPI  -Continue holding Eliquis. Restarted on ASA  -GI consulted, agree with PPI.  No plans for endoscopic evaluation at this time.  Recommend completing course of IV iron.       Paroxysmal Atrial Fibrillation  -Rates currently controlled  -Continue amiodarone  -Cardiology on consult        CAD  Chronic HFrEF/ischemic cardiomyopathy  -No acute signs of exacerbation noted  -Continuing home regimen  - Daily weights, Fluid restriction  -Cardiology on consult     HTN  - controlled  - CPM  - Monitor and adjust as needed     HLD  -Statin        Thalassemia trait  BPH   -Home meds         Physical Exam:    Vitals:    03/26/25 1752 03/26/25 2042 03/27/25 0452 03/27/25 1011   BP: 130/65 119/59 122/62 127/75   BP Location: Right arm Right arm Right arm Right arm   Pulse: 78 76 79 78   Resp: 18 16 16 18   Temp: 98.5 °F (36.9 °C) 98.3 °F (36.8 °C) 98 °F (36.7 °C) 97.7 °F (36.5 °C)   TempSrc: Oral Oral Oral Oral   SpO2: 96% 95% 91% 95%   Weight:         Patient Weight for the past 72 hrs:   Weight   03/24/25 1906 123 lb 8 oz (56 kg)   03/24/25 2339 123 lb (55.8 kg)       Intake/Output Summary (Last 24 hours) at 3/27/2025 1243  Last data filed at 3/27/2025 0859  Gross per 24 hour   Intake 662 ml   Output --   Net 662 ml       GENERAL: Thin male.  Awake and alert, in no acute distress.  HEART:  Regular rhythm, regular rate  LUNGS:  Air entry was minimally decreased.  No increased work of breathing or wheezes   ABDOMEN: Soft and non-tender.    PSYCHIATRIC: Normal  mood    CULTURE:   No results found for this visit on 03/24/25.    IMAGING STUDIES: SOME MAY NEED FOLLOW UP WITH PCP         LABS :     Lab Results   Component Value Date    WBC 8.9 03/27/2025    HGB 8.2 (L) 03/27/2025    HCT 26.2 (L) 03/27/2025    .0 03/27/2025    CREATSERUM 1.01 03/27/2025    BUN 17 03/27/2025     03/27/2025    K 4.1 03/27/2025     03/27/2025    CO2 24.0 03/27/2025    GLU 97 03/27/2025    CA 7.9 (L) 03/27/2025    ALB 3.5 03/24/2025    ALKPHO 109 03/24/2025    BILT 1.2 (H) 03/24/2025    TP 6.0 03/24/2025     (H) 03/24/2025     (H) 03/24/2025    PTT 37.7 (H) 11/06/2024    INR 1.70 (H) 03/24/2025    T4F 1.3 03/24/2025    TSH 9.233 (H) 03/24/2025    PSA 8.71 (H) 08/28/2019    MG 2.0 11/11/2024    PHOS 2.6 11/11/2024    B12 746 09/06/2018       Recent Labs   Lab 03/24/25  1952 03/25/25  0152 03/25/25  0734 03/26/25  0610 03/27/25  0541   RBC 3.19*  --   --  3.67* 3.67*   HGB 6.4*   < > 7.9*  7.8* 8.1* 8.2*   HCT 21.1*  --   --  26.6* 26.2*   MCV 66.1*  --   --  72.5* 71.4*   MCH 20.1*  --   --  22.1* 22.3*   MCHC 30.3*  --   --  30.5* 31.3   RDW 19.0*  --   --  25.2* 26.0*   NEPRELIM 5.72  --   --  5.14 6.52   WBC 8.3  --   --  7.1 8.9   .0  --   --  270.0 255.0    < > = values in this interval not displayed.     Recent Labs   Lab 03/24/25  1421 03/26/25  0610 03/26/25  1621 03/27/25  0541   * 98  --  97   BUN 24* 18  --  17   CREATSERUM 1.09 0.91  --  1.01   CA 8.5* 7.8*  --  7.9*   ALB 3.5  --   --   --     143  --  142   K 4.0 3.5 5.0 4.1    111  --  110   CO2 24.0 23.0  --  24.0   ALKPHO 109  --   --   --    *  --   --   --    *  --   --   --    BILT 1.2*  --   --   --    TP 6.0  --   --   --      Lab Results   Component Value Date    INR 1.70 (H) 03/24/2025       Disposition: Discharge to Home    Condition at Discharge: Stable     Discharge Medications:      Discharge Medications        START taking these medications         Instructions Prescription details   ferrous sulfate 325 (65 FE) MG Tbec      Take 1 tablet (325 mg total) by mouth daily with breakfast.   Quantity: 30 tablet  Refills: 0     furosemide 20 MG Tabs  Commonly known as: Lasix      Take 1 tablet (20 mg total) by mouth daily.   Refills: 0     Potassium Chloride ER 10 MEQ Tbcr      Take 1 tablet (10 mEq total) by mouth daily.   Refills: 0            CONTINUE taking these medications        Instructions Prescription details   amiodarone 100 MG Tabs  Commonly known as: Pacerone      Take 1 tablet (100 mg total) by mouth daily.   Refills: 0     aspirin 81 MG Chew      Chew 1 tablet (81 mg total) by mouth daily.   Refills: 0     atorvastatin 20 MG Tabs  Commonly known as: Lipitor      Take 1 tablet (20 mg total) by mouth nightly.   Refills: 0     dutasteride 0.5 MG Caps  Commonly known as: Avodart      Take 1 capsule (0.5 mg total) by mouth daily.   Quantity: 90 capsule  Refills: 3     pantoprazole 40 MG Tbec  Commonly known as: Protonix      TAKE 1 TABLET DAILY   Quantity: 90 tablet  Refills: 3     tamsulosin 0.4 MG Caps  Commonly known as: Flomax      Take 1 capsule (0.4 mg total) by mouth daily. Take 1/2 hour following the same meal each day   Quantity: 90 capsule  Refills: 3            STOP taking these medications      apixaban 2.5 MG Tabs  Commonly known as: Eliquis                  Where to Get Your Medications        [id number] [place], [place] - [address] [facility] [address] [facility] [address], [phone], [phone]  [address] [facility] [address] [address]      Phone: [phone]   ferrous sulfate 325 (65 FE) MG Tbec         Follow up Visits  No follow-up provider specified.  Jeff Anthony D'Amico, DO    Consultants         Provider   Role Specialty     Wilman Nuñez MD      Consulting Physician Interventional, Cardiology     Ozzie Peralta MD      Consulting Physician GASTROENTEROLOGY              Other  Discharge Instructions:       ----------------------------------------------------  35 MIN SPENT ON THIS DC   Reginaldo Conner MD    3/27/2025      Electronically signed by Reginaldo Conner MD on 3/28/2025  9:45 AM

## (undated) NOTE — LETTER
[facility], [place]  Authorization for Invasive Procedures  1.  I hereby authorize Dr. Viki Ramires , my physician and whomever may be designated as the doctor's assistant, to perform the following operation and/or procedure:  Right hip performed for the purposes of advancing medicine, science, and/or education, provided my identity is not revealed. If the procedure has been videotaped, the physician/surgeon will obtain the original videotape.  The hospital will not be responsible for stor My signature below affirms that prior to the time of the procedure, I have explained to the patient and/or his legal representative, the risks and benefits involved in the proposed treatment and any reasonable alternative to the proposed treatment.  I have

## (undated) NOTE — LETTER
Hospital Discharge Documentation    From: [facility] Hospitalist's Office  Phone: [phone]    Patient discharged time/date: 2024  2:56 PM  Patient discharge disposition:  [facility]    Discharge Summary not available, attached latest progress note:          Aidee Shaver MD   Physician  Hospitalist     Progress Notes     Signed     Date of Service: 11/10/2024 11:38 AM     Signed       Expand All Collapse All       [facility]  [facility] of [facility]     Progress Note           César Copeland Patient Status:  Inpatient    1930 MRN [id number]   Location [facility] 2W/SW Attending Aidee Shaver MD   Hosp Day # 7 PCP Jeff Anthony D'Amico, DO      Chief Complaint:      A-fib with RVR        Subjective:  Subjective:     Patient seen and examined this morning  Endorsing shortness of breath and chest pain  Family at bedside.   Patient states he has good energy, has been walking in the halls.  Clinically improved, sitting upright in chair in good spirits           Objective:  Blood pressure 108/60, pulse 71, temperature 97.7 °F (36.5 °C), temperature source Oral, resp. rate 18, height 5' 4\" (1.626 m), weight 120 lb 1.6 oz (54.5 kg), SpO2 99%.  Physical Exam     General: does not appear to be in acute distress at this time  HEENT: EOMI PERRLA, atraumatic normocephalic  Cardiac: S1-S2 appreciated  Lungs: Good air entry bilaterally clear to auscultation  Abdomen: Soft nontender nondistended positive bowel sounds  Ext: Peripheral pulses are positive  Skin: No rashes noted           Results:        Lab Results   Component Value Date     WBC 12.0 (H) 2024     HGB 11.3 (L) 2024     HCT 35.8 (L) 2024     .0 2024     CREATSERUM 0.93 2024     BUN 36 (H) 2024      2024     K 3.7 2024      2024     CO2 27.0 2024      (H) 2024     CA 9.3 2024      ALB 4.3 05/21/2024     ALKPHO 81 05/21/2024     BILT 1.8 (H) 05/21/2024     TP 6.7 05/21/2024     AST 23 05/21/2024     ALT 14 05/21/2024     PTT 37.7 (H) 11/06/2024     T4F 0.9 10/03/2022     TSH 4.403 11/08/2024     PSA 8.71 (H) 08/28/2019     MG 2.1 11/09/2024     PHOS 3.2 11/09/2024     TROPHS 19 11/03/2024     B12 746 09/06/2018         No results found.            Assessment & Plan:  Acute Chest Pain  New onset afib  CAD- history of MV disease that was found years ago  - patient declined CABG at that time  - CT chest shows dissection at the distal arch of the left subclavian to upper abdomianl aorta above the R renal artery  - continue on esmolol   - CV surgery consulted   - Echo currently pending  - At this time- no surgical intervention is planned  - Palliative care consulted      Type B Aortic Dissection  -Descending thoracic aortic intramural hematoma  -Prescient vascular surgery recommendations  -Recommend tighter control of BP with SBP less than 140  -Repeat CT angiogram within 24 to 48 hours     HTN  Dyslipidemia     DVT PPX heparin drip     DNAR/Select     Global A/P  -Rates controlled.   -transferred out of ICU  -continue PT/OT   -repeat CT reviewed - findings c/w the possibility of an aortic dissection.  -discussion with family regarding ischemic evaluation.   -appreciate cardiology recs,   -Echo reviewed - c/w Systolic congestive heart failure with EF of 30-35%  -Appreciate palliative care recommendations  -Procal mildly elevated - UA (+) for pyuria Ucx with no growth  -continue monitor strict I's and O's  -Appreciate cardiology vascular recommendations  -Reviewed previous consultant notes  -Reviewed CBC, BMP, Mag, and Phos  -Reviewed tests ordered  -Repeat labs in am  -MDM: High, severe exacerbation of chronic illness posing a threat to life. IV medications requiring close inpatient monitoring.            Aidee Shaver MD                                          Electronically signed by Chhaya,  Aidee MUNROE MD at 11/11/2024 10:15 AM         ED to Hosp-Admission (Discharged) on 11/3/2024            Routing History            Detailed Report          Note shared with patient  Chart Review: Note Routing History    Recipients Sent On Sent By Routed Reports     [facility]   Fax: [phone]       11/11/2024  1:56 PM Karen Resendez Progress Notes by Aidee Shaver MD           Routing History    Date/Time From To Method   11/11/2024  1:56 PM Karen Resendez [facility], [facility] Fax     Care Timeline    11/03   Admitted to [facility] 2W/SW from ED 2309   11/06   Transferred out of [facility] 2W/SW 1541   11/11   Discharged 2736

## (undated) NOTE — LETTER
[facility], [place]  Authorization for Invasive Procedures  1.  I hereby authorize Dr. Caron Coulter , my physician and whomever may be designated as the doctor's assistant, to perform the following operation and/or procedure:  Right hip performed for the purposes of advancing medicine, science, and/or education, provided my identity is not revealed. If the procedure has been videotaped, the physician/surgeon will obtain the original videotape.  The hospital will not be responsible for stor My signature below affirms that prior to the time of the procedure, I have explained to the patient and/or his legal representative, the risks and benefits involved in the proposed treatment and any reasonable alternative to the proposed treatment.  I have

## (undated) NOTE — LETTER
[address], [place]  Authorization for Invasive Procedures  1.  I hereby authorize Dr. Werner Murray , my physician and whomever may be designated as the doctor's assistant, to perform the following operation and/or procedure:  Rig performed for the purposes of advancing medicine, science, and/or education, provided my identity is not revealed. If the procedure has been videotaped, the physician/surgeon will obtain the original videotape.  The hospital will not be responsible for stor My signature below affirms that prior to the time of the procedure, I have explained to the patient and/or his legal representative, the risks and benefits involved in the proposed treatment and any reasonable alternative to the proposed treatment.  I have

## (undated) NOTE — LETTER
[address], [place]  Authorization for Invasive Procedures  1.  I hereby authorize Dr. Wilber Chaudhary , my physician and whomever may be designated as the doctor's assistant, to perform the following operation and/or procedure:  [place] 5. I consent to the photographing of the operations or procedures to be performed for the purposes of advancing medicine, science, and/or education, provided my identity is not revealed.  If the procedure has been videotaped, the physician/surgeon will obta __________ Time: ___________    Statement of Physician  My signature below affirms that prior to the time of the procedure, I have explained to the patient and/or his legal representative, the risks and benefits involved in the proposed treatment and any r